# Patient Record
Sex: MALE | Race: WHITE | Employment: OTHER | URBAN - METROPOLITAN AREA
[De-identification: names, ages, dates, MRNs, and addresses within clinical notes are randomized per-mention and may not be internally consistent; named-entity substitution may affect disease eponyms.]

---

## 2018-12-19 ENCOUNTER — HOSPITAL ENCOUNTER (OUTPATIENT)
Dept: LAB | Age: 83
Discharge: HOME | End: 2018-12-19
Payer: MEDICARE

## 2018-12-19 LAB
APPEARANCE UR: ABNORMAL
BACTERIA URNS QL MICRO: ABNORMAL /HPF
BILIRUB UR QL: ABNORMAL
COLOR UR: ABNORMAL
EPITH CASTS URNS QL MICRO: ABNORMAL /LPF (ref 0–5)
GLUCOSE UR STRIP.AUTO-MCNC: 100 MG/DL
HGB UR QL STRIP: ABNORMAL
KETONES UR QL STRIP.AUTO: 40 MG/DL
LEUKOCYTE ESTERASE UR QL STRIP.AUTO: ABNORMAL
NITRITE UR QL STRIP.AUTO: NEGATIVE
PH UR STRIP: 8.5 [PH] (ref 5–8)
PROT UR STRIP-MCNC: >300 MG/DL
RBC #/AREA URNS HPF: ABNORMAL /HPF (ref 0–5)
SP GR UR REFRACTOMETRY: 1.01 (ref 1–1.03)
UROBILINOGEN UR QL STRIP.AUTO: 4 EU/DL (ref 0.2–1)
WBC URNS QL MICRO: ABNORMAL /HPF (ref 0–4)

## 2018-12-19 PROCEDURE — 81001 URINALYSIS AUTO W/SCOPE: CPT

## 2018-12-19 PROCEDURE — 87086 URINE CULTURE/COLONY COUNT: CPT

## 2018-12-21 LAB
BACTERIA SPEC CULT: NORMAL
SERVICE CMNT-IMP: NORMAL

## 2019-01-02 ENCOUNTER — HOSPITAL ENCOUNTER (OUTPATIENT)
Dept: CT IMAGING | Age: 84
Discharge: HOME | End: 2019-01-02
Attending: UROLOGY
Payer: MEDICARE

## 2019-01-02 DIAGNOSIS — R31.0 GROSS HEMATURIA: ICD-10-CM

## 2019-01-02 PROCEDURE — 74176 CT ABD & PELVIS W/O CONTRAST: CPT

## 2019-05-21 PROBLEM — C67.9 BLADDER CANCER (HCC): Status: ACTIVE | Noted: 2019-05-21

## 2021-08-06 ENCOUNTER — OFFICE VISIT (OUTPATIENT)
Dept: ORTHOPEDIC SURGERY | Age: 86
End: 2021-08-06
Payer: MEDICARE

## 2021-08-06 VITALS — BODY MASS INDEX: 32.49 KG/M2 | TEMPERATURE: 97.4 F | HEIGHT: 67 IN | WEIGHT: 207 LBS

## 2021-08-06 DIAGNOSIS — M19.032 PRIMARY OSTEOARTHRITIS OF LEFT WRIST: Primary | ICD-10-CM

## 2021-08-06 DIAGNOSIS — M10.9 ACUTE GOUT OF LEFT WRIST, UNSPECIFIED CAUSE: ICD-10-CM

## 2021-08-06 PROCEDURE — G8432 DEP SCR NOT DOC, RNG: HCPCS | Performed by: ORTHOPAEDIC SURGERY

## 2021-08-06 PROCEDURE — 20605 DRAIN/INJ JOINT/BURSA W/O US: CPT | Performed by: ORTHOPAEDIC SURGERY

## 2021-08-06 PROCEDURE — 1101F PT FALLS ASSESS-DOCD LE1/YR: CPT | Performed by: ORTHOPAEDIC SURGERY

## 2021-08-06 PROCEDURE — G8427 DOCREV CUR MEDS BY ELIG CLIN: HCPCS | Performed by: ORTHOPAEDIC SURGERY

## 2021-08-06 PROCEDURE — G8536 NO DOC ELDER MAL SCRN: HCPCS | Performed by: ORTHOPAEDIC SURGERY

## 2021-08-06 PROCEDURE — G8419 CALC BMI OUT NRM PARAM NOF/U: HCPCS | Performed by: ORTHOPAEDIC SURGERY

## 2021-08-06 PROCEDURE — 73110 X-RAY EXAM OF WRIST: CPT | Performed by: ORTHOPAEDIC SURGERY

## 2021-08-06 PROCEDURE — 99203 OFFICE O/P NEW LOW 30 MIN: CPT | Performed by: ORTHOPAEDIC SURGERY

## 2021-08-06 RX ORDER — PREDNISONE 10 MG/1
TABLET ORAL
COMMUNITY

## 2021-08-06 RX ORDER — MECLIZINE HYDROCHLORIDE 25 MG/1
TABLET ORAL
COMMUNITY

## 2021-08-06 RX ORDER — ALLOPURINOL 100 MG/1
TABLET ORAL DAILY
COMMUNITY

## 2021-08-06 NOTE — PROGRESS NOTES
Guanako Guido is a 80 y.o. male right handed retiree. Worker's Compensation and legal considerations: none filed. Vitals:    08/06/21 0901   Temp: 97.4 °F (36.3 °C)   Weight: 207 lb (93.9 kg)   Height: 5' 7\" (1.702 m)   PainSc:  10 - Worst pain ever           Chief Complaint   Patient presents with    Hand Pain     left    Wrist Pain     left         HPI: Patient presents today with complaints of left wrist pain as well as a pre-existing history of gout for which she takes allopurinol. Date of onset: July 2021    Injury: No    Prior Treatment:  No    Numbness/ Tingling: No      ROS: Review of Systems - General ROS: negative  Psychological ROS: negative  ENT ROS: negative  Allergy and Immunology ROS: negative  Hematological and Lymphatic ROS: negative  Respiratory ROS: no cough, shortness of breath, or wheezing  Cardiovascular ROS: no chest pain or dyspnea on exertion  Gastrointestinal ROS: no abdominal pain, change in bowel habits, or black or bloody stools  Musculoskeletal ROS: positive for - pain in wrist - left and swelling in wrist - left  Neurological ROS: negative  Dermatological ROS: negative    Past Medical History:   Diagnosis Date    Anemia     Atrial fibrillation (HCC)     CKD (chronic kidney disease), stage III (HCC)     Hypothyroidism     Renal insufficiency     Vitamin D deficiency        Past Surgical History:   Procedure Laterality Date    HX CARPAL TUNNEL RELEASE Left     HX CORONARY STENT PLACEMENT  12/04/2018    HX HIP REPLACEMENT      HX KNEE REPLACEMENT Left     HX ORTHOPAEDIC      hip replacement bilateral    HX TONSILLECTOMY      HX UROLOGICAL  05/14/2019    TURBT w postoperative intravesical instillation of gemcitabine. [address]       Current Outpatient Medications   Medication Sig Dispense Refill    predniSONE (DELTASONE) 10 mg tablet Take  by mouth daily (with breakfast).       meclizine (ANTIVERT) 25 mg tablet Take  by mouth three (3) times daily as needed for Dizziness.  allopurinoL (ZYLOPRIM) 100 mg tablet Take  by mouth daily.  apixaban (Eliquis) 2.5 mg tablet Take 2.5 mg by mouth two (2) times a day.  clopidogrel (PLAVIX) 75 mg tab       sotalol (BETAPACE) 80 mg tablet       amLODIPine (NORVASC) 10 mg tablet Take  by mouth daily.  atorvastatin (LIPITOR) 40 mg tablet 40 mg.      aspirin delayed-release 81 mg tablet 81 mg.      sodium bicarbonate 650 mg tablet       furosemide (LASIX) 20 mg tablet Take  by mouth daily.  levothyroxine (SYNTHROID) 100 mcg tablet Take 1 Tab by mouth Daily (before breakfast). 90 Tab 3    multivitamin (ONE A DAY) tablet Take 1 Tab by mouth daily. Current Facility-Administered Medications   Medication Dose Route Frequency Provider Last Rate Last Admin    triamcinolone acetonide (KENALOG) 10 mg/mL injection 5 mg  5 mg Intra artICUlar ONCE Oseas Jesus, DO           Allergies   Allergen Reactions    Codeine Nausea and Vomiting    Dilaudid [Hydromorphone] Nausea and Vomiting           PE:     Physical Exam  Vitals and nursing note reviewed. Constitutional:       General: He is not in acute distress. Appearance: Normal appearance. He is not ill-appearing. Cardiovascular:      Pulses: Normal pulses. Pulmonary:      Effort: Pulmonary effort is normal. No respiratory distress. Musculoskeletal:         General: Swelling and tenderness present. No deformity or signs of injury. Cervical back: Normal range of motion and neck supple. Right lower leg: No edema. Left lower leg: No edema. Skin:     General: Skin is warm and dry. Capillary Refill: Capillary refill takes less than 2 seconds. Findings: No bruising or erythema. Neurological:      General: No focal deficit present. Mental Status: He is alert and oriented to person, place, and time.    Psychiatric:         Mood and Affect: Mood normal.         Behavior: Behavior normal.            Left wrist: There is edema and tenderness to palpation over the dorsal radiocarpal joint. This is exacerbated with flexion and extension of the wrist.  There is also synovitis palpated. Neurovascularly intact distally. Imagin/6/2021 3 views of left wrist significant for moderate degenerative changes throughout the radiocarpal and midcarpal joints. ICD-10-CM ICD-9-CM    1. Primary osteoarthritis of left wrist  M19.032 715.13 AMB POC XRAY, WRIST; COMPLETE, 3+ VIE      triamcinolone acetonide (KENALOG) 10 mg/mL injection 5 mg      DRAIN/INJECT SMALL JOINT/BURSA      REFERRAL TO OCCUPATIONAL THERAPY   2. Acute gout of left wrist, unspecified cause  M10.9 274.01          Plan:     Left wrist joint injection and encourage patient to wear brace that he already has. Also encouraged him to continue taking the allopurinol daily. Follow-up and Dispositions    · Return if symptoms worsen or fail to improve. Plan was reviewed with patient, who verbalized agreement and understanding of the plan    [address] [place] [address] NOTE        Chart reviewed for the following:   Oseas REINOSO DO, have reviewed the History, Physical and updated the Allergic reactions for [address] performed immediately prior to start of procedure:   Oseas REINOSO DO, have performed the following reviews on Lux Ramsay prior to the start of the procedure:            * Patient was identified by name and date of birth   * Agreement on procedure being performed was verified  * Risks and Benefits explained to the patient  * Procedure site verified and marked as necessary  * Patient was positioned for comfort  * Consent was signed and verified     Time: 09:35 AM      Date of procedure: 2021    Procedure performed by:   Kirill Glynn DO    Provider assisted by: Kody Servin LPN    Patient assisted by: self    How tolerated by patient: tolerated the procedure well with no complications    Post Procedural Pain Scale: 0 - No Hurt    Comments: none    Procedure:  After consent was obtained, using sterile technique the left wrist joint was prepped. Local anesthetic used: 1% lidocaine. Kenalog 5 mg and was then injected and the needle withdrawn. The procedure was well tolerated. The patient is asked to continue to rest the area for a few more days before resuming regular activities. It may be more painful for the first 1-2 days. Watch for fever, or increased swelling or persistent pain in the joint. Call or return to clinic prn if such symptoms occur or there is failure to improve as anticipated.

## 2022-03-19 PROBLEM — C67.9 BLADDER CANCER (HCC): Status: ACTIVE | Noted: 2019-05-21

## 2023-09-18 ENCOUNTER — HOSPITAL ENCOUNTER (EMERGENCY)
Facility: HOSPITAL | Age: 88
Discharge: HOME | End: 2023-09-18
Attending: EMERGENCY MEDICINE
Payer: MEDICARE

## 2023-09-18 VITALS
DIASTOLIC BLOOD PRESSURE: 39 MMHG | OXYGEN SATURATION: 95 % | WEIGHT: 220 LBS | TEMPERATURE: 98.3 F | SYSTOLIC BLOOD PRESSURE: 115 MMHG | HEART RATE: 85 BPM | RESPIRATION RATE: 11 BRPM | BODY MASS INDEX: 34.46 KG/M2

## 2023-09-18 DIAGNOSIS — R33.8 ACUTE URINARY RETENTION: Primary | ICD-10-CM

## 2023-09-18 DIAGNOSIS — R31.0 GROSS HEMATURIA: ICD-10-CM

## 2023-09-18 LAB
ANION GAP SERPL CALC-SCNC: 9 MMOL/L (ref 3–18)
APPEARANCE UR: ABNORMAL
BACTERIA URNS QL MICRO: NEGATIVE /HPF
BASOPHILS # BLD: 0 K/UL (ref 0–0.1)
BASOPHILS NFR BLD: 0 % (ref 0–2)
BILIRUB UR QL: ABNORMAL
BUN SERPL-MCNC: 24 MG/DL (ref 7–18)
BUN/CREAT SERPL: 7 (ref 12–20)
CALCIUM SERPL-MCNC: 8.4 MG/DL (ref 8.5–10.1)
CHLORIDE SERPL-SCNC: 107 MMOL/L (ref 100–111)
CO2 SERPL-SCNC: 24 MMOL/L (ref 21–32)
COLOR UR: ABNORMAL
CREAT SERPL-MCNC: 3.55 MG/DL (ref 0.6–1.3)
DIFFERENTIAL METHOD BLD: ABNORMAL
EOSINOPHIL # BLD: 0.1 K/UL (ref 0–0.4)
EOSINOPHIL NFR BLD: 1 % (ref 0–5)
EPITH CASTS URNS QL MICRO: NEGATIVE /LPF (ref 0–5)
ERYTHROCYTE [DISTWIDTH] IN BLOOD BY AUTOMATED COUNT: 22.9 % (ref 11.6–14.5)
GLUCOSE SERPL-MCNC: 119 MG/DL (ref 74–99)
GLUCOSE UR STRIP.AUTO-MCNC: NEGATIVE MG/DL
HCT VFR BLD AUTO: 24.6 % (ref 36–48)
HGB BLD-MCNC: 8.1 G/DL (ref 13–16)
HGB UR QL STRIP: ABNORMAL
IMM GRANULOCYTES # BLD AUTO: 0 K/UL (ref 0–0.04)
IMM GRANULOCYTES NFR BLD AUTO: 0 % (ref 0–0.5)
KETONES UR QL STRIP.AUTO: NEGATIVE MG/DL
LEUKOCYTE ESTERASE UR QL STRIP.AUTO: ABNORMAL
LYMPHOCYTES # BLD: 4.8 K/UL (ref 0.9–3.6)
LYMPHOCYTES NFR BLD: 39 % (ref 21–52)
MCH RBC QN AUTO: 34.2 PG (ref 24–34)
MCHC RBC AUTO-ENTMCNC: 32.9 G/DL (ref 31–37)
MCV RBC AUTO: 103.8 FL (ref 78–100)
MONOCYTES # BLD: 0.6 K/UL (ref 0.05–1.2)
MONOCYTES NFR BLD: 5 % (ref 3–10)
NEUTS BAND NFR BLD MANUAL: 2 %
NEUTS SEG # BLD: 6.8 K/UL (ref 1.8–8)
NEUTS SEG NFR BLD: 53 % (ref 40–73)
NITRITE UR QL STRIP.AUTO: NEGATIVE
NRBC # BLD: 0 K/UL (ref 0–0.01)
NRBC BLD-RTO: 0 PER 100 WBC
PH UR STRIP: 7 (ref 5–8)
PLATELET # BLD AUTO: 505 K/UL (ref 135–420)
PLATELET COMMENT: ABNORMAL
PMV BLD AUTO: 10.5 FL (ref 9.2–11.8)
POTASSIUM SERPL-SCNC: 4.3 MMOL/L (ref 3.5–5.5)
PROT UR STRIP-MCNC: >1000 MG/DL
RBC # BLD AUTO: 2.37 M/UL (ref 4.35–5.65)
RBC #/AREA URNS HPF: NORMAL /HPF (ref 0–5)
RBC MORPH BLD: ABNORMAL
RBC MORPH BLD: ABNORMAL
SODIUM SERPL-SCNC: 140 MMOL/L (ref 136–145)
SP GR UR REFRACTOMETRY: 1.02 (ref 1–1.03)
UROBILINOGEN UR QL STRIP.AUTO: 1 EU/DL (ref 0.2–1)
WBC # BLD AUTO: 12.3 K/UL (ref 4.6–13.2)
WBC URNS QL MICRO: NEGATIVE /HPF (ref 0–4)

## 2023-09-18 PROCEDURE — 99284 EMERGENCY DEPT VISIT MOD MDM: CPT

## 2023-09-18 PROCEDURE — 81001 URINALYSIS AUTO W/SCOPE: CPT

## 2023-09-18 PROCEDURE — 51700 IRRIGATION OF BLADDER: CPT

## 2023-09-18 PROCEDURE — 80048 BASIC METABOLIC PNL TOTAL CA: CPT

## 2023-09-18 PROCEDURE — 51798 US URINE CAPACITY MEASURE: CPT

## 2023-09-18 PROCEDURE — 96374 THER/PROPH/DIAG INJ IV PUSH: CPT

## 2023-09-18 PROCEDURE — 6360000002 HC RX W HCPCS: Performed by: EMERGENCY MEDICINE

## 2023-09-18 PROCEDURE — 6370000000 HC RX 637 (ALT 250 FOR IP): Performed by: EMERGENCY MEDICINE

## 2023-09-18 PROCEDURE — 87086 URINE CULTURE/COLONY COUNT: CPT

## 2023-09-18 PROCEDURE — 85025 COMPLETE CBC W/AUTO DIFF WBC: CPT

## 2023-09-18 RX ORDER — LIDOCAINE HYDROCHLORIDE 20 MG/ML
JELLY TOPICAL
Status: COMPLETED | OUTPATIENT
Start: 2023-09-18 | End: 2023-09-18

## 2023-09-18 RX ORDER — FENTANYL CITRATE 50 UG/ML
25 INJECTION, SOLUTION INTRAMUSCULAR; INTRAVENOUS ONCE
Status: COMPLETED | OUTPATIENT
Start: 2023-09-18 | End: 2023-09-18

## 2023-09-18 RX ORDER — MORPHINE SULFATE 4 MG/ML
4 INJECTION, SOLUTION INTRAMUSCULAR; INTRAVENOUS
Status: DISCONTINUED | OUTPATIENT
Start: 2023-09-18 | End: 2023-09-18

## 2023-09-18 RX ORDER — MIDODRINE HYDROCHLORIDE 5 MG/1
10 TABLET ORAL ONCE
Status: COMPLETED | OUTPATIENT
Start: 2023-09-18 | End: 2023-09-18

## 2023-09-18 RX ADMIN — LIDOCAINE HYDROCHLORIDE: 20 JELLY TOPICAL at 04:15

## 2023-09-18 RX ADMIN — MIDODRINE HYDROCHLORIDE 10 MG: 5 TABLET ORAL at 05:12

## 2023-09-18 RX ADMIN — FENTANYL CITRATE 25 MCG: 50 INJECTION INTRAMUSCULAR; INTRAVENOUS at 04:20

## 2023-09-18 ASSESSMENT — PAIN DESCRIPTION - ORIENTATION: ORIENTATION: LOWER

## 2023-09-18 ASSESSMENT — PAIN DESCRIPTION - LOCATION: LOCATION: ABDOMEN

## 2023-09-18 ASSESSMENT — PAIN SCALES - GENERAL: PAINLEVEL_OUTOF10: 10

## 2023-09-18 ASSESSMENT — PAIN DESCRIPTION - DESCRIPTORS: DESCRIPTORS: CRAMPING;POUNDING

## 2023-09-18 NOTE — ED NOTES
Existing hebert removed. New hebert placed. Pt with 800ml of bloody urine out.   New hebert is draining without difficulty     Fadi Wilkerson RN  09/18/23 9960

## 2023-09-18 NOTE — ED NOTES
Pt hebert connected to leg bag. Pt given discharge instructions. PIV removed.   RAMOS Rondon RN  09/18/23 4265

## 2023-09-18 NOTE — ED PROVIDER NOTES
breakfast)      sodium polystyrene (KAYEXALATE) 15 GM/60ML suspension TAKE 60 MLS BY MOUTH TWICE WEEKLY         Past History     Past Medical History:  Past Medical History:   Diagnosis Date    Anemia     Arthritis     Atrial fibrillation ([address])     Broken leg 09/2022    CHF exacerbation ([address]) 01/08/2021    CKD (chronic kidney disease), stage III ([address])     Essential hypertension 01/18/2016    Exercise tolerance finding 10/2020    WORKS 8HRS/DAY NO CP OR SOB ON EXERTION    Exercise tolerance finding 05/2019    sob when up 2 flights of stairs no cp    Gout     Hay fever     Hearing impaired     History of pneumonia     Hyperkalemia 10/2020    Hypothyroidism     Leukocytosis     Renal insufficiency     Varicella     Vertigo     Vitamin D deficiency        Past Surgical History:  Past Surgical History:   Procedure Laterality Date    BLADDER SURGERY  04/12/2023    CARPAL TUNNEL RELEASE Left     COLOSTOMY  04/11/2023    CORONARY ANGIOPLASTY WITH STENT PLACEMENT  12/04/2018    ORTHOPEDIC SURGERY      hip replacement bilateral    OTHER SURGICAL HISTORY  04/10/2023    Blood clot removal of  the bladder    TONSILLECTOMY      TOTAL HIP ARTHROPLASTY      TOTAL KNEE ARTHROPLASTY Left     UROLOGICAL SURGERY  05/14/2019    TURBT w postoperative intravesical instillation of gemcitabine. [address] [facility]  Dr Fer Graham       Family History:  Family History   Problem Relation Age of Onset    Osteoarthritis Other     Heart Disease Father     Diabetes Sister     Hypertension Sister     Diabetes Brother     Cancer Brother        Social History:  Social History     Tobacco Use    Smoking status: Never    Smokeless tobacco: Never   Substance Use Topics    Alcohol use: No       Allergies:   Allergies   Allergen Reactions    Codeine Nausea And Vomiting    Hydromorphone Nausea And Vomiting    Lorazepam      Other reaction(s): psychological reaction  COMBATIVE         Physical Exam     General: Patient is awake and alert, appears uncomfortable, moaning in

## 2023-09-18 NOTE — ED TRIAGE NOTES
Pt presents via medic c/o no urine output from hebert for the last 4 hours.   Pt has indwelling 3 way hebert on arrival.  VSS

## 2023-09-19 LAB
BACTERIA SPEC CULT: NORMAL
SERVICE CMNT-IMP: NORMAL

## 2023-09-21 PROBLEM — I48.0 HYPERCOAGULABLE STATE DUE TO PAROXYSMAL ATRIAL FIBRILLATION (HCC): Status: ACTIVE | Noted: 2023-09-21

## 2023-09-21 PROBLEM — E78.00 PURE HYPERCHOLESTEROLEMIA: Status: ACTIVE | Noted: 2019-01-10

## 2023-09-21 PROBLEM — M62.81 MUSCLE WEAKNESS (GENERALIZED): Status: ACTIVE | Noted: 2023-08-23

## 2023-09-21 PROBLEM — I12.9 HYPERTENSIVE CHRONIC KIDNEY DISEASE WITH STAGE 1 THROUGH STAGE 4 CHRONIC KIDNEY DISEASE, OR UNSPECIFIED CHRONIC KIDNEY DISEASE: Status: ACTIVE | Noted: 2019-11-19

## 2023-09-21 PROBLEM — N25.81 SECONDARY HYPERPARATHYROIDISM OF RENAL ORIGIN (HCC): Status: ACTIVE | Noted: 2023-05-24

## 2023-09-21 PROBLEM — E55.9 VITAMIN D DEFICIENCY: Status: ACTIVE | Noted: 2017-10-16

## 2023-09-21 PROBLEM — T38.0X5A STEROID-INDUCED HYPERGLYCEMIA: Status: ACTIVE | Noted: 2023-07-31

## 2023-09-21 PROBLEM — D49.4 NEOPLASM OF BLADDER: Status: ACTIVE | Noted: 2019-05-21

## 2023-09-21 PROBLEM — K21.9 GASTRO-ESOPHAGEAL REFLUX DISEASE WITHOUT ESOPHAGITIS: Status: ACTIVE | Noted: 2023-08-23

## 2023-09-21 PROBLEM — Q61.02 MULTIPLE RENAL CYSTS: Status: ACTIVE | Noted: 2019-11-19

## 2023-09-21 PROBLEM — Z95.5 S/P RIGHT CORONARY ARTERY (RCA) STENT PLACEMENT: Status: ACTIVE | Noted: 2018-12-05

## 2023-09-21 PROBLEM — J47.9 BRONCHIECTASIS WITHOUT COMPLICATION (HCC): Status: ACTIVE | Noted: 2022-05-23

## 2023-09-21 PROBLEM — I48.0 PAF (PAROXYSMAL ATRIAL FIBRILLATION) (HCC): Status: ACTIVE | Noted: 2019-01-10

## 2023-09-21 PROBLEM — D68.69 HYPERCOAGULABLE STATE DUE TO PAROXYSMAL ATRIAL FIBRILLATION (HCC): Status: ACTIVE | Noted: 2023-09-21

## 2023-09-21 PROBLEM — Z93.3 COLOSTOMY PRESENT (HCC): Status: ACTIVE | Noted: 2023-09-20

## 2023-09-21 PROBLEM — Z97.8 INDWELLING FOLEY CATHETER PRESENT: Status: ACTIVE | Noted: 2023-09-20

## 2023-09-21 PROBLEM — Z99.2 END-STAGE RENAL DISEASE ON HEMODIALYSIS (HCC): Status: ACTIVE | Noted: 2023-08-12

## 2023-09-21 PROBLEM — G93.41 METABOLIC ENCEPHALOPATHY: Status: ACTIVE | Noted: 2023-08-23

## 2023-09-21 PROBLEM — I63.9 CEREBRAL INFARCTION, UNSPECIFIED (HCC): Status: ACTIVE | Noted: 2023-08-23

## 2023-09-21 PROBLEM — M1A.39X0 CHRONIC GOUT DUE TO RENAL IMPAIRMENT OF MULTIPLE SITES WITHOUT TOPHUS: Status: ACTIVE | Noted: 2021-10-28

## 2023-09-21 PROBLEM — M97.11XA PERIPROSTHETIC FRACTURE AROUND INTERNAL PROSTHETIC RIGHT KNEE JOINT: Status: ACTIVE | Noted: 2022-09-07

## 2023-09-21 PROBLEM — I27.20 PULMONARY HYPERTENSION, UNSPECIFIED (HCC): Status: ACTIVE | Noted: 2023-02-01

## 2023-09-21 PROBLEM — C80.1 PRIMARY MALIGNANT NEOPLASM (HCC): Status: ACTIVE | Noted: 2019-12-17

## 2023-09-21 PROBLEM — R73.9 STEROID-INDUCED HYPERGLYCEMIA: Status: ACTIVE | Noted: 2023-07-31

## 2023-09-21 PROBLEM — I77.810 THORACIC AORTIC ECTASIA (HCC): Status: ACTIVE | Noted: 2023-05-24

## 2023-09-21 PROBLEM — I25.5 ISCHEMIC CARDIOMYOPATHY: Status: ACTIVE | Noted: 2023-08-12

## 2023-09-21 PROBLEM — I25.10 CORONARY ARTERIOSCLEROSIS: Status: ACTIVE | Noted: 2019-04-09

## 2023-09-21 PROBLEM — N13.9 OBSTRUCTIVE AND REFLUX UROPATHY, UNSPECIFIED: Status: ACTIVE | Noted: 2023-08-23

## 2023-09-21 PROBLEM — D89.89 OTHER SPECIFIED DISORDERS INVOLVING THE IMMUNE MECHANISM, NOT ELSEWHERE CLASSIFIED (HCC): Status: ACTIVE | Noted: 2023-08-23

## 2023-09-21 PROBLEM — E78.5 HYPERLIPIDEMIA, UNSPECIFIED: Status: ACTIVE | Noted: 2023-08-23

## 2023-09-21 PROBLEM — N18.6 END-STAGE RENAL DISEASE ON HEMODIALYSIS (HCC): Status: ACTIVE | Noted: 2023-08-12

## 2023-09-21 PROBLEM — H91.90 HEARING IMPAIRED: Status: ACTIVE | Noted: 2023-09-21

## 2023-09-21 PROBLEM — E46 PROTEIN-CALORIE MALNUTRITION (HCC): Status: ACTIVE | Noted: 2023-08-23

## 2023-09-21 PROBLEM — R26.9 UNSPECIFIED ABNORMALITIES OF GAIT AND MOBILITY: Status: ACTIVE | Noted: 2023-08-23

## 2023-09-28 ENCOUNTER — CLINICAL DOCUMENTATION (OUTPATIENT)
Age: 88
End: 2023-09-28

## 2023-09-28 NOTE — PROGRESS NOTES
Received referral from [facility], spoke to [employer] ([phone]) sister in law stated that patient refuse access on his arm. He is happy where it is at. Fax info to the facility. Document scanned.

## 2023-10-09 ENCOUNTER — TRANSCRIBE ORDERS (OUTPATIENT)
Facility: HOSPITAL | Age: 88
End: 2023-10-09

## 2023-10-09 DIAGNOSIS — C67.9 MALIGNANT NEOPLASM OF URINARY BLADDER, UNSPECIFIED SITE (HCC): Primary | ICD-10-CM

## 2023-10-21 ENCOUNTER — APPOINTMENT (OUTPATIENT)
Facility: HOSPITAL | Age: 88
End: 2023-10-21
Payer: MEDICARE

## 2023-10-21 ENCOUNTER — HOSPITAL ENCOUNTER (INPATIENT)
Facility: HOSPITAL | Age: 88
LOS: 35 days | Discharge: NURSING, REHAB OR LONG-TERM CARE | End: 2023-11-25
Attending: STUDENT IN AN ORGANIZED HEALTH CARE EDUCATION/TRAINING PROGRAM | Admitting: STUDENT IN AN ORGANIZED HEALTH CARE EDUCATION/TRAINING PROGRAM
Payer: MEDICARE

## 2023-10-21 DIAGNOSIS — R09.02 HYPOXIA: ICD-10-CM

## 2023-10-21 DIAGNOSIS — R53.81 DEBILITY: ICD-10-CM

## 2023-10-21 DIAGNOSIS — Z71.89 GOALS OF CARE, COUNSELING/DISCUSSION: Primary | ICD-10-CM

## 2023-10-21 DIAGNOSIS — R19.5 OCCULT BLOOD POSITIVE STOOL: ICD-10-CM

## 2023-10-21 DIAGNOSIS — T83.511A URINARY TRACT INFECTION ASSOCIATED WITH INDWELLING URETHRAL CATHETER, INITIAL ENCOUNTER (HCC): ICD-10-CM

## 2023-10-21 DIAGNOSIS — D72.829 LEUKOCYTOSIS, UNSPECIFIED TYPE: ICD-10-CM

## 2023-10-21 DIAGNOSIS — R10.9 ABDOMINAL PAIN: ICD-10-CM

## 2023-10-21 DIAGNOSIS — R60.0 EDEMA OF RIGHT UPPER EXTREMITY: ICD-10-CM

## 2023-10-21 DIAGNOSIS — N39.0 URINARY TRACT INFECTION ASSOCIATED WITH INDWELLING URETHRAL CATHETER, INITIAL ENCOUNTER (HCC): ICD-10-CM

## 2023-10-21 DIAGNOSIS — A04.72 C. DIFFICILE COLITIS: ICD-10-CM

## 2023-10-21 PROBLEM — A41.9 SEVERE SEPSIS (HCC): Status: ACTIVE | Noted: 2023-08-23

## 2023-10-21 PROBLEM — R65.20 SEVERE SEPSIS (HCC): Status: ACTIVE | Noted: 2023-08-23

## 2023-10-21 PROBLEM — N30.90 CYSTITIS: Status: ACTIVE | Noted: 2023-10-21

## 2023-10-21 PROBLEM — J96.01 ACUTE RESPIRATORY FAILURE WITH HYPOXIA (HCC): Status: ACTIVE | Noted: 2023-10-21

## 2023-10-21 PROBLEM — E87.6 HYPOKALEMIA: Status: ACTIVE | Noted: 2023-10-21

## 2023-10-21 LAB
ALBUMIN SERPL-MCNC: 2.1 G/DL (ref 3.4–5)
ALBUMIN/GLOB SERPL: 0.8 (ref 0.8–1.7)
ALP SERPL-CCNC: 119 U/L (ref 45–117)
ALT SERPL-CCNC: 17 U/L (ref 16–61)
AMORPH CRY URNS QL MICRO: ABNORMAL
ANION GAP SERPL CALC-SCNC: 5 MMOL/L (ref 3–18)
APPEARANCE UR: ABNORMAL
AST SERPL-CCNC: 21 U/L (ref 10–38)
B PERT DNA SPEC QL NAA+PROBE: NOT DETECTED
BACTERIA URNS QL MICRO: ABNORMAL /HPF
BASOPHILS # BLD: 0.2 K/UL (ref 0–0.1)
BASOPHILS NFR BLD: 1 % (ref 0–2)
BILIRUB SERPL-MCNC: 1.2 MG/DL (ref 0.2–1)
BILIRUB UR QL: ABNORMAL
BORDETELLA PARAPERTUSSIS BY PCR: NOT DETECTED
BUN SERPL-MCNC: 14 MG/DL (ref 7–18)
BUN/CREAT SERPL: 4 (ref 12–20)
C PNEUM DNA SPEC QL NAA+PROBE: NOT DETECTED
CALCIUM SERPL-MCNC: 7.5 MG/DL (ref 8.5–10.1)
CHLORIDE SERPL-SCNC: 103 MMOL/L (ref 100–111)
CO2 SERPL-SCNC: 30 MMOL/L (ref 21–32)
COLOR UR: ABNORMAL
CREAT SERPL-MCNC: 3.67 MG/DL (ref 0.6–1.3)
DIFFERENTIAL METHOD BLD: ABNORMAL
EOSINOPHIL # BLD: 0 K/UL (ref 0–0.4)
EOSINOPHIL NFR BLD: 0 % (ref 0–5)
EPITH CASTS URNS QL MICRO: ABNORMAL /LPF (ref 0–5)
ERYTHROCYTE [DISTWIDTH] IN BLOOD BY AUTOMATED COUNT: 21.3 % (ref 11.6–14.5)
FLUAV SUBTYP SPEC NAA+PROBE: NOT DETECTED
FLUBV RNA SPEC QL NAA+PROBE: NOT DETECTED
GLOBULIN SER CALC-MCNC: 2.7 G/DL (ref 2–4)
GLUCOSE SERPL-MCNC: 128 MG/DL (ref 74–99)
GLUCOSE UR STRIP.AUTO-MCNC: NEGATIVE MG/DL
HADV DNA SPEC QL NAA+PROBE: NOT DETECTED
HCOV 229E RNA SPEC QL NAA+PROBE: NOT DETECTED
HCOV HKU1 RNA SPEC QL NAA+PROBE: NOT DETECTED
HCOV NL63 RNA SPEC QL NAA+PROBE: NOT DETECTED
HCOV OC43 RNA SPEC QL NAA+PROBE: NOT DETECTED
HCT VFR BLD AUTO: 27.2 % (ref 36–48)
HGB BLD-MCNC: 8.9 G/DL (ref 13–16)
HGB UR QL STRIP: ABNORMAL
HMPV RNA SPEC QL NAA+PROBE: NOT DETECTED
HPIV1 RNA SPEC QL NAA+PROBE: NOT DETECTED
HPIV2 RNA SPEC QL NAA+PROBE: NOT DETECTED
HPIV3 RNA SPEC QL NAA+PROBE: NOT DETECTED
HPIV4 RNA SPEC QL NAA+PROBE: NOT DETECTED
IMM GRANULOCYTES # BLD AUTO: 0 K/UL (ref 0–0.04)
IMM GRANULOCYTES NFR BLD AUTO: 0 % (ref 0–0.5)
KETONES UR QL STRIP.AUTO: NEGATIVE MG/DL
LACTATE SERPL-SCNC: 2 MMOL/L (ref 0.4–2)
LEUKOCYTE ESTERASE UR QL STRIP.AUTO: ABNORMAL
LIPASE SERPL-CCNC: 32 U/L (ref 73–393)
LYMPHOCYTES # BLD: 2.2 K/UL (ref 0.9–3.6)
LYMPHOCYTES NFR BLD: 9 % (ref 21–52)
M PNEUMO DNA SPEC QL NAA+PROBE: NOT DETECTED
MCH RBC QN AUTO: 35.3 PG (ref 24–34)
MCHC RBC AUTO-ENTMCNC: 32.7 G/DL (ref 31–37)
MCV RBC AUTO: 107.9 FL (ref 78–100)
MONOCYTES # BLD: 0 K/UL (ref 0.05–1.2)
MONOCYTES NFR BLD: 0 % (ref 3–10)
NEUTS SEG # BLD: 22.3 K/UL (ref 1.8–8)
NEUTS SEG NFR BLD: 90 % (ref 40–73)
NITRITE UR QL STRIP.AUTO: NEGATIVE
NRBC # BLD: 0.02 K/UL (ref 0–0.01)
NRBC BLD-RTO: 0.1 PER 100 WBC
NT PRO BNP: 9032 PG/ML (ref 0–1800)
PH UR STRIP: 6 (ref 5–8)
PLATELET # BLD AUTO: 438 K/UL (ref 135–420)
PLATELET COMMENT: ABNORMAL
PMV BLD AUTO: 10.4 FL (ref 9.2–11.8)
POTASSIUM SERPL-SCNC: 3.3 MMOL/L (ref 3.5–5.5)
PROT SERPL-MCNC: 4.8 G/DL (ref 6.4–8.2)
PROT UR STRIP-MCNC: 300 MG/DL
RBC # BLD AUTO: 2.52 M/UL (ref 4.35–5.65)
RBC #/AREA URNS HPF: ABNORMAL /HPF (ref 0–5)
RBC MORPH BLD: ABNORMAL
RSV RNA SPEC QL NAA+PROBE: NOT DETECTED
RV+EV RNA SPEC QL NAA+PROBE: NOT DETECTED
SARS-COV-2 RNA RESP QL NAA+PROBE: NOT DETECTED
SODIUM SERPL-SCNC: 138 MMOL/L (ref 136–145)
SP GR UR REFRACTOMETRY: >1.03 (ref 1–1.03)
TROPONIN I SERPL HS-MCNC: 54 NG/L (ref 0–78)
UROBILINOGEN UR QL STRIP.AUTO: 1 EU/DL (ref 0.2–1)
WBC # BLD AUTO: 24.7 K/UL (ref 4.6–13.2)
WBC URNS QL MICRO: ABNORMAL /HPF (ref 0–4)

## 2023-10-21 PROCEDURE — 96365 THER/PROPH/DIAG IV INF INIT: CPT

## 2023-10-21 PROCEDURE — 87040 BLOOD CULTURE FOR BACTERIA: CPT

## 2023-10-21 PROCEDURE — 99285 EMERGENCY DEPT VISIT HI MDM: CPT

## 2023-10-21 PROCEDURE — 71275 CT ANGIOGRAPHY CHEST: CPT

## 2023-10-21 PROCEDURE — 6370000000 HC RX 637 (ALT 250 FOR IP): Performed by: PHYSICIAN ASSISTANT

## 2023-10-21 PROCEDURE — 84484 ASSAY OF TROPONIN QUANT: CPT

## 2023-10-21 PROCEDURE — 80053 COMPREHEN METABOLIC PANEL: CPT

## 2023-10-21 PROCEDURE — 2580000003 HC RX 258: Performed by: STUDENT IN AN ORGANIZED HEALTH CARE EDUCATION/TRAINING PROGRAM

## 2023-10-21 PROCEDURE — 87086 URINE CULTURE/COLONY COUNT: CPT

## 2023-10-21 PROCEDURE — 83605 ASSAY OF LACTIC ACID: CPT

## 2023-10-21 PROCEDURE — 87077 CULTURE AEROBIC IDENTIFY: CPT

## 2023-10-21 PROCEDURE — 87186 SC STD MICRODIL/AGAR DIL: CPT

## 2023-10-21 PROCEDURE — 83880 ASSAY OF NATRIURETIC PEPTIDE: CPT

## 2023-10-21 PROCEDURE — 6360000004 HC RX CONTRAST MEDICATION: Performed by: PHYSICIAN ASSISTANT

## 2023-10-21 PROCEDURE — 71045 X-RAY EXAM CHEST 1 VIEW: CPT

## 2023-10-21 PROCEDURE — 1100000000 HC RM PRIVATE

## 2023-10-21 PROCEDURE — 99223 1ST HOSP IP/OBS HIGH 75: CPT | Performed by: STUDENT IN AN ORGANIZED HEALTH CARE EDUCATION/TRAINING PROGRAM

## 2023-10-21 PROCEDURE — 2580000003 HC RX 258: Performed by: PHYSICIAN ASSISTANT

## 2023-10-21 PROCEDURE — 85025 COMPLETE CBC W/AUTO DIFF WBC: CPT

## 2023-10-21 PROCEDURE — 96375 TX/PRO/DX INJ NEW DRUG ADDON: CPT

## 2023-10-21 PROCEDURE — 0202U NFCT DS 22 TRGT SARS-COV-2: CPT

## 2023-10-21 PROCEDURE — 74177 CT ABD & PELVIS W/CONTRAST: CPT

## 2023-10-21 PROCEDURE — 93005 ELECTROCARDIOGRAM TRACING: CPT | Performed by: PHYSICIAN ASSISTANT

## 2023-10-21 PROCEDURE — 96367 TX/PROPH/DG ADDL SEQ IV INF: CPT

## 2023-10-21 PROCEDURE — 6370000000 HC RX 637 (ALT 250 FOR IP): Performed by: STUDENT IN AN ORGANIZED HEALTH CARE EDUCATION/TRAINING PROGRAM

## 2023-10-21 PROCEDURE — 83690 ASSAY OF LIPASE: CPT

## 2023-10-21 PROCEDURE — 81001 URINALYSIS AUTO W/SCOPE: CPT

## 2023-10-21 PROCEDURE — 6360000002 HC RX W HCPCS: Performed by: PHYSICIAN ASSISTANT

## 2023-10-21 RX ORDER — SODIUM CHLORIDE 0.9 % (FLUSH) 0.9 %
5-40 SYRINGE (ML) INJECTION PRN
Status: DISCONTINUED | OUTPATIENT
Start: 2023-10-21 | End: 2023-11-25 | Stop reason: HOSPADM

## 2023-10-21 RX ORDER — MIDODRINE HYDROCHLORIDE 10 MG/1
10 TABLET ORAL
Status: DISCONTINUED | OUTPATIENT
Start: 2023-10-22 | End: 2023-11-05

## 2023-10-21 RX ORDER — HEPARIN SODIUM 5000 [USP'U]/ML
5000 INJECTION, SOLUTION INTRAVENOUS; SUBCUTANEOUS EVERY 8 HOURS SCHEDULED
Status: DISCONTINUED | OUTPATIENT
Start: 2023-10-21 | End: 2023-11-25 | Stop reason: HOSPADM

## 2023-10-21 RX ORDER — POTASSIUM CHLORIDE 7.45 MG/ML
10 INJECTION INTRAVENOUS
Status: COMPLETED | OUTPATIENT
Start: 2023-10-22 | End: 2023-10-22

## 2023-10-21 RX ORDER — SODIUM CHLORIDE 0.9 % (FLUSH) 0.9 %
5-40 SYRINGE (ML) INJECTION EVERY 12 HOURS SCHEDULED
Status: DISCONTINUED | OUTPATIENT
Start: 2023-10-21 | End: 2023-11-25 | Stop reason: HOSPADM

## 2023-10-21 RX ORDER — IODIXANOL 320 MG/ML
100 INJECTION, SOLUTION INTRAVASCULAR
Status: COMPLETED | OUTPATIENT
Start: 2023-10-21 | End: 2023-10-21

## 2023-10-21 RX ORDER — ONDANSETRON 2 MG/ML
4 INJECTION INTRAMUSCULAR; INTRAVENOUS
Status: COMPLETED | OUTPATIENT
Start: 2023-10-21 | End: 2023-10-21

## 2023-10-21 RX ORDER — ONDANSETRON 2 MG/ML
4 INJECTION INTRAMUSCULAR; INTRAVENOUS EVERY 6 HOURS PRN
Status: DISCONTINUED | OUTPATIENT
Start: 2023-10-21 | End: 2023-11-25 | Stop reason: HOSPADM

## 2023-10-21 RX ORDER — ACETAMINOPHEN 325 MG/1
650 TABLET ORAL
Status: COMPLETED | OUTPATIENT
Start: 2023-10-21 | End: 2023-10-21

## 2023-10-21 RX ORDER — ONDANSETRON 4 MG/1
4 TABLET, ORALLY DISINTEGRATING ORAL EVERY 8 HOURS PRN
Status: DISCONTINUED | OUTPATIENT
Start: 2023-10-21 | End: 2023-11-25 | Stop reason: HOSPADM

## 2023-10-21 RX ORDER — AMIODARONE HYDROCHLORIDE 200 MG/1
200 TABLET ORAL DAILY
Status: DISCONTINUED | OUTPATIENT
Start: 2023-10-22 | End: 2023-11-25 | Stop reason: HOSPADM

## 2023-10-21 RX ORDER — POLYETHYLENE GLYCOL 3350 17 G/17G
17 POWDER, FOR SOLUTION ORAL DAILY PRN
Status: DISCONTINUED | OUTPATIENT
Start: 2023-10-21 | End: 2023-11-05

## 2023-10-21 RX ORDER — ALBUMIN (HUMAN) 12.5 G/50ML
25 SOLUTION INTRAVENOUS ONCE
Status: COMPLETED | OUTPATIENT
Start: 2023-10-21 | End: 2023-10-22

## 2023-10-21 RX ORDER — ACETAMINOPHEN 325 MG/1
650 TABLET ORAL EVERY 6 HOURS PRN
Status: DISCONTINUED | OUTPATIENT
Start: 2023-10-21 | End: 2023-10-23

## 2023-10-21 RX ORDER — MIDODRINE HYDROCHLORIDE 5 MG/1
20 TABLET ORAL
Status: COMPLETED | OUTPATIENT
Start: 2023-10-21 | End: 2023-10-21

## 2023-10-21 RX ORDER — LEVOTHYROXINE SODIUM 0.12 MG/1
125 TABLET ORAL DAILY
Status: DISCONTINUED | OUTPATIENT
Start: 2023-10-22 | End: 2023-11-25 | Stop reason: HOSPADM

## 2023-10-21 RX ORDER — CARVEDILOL 3.12 MG/1
3.12 TABLET ORAL 2 TIMES DAILY
Status: DISCONTINUED | OUTPATIENT
Start: 2023-10-22 | End: 2023-11-05

## 2023-10-21 RX ORDER — ACETAMINOPHEN 650 MG/1
650 SUPPOSITORY RECTAL EVERY 6 HOURS PRN
Status: DISCONTINUED | OUTPATIENT
Start: 2023-10-21 | End: 2023-10-23

## 2023-10-21 RX ORDER — SODIUM CHLORIDE 9 MG/ML
INJECTION, SOLUTION INTRAVENOUS PRN
Status: DISCONTINUED | OUTPATIENT
Start: 2023-10-21 | End: 2023-11-25 | Stop reason: HOSPADM

## 2023-10-21 RX ADMIN — VANCOMYCIN HYDROCHLORIDE 1000 MG: 1 INJECTION, POWDER, LYOPHILIZED, FOR SOLUTION INTRAVENOUS at 18:19

## 2023-10-21 RX ADMIN — POTASSIUM BICARBONATE 40 MEQ: 782 TABLET, EFFERVESCENT ORAL at 21:31

## 2023-10-21 RX ADMIN — ACETAMINOPHEN 325MG 650 MG: 325 TABLET ORAL at 16:29

## 2023-10-21 RX ADMIN — ONDANSETRON 4 MG: 2 INJECTION INTRAMUSCULAR; INTRAVENOUS at 19:10

## 2023-10-21 RX ADMIN — MIDODRINE HYDROCHLORIDE 20 MG: 5 TABLET ORAL at 21:40

## 2023-10-21 RX ADMIN — VANCOMYCIN HYDROCHLORIDE 1000 MG: 1 INJECTION, POWDER, LYOPHILIZED, FOR SOLUTION INTRAVENOUS at 16:55

## 2023-10-21 RX ADMIN — IODIXANOL 100 ML: 320 INJECTION, SOLUTION INTRAVASCULAR at 20:21

## 2023-10-21 RX ADMIN — PIPERACILLIN AND TAZOBACTAM 4500 MG: 4; .5 INJECTION, POWDER, LYOPHILIZED, FOR SOLUTION INTRAVENOUS at 16:36

## 2023-10-21 RX ADMIN — SODIUM CHLORIDE, PRESERVATIVE FREE 10 ML: 5 INJECTION INTRAVENOUS at 23:17

## 2023-10-21 ASSESSMENT — ENCOUNTER SYMPTOMS
RHINORRHEA: 0
EYE REDNESS: 0
STRIDOR: 0
WHEEZING: 0
SHORTNESS OF BREATH: 0
ABDOMINAL PAIN: 0
NAUSEA: 0
VOMITING: 0
BACK PAIN: 0
EYE DISCHARGE: 0
SORE THROAT: 0
DIARRHEA: 1
COUGH: 1
CONSTIPATION: 0

## 2023-10-21 ASSESSMENT — LIFESTYLE VARIABLES
HOW OFTEN DO YOU HAVE A DRINK CONTAINING ALCOHOL: NEVER
HOW MANY STANDARD DRINKS CONTAINING ALCOHOL DO YOU HAVE ON A TYPICAL DAY: PATIENT DOES NOT DRINK

## 2023-10-21 NOTE — ED NOTES
[phone] wife Joanna Charles).  Verbal permission obtained from the patient to speak with his wife regarding protected healthcare information     Negar Fisher RN  10/21/23 0688

## 2023-10-21 NOTE — ED TRIAGE NOTES
Patient bibems. Per family pt has not eaten or drank in 24-48 hours. Pt c/o nausea with dry heaves. Patient also has possible blood in hebert. Family also endorsing new confusion. Pt is a dialysis pt and has an ostomy.

## 2023-10-21 NOTE — ED PROVIDER NOTES
right lower lobe lung nodule recommend 3-month follow-up cholelithiasis and splenomegaly no evidence of obstruction. Patient seen and reevaluated at bedside. His supplemental oxygen was turned off and the patient once again desatted into the low 80s. He was placed back on 4 L by nasal cannula. Hospitalist vicki. 9:33 PM spoke with Dr. Tj Palencia, hospitalist on call and discussed this pt. she agrees to accept the patient for admission to [address] [place] [address]. Patient's blood pressure has been soft for the last hour. Systolic pressures in the low 100s and upper 90s with a map of 59. Patient does take midodrine daily. Hospitalist recommends giving the patient a dose of midodrine 20 mg at this time and adding a proBNP. Admission orders have been placed.  Lexus Garcia PA-C     MED RECONCILIATION:  Current Facility-Administered Medications   Medication Dose Route Frequency    vancomycin (VANCOCIN) intermittent dosing (placeholder)  1 each Other RX Placeholder    potassium bicarb-citric acid (EFFER-K) effervescent tablet 40 mEq  40 mEq Oral NOW     Current Outpatient Medications   Medication Sig    finasteride (PROSCAR) 5 MG tablet     levothyroxine (SYNTHROID) 125 MCG tablet     nystatin (MYCOSTATIN) 123823 UNIT/ML suspension TAKE ONE TEASPOONFUL (5 ML) BY MOUTH FOUR TIMES A DAY - SHAKE WELL (Patient not taking: Reported on 10/21/2023)    ondansetron (ZOFRAN) 4 MG tablet     QUEtiapine (SEROQUEL) 25 MG tablet     predniSONE (DELTASONE) 20 MG tablet     ezetimibe (ZETIA) 10 MG tablet     dutasteride (AVODART) 0.5 MG capsule Take 1 capsule by mouth daily    Epoetin Remberto (PROCRIT IJ) Inject 5,000 Units as directed three times a week    trospium (SANCTURA) 20 MG tablet Take 1 tablet by mouth 2 times daily    atorvastatin (LIPITOR) 80 MG tablet     tamsulosin (FLOMAX) 0.4 MG capsule Take 1 capsule by mouth daily    Multiple Vitamin (MULTIVITAMIN) capsule Take by mouth    midodrine (PROAMATINE) 10 MG tablet     omeprazole

## 2023-10-21 NOTE — ED NOTES
Patient no longer dry heaving at this time.  CT called to retrieve patient for CT scan     Clarissa Gauthier RN  10/21/23 1944

## 2023-10-22 LAB
ALBUMIN SERPL-MCNC: 2.2 G/DL (ref 3.4–5)
ALBUMIN/GLOB SERPL: 1 (ref 0.8–1.7)
ALP SERPL-CCNC: 95 U/L (ref 45–117)
ALT SERPL-CCNC: 13 U/L (ref 16–61)
ANION GAP SERPL CALC-SCNC: 5 MMOL/L (ref 3–18)
AST SERPL-CCNC: 15 U/L (ref 10–38)
BASOPHILS # BLD: 0 K/UL (ref 0–0.1)
BASOPHILS NFR BLD: 0 % (ref 0–2)
BILIRUB SERPL-MCNC: 1.3 MG/DL (ref 0.2–1)
BUN SERPL-MCNC: 18 MG/DL (ref 7–18)
BUN/CREAT SERPL: 4 (ref 12–20)
CALCIUM SERPL-MCNC: 7.4 MG/DL (ref 8.5–10.1)
CHLORIDE SERPL-SCNC: 104 MMOL/L (ref 100–111)
CO2 SERPL-SCNC: 29 MMOL/L (ref 21–32)
CREAT SERPL-MCNC: 4.2 MG/DL (ref 0.6–1.3)
DIFFERENTIAL METHOD BLD: ABNORMAL
EKG ATRIAL RATE: 65 BPM
EKG DIAGNOSIS: NORMAL
EKG Q-T INTERVAL: 502 MS
EKG QRS DURATION: 122 MS
EKG QTC CALCULATION (BAZETT): 557 MS
EKG R AXIS: -4 DEGREES
EKG T AXIS: 20 DEGREES
EKG VENTRICULAR RATE: 74 BPM
EOSINOPHIL # BLD: 0 K/UL (ref 0–0.4)
EOSINOPHIL NFR BLD: 0 % (ref 0–5)
ERYTHROCYTE [DISTWIDTH] IN BLOOD BY AUTOMATED COUNT: 21.2 % (ref 11.6–14.5)
GLOBULIN SER CALC-MCNC: 2.3 G/DL (ref 2–4)
GLUCOSE SERPL-MCNC: 123 MG/DL (ref 74–99)
HCT VFR BLD AUTO: 23.5 % (ref 36–48)
HGB BLD-MCNC: 7.7 G/DL (ref 13–16)
IMM GRANULOCYTES # BLD AUTO: 0 K/UL (ref 0–0.04)
IMM GRANULOCYTES NFR BLD AUTO: 0 % (ref 0–0.5)
LYMPHOCYTES # BLD: 1.1 K/UL (ref 0.9–3.6)
LYMPHOCYTES NFR BLD: 4 % (ref 21–52)
MAGNESIUM SERPL-MCNC: 1.8 MG/DL (ref 1.6–2.6)
MCH RBC QN AUTO: 35.6 PG (ref 24–34)
MCHC RBC AUTO-ENTMCNC: 32.8 G/DL (ref 31–37)
MCV RBC AUTO: 108.8 FL (ref 78–100)
MONOCYTES # BLD: 0.8 K/UL (ref 0.05–1.2)
MONOCYTES NFR BLD: 3 % (ref 3–10)
NEUTS BAND NFR BLD MANUAL: 1 %
NEUTS SEG # BLD: 26.2 K/UL (ref 1.8–8)
NEUTS SEG NFR BLD: 92 % (ref 40–73)
NRBC # BLD: 0.02 K/UL (ref 0–0.01)
NRBC BLD-RTO: 0.1 PER 100 WBC
PLATELET # BLD AUTO: 365 K/UL (ref 135–420)
PLATELET COMMENT: ABNORMAL
PMV BLD AUTO: 10.6 FL (ref 9.2–11.8)
POTASSIUM SERPL-SCNC: 3.2 MMOL/L (ref 3.5–5.5)
PROT SERPL-MCNC: 4.5 G/DL (ref 6.4–8.2)
RBC # BLD AUTO: 2.16 M/UL (ref 4.35–5.65)
RBC MORPH BLD: ABNORMAL
RBC MORPH BLD: ABNORMAL
SODIUM SERPL-SCNC: 138 MMOL/L (ref 136–145)
TROPONIN I SERPL HS-MCNC: 54 NG/L (ref 0–78)
VANCOMYCIN SERPL-MCNC: 21 UG/ML (ref 5–40)
WBC # BLD AUTO: 28.1 K/UL (ref 4.6–13.2)
WBC MORPH BLD: ABNORMAL

## 2023-10-22 PROCEDURE — 6370000000 HC RX 637 (ALT 250 FOR IP): Performed by: HOSPITALIST

## 2023-10-22 PROCEDURE — 83735 ASSAY OF MAGNESIUM: CPT

## 2023-10-22 PROCEDURE — 2580000003 HC RX 258: Performed by: STUDENT IN AN ORGANIZED HEALTH CARE EDUCATION/TRAINING PROGRAM

## 2023-10-22 PROCEDURE — 6370000000 HC RX 637 (ALT 250 FOR IP): Performed by: STUDENT IN AN ORGANIZED HEALTH CARE EDUCATION/TRAINING PROGRAM

## 2023-10-22 PROCEDURE — 6360000002 HC RX W HCPCS: Performed by: STUDENT IN AN ORGANIZED HEALTH CARE EDUCATION/TRAINING PROGRAM

## 2023-10-22 PROCEDURE — 85025 COMPLETE CBC W/AUTO DIFF WBC: CPT

## 2023-10-22 PROCEDURE — P9041 ALBUMIN (HUMAN),5%, 50ML: HCPCS | Performed by: STUDENT IN AN ORGANIZED HEALTH CARE EDUCATION/TRAINING PROGRAM

## 2023-10-22 PROCEDURE — 5A1D70Z PERFORMANCE OF URINARY FILTRATION, INTERMITTENT, LESS THAN 6 HOURS PER DAY: ICD-10-PCS | Performed by: INTERNAL MEDICINE

## 2023-10-22 PROCEDURE — 1100000000 HC RM PRIVATE

## 2023-10-22 PROCEDURE — 99233 SBSQ HOSP IP/OBS HIGH 50: CPT | Performed by: HOSPITALIST

## 2023-10-22 PROCEDURE — 93010 ELECTROCARDIOGRAM REPORT: CPT | Performed by: INTERNAL MEDICINE

## 2023-10-22 PROCEDURE — P9047 ALBUMIN (HUMAN), 25%, 50ML: HCPCS | Performed by: STUDENT IN AN ORGANIZED HEALTH CARE EDUCATION/TRAINING PROGRAM

## 2023-10-22 PROCEDURE — 80202 ASSAY OF VANCOMYCIN: CPT

## 2023-10-22 PROCEDURE — 6360000002 HC RX W HCPCS: Performed by: INTERNAL MEDICINE

## 2023-10-22 PROCEDURE — 80053 COMPREHEN METABOLIC PANEL: CPT

## 2023-10-22 RX ORDER — ERYTHROMYCIN ETHYLSUCCINATE 400 MG/1
400 TABLET ORAL
Status: DISCONTINUED | OUTPATIENT
Start: 2023-10-22 | End: 2023-10-22

## 2023-10-22 RX ORDER — LANOLIN ALCOHOL/MO/W.PET/CERES
400 CREAM (GRAM) TOPICAL
Status: COMPLETED | OUTPATIENT
Start: 2023-10-22 | End: 2023-10-22

## 2023-10-22 RX ORDER — ALBUMIN, HUMAN INJ 5% 5 %
25 SOLUTION INTRAVENOUS ONCE
Status: COMPLETED | OUTPATIENT
Start: 2023-10-23 | End: 2023-10-23

## 2023-10-22 RX ORDER — METOCLOPRAMIDE HYDROCHLORIDE 5 MG/ML
5 INJECTION INTRAMUSCULAR; INTRAVENOUS EVERY 6 HOURS
Status: DISPENSED | OUTPATIENT
Start: 2023-10-22 | End: 2023-10-24

## 2023-10-22 RX ORDER — ALBUMIN, HUMAN INJ 5% 5 %
25 SOLUTION INTRAVENOUS ONCE
Status: COMPLETED | OUTPATIENT
Start: 2023-10-22 | End: 2023-10-22

## 2023-10-22 RX ORDER — MIDODRINE HYDROCHLORIDE 10 MG/1
20 TABLET ORAL ONCE
Status: COMPLETED | OUTPATIENT
Start: 2023-10-23 | End: 2023-10-23

## 2023-10-22 RX ORDER — METOCLOPRAMIDE HYDROCHLORIDE 5 MG/ML
10 INJECTION INTRAMUSCULAR; INTRAVENOUS EVERY 6 HOURS
Status: DISCONTINUED | OUTPATIENT
Start: 2023-10-22 | End: 2023-10-22

## 2023-10-22 RX ADMIN — CARVEDILOL 3.12 MG: 3.12 TABLET, FILM COATED ORAL at 08:22

## 2023-10-22 RX ADMIN — METOCLOPRAMIDE HYDROCHLORIDE 5 MG: 5 INJECTION INTRAMUSCULAR; INTRAVENOUS at 15:24

## 2023-10-22 RX ADMIN — MIDODRINE HYDROCHLORIDE 10 MG: 5 TABLET ORAL at 16:55

## 2023-10-22 RX ADMIN — SODIUM CHLORIDE, PRESERVATIVE FREE 10 ML: 5 INJECTION INTRAVENOUS at 22:10

## 2023-10-22 RX ADMIN — METOCLOPRAMIDE HYDROCHLORIDE 5 MG: 5 INJECTION INTRAMUSCULAR; INTRAVENOUS at 22:10

## 2023-10-22 RX ADMIN — SODIUM CHLORIDE: 9 INJECTION, SOLUTION INTRAVENOUS at 15:36

## 2023-10-22 RX ADMIN — PIPERACILLIN SODIUM AND TAZOBACTAM SODIUM 3375 MG: 3; .375 INJECTION, POWDER, LYOPHILIZED, FOR SOLUTION INTRAVENOUS at 05:40

## 2023-10-22 RX ADMIN — HEPARIN SODIUM 5000 UNITS: 5000 INJECTION INTRAVENOUS; SUBCUTANEOUS at 00:06

## 2023-10-22 RX ADMIN — HEPARIN SODIUM 5000 UNITS: 5000 INJECTION INTRAVENOUS; SUBCUTANEOUS at 05:40

## 2023-10-22 RX ADMIN — ACETAMINOPHEN 325MG 650 MG: 325 TABLET ORAL at 00:40

## 2023-10-22 RX ADMIN — POTASSIUM CHLORIDE 10 MEQ: 7.45 INJECTION INTRAVENOUS at 00:07

## 2023-10-22 RX ADMIN — PIPERACILLIN SODIUM AND TAZOBACTAM SODIUM 3375 MG: 3; .375 INJECTION, POWDER, LYOPHILIZED, FOR SOLUTION INTRAVENOUS at 15:28

## 2023-10-22 RX ADMIN — HEPARIN SODIUM 5000 UNITS: 5000 INJECTION INTRAVENOUS; SUBCUTANEOUS at 22:10

## 2023-10-22 RX ADMIN — ALBUMIN (HUMAN) 25 G: 12.5 INJECTION, SOLUTION INTRAVENOUS at 22:10

## 2023-10-22 RX ADMIN — LEVOTHYROXINE SODIUM 125 MCG: 125 TABLET ORAL at 08:22

## 2023-10-22 RX ADMIN — MIDODRINE HYDROCHLORIDE 10 MG: 5 TABLET ORAL at 08:23

## 2023-10-22 RX ADMIN — ALBUMIN (HUMAN) 25 G: 25 SOLUTION INTRAVENOUS at 00:04

## 2023-10-22 RX ADMIN — Medication 400 MG: at 16:55

## 2023-10-22 RX ADMIN — MIDODRINE HYDROCHLORIDE 10 MG: 5 TABLET ORAL at 12:51

## 2023-10-22 RX ADMIN — SODIUM CHLORIDE, PRESERVATIVE FREE 10 ML: 5 INJECTION INTRAVENOUS at 09:23

## 2023-10-22 RX ADMIN — AMIODARONE HYDROCHLORIDE 200 MG: 200 TABLET ORAL at 08:23

## 2023-10-22 RX ADMIN — POTASSIUM CHLORIDE 10 MEQ: 7.45 INJECTION INTRAVENOUS at 00:57

## 2023-10-22 NOTE — ED NOTES
Assumed care of patient, patient sleeping on stretcher, will continue to monitor.      Fritz Baker, RN  10/22/23 8321

## 2023-10-22 NOTE — PROGRESS NOTES
[facility] Hospitalist Group  Progress Note    Patient: John Christensen Age: 80 y.o. : 1935 MR#: [id number] SSN: [id number]  Date/Time: 10/22/2023     Subjective: Patient lying in the bed, complains of dry heaving. Mild abdominal pain, no vomiting. Patient states he has been having diarrhea for last 2 days. Patient had diarrhea on Monday and then wife called PCP and obtain Imodium. After Imodium, diarrhea stopped and he did not have any bowel movement since Wednesday. Patient cannot recall any passing flatus in the colostomy bag. Assessment/Plan:   Severe sepsis - febrile to 100.9, leukocytosis, tachycardia, lactic acid 2.0; likely secondary to UTI  Urinary tract infection secondary to indwelling hebert. Hebert changed in ED on 10/21. Patient with h/o pseudomonal UTI. Nausea and vomiting  Distended colon, no signs of bowel obstruction  3. Acute hypoxic respiratory failure likely 2/2 fluid overload   4. ESRD on HD           5. Mild hypokalemia  6. Right lower lung nodule - 8 mm. Needs 3 month FU.  7.   Mild hypotension  8. Atrial fibrillation, rate controlled. Plan  We will keep n.p.o., symptomatic treatment  GI consulted, recommend keeping potassium more than 4 and magnesium more than 2  Will continue empiric antibiotics, follow-up urine culture  Continue oxygen supplementation, wean as tolerated  Nephrology consulted, hemodialysis per nephrology  We will replace potassium and monitor  Continue midodrine  Continue amiodarone and Coreg  PT/OT eval and treatment  Further plan based on hospital course    Discussed with patient and also with his wife at the bedside and explained in detail about my above plan care. Both of them understood and agreed with plan. I spent 50 minutes with the patient in face-to-face consultation, of which greater than 50% was spent in counseling and coordination of care as described above.     Case discussed with:  [x]Patient

## 2023-10-22 NOTE — CONSULTS
Consult Note  Consult requested by:dr Sagar Connelly is a 80 y.o. male White (non-) who is being seen on consult for esrd  Chief Complaint   Patient presents with    Hematuria     Admission diagnosis: UTI (urinary tract infection) [N39.0]  Cystitis [N30.90]      HPI: 80 y o white male with hx of esrd,on dialysis mon wed and Friday,admitted with urosepsis,has chronic indwelling cath,hx of cad,afib,chf,anemia  Past Medical History:   Diagnosis Date    Anemia     Arthritis     Atrial fibrillation ([address])     Broken leg 09/2022    CHF exacerbation ([address]) 01/08/2021    CKD (chronic kidney disease), stage III ([address])     Essential hypertension 01/18/2016    Exercise tolerance finding 10/2020    WORKS 8HRS/DAY NO CP OR SOB ON EXERTION    Exercise tolerance finding 05/2019    sob when up 2 flights of stairs no cp    Gout     Hay fever     Hearing impaired     History of pneumonia     Hyperkalemia 10/2020    Hypothyroidism     Leukocytosis     Renal insufficiency     Varicella     Vertigo     Vitamin D deficiency       Past Surgical History:   Procedure Laterality Date    BLADDER SURGERY  04/12/2023    CARPAL TUNNEL RELEASE Left     COLOSTOMY  04/11/2023    CORONARY ANGIOPLASTY WITH STENT PLACEMENT  12/04/2018    ORTHOPEDIC SURGERY      hip replacement bilateral    OTHER SURGICAL HISTORY  04/10/2023    Blood clot removal of  the bladder    TONSILLECTOMY      TOTAL HIP ARTHROPLASTY      TOTAL KNEE ARTHROPLASTY Left     UROLOGICAL SURGERY  05/14/2019    TURBT w postoperative intravesical instillation of gemcitabine.   [facility]  Dr Alber Castillo       Social History     Socioeconomic History    Marital status:      Spouse name: Not on file    Number of children: Not on file    Years of education: Not on file    Highest education level: Not on file   Occupational History    Not on file   Tobacco Use    Smoking status: Never    Smokeless tobacco: Never   Substance and Sexual Activity    Alcohol use: No    Drug use: Not

## 2023-10-22 NOTE — PROGRESS NOTES
Metoclopramide 10mg IV Q6h x 8 doses was adjusted per renal dosing protocol to 5mg IV Q6h x 8 doses. Estimated Creatinine Clearance: 14 mL/min (A) (based on SCr of 4.2 mg/dL (H)).     Devon Shields, PharmD, BCPS

## 2023-10-22 NOTE — H&P
History & Physical    Patient: Sesar Stuart MRN: [id number]  [facility]: [id number]    YOB: 1935  Age: 80 y.o. Sex: male      DOA: 10/21/2023    Chief Complaint:   Chief Complaint   Patient presents with    Hematuria          HPI:     Sesar Stuart is a 80 y.o.  male with hx of urothelial carcinoma of bladder (diagnosed 2019) with chronic indwelling hebert, lap sigmoid colectomy and colostomy (April 23), Afib (rate controlled, not on DOAC), ESRD on HD. Patient presented to [facility] ED via EMS from home where he resides with his wife. Per patient - he has had been unable to tolerate food  and has had dry heaving and nausea for a few days. He had increased watery bowel movements from his osteomy over the past few days. He took imodium twice and has noticed less output. In ED - patient found to be febrile to 100.9. ED provider noted patient was hypoxic to 80s on room air. He does not have oxygen at home.      Past Medical History:   Diagnosis Date    Anemia     Arthritis     Atrial fibrillation ([address])     Broken leg 09/2022    CHF exacerbation ([address]) 01/08/2021    CKD (chronic kidney disease), stage III ([address])     Essential hypertension 01/18/2016    Exercise tolerance finding 10/2020    WORKS 8HRS/DAY NO CP OR SOB ON EXERTION    Exercise tolerance finding 05/2019    sob when up 2 flights of stairs no cp    Gout     Hay fever     Hearing impaired     History of pneumonia     Hyperkalemia 10/2020    Hypothyroidism     Leukocytosis     Renal insufficiency     Varicella     Vertigo     Vitamin D deficiency        Past Surgical History:   Procedure Laterality Date    BLADDER SURGERY  04/12/2023    CARPAL TUNNEL RELEASE Left     COLOSTOMY  04/11/2023    CORONARY ANGIOPLASTY WITH STENT PLACEMENT  12/04/2018    ORTHOPEDIC SURGERY      hip replacement bilateral    OTHER SURGICAL HISTORY  04/10/2023    Blood clot removal of  the bladder    TONSILLECTOMY      TOTAL HIP ARTHROPLASTY TOTAL KNEE ARTHROPLASTY Left     UROLOGICAL SURGERY  05/14/2019    TURBT w postoperative intravesical instillation of gemcitabine. [facility]  Dr Evaristo Bee       Family History   Problem Relation Age of Onset    Osteoarthritis Other     Heart Disease Father     Diabetes Sister     Hypertension Sister     Diabetes Brother     Cancer Brother        Social History     Socioeconomic History    Marital status:    Tobacco Use    Smoking status: Never    Smokeless tobacco: Never   Substance and Sexual Activity    Alcohol use: No       Prior to Admission medications    Medication Sig Start Date End Date Taking?  Authorizing Provider   finasteride (PROSCAR) 5 MG tablet  9/9/23   Scott Montana MD   levothyroxine (SYNTHROID) 125 MCG tablet  9/9/23   Scott Montana MD   nystatin (MYCOSTATIN) 593136 UNIT/ML suspension TAKE ONE TEASPOONFUL (5 ML) BY MOUTH FOUR TIMES A DAY - SHAKE WELL  Patient not taking: Reported on 10/21/2023 9/19/23   Scott Montana MD   ondansetron (ZOFRAN) 4 MG tablet  9/13/23   Scott Montana MD   QUEtiapine (SEROQUEL) 25 MG tablet  9/9/23   Scott Montana MD   predniSONE (DELTASONE) 20 MG tablet  9/15/23   Scott Montana MD   ezetimibe (ZETIA) 10 MG tablet  9/13/23   Scott Montana MD   dutasteride (AVODART) 0.5 MG capsule Take 1 capsule by mouth daily    Scott Montana MD   Epoetin Remberto (PROCRIT IJ) Inject 5,000 Units as directed three times a week    Scott Montana MD   trospium (SANCTURA) 20 MG tablet Take 1 tablet by mouth 2 times daily 9/21/23   Murali Stratton MD   atorvastatin (LIPITOR) 80 MG tablet  6/17/23   Scott Montana MD   tamsulosin (FLOMAX) 0.4 MG capsule Take 1 capsule by mouth daily 6/28/23   Murali Stratton MD   Multiple Vitamin (MULTIVITAMIN) capsule Take by mouth 8/20/19   Scott Montana MD   midodrine (PROAMATINE) 10 MG tablet  5/6/23   Scott Montana MD   omeprazole (PRILOSEC) 20 MG delayed

## 2023-10-22 NOTE — ED NOTES
Patient repositioned in bed with 2 techs. Patient has no complaints.      Rivka Woods., RN  10/22/23 0311

## 2023-10-23 LAB
ALBUMIN SERPL-MCNC: 2.5 G/DL (ref 3.4–5)
ANION GAP SERPL CALC-SCNC: 8 MMOL/L (ref 3–18)
BASOPHILS # BLD: 0 K/UL (ref 0–0.1)
BASOPHILS NFR BLD: 0 % (ref 0–2)
BUN SERPL-MCNC: 26 MG/DL (ref 7–18)
BUN/CREAT SERPL: 5 (ref 12–20)
CALCIUM SERPL-MCNC: 7.3 MG/DL (ref 8.5–10.1)
CHLORIDE SERPL-SCNC: 102 MMOL/L (ref 100–111)
CO2 SERPL-SCNC: 26 MMOL/L (ref 21–32)
CREAT SERPL-MCNC: 5.38 MG/DL (ref 0.6–1.3)
DIFFERENTIAL METHOD BLD: ABNORMAL
EOSINOPHIL # BLD: 0.5 K/UL (ref 0–0.4)
EOSINOPHIL NFR BLD: 1 % (ref 0–5)
ERYTHROCYTE [DISTWIDTH] IN BLOOD BY AUTOMATED COUNT: 21.4 % (ref 11.6–14.5)
GLUCOSE SERPL-MCNC: 97 MG/DL (ref 74–99)
HCT VFR BLD AUTO: 25.8 % (ref 36–48)
HGB BLD-MCNC: 8.4 G/DL (ref 13–16)
IMM GRANULOCYTES # BLD AUTO: 0 K/UL (ref 0–0.04)
IMM GRANULOCYTES NFR BLD AUTO: 0 % (ref 0–0.5)
LYMPHOCYTES # BLD: 2.3 K/UL (ref 0.9–3.6)
LYMPHOCYTES NFR BLD: 5 % (ref 21–52)
MAGNESIUM SERPL-MCNC: 1.9 MG/DL (ref 1.6–2.6)
MCH RBC QN AUTO: 35.7 PG (ref 24–34)
MCHC RBC AUTO-ENTMCNC: 32.6 G/DL (ref 31–37)
MCV RBC AUTO: 109.8 FL (ref 78–100)
METAMYELOCYTES NFR BLD MANUAL: 2 %
MONOCYTES # BLD: 0.9 K/UL (ref 0.05–1.2)
MONOCYTES NFR BLD: 2 % (ref 3–10)
NEUTS BAND NFR BLD MANUAL: 11 %
NEUTS SEG # BLD: 41 K/UL (ref 1.8–8)
NEUTS SEG NFR BLD: 79 % (ref 40–73)
NRBC # BLD: 0.04 K/UL (ref 0–0.01)
NRBC BLD-RTO: 0.1 PER 100 WBC
PHOSPHATE SERPL-MCNC: 3.6 MG/DL (ref 2.5–4.9)
PLATELET # BLD AUTO: 435 K/UL (ref 135–420)
PLATELET COMMENT: ABNORMAL
PMV BLD AUTO: 10.8 FL (ref 9.2–11.8)
POTASSIUM SERPL-SCNC: 3.2 MMOL/L (ref 3.5–5.5)
RBC # BLD AUTO: 2.35 M/UL (ref 4.35–5.65)
RBC MORPH BLD: ABNORMAL
SODIUM SERPL-SCNC: 136 MMOL/L (ref 136–145)
VANCOMYCIN SERPL-MCNC: 16 UG/ML (ref 5–40)
WBC # BLD AUTO: 45.5 K/UL (ref 4.6–13.2)
WBC MORPH BLD: ABNORMAL

## 2023-10-23 PROCEDURE — 85025 COMPLETE CBC W/AUTO DIFF WBC: CPT

## 2023-10-23 PROCEDURE — 80069 RENAL FUNCTION PANEL: CPT

## 2023-10-23 PROCEDURE — 99232 SBSQ HOSP IP/OBS MODERATE 35: CPT | Performed by: HOSPITALIST

## 2023-10-23 PROCEDURE — 87449 NOS EACH ORGANISM AG IA: CPT

## 2023-10-23 PROCEDURE — 90935 HEMODIALYSIS ONE EVALUATION: CPT

## 2023-10-23 PROCEDURE — 83735 ASSAY OF MAGNESIUM: CPT

## 2023-10-23 PROCEDURE — 6360000002 HC RX W HCPCS: Performed by: INTERNAL MEDICINE

## 2023-10-23 PROCEDURE — 97530 THERAPEUTIC ACTIVITIES: CPT

## 2023-10-23 PROCEDURE — 6370000000 HC RX 637 (ALT 250 FOR IP): Performed by: STUDENT IN AN ORGANIZED HEALTH CARE EDUCATION/TRAINING PROGRAM

## 2023-10-23 PROCEDURE — 80202 ASSAY OF VANCOMYCIN: CPT

## 2023-10-23 PROCEDURE — P9041 ALBUMIN (HUMAN),5%, 50ML: HCPCS | Performed by: STUDENT IN AN ORGANIZED HEALTH CARE EDUCATION/TRAINING PROGRAM

## 2023-10-23 PROCEDURE — 6360000002 HC RX W HCPCS: Performed by: STUDENT IN AN ORGANIZED HEALTH CARE EDUCATION/TRAINING PROGRAM

## 2023-10-23 PROCEDURE — 1100000003 HC PRIVATE W/ TELEMETRY

## 2023-10-23 PROCEDURE — 97166 OT EVAL MOD COMPLEX 45 MIN: CPT

## 2023-10-23 PROCEDURE — 2580000003 HC RX 258: Performed by: STUDENT IN AN ORGANIZED HEALTH CARE EDUCATION/TRAINING PROGRAM

## 2023-10-23 PROCEDURE — 6370000000 HC RX 637 (ALT 250 FOR IP): Performed by: INTERNAL MEDICINE

## 2023-10-23 PROCEDURE — 86706 HEP B SURFACE ANTIBODY: CPT

## 2023-10-23 PROCEDURE — 6370000000 HC RX 637 (ALT 250 FOR IP): Performed by: HOSPITALIST

## 2023-10-23 PROCEDURE — 87324 CLOSTRIDIUM AG IA: CPT

## 2023-10-23 PROCEDURE — 87340 HEPATITIS B SURFACE AG IA: CPT

## 2023-10-23 PROCEDURE — P9047 ALBUMIN (HUMAN), 25%, 50ML: HCPCS

## 2023-10-23 PROCEDURE — 6360000002 HC RX W HCPCS

## 2023-10-23 PROCEDURE — 2580000003 HC RX 258: Performed by: INTERNAL MEDICINE

## 2023-10-23 PROCEDURE — 2700000000 HC OXYGEN THERAPY PER DAY

## 2023-10-23 RX ORDER — ACETAMINOPHEN 325 MG/1
650 TABLET ORAL EVERY 6 HOURS PRN
Status: DISCONTINUED | OUTPATIENT
Start: 2023-10-23 | End: 2023-10-30

## 2023-10-23 RX ORDER — ALBUMIN (HUMAN) 12.5 G/50ML
SOLUTION INTRAVENOUS
Status: COMPLETED
Start: 2023-10-23 | End: 2023-10-23

## 2023-10-23 RX ORDER — POTASSIUM CHLORIDE 20 MEQ/1
20 TABLET, EXTENDED RELEASE ORAL EVERY 4 HOURS
Status: DISPENSED | OUTPATIENT
Start: 2023-10-23 | End: 2023-10-23

## 2023-10-23 RX ORDER — ONDANSETRON 2 MG/ML
4 INJECTION INTRAMUSCULAR; INTRAVENOUS ONCE
Status: COMPLETED | OUTPATIENT
Start: 2023-10-23 | End: 2023-10-23

## 2023-10-23 RX ORDER — HEPARIN SODIUM 1000 [USP'U]/ML
INJECTION, SOLUTION INTRAVENOUS; SUBCUTANEOUS
Status: DISPENSED
Start: 2023-10-23 | End: 2023-10-23

## 2023-10-23 RX ORDER — ACETAMINOPHEN 650 MG/1
650 SUPPOSITORY RECTAL EVERY 6 HOURS PRN
Status: DISCONTINUED | OUTPATIENT
Start: 2023-10-23 | End: 2023-10-30

## 2023-10-23 RX ORDER — VANCOMYCIN 1.75 G/350ML
1250 INJECTION, SOLUTION INTRAVENOUS
Status: COMPLETED | OUTPATIENT
Start: 2023-10-23 | End: 2023-10-23

## 2023-10-23 RX ADMIN — MIDODRINE HYDROCHLORIDE 20 MG: 10 TABLET ORAL at 00:31

## 2023-10-23 RX ADMIN — METOCLOPRAMIDE HYDROCHLORIDE 5 MG: 5 INJECTION INTRAMUSCULAR; INTRAVENOUS at 23:00

## 2023-10-23 RX ADMIN — MIDODRINE HYDROCHLORIDE 10 MG: 5 TABLET ORAL at 13:32

## 2023-10-23 RX ADMIN — POTASSIUM CHLORIDE 20 MEQ: 1500 TABLET, EXTENDED RELEASE ORAL at 13:32

## 2023-10-23 RX ADMIN — LEVOTHYROXINE SODIUM 125 MCG: 125 TABLET ORAL at 07:06

## 2023-10-23 RX ADMIN — HEPARIN SODIUM 5000 UNITS: 5000 INJECTION INTRAVENOUS; SUBCUTANEOUS at 22:59

## 2023-10-23 RX ADMIN — ALBUMIN (HUMAN) 25 G: 12.5 INJECTION, SOLUTION INTRAVENOUS at 00:11

## 2023-10-23 RX ADMIN — ALBUMIN (HUMAN) 25 G: 0.25 INJECTION, SOLUTION INTRAVENOUS at 10:45

## 2023-10-23 RX ADMIN — MIDODRINE HYDROCHLORIDE 10 MG: 5 TABLET ORAL at 07:29

## 2023-10-23 RX ADMIN — METOCLOPRAMIDE HYDROCHLORIDE 5 MG: 5 INJECTION INTRAMUSCULAR; INTRAVENOUS at 07:07

## 2023-10-23 RX ADMIN — VANCOMYCIN 1250 MG: 1.75 INJECTION, SOLUTION INTRAVENOUS at 13:51

## 2023-10-23 RX ADMIN — CARVEDILOL 3.12 MG: 3.12 TABLET, FILM COATED ORAL at 13:39

## 2023-10-23 RX ADMIN — ACETAMINOPHEN 325MG 650 MG: 325 TABLET ORAL at 13:32

## 2023-10-23 RX ADMIN — METOCLOPRAMIDE HYDROCHLORIDE 5 MG: 5 INJECTION INTRAMUSCULAR; INTRAVENOUS at 16:26

## 2023-10-23 RX ADMIN — GENTAMICIN SULFATE 160 MG: 40 INJECTION, SOLUTION INTRAMUSCULAR; INTRAVENOUS at 16:25

## 2023-10-23 RX ADMIN — PIPERACILLIN SODIUM AND TAZOBACTAM SODIUM 3375 MG: 3; .375 INJECTION, POWDER, LYOPHILIZED, FOR SOLUTION INTRAVENOUS at 07:07

## 2023-10-23 RX ADMIN — SODIUM CHLORIDE, PRESERVATIVE FREE 10 ML: 5 INJECTION INTRAVENOUS at 07:00

## 2023-10-23 RX ADMIN — ONDANSETRON 4 MG: 2 INJECTION INTRAMUSCULAR; INTRAVENOUS at 00:36

## 2023-10-23 RX ADMIN — CARVEDILOL 3.12 MG: 3.12 TABLET, FILM COATED ORAL at 22:59

## 2023-10-23 RX ADMIN — HEPARIN SODIUM 5000 UNITS: 5000 INJECTION INTRAVENOUS; SUBCUTANEOUS at 13:51

## 2023-10-23 RX ADMIN — MIDODRINE HYDROCHLORIDE 10 MG: 5 TABLET ORAL at 16:25

## 2023-10-23 RX ADMIN — HEPARIN SODIUM 5000 UNITS: 5000 INJECTION INTRAVENOUS; SUBCUTANEOUS at 07:28

## 2023-10-23 RX ADMIN — POTASSIUM CHLORIDE 20 MEQ: 1500 TABLET, EXTENDED RELEASE ORAL at 16:25

## 2023-10-23 RX ADMIN — AMIODARONE HYDROCHLORIDE 200 MG: 200 TABLET ORAL at 13:38

## 2023-10-23 RX ADMIN — SODIUM CHLORIDE, PRESERVATIVE FREE 10 ML: 5 INJECTION INTRAVENOUS at 23:12

## 2023-10-23 ASSESSMENT — PAIN DESCRIPTION - ORIENTATION: ORIENTATION: RIGHT;LEFT

## 2023-10-23 ASSESSMENT — PAIN SCALES - GENERAL
PAINLEVEL_OUTOF10: 7
PAINLEVEL_OUTOF10: 3
PAINLEVEL_OUTOF10: 7

## 2023-10-23 ASSESSMENT — PAIN - FUNCTIONAL ASSESSMENT: PAIN_FUNCTIONAL_ASSESSMENT: ACTIVITIES ARE NOT PREVENTED

## 2023-10-23 ASSESSMENT — PAIN DESCRIPTION - DESCRIPTORS: DESCRIPTORS: ACHING

## 2023-10-23 ASSESSMENT — PAIN DESCRIPTION - LOCATION: LOCATION: ABDOMEN

## 2023-10-23 NOTE — PROGRESS NOTES
[phone]    Gastroenterology follow up-Progress note    Impression:  1. Distended abdomen, dry heaves; distended colon on CT scan-suggestive of ileus versus pseudoobstruction - occurred after taking Imodium for diarrhea, Bms now currently resumed, C diff pending. 2.  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 Shobha Washburn. 3.  Urothelial carcinoma of the bladder-status post treatment by urology [facility]. 4.  End-stage renal disease-on hemodialysis. 5.  Obesity. Plan:  1. Await C diff   2. Keep K > 4, Mg > 2.0  3. Monitor Abdomen  4. Avoid narcotics/Imodium if possible  5. Diet per primary team  6. Medical management per primary team    Chief Complaint: Abdominal distention, lack of output from stoma      Subjective:  No abdominal pain, diarrhea has resumed, passing gas into bag. ROS: Denies any fevers, chills, rash.        Patient Active Problem List   Diagnosis    Proteinuria    Acquired hypothyroidism    Osteoarthrosis    Leukocytosis    Renal failure    Neoplasm of bladder    Anemia of chronic renal failure    Chronic hyperkalemia    PAF (paroxysmal atrial fibrillation) ([facility])    Bronchiectasis without complication (HCC)    Carpal tunnel syndrome    Cerebral infarction, unspecified ([address])    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 without tophus    Hypertensive chronic kidney disease with stage 1 through stage 4 chronic kidney disease, or unspecified chronic kidney disease    Colostomy present (HCC)    Coronary arteriosclerosis    End-stage renal disease on hemodialysis (HCC)    Gastro-esophageal reflux disease without esophagitis    Hearing impaired    Hypercoagulable state due to paroxysmal atrial fibrillation (HCC)    Hyperlipidemia, unspecified    Indwelling Carlos catheter present    Ischemic cardiomyopathy    Metabolic encephalopathy    Multiple renal cysts    Muscle weakness (generalized)    Obstructive and reflux uropathy, unspecified    Severe sepsis (HCC)    Periprosthetic

## 2023-10-23 NOTE — DIALYSIS
HD Care plan  Time: 3  hrs  Dialysate:  4 K  2.5   Ca  [place]  Net UF: 2 L  Access: Aseptically care for RT [facility]  Hemodynamic stability: Maintain BP WNL    Pre Dialysis:  Pt received from Unit Nurse Stacey Correa , pt on a bed ,A+O X 4, No s/s of distress noted  on oxygen 2L via NC, SPO2 98% . RT [facility]  assessed no abnormalities noted, dressing changed, line patent with good flow. TDC accessed  per protocol without any difficulty    Intra Dialysis:  Time out / safety process performed per policy, Tx initiated at 0910. TDC flowing with ease. For hemodynamic stability UF goal  set at 2000 ml as tolerated   Pt offered assistance with repositioning every 2 hours/prn    Vascular access visible  and line connections remained intact throughout entire duration of treatment. Vital signs checked every 15 mins, Had an episode of low BP,Nephrologist Dr Donis Farah updated, 25% Albumin 100 mls given with good effect. UF continued. Rest of vital signs WNL. Dr Pravin Tello in attendance, colostomy bag full and leaking, order for stool for c-diff obtained, Primary Nurse called, came and changed the colostomy bag and obtained a stool sample, pt made comfortable. Post Dialysis: Tx completed at 1223,   Tolerated well ,2 L removed. De-accessed per protocol. Dialysis catheter  locked accordingly with Heparin 1.8 ml in arterial port, and 1.8 ml in venous port ,catheter dressing clean, dry and intact.   Post Dialysis report given to unit  Nurse Tamela Jones

## 2023-10-23 NOTE — PLAN OF CARE
Problem: Occupational Therapy - Adult  Goal: By Discharge: Performs self-care activities at highest level of function for planned discharge setting. See evaluation for individualized goals. Description: Occupational Therapy Goals:  Initiated 10/23/2023 to be met within 7-10 days. 1.  Patient will perform upper body dressing with minimal assistance/contact guard assist.   2.  Patient will perform lower body dressing with minimal assistance/contact guard assist.  3.  Patient will perform functional task in standing for 3 minutes with min assist for balance. 4.  Patient will perform toilet transfers with minimal assistance/contact guard assist.  5.  Patient will participate in upper extremity therapeutic exercise/activities with supervision/set-up for 8-10 minutes to increase strength/endurance for ADLs. PLOF: Patient required min to mod assist with basic self care tasks and used a RW for functional mobility PTA. Outcome: Progressing   OCCUPATIONAL THERAPY EVALUATION    Patient: Brando Fritz (67 y.o. male)  Date: 10/23/2023  Primary Diagnosis: UTI (urinary tract infection) [N39.0]  Cystitis [N30.90]  Hypoxia [R09.02]  Leukocytosis, unspecified type [D72.829]  Urinary tract infection associated with indwelling urethral catheter, initial encounter ([address]) [Z07.696Q, N39.0]       Precautions: Contact Precautions, Fall Risk,     ASSESSMENT :  Patient presents with decreased ADLs, decreased functional mobility and muscle weakness/poor endurance. He was receiving care at home as well as home health therapy. Patient unable to reach his feet for LB self care and needs partial assist for his UB. He sat on the EOB with mod assist in prep for functional tasks but declined to attempt standing. Verbal and tactile cuing given to maintain sitting balance. He was returned to supine in bed with all needs met at the end of the session. Wife and nurse in room.      DEFICITS/IMPAIRMENTS:  Performance deficits / understanding;Continued education needed    Thank you for this referral.  Danny Perez, MS OTR/L   Minutes: 34    Eval Complexity: Decision Making: Medium Complexity

## 2023-10-23 NOTE — CONSULTS
Infectious Disease Consultation Note        Reason: Severe sepsis    Current abx Prior abx   Piperacillin/tazobactam, vancomycin since 10/21      Lines:       Assessment :   80 y.o.  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 Clinical presentation consistent with severe sepsis-present on admission due to Hebert catheter associated cystitis    Possible ileus versus pseudoobstruction:  GI follow-up appreciated. Now with liquid bowel movements    Significant worsening leukocytosis-despite current broad-spectrum antibiotics concerning for partially treated sepsis- ? UTI with multidrug-resistant gram-negative's versus evolving C. difficile colitis. Discussed with microbiology lab. Urine culture 10/21 positive for 70,000 colonies of 3 different gram-negative rods    Recommendations:    Discontinue piperacillin/tazobactam, IV vancomycin. Start IV gentamicin  Agree with obtaining stool C. Difficile  Follow-up GI recommended regarding ileus/diarrhea  Recommend performing ID/susceptibility gram-negative mitchel in urine culture. Discussed with micro lab  We will change Hebert catheter after patient has received at least 48 hours of effective antibiotic therapy  Probiotics while on antibiotics    Thank you for consultation request. Above plan was discussed in details with patient, rn  and dr Josef Lindsey. Please call me if any further questions or concerns. Will continue to participate in the care of this patient. HPI:       80 y.o.   male with hx of urothelial carcinoma of bladder (diagnosed 2019) with chronic indwelling hebert, lap sigmoid colectomy and colostomy (April 23), Afib (rate have resulted in some phonetic based errors in grammar and contents. Even though attempts were made to correct all the mistakes, some may have been missed, and remained in the body of the document. If questions arise, please contact our department.     Dr. Caitlyn Ponce, Infectious Disease Specialist  [phone]  October 23, 2023  2:00 PM

## 2023-10-23 NOTE — PROGRESS NOTES
RENAL DAILY PROGRESS NOTE    Patient: Monique Matthews               Sex: male          DOA: 10/21/2023  3:52 PM        YOB: 1935      Age:  80 y.o.        LOS:  LOS: 2 days     Subjective:     Monique Matthews is a 80 y.o.  who presents with UTI (urinary tract infection) [N39.0]  Cystitis [N30.90]  Hypoxia [R09.02]  Leukocytosis, unspecified type [D72.829]  Urinary tract infection associated with indwelling urethral catheter, initial encounter ([address] ) [T83.511A, N39.0].    Asked to evaluate for esrd,hx of uretheral cancer,indwelling hebert cath,admitted with urosepsis  Chief complains: Patient denies nausea, vomiting, chest pain, dizziness, shortness of breath or headache.  - Reviewed last 24 hrs events     Current Facility-Administered Medications   Medication Dose Route Frequency    potassium chloride (KLOR-CON M) extended release tablet 20 mEq  20 mEq Oral Q4H    heparin (porcine) 1000 UNIT/ML injection        vancomycin (VANCOCIN) 1250 mg in 250 mL IVPB  1,250 mg IntraVENous Once in dialysis    acetaminophen (TYLENOL) tablet 650 mg  650 mg Oral Q6H PRN    Or    acetaminophen (TYLENOL) suppository 650 mg  650 mg Rectal Q6H PRN    metoclopramide (REGLAN) injection 5 mg  5 mg IntraVENous Q6H    vancomycin (VANCOCIN) intermittent dosing (placeholder)  1 each Other RX Placeholder    sodium chloride flush 0.9 % injection 5-40 mL  5-40 mL IntraVENous 2 times per day    sodium chloride flush 0.9 % injection 5-40 mL  5-40 mL IntraVENous PRN    0.9 % sodium chloride infusion   IntraVENous PRN    ondansetron (ZOFRAN-ODT) disintegrating tablet 4 mg  4 mg Oral Q8H PRN    Or    ondansetron (ZOFRAN) injection 4 mg  4 mg IntraVENous Q6H PRN    polyethylene glycol (GLYCOLAX) packet 17 g  17 g Oral Daily PRN    heparin (porcine) injection 5,000 Units  5,000 Units SubCUTAneous 3 times per day    piperacillin-tazobactam (ZOSYN) 3,375 mg in sodium chloride 0.9 % 50 mL IVPB (mini-bag)  3,375 mg IntraVENous Q12H

## 2023-10-24 LAB
ALBUMIN SERPL-MCNC: 2.2 G/DL (ref 3.4–5)
ANION GAP SERPL CALC-SCNC: 10 MMOL/L (ref 3–18)
BASOPHILS # BLD: 0 K/UL (ref 0–0.1)
BASOPHILS NFR BLD: 0 % (ref 0–2)
BUN SERPL-MCNC: 19 MG/DL (ref 7–18)
BUN/CREAT SERPL: 4 (ref 12–20)
C DIFF GDH STL QL: POSITIVE
C DIFF TOX A+B STL QL IA: POSITIVE
C DIFF TOXIN INTERPRETATION: ABNORMAL
CALCIUM SERPL-MCNC: 7.6 MG/DL (ref 8.5–10.1)
CHLORIDE SERPL-SCNC: 102 MMOL/L (ref 100–111)
CO2 SERPL-SCNC: 24 MMOL/L (ref 21–32)
CREAT SERPL-MCNC: 4.37 MG/DL (ref 0.6–1.3)
DIFFERENTIAL METHOD BLD: ABNORMAL
EOSINOPHIL # BLD: 0 K/UL (ref 0–0.4)
EOSINOPHIL NFR BLD: 0 % (ref 0–5)
ERYTHROCYTE [DISTWIDTH] IN BLOOD BY AUTOMATED COUNT: 20.9 % (ref 11.6–14.5)
GLUCOSE SERPL-MCNC: 90 MG/DL (ref 74–99)
HBV SURFACE AB SER QL IA: NEGATIVE
HBV SURFACE AB SERPL IA-ACNC: <3.1 MIU/ML
HBV SURFACE AG SER QL: <0.1 INDEX
HBV SURFACE AG SER QL: NEGATIVE
HCT VFR BLD AUTO: 27.7 % (ref 36–48)
HEP BS AB COMMENT: ABNORMAL
HGB BLD-MCNC: 8.8 G/DL (ref 13–16)
IMM GRANULOCYTES # BLD AUTO: 0 K/UL (ref 0–0.04)
IMM GRANULOCYTES NFR BLD AUTO: 0 % (ref 0–0.5)
LYMPHOCYTES # BLD: 0 K/UL (ref 0.9–3.6)
LYMPHOCYTES NFR BLD: 0 % (ref 21–52)
MAGNESIUM SERPL-MCNC: 1.9 MG/DL (ref 1.6–2.6)
MCH RBC QN AUTO: 34.8 PG (ref 24–34)
MCHC RBC AUTO-ENTMCNC: 31.8 G/DL (ref 31–37)
MCV RBC AUTO: 109.5 FL (ref 78–100)
MONOCYTES # BLD: 0.9 K/UL (ref 0.05–1.2)
MONOCYTES NFR BLD: 2 % (ref 3–10)
NEUTS BAND NFR BLD MANUAL: 4 %
NEUTS SEG # BLD: 45.6 K/UL (ref 1.8–8)
NEUTS SEG NFR BLD: 94 % (ref 40–73)
NRBC # BLD: 0.05 K/UL (ref 0–0.01)
NRBC BLD-RTO: 0.1 PER 100 WBC
PHOSPHATE SERPL-MCNC: 2.6 MG/DL (ref 2.5–4.9)
PLATELET # BLD AUTO: 435 K/UL (ref 135–420)
PLATELET COMMENT: ABNORMAL
PMV BLD AUTO: 11.3 FL (ref 9.2–11.8)
POTASSIUM SERPL-SCNC: 3.7 MMOL/L (ref 3.5–5.5)
RBC # BLD AUTO: 2.53 M/UL (ref 4.35–5.65)
RBC MORPH BLD: ABNORMAL
SODIUM SERPL-SCNC: 136 MMOL/L (ref 136–145)
WBC # BLD AUTO: 46.5 K/UL (ref 4.6–13.2)

## 2023-10-24 PROCEDURE — 97530 THERAPEUTIC ACTIVITIES: CPT

## 2023-10-24 PROCEDURE — 6370000000 HC RX 637 (ALT 250 FOR IP): Performed by: HOSPITALIST

## 2023-10-24 PROCEDURE — 6360000002 HC RX W HCPCS: Performed by: STUDENT IN AN ORGANIZED HEALTH CARE EDUCATION/TRAINING PROGRAM

## 2023-10-24 PROCEDURE — 6370000000 HC RX 637 (ALT 250 FOR IP): Performed by: INTERNAL MEDICINE

## 2023-10-24 PROCEDURE — 97535 SELF CARE MNGMENT TRAINING: CPT

## 2023-10-24 PROCEDURE — 6370000000 HC RX 637 (ALT 250 FOR IP): Performed by: STUDENT IN AN ORGANIZED HEALTH CARE EDUCATION/TRAINING PROGRAM

## 2023-10-24 PROCEDURE — 83735 ASSAY OF MAGNESIUM: CPT

## 2023-10-24 PROCEDURE — 2580000003 HC RX 258: Performed by: INTERNAL MEDICINE

## 2023-10-24 PROCEDURE — 36415 COLL VENOUS BLD VENIPUNCTURE: CPT

## 2023-10-24 PROCEDURE — 94761 N-INVAS EAR/PLS OXIMETRY MLT: CPT

## 2023-10-24 PROCEDURE — 6360000002 HC RX W HCPCS: Performed by: INTERNAL MEDICINE

## 2023-10-24 PROCEDURE — 85025 COMPLETE CBC W/AUTO DIFF WBC: CPT

## 2023-10-24 PROCEDURE — 2580000003 HC RX 258: Performed by: STUDENT IN AN ORGANIZED HEALTH CARE EDUCATION/TRAINING PROGRAM

## 2023-10-24 PROCEDURE — 97162 PT EVAL MOD COMPLEX 30 MIN: CPT

## 2023-10-24 PROCEDURE — 1100000003 HC PRIVATE W/ TELEMETRY

## 2023-10-24 PROCEDURE — 99232 SBSQ HOSP IP/OBS MODERATE 35: CPT | Performed by: HOSPITALIST

## 2023-10-24 PROCEDURE — 80069 RENAL FUNCTION PANEL: CPT

## 2023-10-24 RX ORDER — NEPHROCAP 1 MG
1 CAP ORAL DAILY
Status: DISCONTINUED | OUTPATIENT
Start: 2023-10-24 | End: 2023-11-25 | Stop reason: HOSPADM

## 2023-10-24 RX ORDER — LANOLIN ALCOHOL/MO/W.PET/CERES
6 CREAM (GRAM) TOPICAL NIGHTLY PRN
Status: DISCONTINUED | OUTPATIENT
Start: 2023-10-24 | End: 2023-11-25 | Stop reason: HOSPADM

## 2023-10-24 RX ORDER — POTASSIUM CHLORIDE 20 MEQ/1
20 TABLET, EXTENDED RELEASE ORAL ONCE
Status: COMPLETED | OUTPATIENT
Start: 2023-10-24 | End: 2023-10-24

## 2023-10-24 RX ORDER — LACTOBACILLUS RHAMNOSUS GG 10B CELL
1 CAPSULE ORAL
Status: DISCONTINUED | OUTPATIENT
Start: 2023-10-24 | End: 2023-10-25

## 2023-10-24 RX ADMIN — CARVEDILOL 3.12 MG: 3.12 TABLET, FILM COATED ORAL at 21:13

## 2023-10-24 RX ADMIN — POTASSIUM CHLORIDE 20 MEQ: 1500 TABLET, EXTENDED RELEASE ORAL at 09:54

## 2023-10-24 RX ADMIN — LEVOTHYROXINE SODIUM 125 MCG: 125 TABLET ORAL at 07:12

## 2023-10-24 RX ADMIN — AMIODARONE HYDROCHLORIDE 200 MG: 200 TABLET ORAL at 09:54

## 2023-10-24 RX ADMIN — MIDODRINE HYDROCHLORIDE 10 MG: 5 TABLET ORAL at 16:07

## 2023-10-24 RX ADMIN — VANCOMYCIN HYDROCHLORIDE 500 MG: 10 INJECTION, POWDER, LYOPHILIZED, FOR SOLUTION INTRAVENOUS at 22:17

## 2023-10-24 RX ADMIN — SODIUM CHLORIDE, PRESERVATIVE FREE 10 ML: 5 INJECTION INTRAVENOUS at 09:56

## 2023-10-24 RX ADMIN — HEPARIN SODIUM 5000 UNITS: 5000 INJECTION INTRAVENOUS; SUBCUTANEOUS at 21:12

## 2023-10-24 RX ADMIN — MIDODRINE HYDROCHLORIDE 10 MG: 5 TABLET ORAL at 12:04

## 2023-10-24 RX ADMIN — NEPHROCAP 1 MG: 1 CAP ORAL at 16:07

## 2023-10-24 RX ADMIN — HEPARIN SODIUM 5000 UNITS: 5000 INJECTION INTRAVENOUS; SUBCUTANEOUS at 13:48

## 2023-10-24 RX ADMIN — METOCLOPRAMIDE HYDROCHLORIDE 5 MG: 5 INJECTION INTRAMUSCULAR; INTRAVENOUS at 13:46

## 2023-10-24 RX ADMIN — MIDODRINE HYDROCHLORIDE 10 MG: 5 TABLET ORAL at 09:55

## 2023-10-24 RX ADMIN — CARVEDILOL 3.12 MG: 3.12 TABLET, FILM COATED ORAL at 09:55

## 2023-10-24 RX ADMIN — HEPARIN SODIUM 5000 UNITS: 5000 INJECTION INTRAVENOUS; SUBCUTANEOUS at 07:12

## 2023-10-24 RX ADMIN — VANCOMYCIN HYDROCHLORIDE 500 MG: 10 INJECTION, POWDER, LYOPHILIZED, FOR SOLUTION INTRAVENOUS at 16:13

## 2023-10-24 RX ADMIN — SODIUM CHLORIDE, PRESERVATIVE FREE 10 ML: 5 INJECTION INTRAVENOUS at 21:13

## 2023-10-24 RX ADMIN — METOCLOPRAMIDE HYDROCHLORIDE 5 MG: 5 INJECTION INTRAMUSCULAR; INTRAVENOUS at 07:12

## 2023-10-24 RX ADMIN — Medication 1 CAPSULE: at 16:07

## 2023-10-24 ASSESSMENT — PAIN SCALES - GENERAL
PAINLEVEL_OUTOF10: 0

## 2023-10-24 NOTE — PLAN OF CARE
pre-treatment: 0/10   Pain level post-treatment: 010  Pain Intervention(s): Rest, Repositioning   Response to intervention: Nurse notified    Activity Tolerance:    Activity Tolerance: Patient limited by fatigue;Patient limited by endurance  Please refer to the flowsheet for vital signs taken during this treatment. After treatment:   []  Patient left in no apparent distress sitting up in chair  [x]  Patient left in no apparent distress in bed  [x]  Call bell left within reach  [x]  Nursing notified  []  Caregiver present  [x]  Bed alarm activated    COMMUNICATION/EDUCATION:   Patient Education  Education Given To: Patient  Education Provided: Role of Therapy;Plan of Care;ADL Adaptive Strategies;Transfer Training;Energy Conservation; Fall Prevention Strategies  Education Method: Demonstration;Verbal  Barriers to Learning: None  Education Outcome: Continued education needed      Thank you for this referral.  NORM Naidu  Minutes: 24

## 2023-10-24 NOTE — PLAN OF CARE
Problem: Physical Therapy - Adult  Goal: By Discharge: Performs mobility at highest level of function for planned discharge setting. See evaluation for individualized goals. Description: Initiated  10/23/23  to be met within 7-10 days. 1.  Patient will move from supine to sit and sit to supine , scoot up and down, and roll side to side in bed with minimal assistance/contact guard assist.    2.  Patient will transfer from bed to chair and chair to bed with minimal assistance/contact guard assist using the least restrictive device. 3.  Patient will perform sit to stand with minimal assistance/contact guard assist.  4.  Patient will ambulate with minimal assistance/contact guard assist for 50 feet with the least restrictive device. 5.  Patient will ascend/descend stairs as needed for discharge. PLOF: pt lives with wife in a 1 SH, gets assist with ADLs, ambulatory with a walker    Outcome: Progressing     PHYSICAL THERAPY EVALUATION    Patient: Noman Connelly (88 y.o. male)  Date: 10/24/2023  Primary Diagnosis: UTI (urinary tract infection) [N39.0]  Cystitis [N30.90]  Hypoxia [R09.02]  Leukocytosis, unspecified type [D72.829]  Urinary tract infection associated with indwelling urethral catheter, initial encounter ([address]) [E92.104Z, N39.0]       Precautions: Contact Precautions, Fall Risk, Bed Alarm     ASSESSMENT :  Pt received in bed and agreeable, on 2L via NC. Pt with grossly decreased strength to BLE. Pt with fair sitting and standing balance this date, utilized RW in standing for increased support. X 2 standing trials with max A x 2, able to tolerate up to 1 min of standing with lateral shuffling steps x 3 ft along EOB. Pt with increased fatigue following activity this date, educated in PLB. Pt returned to bed at end of session ,bed alarm on and all needs met. Pt limited by decreased activity tolerance and functional mobility from baseline and will benefit from acute PT during admission. DEFICITS/IMPAIRMENTS:    Body Structures, Functions, Activity Limitations Requiring Skilled Therapeutic Intervention: Decreased functional mobility ; Decreased strength;Decreased tolerance to work activity; Decreased balance;Decreased endurance    Patient will benefit from skilled intervention to address the above impairments. Patient's rehabilitation potential/Therapy Prognosis: Good. Factors which may influence rehabilitation potential include:   []         None noted  []         Mental ability/status  [x]         Medical condition  [x]         Home/family situation and support systems  []         Safety awareness  []         Pain tolerance/management  []         Other:      PLAN :  Recommendations and Planned Interventions:   [x]           Bed Mobility Training             [x]    Neuromuscular Re-Education  [x]           Transfer Training                   []    Orthotic/Prosthetic Training  [x]           Gait Training                          []    Modalities  [x]           Therapeutic Exercises           []    Edema Management/Control  [x]           Therapeutic Activities            [x]    Family Training/Education  [x]           Patient Education  []           Other (comment):    Frequency/Duration: Patient will be followed by physical therapy to address goals, 1-2 times per day/3-5 days per week to address goals. Further Equipment Recommendations for Discharge:  has RW     AMPAC:    12    Current research shows that an AM-PAC score of 17 (13 without stairs) or less is not associated with a discharge to the patient's home setting. Based on an AM-PAC score and their current functional mobility deficits, it is recommended that the patient have 3-5 sessions per week of Physical Therapy at d/c to increase the patient's independence. This AMPAC score should be considered in conjunction with interdisciplinary team recommendations to determine the most appropriate discharge setting.  Patient's social

## 2023-10-24 NOTE — PLAN OF CARE
Problem: Discharge Planning  Goal: Discharge to home or other facility with appropriate resources  Outcome: Progressing  Flowsheets (Taken 10/23/2023 0845)  Discharge to home or other facility with appropriate resources: Identify barriers to discharge with patient and caregiver     Problem: Safety - Adult  Goal: Free from fall injury  Outcome: Progressing     Problem: Skin/Tissue Integrity  Goal: Absence of new skin breakdown  Description: 1. Monitor for areas of redness and/or skin breakdown  2. Assess vascular access sites hourly  3. Every 4-6 hours minimum:  Change oxygen saturation probe site  4. Every 4-6 hours:  If on nasal continuous positive airway pressure, respiratory therapy assess nares and determine need for appliance change or resting period. Outcome: Progressing     Problem: Chronic Conditions and Co-morbidities  Goal: Patient's chronic conditions and co-morbidity symptoms are monitored and maintained or improved  Outcome: Progressing  Flowsheets (Taken 10/23/2023 0845)  Care Plan - Patient's Chronic Conditions and Co-Morbidity Symptoms are Monitored and Maintained or Improved: Monitor and assess patient's chronic conditions and comorbid symptoms for stability, deterioration, or improvement     Problem: ABCDS Injury Assessment  Goal: Absence of physical injury  Outcome: Progressing     Problem: Pain  Goal: Verbalizes/displays adequate comfort level or baseline comfort level  Outcome: Progressing     Problem: Occupational Therapy - Adult  Goal: By Discharge: Performs self-care activities at highest level of function for planned discharge setting. See evaluation for individualized goals. Description: Occupational Therapy Goals:  Initiated 10/23/2023 to be met within 7-10 days.     1.  Patient will perform upper body dressing with minimal assistance/contact guard assist.   2.  Patient will perform lower body dressing with minimal assistance/contact guard assist.  3.  Patient will perform

## 2023-10-24 NOTE — PROGRESS NOTES
conducted an initial consultation and Spiritual Assessment for Bhavani Miller, who is a 80 y. o.,male. Patient's Primary Language is: [place].    According to the patient's EMR Yarsani Affiliation is: [employer].     The reason the Patient came to the hospital is:   Patient Active Problem List    Diagnosis Date Noted    Leukocytosis 09/23/2014    Anemia of chronic renal failure 09/12/2014    UTI (urinary tract infection) 10/21/2023    Cystitis 10/21/2023    Acute respiratory failure with hypoxia ([address]) 10/21/2023    Hypokalemia 10/21/2023    Hearing impaired 09/21/2023    Hypercoagulable state due to paroxysmal atrial fibrillation ([address]) 09/21/2023    Colostomy present ([address]) 09/20/2023    Indwelling Carlos catheter present 09/20/2023    Cerebral infarction, unspecified ([address]) 08/23/2023    Gastro-esophageal reflux disease without esophagitis 08/23/2023    Hyperlipidemia, unspecified 14/24/2711    Metabolic encephalopathy 89/93/8749    Muscle weakness (generalized) 08/23/2023    Obstructive and reflux uropathy, unspecified 08/23/2023    Severe sepsis ([address]) 08/23/2023    Protein-calorie malnutrition ([address]) 08/23/2023    Unspecified abnormalities of gait and mobility 08/23/2023    End-stage renal disease on hemodialysis ([address]) 08/12/2023    Ischemic cardiomyopathy 08/12/2023    Steroid-induced hyperglycemia 07/31/2023    Secondary hyperparathyroidism of renal origin ([address]) 05/24/2023    Thoracic aortic ectasia ([address]) 05/24/2023    Pulmonary hypertension, unspecified ([address]) 02/01/2023    Periprosthetic fracture around internal prosthetic right knee joint 09/07/2022    Bronchiectasis without complication ([address]) 20/53/4628    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

## 2023-10-24 NOTE — PROGRESS NOTES
RENAL DAILY PROGRESS NOTE    Patient: Jennifer Briceno               Sex: male          DOA: 10/21/2023  3:52 PM        YOB: 1935      Age:  80 y.o.        LOS:  LOS: 3 days     Subjective:     Jennifer Briceno is a 80 y.o.  who presents with UTI (urinary tract infection) [N39.0]  Cystitis [N30.90]  Hypoxia [R09.02]  Leukocytosis, unspecified type [D72.829]  Urinary tract infection associated with indwelling urethral catheter, initial encounter ([address] ) [T83.511A, N39.0].    Asked to evaluate for esrd,hx of uretheral cancer,indwelling hebert cath,admitted with urosepsis  Chief complains: Patient denies nausea, vomiting, chest pain, dizziness, shortness of breath or headache.  - Reviewed last 24 hrs events     Current Facility-Administered Medications   Medication Dose Route Frequency    vancomycin (VANCOCIN) 50 mg/mL oral solution 500 mg  500 mg Oral 4 times per day    acetaminophen (TYLENOL) tablet 650 mg  650 mg Oral Q6H PRN    Or    acetaminophen (TYLENOL) suppository 650 mg  650 mg Rectal Q6H PRN    gentamicin intermittent dosing (placeholder)   Other RX Placeholder    metoclopramide (REGLAN) injection 5 mg  5 mg IntraVENous Q6H    sodium chloride flush 0.9 % injection 5-40 mL  5-40 mL IntraVENous 2 times per day    sodium chloride flush 0.9 % injection 5-40 mL  5-40 mL IntraVENous PRN    0.9 % sodium chloride infusion   IntraVENous PRN    ondansetron (ZOFRAN-ODT) disintegrating tablet 4 mg  4 mg Oral Q8H PRN    Or    ondansetron (ZOFRAN) injection 4 mg  4 mg IntraVENous Q6H PRN    polyethylene glycol (GLYCOLAX) packet 17 g  17 g Oral Daily PRN    heparin (porcine) injection 5,000 Units  5,000 Units SubCUTAneous 3 times per day    amiodarone (CORDARONE) tablet 200 mg  200 mg Oral Daily    carvedilol (COREG) tablet 3.125 mg  3.125 mg Oral BID    levothyroxine (SYNTHROID) tablet 125 mcg  125 mcg Oral Daily    midodrine (PROAMATINE) tablet 10 mg  10 mg Oral TID WC       Objective:     Vitals:

## 2023-10-24 NOTE — PROGRESS NOTES
Comprehensive Nutrition Assessment    Type and Reason for Visit:  Initial, Positive Nutrition Screen, Wound    Nutrition Recommendations/Plan:   Continue current diet. Plan to add renal MVI. Plan to add oral nutrition supplement to optimize nutrition intake opportunity: Abdiel (each provides 80 kcals, 2.5 g collagen protein, 300 mg vitamin C, 9.5 mg zinc) BID  Monitor PO intake, weight, labs, and POC while admitted. Malnutrition Assessment:  Malnutrition Status: At risk for malnutrition (Comment) (r/t increased nutrient needs 2/2 ESRD on HD and wounds) (10/24/23 1458)    Context:  Acute Illness       Nutrition History and Allergies: PMHx: hx of urothelial carcinoma of bladder (diagnosed 2019) with chronic indwelling hebert, lap sigmoid colectomy and colostomy (April 23), Afib, ESRD on HD. Wt hx: c wt- 220 lb (stated), 220 lb (9/18/23), 212 lb (7/10/23, +3.8%), 214 lb (8/31/22, +2.8%). no significant wt change documented, per EMR review with current stated wt. NKFA. Nutrition Assessment:    Pt presented c/o unable to tolerate food with dry heaving and nausea for a few days PTA with increased watery bowel movements from ostomy. Admitted for management of severe sepsis with UTI. Received referral r/t pt with stg 2 PI. Per chart, dry heaving improved since admission, and pt does not report nausea today. Tolerating diet currently. Pt last dialyzed 10/23- 2 L removed. No PO data in chart, plan to add supplements to aid wound healing. Will not add potassium of phosphorus restriction r/t normal labs. Nutrition Related Findings:    Last BM: 10/23. Edema: BLE +1. Labs: Na 136 WNL, K 3.7 WNL, Cl 102 WNL, BUN/Cr 19/4.37 H, GFR 12 L, Ca 7.6 L (trend up), Phos 2.6 WNL. Pertinent meds: Synthroid. S/p KCl 20 mEq x1 dose 10/24.  Wound Type: Pressure Injury, Stage II       Current Nutrition Intake & Therapies:    Average Meal Intake:  (reporting nausea and dry heaving has improved over past 1-2 days, noted to be

## 2023-10-25 LAB
ALBUMIN SERPL-MCNC: 1.9 G/DL (ref 3.4–5)
ANION GAP SERPL CALC-SCNC: 11 MMOL/L (ref 3–18)
BACTERIA SPEC CULT: ABNORMAL
BACTERIA SPEC CULT: ABNORMAL
BASOPHILS # BLD: 0 K/UL (ref 0–0.1)
BASOPHILS NFR BLD: 0 % (ref 0–2)
BUN SERPL-MCNC: 33 MG/DL (ref 7–18)
BUN/CREAT SERPL: 6 (ref 12–20)
CALCIUM SERPL-MCNC: 7.9 MG/DL (ref 8.5–10.1)
CC UR VC: ABNORMAL
CHLORIDE SERPL-SCNC: 99 MMOL/L (ref 100–111)
CO2 SERPL-SCNC: 21 MMOL/L (ref 21–32)
CREAT SERPL-MCNC: 5.92 MG/DL (ref 0.6–1.3)
DIFFERENTIAL METHOD BLD: ABNORMAL
EOSINOPHIL # BLD: 0 K/UL (ref 0–0.4)
EOSINOPHIL NFR BLD: 0 % (ref 0–5)
ERYTHROCYTE [DISTWIDTH] IN BLOOD BY AUTOMATED COUNT: 20.9 % (ref 11.6–14.5)
GENTAMICIN SERPL-MCNC: 2 UG/ML (ref 0.5–10)
GLUCOSE SERPL-MCNC: 158 MG/DL (ref 74–99)
HCT VFR BLD AUTO: 32.8 % (ref 36–48)
HEMOCCULT STL QL: POSITIVE
HGB BLD-MCNC: 10.6 G/DL (ref 13–16)
IMM GRANULOCYTES # BLD AUTO: 0 K/UL (ref 0–0.04)
IMM GRANULOCYTES NFR BLD AUTO: 0 % (ref 0–0.5)
LYMPHOCYTES # BLD: 2.2 K/UL (ref 0.9–3.6)
LYMPHOCYTES NFR BLD: 3 % (ref 21–52)
MAGNESIUM SERPL-MCNC: 2 MG/DL (ref 1.6–2.6)
MCH RBC QN AUTO: 35.2 PG (ref 24–34)
MCHC RBC AUTO-ENTMCNC: 32.3 G/DL (ref 31–37)
MCV RBC AUTO: 109 FL (ref 78–100)
METAMYELOCYTES NFR BLD MANUAL: 1 %
MONOCYTES # BLD: 0.7 K/UL (ref 0.05–1.2)
MONOCYTES NFR BLD: 1 % (ref 3–10)
MYELOCYTES NFR BLD MANUAL: 1 %
NEUTS BAND NFR BLD MANUAL: 2 %
NEUTS SEG # BLD: 69 K/UL (ref 1.8–8)
NEUTS SEG NFR BLD: 92 % (ref 40–73)
NRBC # BLD: 0.06 K/UL (ref 0–0.01)
NRBC BLD-RTO: 0.1 PER 100 WBC
PERIPHERAL SMEAR, MD REVIEW: NORMAL
PHOSPHATE SERPL-MCNC: 3.2 MG/DL (ref 2.5–4.9)
PLATELET # BLD AUTO: 535 K/UL (ref 135–420)
PLATELET COMMENT: ABNORMAL
PMV BLD AUTO: 11.2 FL (ref 9.2–11.8)
POTASSIUM SERPL-SCNC: 4.6 MMOL/L (ref 3.5–5.5)
RBC # BLD AUTO: 3.01 M/UL (ref 4.35–5.65)
RBC MORPH BLD: ABNORMAL
SERVICE CMNT-IMP: ABNORMAL
SODIUM SERPL-SCNC: 131 MMOL/L (ref 136–145)
WBC # BLD AUTO: 73.4 K/UL (ref 4.6–13.2)

## 2023-10-25 PROCEDURE — 1100000003 HC PRIVATE W/ TELEMETRY

## 2023-10-25 PROCEDURE — 87449 NOS EACH ORGANISM AG IA: CPT

## 2023-10-25 PROCEDURE — 6360000002 HC RX W HCPCS

## 2023-10-25 PROCEDURE — 80170 ASSAY OF GENTAMICIN: CPT

## 2023-10-25 PROCEDURE — 6370000000 HC RX 637 (ALT 250 FOR IP): Performed by: INTERNAL MEDICINE

## 2023-10-25 PROCEDURE — 6360000002 HC RX W HCPCS: Performed by: INTERNAL MEDICINE

## 2023-10-25 PROCEDURE — 2580000003 HC RX 258: Performed by: INTERNAL MEDICINE

## 2023-10-25 PROCEDURE — 85025 COMPLETE CBC W/AUTO DIFF WBC: CPT

## 2023-10-25 PROCEDURE — 80069 RENAL FUNCTION PANEL: CPT

## 2023-10-25 PROCEDURE — 6370000000 HC RX 637 (ALT 250 FOR IP): Performed by: STUDENT IN AN ORGANIZED HEALTH CARE EDUCATION/TRAINING PROGRAM

## 2023-10-25 PROCEDURE — 6360000002 HC RX W HCPCS: Performed by: STUDENT IN AN ORGANIZED HEALTH CARE EDUCATION/TRAINING PROGRAM

## 2023-10-25 PROCEDURE — 2580000003 HC RX 258: Performed by: STUDENT IN AN ORGANIZED HEALTH CARE EDUCATION/TRAINING PROGRAM

## 2023-10-25 PROCEDURE — 94761 N-INVAS EAR/PLS OXIMETRY MLT: CPT

## 2023-10-25 PROCEDURE — 6370000000 HC RX 637 (ALT 250 FOR IP): Performed by: HOSPITALIST

## 2023-10-25 PROCEDURE — 83735 ASSAY OF MAGNESIUM: CPT

## 2023-10-25 PROCEDURE — 82272 OCCULT BLD FECES 1-3 TESTS: CPT

## 2023-10-25 PROCEDURE — 99232 SBSQ HOSP IP/OBS MODERATE 35: CPT | Performed by: HOSPITALIST

## 2023-10-25 PROCEDURE — 90935 HEMODIALYSIS ONE EVALUATION: CPT

## 2023-10-25 PROCEDURE — 36415 COLL VENOUS BLD VENIPUNCTURE: CPT

## 2023-10-25 PROCEDURE — P9047 ALBUMIN (HUMAN), 25%, 50ML: HCPCS

## 2023-10-25 RX ORDER — ALLOPURINOL 100 MG/1
100 TABLET ORAL DAILY
Status: DISCONTINUED | OUTPATIENT
Start: 2023-10-25 | End: 2023-10-26

## 2023-10-25 RX ORDER — METRONIDAZOLE 500 MG/100ML
500 INJECTION, SOLUTION INTRAVENOUS EVERY 8 HOURS
Status: DISCONTINUED | OUTPATIENT
Start: 2023-10-25 | End: 2023-11-06 | Stop reason: ALTCHOICE

## 2023-10-25 RX ORDER — ALBUMIN (HUMAN) 12.5 G/50ML
SOLUTION INTRAVENOUS
Status: COMPLETED
Start: 2023-10-25 | End: 2023-10-25

## 2023-10-25 RX ADMIN — METRONIDAZOLE 500 MG: 500 INJECTION, SOLUTION INTRAVENOUS at 22:45

## 2023-10-25 RX ADMIN — SODIUM CHLORIDE, PRESERVATIVE FREE 10 ML: 5 INJECTION INTRAVENOUS at 09:04

## 2023-10-25 RX ADMIN — METRONIDAZOLE 500 MG: 500 INJECTION, SOLUTION INTRAVENOUS at 07:57

## 2023-10-25 RX ADMIN — VANCOMYCIN HYDROCHLORIDE 500 MG: 10 INJECTION, POWDER, LYOPHILIZED, FOR SOLUTION INTRAVENOUS at 04:15

## 2023-10-25 RX ADMIN — ALLOPURINOL 100 MG: 100 TABLET ORAL at 12:31

## 2023-10-25 RX ADMIN — MIDODRINE HYDROCHLORIDE 10 MG: 5 TABLET ORAL at 17:44

## 2023-10-25 RX ADMIN — Medication 1 CAPSULE: at 09:01

## 2023-10-25 RX ADMIN — MIDODRINE HYDROCHLORIDE 10 MG: 5 TABLET ORAL at 12:31

## 2023-10-25 RX ADMIN — Medication 6 MG: at 21:52

## 2023-10-25 RX ADMIN — HEPARIN SODIUM 5000 UNITS: 5000 INJECTION INTRAVENOUS; SUBCUTANEOUS at 22:00

## 2023-10-25 RX ADMIN — SODIUM CHLORIDE, PRESERVATIVE FREE 10 ML: 5 INJECTION INTRAVENOUS at 21:58

## 2023-10-25 RX ADMIN — VANCOMYCIN HYDROCHLORIDE 500 MG: 10 INJECTION, POWDER, LYOPHILIZED, FOR SOLUTION INTRAVENOUS at 18:22

## 2023-10-25 RX ADMIN — HEPARIN SODIUM 5000 UNITS: 5000 INJECTION INTRAVENOUS; SUBCUTANEOUS at 05:33

## 2023-10-25 RX ADMIN — CARVEDILOL 3.12 MG: 3.12 TABLET, FILM COATED ORAL at 21:52

## 2023-10-25 RX ADMIN — VANCOMYCIN HYDROCHLORIDE 500 MG: 10 INJECTION, POWDER, LYOPHILIZED, FOR SOLUTION INTRAVENOUS at 12:32

## 2023-10-25 RX ADMIN — LEVOTHYROXINE SODIUM 125 MCG: 125 TABLET ORAL at 07:55

## 2023-10-25 RX ADMIN — VANCOMYCIN HYDROCHLORIDE 500 MG: 10 INJECTION, POWDER, LYOPHILIZED, FOR SOLUTION INTRAVENOUS at 22:00

## 2023-10-25 RX ADMIN — MIDODRINE HYDROCHLORIDE 10 MG: 5 TABLET ORAL at 09:01

## 2023-10-25 RX ADMIN — ONDANSETRON 4 MG: 2 INJECTION INTRAMUSCULAR; INTRAVENOUS at 09:09

## 2023-10-25 RX ADMIN — ACETAMINOPHEN 325MG 650 MG: 325 TABLET ORAL at 22:21

## 2023-10-25 RX ADMIN — ALBUMIN (HUMAN) 25 G: 0.25 INJECTION, SOLUTION INTRAVENOUS at 13:30

## 2023-10-25 RX ADMIN — AMIODARONE HYDROCHLORIDE 200 MG: 200 TABLET ORAL at 09:01

## 2023-10-25 RX ADMIN — NEPHROCAP 1 MG: 1 CAP ORAL at 09:01

## 2023-10-25 RX ADMIN — GENTAMICIN SULFATE 140 MG: 40 INJECTION, SOLUTION INTRAMUSCULAR; INTRAVENOUS at 17:45

## 2023-10-25 ASSESSMENT — PAIN DESCRIPTION - DESCRIPTORS: DESCRIPTORS: ACHING

## 2023-10-25 ASSESSMENT — PAIN SCALES - GENERAL
PAINLEVEL_OUTOF10: 0
PAINLEVEL_OUTOF10: 3
PAINLEVEL_OUTOF10: 0
PAINLEVEL_OUTOF10: 0

## 2023-10-25 ASSESSMENT — PAIN DESCRIPTION - LOCATION: LOCATION: FOOT

## 2023-10-25 ASSESSMENT — PAIN DESCRIPTION - ORIENTATION: ORIENTATION: RIGHT;LEFT

## 2023-10-25 NOTE — CARE COORDINATION
Patient was provided SNF list yesterday. Patient not interested in SNF but will discuss with his wife. Patient prefers to discharge home. Patient stated he's been at several SNFs recently and does not want to go to another. CM to follow up today with patient and his wife. 1340: CM met with the patient's wife and sister in law Jaimee Mendoza today. The patient is considering SNF. Rudy Dhaliwal to visit several facilities today and discuss with the patient. CM explained the patient should be ready for discharge on Friday or Saturday and a plan is needed. They will finalize the plan tomorrow and follow up with CM.      Mendel Capri, BSN, RN  Care Management  Phone: [phone]

## 2023-10-25 NOTE — DIALYSIS
HD Care plan  Time: 3  hrs  Dialysate:  4 K  2.5   Ca  [place]  Net UF: 2 L  Access: Aseptically care for RT [facility]  Hemodynamic stability: Maintain BP WNL     Pre Dialysis:  Pt received from Unit Nurse [facility]  , pt on a bed ,A+O X 4, No s/s of distress noted  on oxygen 2L via NC, SPO2 98% . RT [facility]  assessed no abnormalities noted, dressing changed, line patent with good flow. TDC accessed  per protocol without any difficulty     Intra Dialysis:  Time out / safety process performed per policy, Tx initiated at 1320. TDC flowing with ease. For hemodynamic stability UF goal  set at 2000 ml as tolerated   Pt offered assistance with repositioning every 2 hours/prn    Vascular access visible  and line connections remained intact throughout entire duration of treatment. Vital signs checked every 15 mins,  BP low, despite giving,  25% Albumin 100 mls . UF Paused, not able to achieve prescribed UF,  tx disc 15 mins early as blood was starting to clot, Nephrologist Dr Vivian Landis updated. Rest of vital signs WNL. Post Dialysis: Tx completed at 1602,   Tolerated fairly ,No net UF removed. De-accessed per protocol. Dialysis catheter  locked accordingly with Heparin 1.8 ml in arterial port, and 1.8 ml in venous port ,catheter dressing clean, dry and intact.   Post Dialysis report given to unit  Nurse Neda Ruelas

## 2023-10-25 NOTE — PROGRESS NOTES
[address] [facility] Hospitalist Group  Progress Note    Patient: Xu Ndiaye Age: 80 y.o. : 1935 MR#: [id number] SSN: [id number]  Date/Time: 10/25/2023     Subjective: Patient seen in hemodialysis, patient feeling good, denies any abdominal pain, denies any nausea or vomiting. Tolerating p.o. diet but does not like the food here. Per family including sister and wife, patient did not sleep well last night. Patient takes Benadryl to sleep at home and she prefers him having low-dose of Benadryl. Patient gets very woozy and confused with narcotics and also sleeping medications. Assessment/Plan:   Severe sepsis, worsening leukocytosis  C. difficile toxin colitis  Diarrhea DVT #2  Urinary tract infection secondary to indwelling hebert. Hebert changed in ED on 10/21. Patient with h/o pseudomonal UTI. Nausea and vomiting  Distended colon, no signs of bowel obstruction  3. Acute hypoxic respiratory failure likely 2/2 fluid overload   4. ESRD on HD           5. Mild hypokalemia  6. Right lower lung nodule - 8 mm. Needs 3 month FU.  7.   Mild hypotension  8. Atrial fibrillation, rate controlled. Plan  Discussed with ID, continue p.o. vancomycin, IV Flagyl has been ordered. ID recommending hematology input. We will consult hematology, peripheral smear mostly neutrophils I suspect this is most likely due to infection. Continue gentamicin per ID  Continue oxygen supplementation, wean as tolerated  Hemodialysis per nephrology  We will replace potassium and monitor  Continue midodrine  Continue amiodarone and Coreg  PT/OT eval and treatment, recommending SNF    Discussed with patient and also with his wife and sister at the bedside and explained in detail about my above plan care including diagnosis of C. difficile toxin colitis, worsening leukocytosis and need for hematology evaluation. Both of them understood and agreed with the plan.   Patient currently declining SNF but family %    Platelets 134 (H) 759 - 420 K/uL    MPV 11.2 9.2 - 11.8 FL    Nucleated RBCs 0.1 (H) 0  WBC    nRBC 0.06 (H) 0.00 - 0.01 K/uL    Neutrophils % 92 (H) 40 - 73 %    Band Neutrophils 2 %    Lymphocytes % 3 (L) 21 - 52 %    Monocytes % 1 (L) 3 - 10 %    Eosinophils % 0 0 - 5 %    Basophils % 0 0 - 2 %    Metamyelocytes 1 %    Myelocytes 1 %    Immature Granulocytes 0 0.0 - 0.5 %    Neutrophils Absolute 69.0 (H) 1.8 - 8.0 K/UL    Lymphocytes Absolute 2.2 0.9 - 3.6 K/UL    Monocytes Absolute 0.7 0.05 - 1.2 K/UL    Eosinophils Absolute 0.0 0.0 - 0.4 K/UL    Basophils Absolute 0.0 0.0 - 0.1 K/UL    Absolute Immature Granulocyte 0.0 0.00 - 0.04 K/UL    Differential Type MANUAL      Platelet Comment Increased Platelets      RBC Comment ANISOCYTOSIS  2+        RBC Comment TONY CELLS  2+        RBC Comment TEARDROP CELLS  1+        RBC Comment OVALOCYTES  1+       Occult Blood, Fecal    Collection Time: 10/25/23  9:06 AM   Result Value Ref Range    POC Occult Blood, Fecal Positive (A) NEG         Signed By: Flip Mendez MD     October 25, 2023      Disclaimer: Sections of this note are dictated using utilizing voice recognition software. Minor typographical errors may be present. If questions arise, please do not hesitate to contact me or call our department.

## 2023-10-25 NOTE — CARE COORDINATION
Case Management Assessment  Initial Evaluation    Date/Time of Evaluation: 10/25/2023 6:36 PM  Assessment Completed by: Lilian Skinner RN    If patient is discharged prior to next notation, then this note serves as note for discharge by case management. Patient Name: Darlin Miller                   YOB: 1935  Diagnosis: UTI (urinary tract infection) [N39.0]  Cystitis [N30.90]  Hypoxia [R09.02]  Leukocytosis, unspecified type [D72.829]  Urinary tract infection associated with indwelling urethral catheter, initial encounter ([address]) [N31.366J, N39.0]                   Date / Time: 10/21/2023  3:52 PM    Patient Admission Status: Inpatient   Readmission Risk (Low < 19, Mod (19-27), High > 27): Readmission Risk Score: 21.9    Current PCP: Yuni Hassan MD  PCP verified by CM? Yes    Chart Reviewed: Yes      History Provided by: Patient, Friend  Patient Orientation: Alert and Oriented    Patient Cognition: Alert    Hospitalization in the last 30 days (Readmission):  No    If yes, Readmission Assessment in  Navigator will be completed. Advance Directives:      Code Status: Full Code   Patient's Primary Decision Maker is: Legal Next of Kin      Discharge Planning:    Patient lives with: (P) Spouse/Significant Other Type of Home: (P) Skilled Nursing Facility  Primary Care Giver: Self  Patient Support Systems include: Spouse/Significant Other, Friends/Neighbors   Current Financial resources: (P) Medicare  Current community resources: (P) None  Current services prior to admission: (P) Home Care            Current DME:              Type of Home Care services:  (P) None    ADLS  Prior functional level: Assistance with the following:, Mobility, Housework, Shopping  Current functional level: Assistance with the following:, Mobility, Shopping, Housework    PT AM-PAC:   /24  OT AM-PAC: 14 /24    Family can provide assistance at DC:  Yes  Would you like Case Management to discuss the discharge plan with any

## 2023-10-25 NOTE — PROGRESS NOTES
RENAL DAILY PROGRESS NOTE    Patient: John Christensen               Sex: male          DOA: 10/21/2023  3:52 PM        YOB: 1935      Age:  80 y.o.        LOS:  LOS: 4 days     Subjective:     John Christensen is a 80 y.o.  who presents with UTI (urinary tract infection) [N39.0]  Cystitis [N30.90]  Hypoxia [R09.02]  Leukocytosis, unspecified type [D72.829]  Urinary tract infection associated with indwelling urethral catheter, initial encounter ([address] ) [T83.511A, N39.0].    Asked to evaluate for esrd,hx of uretheral cancer,indwelling hebert cath,admitted with urosepsis  Chief complains: Patient denies nausea, vomiting, chest pain, dizziness, shortness of breath or headache.  - Reviewed last 24 hrs events     Current Facility-Administered Medications   Medication Dose Route Frequency    metronidazole (FLAGYL) 500 mg in 0.9% NaCl 100 mL IVPB premix  500 mg IntraVENous Q8H    gentamicin (GARAMYCIN) 140 mg in sodium chloride 0.9 % 250 mL IVPB  140 mg IntraVENous Once    allopurinol (ZYLOPRIM) tablet 100 mg  100 mg Oral Daily    vancomycin (VANCOCIN) 50 mg/mL oral solution 500 mg  500 mg Oral 4 times per day    lactobacillus (CULTURELLE) capsule 1 capsule  1 capsule Oral Daily with breakfast    Virt-Caps 1 mg  1 capsule Oral Daily    melatonin tablet 6 mg  6 mg Oral Nightly PRN    acetaminophen (TYLENOL) tablet 650 mg  650 mg Oral Q6H PRN    Or    acetaminophen (TYLENOL) suppository 650 mg  650 mg Rectal Q6H PRN    gentamicin intermittent dosing (placeholder)   Other RX Placeholder    sodium chloride flush 0.9 % injection 5-40 mL  5-40 mL IntraVENous 2 times per day    sodium chloride flush 0.9 % injection 5-40 mL  5-40 mL IntraVENous PRN    0.9 % sodium chloride infusion   IntraVENous PRN    ondansetron (ZOFRAN-ODT) disintegrating tablet 4 mg  4 mg Oral Q8H PRN    Or    ondansetron (ZOFRAN) injection 4 mg  4 mg IntraVENous Q6H PRN    polyethylene glycol (GLYCOLAX) packet 17 g  17 g Oral Daily PRN Additional Contrast? None    Result Date: 10/21/2023  No definite evidence of pulmonary embolism. Suboptimal contrast bolus timing limiting subsegmental levels as discussed above. Small pleural effusions with dependent mild atelectasis. New 8 mm right lower lobe lung nodule. Fleischner Society Pulmonary Nodule Guidelines: Three-month follow-up. Gas distended transverse colon but no evidence of obstruction. Oral contrast reaches the and nondilated descending colon. Cholelithiasis. Splenomegaly. XR CHEST PORTABLE    Result Date: 10/21/2023  1. Low lung volumes with bibasilar opacities likely representing atelectasis. Continued follow-up is recommended. EKG Results for orders placed or performed during the hospital encounter of 10/21/23   EKG 12 Lead   Result Value Ref Range    Ventricular Rate 74 BPM    Atrial Rate 65 BPM    QRS Duration 122 ms    Q-T Interval 502 ms    QTc Calculation (Bazett) 557 ms    R Axis -4 degrees    T Axis 20 degrees    Diagnosis       Likely sinus rhythm   Nonspecific intraventricular conduction delay  ST & T wave abnormality, consider anterior ischemia  Abnormal ECG  No previous ECGs available  Confirmed by Coleen Marie MD, Victor Hugo Kim ([id number]) on 10/22/2023 9:50:20 AM          I have personally reviewed the old medical records and labs    Plan / Recommendation:      1.  Esrd,continue dialysis mon wed friday  2.urosepsis,worsening leukocytosis,pos c diff  3.anemia,pt and  wife refused epo  4.replace potassium    D/w Viji Jackson MD  Nephrology  10/25/2023

## 2023-10-25 NOTE — PROGRESS NOTES
[phone]    Gastroenterology follow up-Progress note    Impression:  1. Distended abdomen, improving, stool C diff positive, oral vanco started, still with mild nausea but no vomiting  2. Leukocytosis - severe, wbc up to 73.4, severe sepsis on admission due to Carlos catheter associated cystitis -  ? C diff. 3. Chronic hiccups - X 3- 4 weeks  4. 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 Sharon Parson. 5.  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 6.  End-stage renal disease-on hemodialysis. 7.  Obesity. Plan:  1. Continue oral vancomycin for C diff  2. Appreciate input from ID  3. Monitor abdomen and ostomy output  4. Diet per primary team  5. Medical management per primary team    Chief Complaint: Abdominal distention, diarrhea, C diff      Subjective:  Nausea better, tolerating PO, denies abdominal pain.         Patient Active Problem List   Diagnosis    Proteinuria    Acquired hypothyroidism    Osteoarthrosis    Leukocytosis    Renal failure    Neoplasm of bladder    Anemia of chronic renal failure    Chronic hyperkalemia    PAF (paroxysmal atrial fibrillation) ([facility])    Bronchiectasis without complication ([facility])    Carpal tunnel syndrome    Cerebral infarction, unspecified ([address])    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 without tophus    Hypertensive chronic kidney disease with stage 1 through stage 4 chronic kidney disease, or unspecified chronic kidney disease    Colostomy present (H[facility])    Coronary arteriosclerosis    End-stage renal disease on hemodialysis (HCC)    Gastro-esophageal reflux disease without esophagitis    Hearing impaired    Hypercoagulable state due to paroxysmal atrial fibrillation ([facility])    Hyperlipidemia, unspecified    Indwelling Carlos catheter present    Ischemic cardiomyopathy    Metabolic encephalopathy    Multiple renal cysts    Muscle weakness (generalized)    Obstructive and reflux uropathy, unspecified    Severe sepsis ([address])

## 2023-10-25 NOTE — PROGRESS NOTES
Infectious Disease progress Note        Reason: Severe sepsis    Current abx Prior abx   Gentamicin since 10/23 Piperacillin/tazobactam, vancomycin since 10/21-10/23     Lines:       Assessment :   80 y.o.  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 Clinical presentation consistent with severe sepsis-present on admission due to Hebert catheter associated cystitis    Possible ileus versus pseudoobstruction:  GI follow-up appreciated. Now with liquid bowel movements    Significant worsening leukocytosis-despite current broad-spectrum antibiotics, treatment positive for infection is perplexing- ? Undiagnosed underlying hematological disorder rule out evolving GI bleed    Stool c.diff 10/23- positive likely suggest evolving c.diff as the cause of persistent leukocytosis. Patient's recent diarrhea as outpt could have been due to evolving c.diff with subsequent ileus due to immodium in setting of c.diff infection. Discussed with microbiology lab. Urine culture 10/21 positive for 70,000 colonies of pseudomonas (gentamicin NIKA 2, levofloxacin NIKA:1, cefepime NIKA:4, pip/tazo NIKA 8), Klebsiella (R to levofloxacin, gentamicin NIKA:<1)    Clinically same. No worsening diarrhea or abdominal pain noted on today's exam  Leukocytosis out of proportion to severity of illness- rule out undiagnosed hematological disorder  No blasts noted on peripheral smear. Toxic granulation noted consistent with infection  Rule out fungemia    + melanotic stool- rule out evolving UGi bleed- stool occult blood not sent on 10/24     + nausea, some vomiting- ?  Due to GI infection     Recommendations:    Continue IV gentamicin till 10/27 (will use shorter course since abx can worsen c.diff)  Cont po. Vancomycin-add IV metronidazole  Obtain hematology evaluation for persistent leukocytosis  Follow-up GI recommendations   change Carlos catheter tomorrow  Probiotics while on antibiotics  Send stool occult blood, fungitell    Above plan was discussed in details with patient, rn, GI NP and dr Lisa Lovett. All questions answered to their full satisfaction. Spent additional 20 minutes in management and evaluation of this patient. >50% time spent in counselling and coordination of care. Please call me if any further questions or concerns. Will continue to participate in the care of this patient. HPI:      Patient denies any subjective fever, chills at this time. Denies increasing chest pain, shortness of breath, abdominal pain. Past Medical History:   Diagnosis Date    Anemia     Arthritis     Atrial fibrillation ([address])     Broken leg 09/2022    CHF exacerbation ([address]) 01/08/2021    CKD (chronic kidney disease), stage III ([address])     Essential hypertension 01/18/2016    Exercise tolerance finding 10/2020    WORKS 8HRS/DAY NO CP OR SOB ON EXERTION    Exercise tolerance finding 05/2019    sob when up 2 flights of stairs no cp    Gout     Hay fever     Hearing impaired     History of pneumonia     Hyperkalemia 10/2020    Hypothyroidism     Leukocytosis     Renal insufficiency     Varicella     Vertigo     Vitamin D deficiency        Past Surgical History:   Procedure Laterality Date    BLADDER SURGERY  04/12/2023    CARPAL TUNNEL RELEASE Left     COLOSTOMY  04/11/2023    CORONARY ANGIOPLASTY WITH STENT PLACEMENT  12/04/2018    ORTHOPEDIC SURGERY      hip replacement bilateral    OTHER SURGICAL HISTORY  04/10/2023    Blood clot removal of  the bladder    TONSILLECTOMY      TOTAL HIP ARTHROPLASTY      TOTAL KNEE ARTHROPLASTY Left     UROLOGICAL SURGERY  05/14/2019    TURBT w postoperative intravesical instillation of gemcitabine.   [facility]

## 2023-10-26 LAB
ANION GAP SERPL CALC-SCNC: 7 MMOL/L (ref 3–18)
BASOPHILS # BLD: 0.4 K/UL (ref 0–0.1)
BASOPHILS NFR BLD: 1 % (ref 0–2)
BUN SERPL-MCNC: 21 MG/DL (ref 7–18)
BUN/CREAT SERPL: 5 (ref 12–20)
CALCIUM SERPL-MCNC: 7.6 MG/DL (ref 8.5–10.1)
CHLORIDE SERPL-SCNC: 102 MMOL/L (ref 100–111)
CO2 SERPL-SCNC: 27 MMOL/L (ref 21–32)
CREAT SERPL-MCNC: 4.51 MG/DL (ref 0.6–1.3)
DIFFERENTIAL METHOD BLD: ABNORMAL
EOSINOPHIL # BLD: 0.4 K/UL (ref 0–0.4)
EOSINOPHIL NFR BLD: 1 % (ref 0–5)
ERYTHROCYTE [DISTWIDTH] IN BLOOD BY AUTOMATED COUNT: 20.7 % (ref 11.6–14.5)
FOLATE SERPL-MCNC: 18.1 NG/ML (ref 3.1–17.5)
GLUCOSE SERPL-MCNC: 116 MG/DL (ref 74–99)
HCT VFR BLD AUTO: 27.5 % (ref 36–48)
HGB BLD-MCNC: 9.1 G/DL (ref 13–16)
IMM GRANULOCYTES # BLD AUTO: 0 K/UL (ref 0–0.04)
IMM GRANULOCYTES NFR BLD AUTO: 0 % (ref 0–0.5)
IRON SATN MFR SERPL: 39 % (ref 20–50)
IRON SERPL-MCNC: 37 UG/DL (ref 50–175)
LYMPHOCYTES # BLD: 1.3 K/UL (ref 0.9–3.6)
LYMPHOCYTES NFR BLD: 3 % (ref 21–52)
MAGNESIUM SERPL-MCNC: 2 MG/DL (ref 1.6–2.6)
MCH RBC QN AUTO: 35.7 PG (ref 24–34)
MCHC RBC AUTO-ENTMCNC: 33.1 G/DL (ref 31–37)
MCV RBC AUTO: 107.8 FL (ref 78–100)
METAMYELOCYTES NFR BLD MANUAL: 2 %
MONOCYTES # BLD: 0 K/UL (ref 0.05–1.2)
MONOCYTES NFR BLD: 0 % (ref 3–10)
MYELOCYTES NFR BLD MANUAL: 3 %
NEUTS BAND NFR BLD MANUAL: 4 %
NEUTS SEG # BLD: 38.1 K/UL (ref 1.8–8)
NEUTS SEG NFR BLD: 86 % (ref 40–73)
NRBC # BLD: 0.04 K/UL (ref 0–0.01)
NRBC BLD-RTO: 0.1 PER 100 WBC
PLATELET # BLD AUTO: 365 K/UL (ref 135–420)
PLATELET COMMENT: ABNORMAL
PMV BLD AUTO: 11 FL (ref 9.2–11.8)
POTASSIUM SERPL-SCNC: 3.4 MMOL/L (ref 3.5–5.5)
RBC # BLD AUTO: 2.55 M/UL (ref 4.35–5.65)
RBC MORPH BLD: ABNORMAL
SODIUM SERPL-SCNC: 136 MMOL/L (ref 136–145)
TIBC SERPL-MCNC: 94 UG/DL (ref 250–450)
VIT B12 SERPL-MCNC: 1477 PG/ML (ref 211–911)
WBC # BLD AUTO: 42.3 K/UL (ref 4.6–13.2)

## 2023-10-26 PROCEDURE — 36415 COLL VENOUS BLD VENIPUNCTURE: CPT

## 2023-10-26 PROCEDURE — 83735 ASSAY OF MAGNESIUM: CPT

## 2023-10-26 PROCEDURE — 97535 SELF CARE MNGMENT TRAINING: CPT

## 2023-10-26 PROCEDURE — 6360000002 HC RX W HCPCS: Performed by: STUDENT IN AN ORGANIZED HEALTH CARE EDUCATION/TRAINING PROGRAM

## 2023-10-26 PROCEDURE — 6360000002 HC RX W HCPCS: Performed by: INTERNAL MEDICINE

## 2023-10-26 PROCEDURE — 82746 ASSAY OF FOLIC ACID SERUM: CPT

## 2023-10-26 PROCEDURE — 83540 ASSAY OF IRON: CPT

## 2023-10-26 PROCEDURE — 82607 VITAMIN B-12: CPT

## 2023-10-26 PROCEDURE — 2580000003 HC RX 258: Performed by: STUDENT IN AN ORGANIZED HEALTH CARE EDUCATION/TRAINING PROGRAM

## 2023-10-26 PROCEDURE — 6370000000 HC RX 637 (ALT 250 FOR IP): Performed by: STUDENT IN AN ORGANIZED HEALTH CARE EDUCATION/TRAINING PROGRAM

## 2023-10-26 PROCEDURE — 85025 COMPLETE CBC W/AUTO DIFF WBC: CPT

## 2023-10-26 PROCEDURE — 99223 1ST HOSP IP/OBS HIGH 75: CPT | Performed by: INTERNAL MEDICINE

## 2023-10-26 PROCEDURE — 99232 SBSQ HOSP IP/OBS MODERATE 35: CPT | Performed by: INTERNAL MEDICINE

## 2023-10-26 PROCEDURE — 83550 IRON BINDING TEST: CPT

## 2023-10-26 PROCEDURE — 80048 BASIC METABOLIC PNL TOTAL CA: CPT

## 2023-10-26 PROCEDURE — 6370000000 HC RX 637 (ALT 250 FOR IP): Performed by: INTERNAL MEDICINE

## 2023-10-26 PROCEDURE — 6370000000 HC RX 637 (ALT 250 FOR IP): Performed by: HOSPITALIST

## 2023-10-26 PROCEDURE — 94761 N-INVAS EAR/PLS OXIMETRY MLT: CPT

## 2023-10-26 PROCEDURE — 51702 INSERT TEMP BLADDER CATH: CPT

## 2023-10-26 PROCEDURE — 2580000003 HC RX 258: Performed by: INTERNAL MEDICINE

## 2023-10-26 PROCEDURE — 1100000003 HC PRIVATE W/ TELEMETRY

## 2023-10-26 RX ORDER — ALLOPURINOL 100 MG/1
100 TABLET ORAL
Status: DISCONTINUED | OUTPATIENT
Start: 2023-10-27 | End: 2023-11-25 | Stop reason: HOSPADM

## 2023-10-26 RX ORDER — PREDNISONE 20 MG/1
20 TABLET ORAL DAILY
Status: DISCONTINUED | OUTPATIENT
Start: 2023-10-26 | End: 2023-10-27

## 2023-10-26 RX ADMIN — NEPHROCAP 1 MG: 1 CAP ORAL at 10:03

## 2023-10-26 RX ADMIN — METRONIDAZOLE 500 MG: 500 INJECTION, SOLUTION INTRAVENOUS at 14:50

## 2023-10-26 RX ADMIN — HEPARIN SODIUM 5000 UNITS: 5000 INJECTION INTRAVENOUS; SUBCUTANEOUS at 05:52

## 2023-10-26 RX ADMIN — METRONIDAZOLE 500 MG: 500 INJECTION, SOLUTION INTRAVENOUS at 23:00

## 2023-10-26 RX ADMIN — MIDODRINE HYDROCHLORIDE 10 MG: 5 TABLET ORAL at 10:03

## 2023-10-26 RX ADMIN — AMIODARONE HYDROCHLORIDE 200 MG: 200 TABLET ORAL at 10:03

## 2023-10-26 RX ADMIN — ACETAMINOPHEN 325MG 650 MG: 325 TABLET ORAL at 16:01

## 2023-10-26 RX ADMIN — VANCOMYCIN HYDROCHLORIDE 500 MG: 10 INJECTION, POWDER, LYOPHILIZED, FOR SOLUTION INTRAVENOUS at 17:33

## 2023-10-26 RX ADMIN — VANCOMYCIN HYDROCHLORIDE 500 MG: 10 INJECTION, POWDER, LYOPHILIZED, FOR SOLUTION INTRAVENOUS at 04:00

## 2023-10-26 RX ADMIN — Medication 6 MG: at 23:48

## 2023-10-26 RX ADMIN — VANCOMYCIN HYDROCHLORIDE 500 MG: 10 INJECTION, POWDER, LYOPHILIZED, FOR SOLUTION INTRAVENOUS at 22:00

## 2023-10-26 RX ADMIN — LEVOTHYROXINE SODIUM 125 MCG: 125 TABLET ORAL at 10:03

## 2023-10-26 RX ADMIN — MIDODRINE HYDROCHLORIDE 10 MG: 5 TABLET ORAL at 16:01

## 2023-10-26 RX ADMIN — SODIUM CHLORIDE, PRESERVATIVE FREE 10 ML: 5 INJECTION INTRAVENOUS at 22:00

## 2023-10-26 RX ADMIN — HEPARIN SODIUM 5000 UNITS: 5000 INJECTION INTRAVENOUS; SUBCUTANEOUS at 22:00

## 2023-10-26 RX ADMIN — HEPARIN SODIUM 5000 UNITS: 5000 INJECTION INTRAVENOUS; SUBCUTANEOUS at 14:42

## 2023-10-26 RX ADMIN — PREDNISONE 20 MG: 20 TABLET ORAL at 14:37

## 2023-10-26 RX ADMIN — VANCOMYCIN HYDROCHLORIDE 500 MG: 10 INJECTION, POWDER, LYOPHILIZED, FOR SOLUTION INTRAVENOUS at 10:05

## 2023-10-26 RX ADMIN — METRONIDAZOLE 500 MG: 500 INJECTION, SOLUTION INTRAVENOUS at 06:30

## 2023-10-26 RX ADMIN — SODIUM CHLORIDE, PRESERVATIVE FREE 10 ML: 5 INJECTION INTRAVENOUS at 10:04

## 2023-10-26 RX ADMIN — ACETAMINOPHEN 325MG 650 MG: 325 TABLET ORAL at 23:48

## 2023-10-26 ASSESSMENT — PAIN DESCRIPTION - LOCATION: LOCATION: GENERALIZED

## 2023-10-26 ASSESSMENT — PAIN SCALES - GENERAL
PAINLEVEL_OUTOF10: 0
PAINLEVEL_OUTOF10: 3

## 2023-10-26 ASSESSMENT — PAIN DESCRIPTION - DESCRIPTORS: DESCRIPTORS: ACHING

## 2023-10-26 NOTE — PROGRESS NOTES
Infectious Disease progress Note        Reason: Severe sepsis    Current abx Prior abx   Gentamicin since 10/23 Piperacillin/tazobactam, vancomycin since 10/21-10/23     Lines:       Assessment :   80 y.o.  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 Clinical presentation consistent with severe sepsis-present on admission due to Hebert catheter associated cystitis    Possible ileus versus pseudoobstruction:  GI follow-up appreciated. Now with liquid bowel movements    Significant worsening leukocytosis-despite current broad-spectrum antibiotics, treatment positive for infection is perplexing- ? Undiagnosed underlying hematological disorder rule out evolving GI bleed    Stool c.diff 10/23- positive likely suggest evolving c.diff as the cause of persistent leukocytosis. Patient's recent diarrhea as outpt could have been due to evolving c.diff with subsequent ileus due to immodium in setting of c.diff infection. Discussed with microbiology lab. Urine culture 10/21 positive for 70,000 colonies of pseudomonas (gentamicin NIKA 2, levofloxacin NIKA:1, cefepime NIKA:4, pip/tazo NIKA 8), Klebsiella (R to levofloxacin, gentamicin NIKA:<1)    Clinically same. No worsening diarrhea or abdominal pain noted on today's exam  Leukocytosis out of proportion to severity of illness- rule out undiagnosed hematological disorder  No blasts noted on peripheral smear. Toxic granulation noted consistent with infection  Rule out fungemia    + melanotic stool- rule out evolving UGi bleed- stool occult blood not sent on 10/24     + nausea, some vomiting- ? Due to GI infection     Clinically stable.  Improving

## 2023-10-26 NOTE — CONSULTS
[facility]    [facility] - Consult Note    Consultation request by Kari Mena DO for advice/opinion related to evaluating UTI (urinary tract infection) [N39.0]  Cystitis [N30.90]  Hypoxia [R09.02]  Leukocytosis, unspecified type [D72.829]  Urinary tract infection associated with indwelling urethral catheter, initial encounter [facility]) [N10.873T, N39.0]    Date of  Admission: 10/21/2023  3:52 PM   Primary Care Physician:  Guzman Collier MD     Assessment patient has indwelling     Patient Active Problem List   Diagnosis    Proteinuria    Acquired hypothyroidism    Osteoarthrosis    Leukocytosis    Renal failure    Neoplasm of bladder    Anemia of chronic renal failure    Chronic hyperkalemia    PAF (paroxysmal atrial fibrillation) (HCC)    Bronchiectasis without complication ([address])    Carpal tunnel syndrome    Cerebral infarction, unspecified ([address])    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 without tophus    Hypertensive chronic kidney disease with stage 1 through stage 4 chronic kidney disease, or unspecified chronic kidney disease    Colostomy present ([address])    Coronary arteriosclerosis    End-stage renal disease on hemodialysis ([address])    Gastro-esophageal reflux disease without esophagitis    Hearing impaired    Hypercoagulable state due to paroxysmal atrial fibrillation ([address])    Hyperlipidemia, unspecified    Indwelling Carlos catheter present    Ischemic cardiomyopathy    Metabolic encephalopathy    Multiple renal cysts    Muscle weakness (generalized)    Obstructive and reflux uropathy, unspecified    Severe sepsis ([address])    Periprosthetic fracture around internal prosthetic right knee joint    Primary malignant neoplasm ([address])    Protein-calorie malnutrition ([address])    Pulmonary hypertension, unspecified ([address])    Pure hypercholesterolemia    S/P right coronary artery (RCA) stent placement    Secondary hyperparathyroidism of renal origin ([address])    Steroid-induced

## 2023-10-26 NOTE — PLAN OF CARE
Problem: Occupational Therapy - Adult  Goal: By Discharge: Performs self-care activities at highest level of function for planned discharge setting. See evaluation for individualized goals. Description: Occupational Therapy Goals:  Initiated 10/23/2023 to be met within 7-10 days. 1.  Patient will perform upper body dressing with minimal assistance/contact guard assist.   2.  Patient will perform lower body dressing with minimal assistance/contact guard assist.  3.  Patient will perform functional task in standing for 3 minutes with min assist for balance. 4.  Patient will perform toilet transfers with minimal assistance/contact guard assist.  5.  Patient will participate in upper extremity therapeutic exercise/activities with supervision/set-up for 8-10 minutes to increase strength/endurance for ADLs. PLOF: Patient required min to mod assist with basic self care tasks and used a RW for functional mobility PTA. Outcome: Progressing   OCCUPATIONAL THERAPY TREATMENT    Patient: Kendra Gaviria (20 y.o. male)  Date: 10/26/2023  Diagnosis: UTI (urinary tract infection) [N39.0]  Cystitis [N30.90]  Hypoxia [R09.02]  Leukocytosis, unspecified type [D72.829]  Urinary tract infection associated with indwelling urethral catheter, initial encounter ([address]) [V44.639B, N39.0] Severe sepsis ([address])      Precautions: Contact Precautions, Fall Risk, Bed Alarm,  ,  ,  ,  ,  ,  ,      Chart, occupational therapy assessment, plan of care, and goals were reviewed. ASSESSMENT:  Pt presented slumped towards R SR attempting to eat his breakfast. Pt required MOD A to reposition to mid line. Pt performed simple grooming tasks SBA w/ extended time 2/2 fatigue to improve functional activity tolerance for ADL carryover. He was provided S/U for his meal and declined sitting EOB at this time. MAX A to scoot towards [facility] for optimal bed positioning. He was left with HOB elevated, bed alarm active, and all needs within reach.  RN made

## 2023-10-26 NOTE — CONSULTS
[address] [place] [address]    Name:  Brenda Rodriguez  MR#:   [id number]  :  1935  ACCOUNT #:  [de-identified]  DATE OF SERVICE:  10/26/2023      REFERRING PHYSICIAN:  Gill Peters MD    REASON FOR EVALUATION:  Leukocytosis. HISTORY OF PRESENT ILLNESS:  The patient is an 24-year-old male. He is currently being treated for C. diff colitis as well as multidrug-resistant urinary tract infection with Klebsiella and Pseudomonas. He came in with blood work noting white count of 12,000 in September. During his hospitalization, his white count is 73,000. We have been asked to evaluate and comment on the leukocytosis. He is also noted to have thrombocytosis, platelet count 693,273. He has macrocytic anemia that is chronic. The patient has end-stage renal disease and is currently on hemodialysis. He remains on multiple antimicrobials including treatment with gentamycin, IV Flagyl and vancomycin oral.    PAST MEDICAL HISTORY:  End-stage renal disease on hemodialysis, chronic anemia, congestive heart failure, gout, hypothyroidism, vitamin D deficiency, bladder cancer. PAST SURGICAL HISTORY:  Bilateral hip replacement, tonsillectomy, left knee arthroplasty, TURBT for a bladder tumor, colostomy, coronary artery stent placement. FAMILY HISTORY:  Noncontributory. SOCIAL HISTORY:  . No tobacco use. REVIEW OF SYSTEMS:  Denies chest pain. There is intermittent abdominal pain, loose bowel movement. No bleeding in stools. No headache, vision problems or focal weakness. Denies rash or arthralgia. No changes in the mood. Denies dysuria or frequency of urination. PHYSICAL EXAMINATION:  GENERAL:  Elderly gentleman, chronically ill appearing. VITAL SIGNS:  In the chart. HEENT:  Pupils equal.  Sclerae anicteric. There is pallor noted. EXTREMITIES:  Without rash. LUNGS:  Bilateral inspiratory breath sounds. CARDIAC:  First, second heart sounds audible.   ABDOMEN:

## 2023-10-26 NOTE — CONSULTS
Secondary leukocytosis and thrombocytosis, normal WBC 4 wks ago  Macrocytic anemia, multifactorial sec to inflammation/renal failure  Sepsis, C diff colitis, klebsiella and pseudomonas urosepsis     Peripheral smear does not reveal any immature myeloid cells, note shift to left with neutrophilia    Suggest no suspicion for leukemia   Continue management of infection as ongoing  Epogen support with HD    Note dictated

## 2023-10-26 NOTE — PROGRESS NOTES
[phone]    Gastroenterology follow up-Progress note    Impression:  1. C diff - output into ostomy bag improving  2. Leukocytosis - severe, wbc up to 73.4 10/25/2023 now back down to 42.3, severe sepsis on admission due to Carlos catheter associated cystitis -  ? C diff. 3. Chronic hiccups - improved  4. 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 Julieth Blakely. 5.  Urothelial carcinoma of the bladder-status post treatment by [facility] [place]. 6.  End-stage renal disease-on hemodialysis. 7.  Obesity. Plan:  1. Continue oral vancomycin and IV metronidazole  2. Remaining abx per ID  3. Monitor ostomy output  4. Start probiotics and fiber therapy if diarrhea is persistent  5. Medical management per primary team    Will sign off-Thank you for this consultation and the opportunity to participate in the care of this patient. Please do not hesitate to call with any questions or concerns, or should event occur that may necessitate additional GI evaluation.       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

## 2023-10-26 NOTE — CARE COORDINATION
CM spoke with the patient and his wife at the bedside. Patient agreeable to going to SNF. Sister in law Radha Mauricio was able to visit several SNF's today. FOC obtained for [facility]. She met with the admissions coordinator while at the facility. Referral uploaded in [place] [address] spoke with Dori Bautista, [address] [place] [address] Director. She will review for admission. Updated PT note need to start auth. CM sent a message to Radha Colbert and requested that PT see the patient tomorrow; and that OT and PT see the patient over the weekend as required for SNF authorization. 1526: CM received a call from Dori Bautista, stating they can not accept the patient due to facility not providing transport to HD. CM spoke with the patient's wife and she is agreeable to arranging and paying for medical transport for the patient. 1558: CM advised Dori Bautista the family will schedule and pay for transportation to HD. [facility] can accept the patient. Wife verbalized understanding that transportation must be scheduled prior to the patient going to SNF.      NELSON Alas, RN  Care Management  Phone: [phone]

## 2023-10-26 NOTE — PROGRESS NOTES
[facility] Hospitalist Group  Progress Note    Patient: Catherine Mcmullen Age: 80 y.o. : 1935 MR#: [id number] SSN: [id number]  Date/Time: 10/26/2023     Subjective: Patient is eating his lunch asymptomatic denies any fever chills rigors no abdominal pain no nausea vomiting     Assessment/Plan:   Severe sepsis, worsening leukocytosis  C. difficile toxin colitis  Diarrhea DVT #2  Urinary tract infection secondary to indwelling hebert. Hebert changed in ED on 10/21. Patient with h/o pseudomonal UTI. Nausea and vomiting  Distended colon, no signs of bowel obstruction  3. Acute hypoxic respiratory failure likely 2/2 fluid overload   4. ESRD on HD           5. Mild hypokalemia  6. Right lower lung nodule - 8 mm. Needs 3 month FU.  7.   Mild hypotension  8. Atrial fibrillation, rate controlled. Plan  -Continue current treatment with vancomycin p.o. and follow ID recommendation for C. difficile treatment  hematology input.-Consult was placed for leukocytosis what appears to be secondary leukocytosis from infection. Continue gentamicin per ID  Continue oxygen supplementation, wean as tolerated  Hemodialysis per nephrology  We will replace potassium and monitor  Continue midodrine  Continue amiodarone and Coreg  PT/OT eval and treatment, recommending SNF    Discussed with patient and also with his wife and sister at the bedside and explained in detail about my above plan care including diagnosis of C. difficile toxin colitis, worsening leukocytosis and need for hematology evaluation. Both of them understood and agreed with the plan. Patient currently declining SNF but family is trying to convince him to go to SNF. Wife and sister will be looking at [facility] tomorrow and will finalize facility tomorrow.     Disposition: SNF once clinically better possibly in 2 days          Case discussed with:  [x]Patient  [x]Family  [x]Nursing  []Case Management  DVT Prophylaxis:  []Lovenox  [x]Hep polyethylene glycol (GLYCOLAX) packet 17 g  17 g Oral Daily PRN    heparin (porcine) injection 5,000 Units  5,000 Units SubCUTAneous 3 times per day    amiodarone (CORDARONE) tablet 200 mg  200 mg Oral Daily    carvedilol (COREG) tablet 3.125 mg  3.125 mg Oral BID    levothyroxine (SYNTHROID) tablet 125 mcg  125 mcg Oral Daily    midodrine (PROAMATINE) tablet 10 mg  10 mg Oral TID WC       Labs:    Recent Results (from the past 24 hour(s))   Basic Metabolic Panel    Collection Time: 10/26/23  3:41 AM   Result Value Ref Range    Sodium 136 136 - 145 mmol/L    Potassium 3.4 (L) 3.5 - 5.5 mmol/L    Chloride 102 100 - 111 mmol/L    CO2 27 21 - 32 mmol/L    Anion Gap 7 3.0 - 18 mmol/L    Glucose 116 (H) 74 - 99 mg/dL    BUN 21 (H) 7.0 - 18 MG/DL    Creatinine 4.51 (H) 0.6 - 1.3 MG/DL    Bun/Cre Ratio 5 (L) 12 - 20      Est, Glom Filt Rate 12 (L) >60 ml/min/1.73m2    Calcium 7.6 (L) 8.5 - 10.1 MG/DL   Magnesium    Collection Time: 10/26/23  3:41 AM   Result Value Ref Range    Magnesium 2.0 1.6 - 2.6 mg/dL   CBC with Auto Differential    Collection Time: 10/26/23  7:19 AM   Result Value Ref Range    WBC 42.3 (H) 4.6 - 13.2 K/uL    RBC 2.55 (L) 4.35 - 5.65 M/uL    Hemoglobin 9.1 (L) 13.0 - 16.0 g/dL    Hematocrit 27.5 (L) 36.0 - 48.0 %    .8 (H) 78.0 - 100.0 FL    MCH 35.7 (H) 24.0 - 34.0 PG    MCHC 33.1 31.0 - 37.0 g/dL    RDW 20.7 (H) 11.6 - 14.5 %    Platelets 217 388 - 553 K/uL    MPV 11.0 9.2 - 11.8 FL    Nucleated RBCs 0.1 (H) 0  WBC    nRBC 0.04 (H) 0.00 - 0.01 K/uL    Neutrophils % 86 (H) 40 - 73 %    Band Neutrophils 4 %    Lymphocytes % 3 (L) 21 - 52 %    Monocytes % 0 (L) 3 - 10 %    Eosinophils % 1 0 - 5 %    Basophils % 1 0 - 2 %    Metamyelocytes 2 %    Myelocytes 3 %    Immature Granulocytes 0 0.0 - 0.5 %    Neutrophils Absolute 38.1 (H) 1.8 - 8.0 K/UL    Lymphocytes Absolute 1.3 0.9 - 3.6 K/UL    Monocytes Absolute 0.0 (L) 0.05 - 1.2 K/UL    Eosinophils Absolute 0.4 0.0 - 0.4 K/UL    Basophils

## 2023-10-27 PROBLEM — A04.72 C. DIFFICILE COLITIS: Status: ACTIVE | Noted: 2023-10-27

## 2023-10-27 PROBLEM — A04.72 C. DIFFICILE DIARRHEA: Status: ACTIVE | Noted: 2023-10-27

## 2023-10-27 PROBLEM — D53.9 MACROCYTIC ANEMIA: Status: ACTIVE | Noted: 2023-10-27

## 2023-10-27 PROBLEM — R19.5 OCCULT BLOOD POSITIVE STOOL: Status: ACTIVE | Noted: 2023-10-27

## 2023-10-27 PROBLEM — R09.02 HYPOXIA: Status: ACTIVE | Noted: 2023-10-27

## 2023-10-27 LAB
BACTERIA SPEC CULT: NORMAL
BACTERIA SPEC CULT: NORMAL
BASOPHILS # BLD: 0 K/UL (ref 0–0.1)
BASOPHILS NFR BLD: 0 % (ref 0–2)
DIFFERENTIAL METHOD BLD: ABNORMAL
EOSINOPHIL # BLD: 0 K/UL (ref 0–0.4)
EOSINOPHIL NFR BLD: 0 % (ref 0–5)
ERYTHROCYTE [DISTWIDTH] IN BLOOD BY AUTOMATED COUNT: 20.6 % (ref 11.6–14.5)
GENTAMICIN SERPL-MCNC: 3.1 UG/ML (ref 0.5–10)
HCT VFR BLD AUTO: 27.3 % (ref 36–48)
HGB BLD-MCNC: 9 G/DL (ref 13–16)
IMM GRANULOCYTES # BLD AUTO: 0 K/UL (ref 0–0.04)
IMM GRANULOCYTES NFR BLD AUTO: 0 % (ref 0–0.5)
LYMPHOCYTES # BLD: 1.4 K/UL (ref 0.9–3.6)
LYMPHOCYTES NFR BLD: 3 % (ref 21–52)
MCH RBC QN AUTO: 35.6 PG (ref 24–34)
MCHC RBC AUTO-ENTMCNC: 33 G/DL (ref 31–37)
MCV RBC AUTO: 107.9 FL (ref 78–100)
METAMYELOCYTES NFR BLD MANUAL: 3 %
MONOCYTES # BLD: 1 K/UL (ref 0.05–1.2)
MONOCYTES NFR BLD: 2 % (ref 3–10)
MYELOCYTES NFR BLD MANUAL: 5 %
NEUTS BAND NFR BLD MANUAL: 9 %
NEUTS SEG # BLD: 41.4 K/UL (ref 1.8–8)
NEUTS SEG NFR BLD: 78 % (ref 40–73)
NRBC # BLD: 0.04 K/UL (ref 0–0.01)
NRBC BLD-RTO: 0.1 PER 100 WBC
PLATELET # BLD AUTO: 371 K/UL (ref 135–420)
PLATELET COMMENT: ABNORMAL
PMV BLD AUTO: 11.3 FL (ref 9.2–11.8)
RBC # BLD AUTO: 2.53 M/UL (ref 4.35–5.65)
RBC MORPH BLD: ABNORMAL
SERVICE CMNT-IMP: NORMAL
SERVICE CMNT-IMP: NORMAL
WBC # BLD AUTO: 47.6 K/UL (ref 4.6–13.2)

## 2023-10-27 PROCEDURE — 6370000000 HC RX 637 (ALT 250 FOR IP): Performed by: STUDENT IN AN ORGANIZED HEALTH CARE EDUCATION/TRAINING PROGRAM

## 2023-10-27 PROCEDURE — 6360000002 HC RX W HCPCS: Performed by: INTERNAL MEDICINE

## 2023-10-27 PROCEDURE — 80170 ASSAY OF GENTAMICIN: CPT

## 2023-10-27 PROCEDURE — 97530 THERAPEUTIC ACTIVITIES: CPT

## 2023-10-27 PROCEDURE — 97535 SELF CARE MNGMENT TRAINING: CPT

## 2023-10-27 PROCEDURE — 36415 COLL VENOUS BLD VENIPUNCTURE: CPT

## 2023-10-27 PROCEDURE — 2700000000 HC OXYGEN THERAPY PER DAY

## 2023-10-27 PROCEDURE — 90935 HEMODIALYSIS ONE EVALUATION: CPT

## 2023-10-27 PROCEDURE — 2580000003 HC RX 258: Performed by: STUDENT IN AN ORGANIZED HEALTH CARE EDUCATION/TRAINING PROGRAM

## 2023-10-27 PROCEDURE — 99232 SBSQ HOSP IP/OBS MODERATE 35: CPT | Performed by: INTERNAL MEDICINE

## 2023-10-27 PROCEDURE — 6370000000 HC RX 637 (ALT 250 FOR IP): Performed by: INTERNAL MEDICINE

## 2023-10-27 PROCEDURE — 94761 N-INVAS EAR/PLS OXIMETRY MLT: CPT

## 2023-10-27 PROCEDURE — 97112 NEUROMUSCULAR REEDUCATION: CPT

## 2023-10-27 PROCEDURE — 6370000000 HC RX 637 (ALT 250 FOR IP): Performed by: HOSPITALIST

## 2023-10-27 PROCEDURE — 6360000002 HC RX W HCPCS

## 2023-10-27 PROCEDURE — 1100000003 HC PRIVATE W/ TELEMETRY

## 2023-10-27 PROCEDURE — 6360000002 HC RX W HCPCS: Performed by: STUDENT IN AN ORGANIZED HEALTH CARE EDUCATION/TRAINING PROGRAM

## 2023-10-27 PROCEDURE — 85025 COMPLETE CBC W/AUTO DIFF WBC: CPT

## 2023-10-27 PROCEDURE — 2580000003 HC RX 258: Performed by: INTERNAL MEDICINE

## 2023-10-27 PROCEDURE — P9047 ALBUMIN (HUMAN), 25%, 50ML: HCPCS

## 2023-10-27 RX ORDER — ALBUMIN (HUMAN) 12.5 G/50ML
SOLUTION INTRAVENOUS
Status: COMPLETED
Start: 2023-10-27 | End: 2023-10-27

## 2023-10-27 RX ORDER — ATORVASTATIN CALCIUM 40 MG/1
80 TABLET, FILM COATED ORAL NIGHTLY
Status: DISCONTINUED | OUTPATIENT
Start: 2023-10-27 | End: 2023-11-25 | Stop reason: HOSPADM

## 2023-10-27 RX ORDER — HEPARIN SODIUM 1000 [USP'U]/ML
INJECTION, SOLUTION INTRAVENOUS; SUBCUTANEOUS
Status: DISPENSED
Start: 2023-10-27 | End: 2023-10-27

## 2023-10-27 RX ORDER — HEPARIN SODIUM 1000 [USP'U]/ML
1000 INJECTION, SOLUTION INTRAVENOUS; SUBCUTANEOUS PRN
OUTPATIENT
Start: 2023-10-27

## 2023-10-27 RX ORDER — ALBUMIN (HUMAN) 12.5 G/50ML
25 SOLUTION INTRAVENOUS PRN
Status: DISCONTINUED | OUTPATIENT
Start: 2023-10-27 | End: 2023-11-12

## 2023-10-27 RX ORDER — PROCHLORPERAZINE EDISYLATE 5 MG/ML
10 INJECTION INTRAMUSCULAR; INTRAVENOUS EVERY 6 HOURS PRN
Status: DISCONTINUED | OUTPATIENT
Start: 2023-10-27 | End: 2023-10-28

## 2023-10-27 RX ORDER — ASPIRIN 81 MG/1
81 TABLET, CHEWABLE ORAL DAILY
Status: DISCONTINUED | OUTPATIENT
Start: 2023-10-27 | End: 2023-10-29

## 2023-10-27 RX ADMIN — SODIUM CHLORIDE, PRESERVATIVE FREE 5 ML: 5 INJECTION INTRAVENOUS at 08:27

## 2023-10-27 RX ADMIN — PROCHLORPERAZINE EDISYLATE 10 MG: 5 INJECTION INTRAMUSCULAR; INTRAVENOUS at 22:28

## 2023-10-27 RX ADMIN — NEPHROCAP 1 MG: 1 CAP ORAL at 08:26

## 2023-10-27 RX ADMIN — MIDODRINE HYDROCHLORIDE 10 MG: 5 TABLET ORAL at 16:13

## 2023-10-27 RX ADMIN — CARVEDILOL 3.12 MG: 3.12 TABLET, FILM COATED ORAL at 20:06

## 2023-10-27 RX ADMIN — LEVOTHYROXINE SODIUM 125 MCG: 125 TABLET ORAL at 08:26

## 2023-10-27 RX ADMIN — ALLOPURINOL 100 MG: 100 TABLET ORAL at 08:30

## 2023-10-27 RX ADMIN — VANCOMYCIN HYDROCHLORIDE 500 MG: 10 INJECTION, POWDER, LYOPHILIZED, FOR SOLUTION INTRAVENOUS at 04:00

## 2023-10-27 RX ADMIN — PREDNISONE 20 MG: 20 TABLET ORAL at 08:26

## 2023-10-27 RX ADMIN — METRONIDAZOLE 500 MG: 500 INJECTION, SOLUTION INTRAVENOUS at 13:54

## 2023-10-27 RX ADMIN — HEPARIN SODIUM 5000 UNITS: 5000 INJECTION INTRAVENOUS; SUBCUTANEOUS at 06:00

## 2023-10-27 RX ADMIN — METRONIDAZOLE 500 MG: 500 INJECTION, SOLUTION INTRAVENOUS at 22:39

## 2023-10-27 RX ADMIN — SODIUM CHLORIDE, PRESERVATIVE FREE 10 ML: 5 INJECTION INTRAVENOUS at 20:07

## 2023-10-27 RX ADMIN — MIDODRINE HYDROCHLORIDE 10 MG: 5 TABLET ORAL at 13:51

## 2023-10-27 RX ADMIN — GENTAMICIN SULFATE 140 MG: 40 INJECTION, SOLUTION INTRAMUSCULAR; INTRAVENOUS at 18:15

## 2023-10-27 RX ADMIN — ALBUMIN (HUMAN) 25 G: 0.25 INJECTION, SOLUTION INTRAVENOUS at 10:15

## 2023-10-27 RX ADMIN — HEPARIN SODIUM 5000 UNITS: 5000 INJECTION INTRAVENOUS; SUBCUTANEOUS at 13:53

## 2023-10-27 RX ADMIN — VANCOMYCIN HYDROCHLORIDE 500 MG: 10 INJECTION, POWDER, LYOPHILIZED, FOR SOLUTION INTRAVENOUS at 20:16

## 2023-10-27 RX ADMIN — VANCOMYCIN HYDROCHLORIDE 500 MG: 10 INJECTION, POWDER, LYOPHILIZED, FOR SOLUTION INTRAVENOUS at 14:00

## 2023-10-27 RX ADMIN — METRONIDAZOLE 500 MG: 500 INJECTION, SOLUTION INTRAVENOUS at 06:45

## 2023-10-27 RX ADMIN — ATORVASTATIN CALCIUM 80 MG: 40 TABLET, FILM COATED ORAL at 20:06

## 2023-10-27 RX ADMIN — ONDANSETRON 4 MG: 2 INJECTION INTRAMUSCULAR; INTRAVENOUS at 20:03

## 2023-10-27 RX ADMIN — MIDODRINE HYDROCHLORIDE 10 MG: 5 TABLET ORAL at 08:26

## 2023-10-27 RX ADMIN — POTASSIUM BICARBONATE 50 MEQ: 978 TABLET, EFFERVESCENT ORAL at 13:52

## 2023-10-27 RX ADMIN — HEPARIN SODIUM 5000 UNITS: 5000 INJECTION INTRAVENOUS; SUBCUTANEOUS at 22:30

## 2023-10-27 ASSESSMENT — PAIN SCALES - GENERAL
PAINLEVEL_OUTOF10: 0

## 2023-10-27 NOTE — PROGRESS NOTES
SUSCEPTIBILITY PANEL NIKA      amikacin <=2 ug/mL Sensitive      ampicillin >=32 ug/mL Resistant      ampicillin-sulbactam 16 ug/mL Intermediate      ceFAZolin <=4 ug/mL Sensitive      cefepime <=1 ug/mL Sensitive      cefOXitin 32 ug/mL Resistant      cefTAZidime <=1 ug/mL Sensitive      cefTRIAXone <=1 ug/mL Sensitive      ciprofloxacin >=4 ug/mL Resistant      gentamicin <=1 ug/mL Sensitive      levofloxacin >=8 ug/mL Resistant      meropenem <=0.25 ug/mL Sensitive      nitrofurantoin 128 ug/mL Resistant      piperacillin-tazobactam 16 ug/mL Sensitive      tobramycin <=1 ug/mL Sensitive      trimethoprim-sulfamethoxazole <=20 ug/mL Sensitive                       Susceptibility        Pseudomonas aeruginosa      BACTERIAL SUSCEPTIBILITY PANEL NIKA      amikacin 8 ug/mL Sensitive      cefepime 4 ug/mL Sensitive      cefTAZidime 4 ug/mL Sensitive      ciprofloxacin <=0.25 ug/mL Sensitive      gentamicin 2 ug/mL Sensitive      levofloxacin 1 ug/mL Sensitive      meropenem <=0.25 ug/mL Sensitive      piperacillin-tazobactam 8 ug/mL Sensitive      tobramycin <=1 ug/mL Sensitive                           Respiratory Panel, Molecular, with COVID-19 (Restricted: peds pts or suitable admitted adults) [[id number]] Collected: 10/21/23 1657    Order Status: Completed Specimen: Nasopharyngeal Updated: 10/21/23 1816     Adenovirus by PCR Not detected        Coronavirus 229E by PCR Not detected        Coronavirus HKU1 by PCR Not detected        Coronavirus NL63 by PCR Not detected        Coronavirus OC43 by PCR Not detected        SARS-CoV-2, PCR Not detected        Human Metapneumovirus by PCR Not detected        Rhinovirus Enterovirus PCR Not detected        Influenza A by PCR Not detected        Influenza B PCR Not detected        Parainfluenza 1 PCR Not detected        Parainfluenza 2 PCR Not detected        Parainfluenza 3 PCR Not detected        Parainfluenza 4 PCR Not detected        Respiratory Syncytial Virus by PCR Not detected        Bordetella parapertussis by PCR Not detected        Bordetella pertussis by PCR Not detected        Chlamydophila Pneumonia PCR Not detected        Mycoplasma pneumo by PCR Not detected       Culture, Blood 2 [[id number]] Collected: 10/21/23 1637    Order Status: Completed Specimen: Blood Updated: 10/27/23 0650     Special Requests NO SPECIAL REQUESTS        Culture NO GROWTH 6 DAYS       Culture, Blood 1 [[id number]] Collected: 10/21/23 1620    Order Status: Completed Specimen: Blood Updated: 10/27/23 0650     Special Requests NO SPECIAL REQUESTS        Culture NO GROWTH 6 DAYS                   RADIOLOGY:    All available imaging studies/reports in [facility] for this admission were reviewed     High complexity decision making was performed during the evaluation of this patient at high risk for decompensation with multiple organ involvement     Above mentioned total time spent on reviewing the case/medical record/data/notes/EMR/patient examination/documentation/coordinating care with nurse/consultants, exclusive of procedures with complex decision making performed and > 50% time spent in face to face evaluation. Disclaimer: Sections of this note are dictated utilizing voice recognition software, which may have resulted in some phonetic based errors in grammar and contents. Even though attempts were made to correct all the mistakes, some may have been missed, and remained in the body of the document. If questions arise, please contact our department.     Dr. Jax Enriquez, Infectious Disease Specialist  [phone]  October 27, 2023  9:24 AM

## 2023-10-27 NOTE — PROGRESS NOTES
Received report from [facility]. Pt AAOx3, NAD, breathing non labored, on O2 NC at 2L HOB up. IV site clean, dry and intact. Colostomy noted w/ no stool. FC in place. Bed at the lowest level on lock position, call bell w/i reach. Bed alarm on. Pt w/ episodes of nausea and vomiting. Medicated accordingly and referred to MD for stomach feeling tight as per pt. Noted positive bowel sound. Compazine was ordered by MD and given.

## 2023-10-27 NOTE — PROGRESS NOTES
[address] Hospitalist Group  Progress Note    Patient: Monique Matthews Age: 80 y.o. : 1935 MR#: [id number] SSN: [id number]  Date/Time: 10/27/2023     Subjective:   Patient doing well today. Eating well and in good spirits. No chest pain, no nausea, no vomiting. There is present. Review of systems  General: No fevers or chills. Cardiovascular: No chest pain or pressure. No palpitations. Pulmonary: No shortness of breath, cough or wheeze. Gastrointestinal: No abdominal pain, nausea, vomiting or diarrhea. Genitourinary: No urinary frequency, urgency, hesitancy or dysuria. Musculoskeletal: No joint or muscle pain, no back pain, no recent trauma. Neurologic: No headache, numbness, tingling or weakness. Assessment/Plan:   Severe sepsis, worsening leukocytosis  C. difficile toxin colitis  Diarrhea  UTI secondary to indwelling Carlos, Pseudomonas positive  Nausea vomiting  Distended colon, no signs of bowel obstruction  Acute hypoxic respiratory failure likely secondary to fluid overload  ESRD on HD  Mild hypokalemia  Right lower lung nodule 8 mm, 3-month follow-up  Mild hypertension  Atrial fibrillation rate controlled  Macrocytic anemia  Positive Hemoccult stool    Admitted to 2 S. Cardiac monitoring  Currently on vancomycin p.o. ID following  Hematology following for leukocytosis, thrombocytosis and microcytic anemia. Peripheral smear done showing left shifted myeloid series with neutrophilia with no abnormal cells. On gentamicin, Flagyl and vancomycin  Continue oxygen supplementation, wean as tolerated  Catheter change today  Nephrology following for hemodialysis  Continue midodrine  Continue amiodarone and Coreg  PT/OT evaluation  Monitor for bleeding, no visualized blood in the ostomy bag. GI following for potential GI bleed. If patient has a melanotic bowel movement could consider an EGD.   We will continue heparin subcutaneous DVT prophylaxis, hold if patient Metamyelocytes 3 %    Myelocytes 5 %    Immature Granulocytes 0 0.0 - 0.5 %    Neutrophils Absolute 41.4 (H) 1.8 - 8.0 K/UL    Lymphocytes Absolute 1.4 0.9 - 3.6 K/UL    Monocytes Absolute 1.0 0.05 - 1.2 K/UL    Eosinophils Absolute 0.0 0.0 - 0.4 K/UL    Basophils Absolute 0.0 0.0 - 0.1 K/UL    Absolute Immature Granulocyte 0.0 0.00 - 0.04 K/UL    Differential Type MANUAL      Platelet Comment ADEQUATE PLATELETS      RBC Comment ANISOCYTOSIS  2+        RBC Comment POLYCHROMASIA  1+        RBC Comment SCHISTOCYTES  1+        RBC Comment TONY CELLS  1+           Signed By: Alek Salmon DO     October 27, 2023      Disclaimer: Sections of this note are dictated using utilizing voice recognition software. Minor typographical errors may be present. If questions arise, please do not hesitate to contact me or call our department.

## 2023-10-27 NOTE — PLAN OF CARE
Problem: Occupational Therapy - Adult  Goal: By Discharge: Performs self-care activities at highest level of function for planned discharge setting. See evaluation for individualized goals. Description: Occupational Therapy Goals:  Initiated 10/23/2023 to be met within 7-10 days. 1.  Patient will perform upper body dressing with minimal assistance/contact guard assist.   2.  Patient will perform lower body dressing with minimal assistance/contact guard assist.  3.  Patient will perform functional task in standing for 3 minutes with min assist for balance. 4.  Patient will perform toilet transfers with minimal assistance/contact guard assist.  5.  Patient will participate in upper extremity therapeutic exercise/activities with supervision/set-up for 8-10 minutes to increase strength/endurance for ADLs. PLOF: Patient required min to mod assist with basic self care tasks and used a RW for functional mobility PTA. Outcome: Progressing   OCCUPATIONAL THERAPY TREATMENT    Patient: Noman Connelly (76 y.o. male)  Date: 10/27/2023  Diagnosis: UTI (urinary tract infection) [N39.0]  Cystitis [N30.90]  Hypoxia [R09.02]  Leukocytosis, unspecified type [D72.829]  Urinary tract infection associated with indwelling urethral catheter, initial encounter ([address]) [T83.511A, N39.0] Severe sepsis ([address])  Procedure(s) (LRB):  EGD ESOPHAGOGASTRODUODENOSCOPY (N/A)    Precautions: Fall Risk, Contact Precautions,  ,  ,  ,  ,  ,  ,      Chart, occupational therapy assessment, plan of care, and goals were reviewed. ASSESSMENT:  Pt presented supine in bed upon entry and agreeable for participation. Pt assisted to EOB MAX A w/ max cueing for proper placement in prep for functional tasks. Once sitting, pt demo F balance requiring MIN A to maintain balance and MOD A over time 2/2 increased fatigue, tolerating ~ 5 mins to improve functional activity tolerance needed for self cares.  Pt was returned back to supine w/ 2 person assist and positioned for comfort. Pt required S/U for breakfast and assist with opening all containers 2/2 decreased FM strength. Pt left with bed alarm active and all needs left within reach. RN made aware. Progression toward goals:  []          Improving appropriately and progressing toward goals  [x]          Improving slowly and progressing toward goals  []          Not making progress toward goals and plan of care will be adjusted     PLAN:  Patient continues to benefit from skilled intervention to address the above impairments. Continue treatment per established plan of care. Further Equipment Recommendations for Discharge: TBA    AMPAC: AM-PAC Inpatient Daily Activity Raw Score: 14    Current research shows that an AM-PAC score of 17 or less is not associated with a discharge to the patient's home setting. Based on an AM-PAC score and their current ADL deficits; it is recommended that the patient have 3-5 sessions per week of Occupational Therapy at d/c to increase the patient's independence. This AMPA score should be considered in conjunction with interdisciplinary team recommendations to determine the most appropriate discharge setting. Patient's social support, diagnosis, medical stability, and prior level of function should also be taken into consideration. SUBJECTIVE:   Patient stated, \"My boss is calling me. \"    OBJECTIVE DATA SUMMARY:   Cognitive/Behavioral Status:  Orientation  Orientation Level: Disoriented to person;Oriented to place       Functional Mobility and Transfers for ADLs:   Bed Mobility:  Bed Mobility Training  Supine to Sit: Maximum assistance; Moderate assistance  Sit to Supine: Maximum assistance;Assist X2  Scooting: Maximum assistance;Assist X2     Balance:  Balance  Sitting: Impaired  Sitting - Static: Fair (occasional)  Sitting - Dynamic: Poor (constant support)    ADL Intervention:  Feeding: Setup     Pain:  Pain level pre-treatment: 0/10   Pain level post-treatment:

## 2023-10-27 NOTE — PLAN OF CARE
Problem: Physical Therapy - Adult  Goal: By Discharge: Performs mobility at highest level of function for planned discharge setting. See evaluation for individualized goals. Description: Initiated  10/23/23  to be met within 7-10 days. 1.  Patient will move from supine to sit and sit to supine , scoot up and down, and roll side to side in bed with minimal assistance/contact guard assist.    2.  Patient will transfer from bed to chair and chair to bed with minimal assistance/contact guard assist using the least restrictive device. 3.  Patient will perform sit to stand with minimal assistance/contact guard assist.  4.  Patient will ambulate with minimal assistance/contact guard assist for 50 feet with the least restrictive device. 5.  Patient will ascend/descend stairs as needed for discharge. PLOF: pt lives with wife in a 1 SH, gets assist with ADLs, ambulatory with a walker    Outcome: Progressing   PHYSICAL THERAPY TREATMENT    Patient: Randolph Alpers (80 y.o. male)  Date: 10/27/2023  Diagnosis: UTI (urinary tract infection) [N39.0]  Cystitis [N30.90]  Hypoxia [R09.02]  Leukocytosis, unspecified type [D72.829]  Urinary tract infection associated with indwelling urethral catheter, initial encounter ([address]) [L84.757G, N39.0] Severe sepsis ([address])  Precautions: Fall Risk, Contact Precautions  ASSESSMENT:  Alert. Oriented to self. Mod A for BLE; max A for trunk for supine to sit with HOB elevated. Seated EOB with min A for balance initially; progresses to mod A with fatigue. Increased fatigue and weakness with all mobility. Returned to seated in bed with max A x2 for sit to supine. Max A x2 for scooting up in bed. HOB elevated. Educated on need for RN assistance with mobility; verbalized understanding. Call hayes in reach. RN Haily Ponce aware.      Progression toward goals:   [x]      Improving appropriately and progressing toward goals  []      Improving slowly and progressing toward goals  []      Not making progress

## 2023-10-27 NOTE — PROGRESS NOTES
[phone]    Gastroenterology follow up-Progress note    Impression:  1. C diff - output into ostomy bag improving  2. Leukocytosis - severe, 47.6, severe sepsis on admission due to Carlos catheter associated cystitis -  ? C diff. 3. Anemia - hgb stable, no overt bleeding, heme + stool, however stool is brown in ostomy bag.   4. Chronic hiccups - improved  5. 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 Chester Jones. 6.  Urothelial carcinoma of the bladder-status post treatment by urology [facility]. 7.  End-stage renal disease-on hemodialysis. 8.  Obesity. Plan:  1. Conservative management concerning occult pos stool. Hgb remains stable, stool is brown in ostomy bag.   2. Monitor for bleeding, if precipitous drop in h/h or large volume event would order NM bleed scan or can consider EGD. 3. Monitor h/h transfuse per protocol  4. Continue current abx  5. Probiotics and fiber therapy if diarrhea is persistent  6. Medical management per primary team      Chief Complaint: Diarrhea, C diff, occult pos stool      Subjective:  Feeling better, family at bedside, patient eating outside food brought in by family. Nausea has resolved. ROS: Denies any fevers, chills, rash.        Patient Active Problem List   Diagnosis    Proteinuria    Acquired hypothyroidism    Osteoarthrosis    Leukocytosis    Renal failure    Neoplasm of bladder    Anemia of chronic renal failure    Chronic hyperkalemia    PAF (paroxysmal atrial fibrillation) (HCC)    Bronchiectasis without complication (HCC)    Carpal tunnel syndrome    Cerebral infarction, unspecified ([address] )    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 without tophus    Hypertensive chronic kidney disease with stage 1 through stage 4 chronic kidney disease, or unspecified chronic kidney disease    Colostomy present ([address] )    Coronary arteriosclerosis    End-stage renal disease on hemodialysis (HCC)    Gastro-esophageal reflux disease without esophagitis    Hearing

## 2023-10-27 NOTE — DIALYSIS
HD Care plan  Time: 3  hrs  Dialysate:  3 K  2.5   Ca  [place]  Net UF: 2 L  Access: Aseptically care for RT [facility]  Hemodynamic stability: Maintain BP WNL     Pre Dialysis:  Pt received from Unit Nurse Puja Mcallister pt on a bed ,A+O X 4, No s/s of distress noted  on oxygen 2L via NC, SPO2 97% . RT [facility]  assessed no abnormalities noted, line patent with good flow. TDC accessed  per protocol without any difficulty     Intra Dialysis:  Time out / safety process performed per policy, Tx initiated at 0905. TDC flowing with ease. For hemodynamic stability UF goal  set at 2000 ml as tolerated   Pt offered assistance with repositioning every 2 hours/prn    Vascular access visible  and line connections remained intact throughout entire duration of treatment. Vital signs checked every 15 mins,  BP low, despite giving,  25% Albumin 100 mls . UF Paused, UF continued,  Rest of vital signs WNL. Post Dialysis: Tx completed at 1205,   Tolerated fairly ,No net UF removed. De-accessed per protocol. Dialysis catheter  locked accordingly with Heparin 1.8 ml in arterial port, and 1.8 ml in venous port ,catheter dressing clean, dry and intact.   Post Dialysis report given to unit  Nurse Bharath Seen

## 2023-10-28 ENCOUNTER — APPOINTMENT (OUTPATIENT)
Facility: HOSPITAL | Age: 88
End: 2023-10-28
Payer: MEDICARE

## 2023-10-28 LAB
ALBUMIN SERPL-MCNC: 2.5 G/DL (ref 3.4–5)
ALBUMIN/GLOB SERPL: 1 (ref 0.8–1.7)
ALP SERPL-CCNC: 130 U/L (ref 45–117)
ALT SERPL-CCNC: 21 U/L (ref 16–61)
ANION GAP SERPL CALC-SCNC: 7 MMOL/L (ref 3–18)
APPEARANCE UR: ABNORMAL
AST SERPL-CCNC: 13 U/L (ref 10–38)
BACTERIA URNS QL MICRO: ABNORMAL /HPF
BASOPHILS # BLD: 0 K/UL (ref 0–0.1)
BASOPHILS NFR BLD: 0 % (ref 0–2)
BILIRUB SERPL-MCNC: 0.7 MG/DL (ref 0.2–1)
BILIRUB UR QL: ABNORMAL
BUN SERPL-MCNC: 24 MG/DL (ref 7–18)
BUN/CREAT SERPL: 5 (ref 12–20)
CALCIUM SERPL-MCNC: 8.4 MG/DL (ref 8.5–10.1)
CHLORIDE SERPL-SCNC: 102 MMOL/L (ref 100–111)
CO2 SERPL-SCNC: 26 MMOL/L (ref 21–32)
COLOR UR: ABNORMAL
CREAT SERPL-MCNC: 4.51 MG/DL (ref 0.6–1.3)
DIFFERENTIAL METHOD BLD: ABNORMAL
EOSINOPHIL # BLD: 0 K/UL (ref 0–0.4)
EOSINOPHIL NFR BLD: 0 % (ref 0–5)
EPITH CASTS URNS QL MICRO: NEGATIVE /LPF (ref 0–5)
ERYTHROCYTE [DISTWIDTH] IN BLOOD BY AUTOMATED COUNT: 20.6 % (ref 11.6–14.5)
GLOBULIN SER CALC-MCNC: 2.6 G/DL (ref 2–4)
GLUCOSE SERPL-MCNC: 153 MG/DL (ref 74–99)
GLUCOSE UR STRIP.AUTO-MCNC: NEGATIVE MG/DL
HCT VFR BLD AUTO: 30 % (ref 36–48)
HGB BLD-MCNC: 9.9 G/DL (ref 13–16)
HGB UR QL STRIP: ABNORMAL
IMM GRANULOCYTES # BLD AUTO: 0 K/UL (ref 0–0.04)
IMM GRANULOCYTES NFR BLD AUTO: 0 % (ref 0–0.5)
KETONES UR QL STRIP.AUTO: ABNORMAL MG/DL
LEUKOCYTE ESTERASE UR QL STRIP.AUTO: ABNORMAL
LYMPHOCYTES # BLD: 2.1 K/UL (ref 0.9–3.6)
LYMPHOCYTES NFR BLD: 4 % (ref 21–52)
MCH RBC QN AUTO: 34.9 PG (ref 24–34)
MCHC RBC AUTO-ENTMCNC: 33 G/DL (ref 31–37)
MCV RBC AUTO: 105.6 FL (ref 78–100)
METAMYELOCYTES NFR BLD MANUAL: 2 %
MONOCYTES # BLD: 1 K/UL (ref 0.05–1.2)
MONOCYTES NFR BLD: 2 % (ref 3–10)
MYELOCYTES NFR BLD MANUAL: 1 %
NEUTS BAND NFR BLD MANUAL: 3 %
NEUTS SEG # BLD: 47 K/UL (ref 1.8–8)
NEUTS SEG NFR BLD: 88 % (ref 40–73)
NITRITE UR QL STRIP.AUTO: NEGATIVE
NRBC # BLD: 0.08 K/UL (ref 0–0.01)
NRBC BLD-RTO: 0.2 PER 100 WBC
PH UR STRIP: 5 (ref 5–8)
PLATELET # BLD AUTO: 447 K/UL (ref 135–420)
PLATELET COMMENT: ABNORMAL
PMV BLD AUTO: 11.3 FL (ref 9.2–11.8)
POTASSIUM SERPL-SCNC: 3.6 MMOL/L (ref 3.5–5.5)
PROT SERPL-MCNC: 5.1 G/DL (ref 6.4–8.2)
PROT UR STRIP-MCNC: 100 MG/DL
RBC # BLD AUTO: 2.84 M/UL (ref 4.35–5.65)
RBC #/AREA URNS HPF: ABNORMAL /HPF (ref 0–5)
RBC MORPH BLD: ABNORMAL
SODIUM SERPL-SCNC: 135 MMOL/L (ref 136–145)
SP GR UR REFRACTOMETRY: 1.02 (ref 1–1.03)
UROBILINOGEN UR QL STRIP.AUTO: 1 EU/DL (ref 0.2–1)
WBC # BLD AUTO: 51.6 K/UL (ref 4.6–13.2)
WBC URNS QL MICRO: ABNORMAL /HPF (ref 0–4)
YEAST URNS QL MICRO: ABNORMAL

## 2023-10-28 PROCEDURE — 6360000002 HC RX W HCPCS: Performed by: INTERNAL MEDICINE

## 2023-10-28 PROCEDURE — 6370000000 HC RX 637 (ALT 250 FOR IP): Performed by: STUDENT IN AN ORGANIZED HEALTH CARE EDUCATION/TRAINING PROGRAM

## 2023-10-28 PROCEDURE — 2580000003 HC RX 258: Performed by: INTERNAL MEDICINE

## 2023-10-28 PROCEDURE — 2580000003 HC RX 258: Performed by: STUDENT IN AN ORGANIZED HEALTH CARE EDUCATION/TRAINING PROGRAM

## 2023-10-28 PROCEDURE — 97535 SELF CARE MNGMENT TRAINING: CPT

## 2023-10-28 PROCEDURE — 97530 THERAPEUTIC ACTIVITIES: CPT

## 2023-10-28 PROCEDURE — 80053 COMPREHEN METABOLIC PANEL: CPT

## 2023-10-28 PROCEDURE — 94761 N-INVAS EAR/PLS OXIMETRY MLT: CPT

## 2023-10-28 PROCEDURE — 6370000000 HC RX 637 (ALT 250 FOR IP): Performed by: INTERNAL MEDICINE

## 2023-10-28 PROCEDURE — 36415 COLL VENOUS BLD VENIPUNCTURE: CPT

## 2023-10-28 PROCEDURE — 74018 RADEX ABDOMEN 1 VIEW: CPT

## 2023-10-28 PROCEDURE — 99232 SBSQ HOSP IP/OBS MODERATE 35: CPT | Performed by: INTERNAL MEDICINE

## 2023-10-28 PROCEDURE — 1100000003 HC PRIVATE W/ TELEMETRY

## 2023-10-28 PROCEDURE — 85025 COMPLETE CBC W/AUTO DIFF WBC: CPT

## 2023-10-28 PROCEDURE — 6370000000 HC RX 637 (ALT 250 FOR IP): Performed by: HOSPITALIST

## 2023-10-28 PROCEDURE — 81001 URINALYSIS AUTO W/SCOPE: CPT

## 2023-10-28 PROCEDURE — 6360000002 HC RX W HCPCS: Performed by: STUDENT IN AN ORGANIZED HEALTH CARE EDUCATION/TRAINING PROGRAM

## 2023-10-28 RX ORDER — SIMETHICONE 80 MG
80 TABLET,CHEWABLE ORAL EVERY 6 HOURS PRN
Status: DISCONTINUED | OUTPATIENT
Start: 2023-10-28 | End: 2023-11-25 | Stop reason: HOSPADM

## 2023-10-28 RX ORDER — METOCLOPRAMIDE HYDROCHLORIDE 5 MG/ML
5 INJECTION INTRAMUSCULAR; INTRAVENOUS EVERY 6 HOURS
Status: DISCONTINUED | OUTPATIENT
Start: 2023-10-28 | End: 2023-11-05

## 2023-10-28 RX ADMIN — ONDANSETRON 4 MG: 2 INJECTION INTRAMUSCULAR; INTRAVENOUS at 08:56

## 2023-10-28 RX ADMIN — SODIUM CHLORIDE, PRESERVATIVE FREE 5 ML: 5 INJECTION INTRAVENOUS at 09:02

## 2023-10-28 RX ADMIN — METOCLOPRAMIDE HYDROCHLORIDE 5 MG: 5 INJECTION INTRAMUSCULAR; INTRAVENOUS at 14:05

## 2023-10-28 RX ADMIN — Medication 6 MG: at 21:23

## 2023-10-28 RX ADMIN — METOCLOPRAMIDE HYDROCHLORIDE 5 MG: 5 INJECTION INTRAMUSCULAR; INTRAVENOUS at 21:02

## 2023-10-28 RX ADMIN — METRONIDAZOLE 500 MG: 500 INJECTION, SOLUTION INTRAVENOUS at 14:12

## 2023-10-28 RX ADMIN — ONDANSETRON 4 MG: 2 INJECTION INTRAMUSCULAR; INTRAVENOUS at 02:30

## 2023-10-28 RX ADMIN — AMIODARONE HYDROCHLORIDE 200 MG: 200 TABLET ORAL at 09:04

## 2023-10-28 RX ADMIN — SODIUM CHLORIDE, PRESERVATIVE FREE 10 ML: 5 INJECTION INTRAVENOUS at 21:27

## 2023-10-28 RX ADMIN — HEPARIN SODIUM 5000 UNITS: 5000 INJECTION INTRAVENOUS; SUBCUTANEOUS at 05:41

## 2023-10-28 RX ADMIN — CARVEDILOL 3.12 MG: 3.12 TABLET, FILM COATED ORAL at 21:27

## 2023-10-28 RX ADMIN — MIDODRINE HYDROCHLORIDE 10 MG: 5 TABLET ORAL at 12:21

## 2023-10-28 RX ADMIN — FIDAXOMICIN 200 MG: 200 TABLET, FILM COATED ORAL at 14:20

## 2023-10-28 RX ADMIN — METRONIDAZOLE 500 MG: 500 INJECTION, SOLUTION INTRAVENOUS at 05:47

## 2023-10-28 RX ADMIN — SIMETHICONE 80 MG: 80 TABLET, CHEWABLE ORAL at 14:05

## 2023-10-28 RX ADMIN — LEVOTHYROXINE SODIUM 125 MCG: 125 TABLET ORAL at 09:01

## 2023-10-28 RX ADMIN — VANCOMYCIN HYDROCHLORIDE 500 MG: 10 INJECTION, POWDER, LYOPHILIZED, FOR SOLUTION INTRAVENOUS at 09:15

## 2023-10-28 RX ADMIN — ATORVASTATIN CALCIUM 80 MG: 40 TABLET, FILM COATED ORAL at 21:27

## 2023-10-28 RX ADMIN — SIMETHICONE 80 MG: 80 TABLET, CHEWABLE ORAL at 20:03

## 2023-10-28 RX ADMIN — MIDODRINE HYDROCHLORIDE 10 MG: 5 TABLET ORAL at 09:00

## 2023-10-28 RX ADMIN — NEPHROCAP 1 MG: 1 CAP ORAL at 09:00

## 2023-10-28 RX ADMIN — ASPIRIN 81 MG: 81 TABLET, CHEWABLE ORAL at 09:01

## 2023-10-28 RX ADMIN — HEPARIN SODIUM 5000 UNITS: 5000 INJECTION INTRAVENOUS; SUBCUTANEOUS at 14:06

## 2023-10-28 RX ADMIN — METRONIDAZOLE 500 MG: 500 INJECTION, SOLUTION INTRAVENOUS at 23:25

## 2023-10-28 RX ADMIN — MIDODRINE HYDROCHLORIDE 10 MG: 5 TABLET ORAL at 16:20

## 2023-10-28 RX ADMIN — HEPARIN SODIUM 5000 UNITS: 5000 INJECTION INTRAVENOUS; SUBCUTANEOUS at 21:25

## 2023-10-28 RX ADMIN — VANCOMYCIN HYDROCHLORIDE 500 MG: 10 INJECTION, POWDER, LYOPHILIZED, FOR SOLUTION INTRAVENOUS at 02:34

## 2023-10-28 ASSESSMENT — PAIN SCALES - GENERAL
PAINLEVEL_OUTOF10: 0
PAINLEVEL_OUTOF10: 5
PAINLEVEL_OUTOF10: 0

## 2023-10-28 ASSESSMENT — PAIN DESCRIPTION - DESCRIPTORS: DESCRIPTORS: SORE

## 2023-10-28 ASSESSMENT — PAIN DESCRIPTION - LOCATION: LOCATION: ABDOMEN

## 2023-10-28 NOTE — PLAN OF CARE
Problem: Discharge Planning  Goal: Discharge to home or other facility with appropriate resources  10/28/2023 0326 by Shell Winter RN  Outcome: Progressing  10/27/2023 2055 by Tish Lazar RN  Outcome: Progressing  Flowsheets  Taken 10/27/2023 2000 by Shell Winter RN  Discharge to home or other facility with appropriate resources:   Identify barriers to discharge with patient and caregiver   Arrange for needed discharge resources and transportation as appropriate   Refer to discharge planning if patient needs post-hospital services based on physician order or complex needs related to functional status, cognitive ability or social support system  Taken 10/27/2023 0825 by Tish Lazar RN  Discharge to home or other facility with appropriate resources: Identify barriers to discharge with patient and caregiver     Problem: Safety - Adult  Goal: Free from fall injury  10/28/2023 0326 by Shell Winter RN  Outcome: Progressing  10/27/2023 2055 by Tish Lazar RN  Outcome: Progressing     Problem: Skin/Tissue Integrity  Goal: Absence of new skin breakdown  Description: 1. Monitor for areas of redness and/or skin breakdown  2. Assess vascular access sites hourly  3. Every 4-6 hours minimum:  Change oxygen saturation probe site  4. Every 4-6 hours:  If on nasal continuous positive airway pressure, respiratory therapy assess nares and determine need for appliance change or resting period.   10/28/2023 0326 by Shell Winter RN  Outcome: Progressing  10/27/2023 2055 by Tish Lazar RN  Outcome: Progressing     Problem: Chronic Conditions and Co-morbidities  Goal: Patient's chronic conditions and co-morbidity symptoms are monitored and maintained or improved  10/28/2023 0326 by Shell Winter RN  Outcome: Progressing  10/27/2023 2055 by Tish Lazar RN  Outcome: Progressing  Flowsheets  Taken 10/27/2023 2000 by Shell Winter RN  Care Plan - Patient's Chronic Conditions and Co-Morbidity Symptoms are

## 2023-10-28 NOTE — PLAN OF CARE
Problem: Occupational Therapy - Adult  Goal: By Discharge: Performs self-care activities at highest level of function for planned discharge setting. See evaluation for individualized goals. Description: Occupational Therapy Goals:  Initiated 10/23/2023 to be met within 7-10 days. 1.  Patient will perform upper body dressing with minimal assistance/contact guard assist.   2.  Patient will perform lower body dressing with minimal assistance/contact guard assist.  3.  Patient will perform functional task in standing for 3 minutes with min assist for balance. 4.  Patient will perform toilet transfers with minimal assistance/contact guard assist.  5.  Patient will participate in upper extremity therapeutic exercise/activities with supervision/set-up for 8-10 minutes to increase strength/endurance for ADLs. PLOF: Patient required min to mod assist with basic self care tasks and used a RW for functional mobility PTA. Outcome: Progressing   OCCUPATIONAL THERAPY TREATMENT    Patient: Jennifer Briceno (87 y.o. male)  Date: 10/28/2023  Diagnosis: UTI (urinary tract infection) [N39.0]  Cystitis [N30.90]  Hypoxia [R09.02]  Leukocytosis, unspecified type [D72.829]  Urinary tract infection associated with indwelling urethral catheter, initial encounter ([address]) [T83.511A, N39.0] Severe sepsis ([address])  Procedure(s) (LRB):  EGD ESOPHAGOGASTRODUODENOSCOPY (N/A)    Precautions: Fall Risk, Contact Precautions, General Precautions, Bed Alarm    Chart, occupational therapy assessment, plan of care, and goals were reviewed. ASSESSMENT:    Pt reports not feeling good and having mult episodes of emesis this am. Pt remains limited by nausea. Pt is poorly positioned in bed, required Max A x2 to reposition for comfort with increased time due to abdominal discomfort. Pt with increased edema in RUE, educated on edema management techniques including AROM and elevation, RUE positioned on pillow.  Pt educated on importance of BUE TherEx

## 2023-10-28 NOTE — PLAN OF CARE
Problem: Physical Therapy - Adult  Goal: By Discharge: Performs mobility at highest level of function for planned discharge setting. See evaluation for individualized goals. Description: Initiated  10/23/23  to be met within 7-10 days. 1.  Patient will move from supine to sit and sit to supine , scoot up and down, and roll side to side in bed with minimal assistance/contact guard assist.    2.  Patient will transfer from bed to chair and chair to bed with minimal assistance/contact guard assist using the least restrictive device. 3.  Patient will perform sit to stand with minimal assistance/contact guard assist.  4.  Patient will ambulate with minimal assistance/contact guard assist for 50 feet with the least restrictive device. 5.  Patient will ascend/descend stairs as needed for discharge. PLOF: pt lives with wife in a 1 SH, gets assist with ADLs, ambulatory with a walker    Outcome: Progressing     PHYSICAL THERAPY TREATMENT    Patient: Marcos Perez (73 y.o. male)  Date: 10/28/2023  Diagnosis: UTI (urinary tract infection) [N39.0]  Cystitis [N30.90]  Hypoxia [R09.02]  Leukocytosis, unspecified type [D72.829]  Urinary tract infection associated with indwelling urethral catheter, initial encounter ([address]) [T83.511A, N39.0] Severe sepsis ([address])  Procedure(s) (LRB):  EGD ESOPHAGOGASTRODUODENOSCOPY (N/A)    Precautions: Fall Risk, Contact Precautions, General Precautions, Bed Alarm      ASSESSMENT:    Pt received in bed, on 2.5L via NC. Appears to not be feeling well, reports \"I feel horrible today\", limited during session by nausea. Pt repositioned with scooting up in bed with max A x 2 , UE/heels floated for pressure relief. After scooting, pt began to vomit, further mobility deferred. HOB elevated to prevent aspiration. Left with all needs met. Will continue to benefit from acute PT during admission.      Progression toward goals:   [x]      Improving appropriately and progressing toward goals  []

## 2023-10-28 NOTE — PROGRESS NOTES
Infectious Disease progress Note        Reason: Severe sepsis    Current abx Prior abx   Gentamicin since 10/23 Piperacillin/tazobactam, vancomycin since 10/21-10/23     Lines:       Assessment :   80 y.o.  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 Clinical presentation consistent with severe sepsis-present on admission due to Hebert catheter associated cystitis    Possible ileus versus pseudoobstruction:  GI follow-up appreciated. Now with liquid bowel movements    Significant worsening leukocytosis-despite current broad-spectrum antibiotics, treatment positive for infection is perplexing- ? Undiagnosed underlying hematological disorder rule out evolving GI bleed    Stool c.diff 10/23- positive likely suggest evolving c.diff as the cause of persistent leukocytosis. Patient's recent diarrhea as outpt could have been due to evolving c.diff with subsequent ileus due to immodium in setting of c.diff infection. Discussed with microbiology lab. Urine culture 10/21 positive for 70,000 colonies of pseudomonas (gentamicin NIKA 2, levofloxacin NIKA:1, cefepime NIKA:4, pip/tazo NIKA 8), Klebsiella (R to levofloxacin, gentamicin NIKA:<1)    Now with worsening leukocytosis. Leukocytosis out of proportion to severity of c.diff illness- very atypical presentation. Worsening leukocytosis could be due to prednisone. However, nausea, increased abdominal distension noted today concerning for partially treated c.diff- hence, will escalate care. No blasts noted on peripheral smear.   Toxic granulation noted consistent with infection  Rule out fungemia    + melanotic stool- rule out evolving UGi

## 2023-10-28 NOTE — PLAN OF CARE
Problem: Discharge Planning  Goal: Discharge to home or other facility with appropriate resources  10/28/2023 1102 by Josi Telles RN  Outcome: Progressing  Flowsheets (Taken 10/28/2023 0900)  Discharge to home or other facility with appropriate resources: Identify barriers to discharge with patient and caregiver  10/28/2023 0326 by Ruchi Aguayo RN  Outcome: Progressing     Problem: Safety - Adult  Goal: Free from fall injury  10/28/2023 1102 by Josi Telles RN  Outcome: Progressing  10/28/2023 0326 by Ruchi Aguayo RN  Outcome: Progressing     Problem: Skin/Tissue Integrity  Goal: Absence of new skin breakdown  Description: 1. Monitor for areas of redness and/or skin breakdown  2. Assess vascular access sites hourly  3. Every 4-6 hours minimum:  Change oxygen saturation probe site  4. Every 4-6 hours:  If on nasal continuous positive airway pressure, respiratory therapy assess nares and determine need for appliance change or resting period.   10/28/2023 1102 by Josi Telles RN  Outcome: Progressing  10/28/2023 0326 by Ruchi Aguayo RN  Outcome: Progressing     Problem: Chronic Conditions and Co-morbidities  Goal: Patient's chronic conditions and co-morbidity symptoms are monitored and maintained or improved  10/28/2023 1102 by Josi Telles RN  Outcome: Progressing  Flowsheets (Taken 10/28/2023 0900)  Care Plan - Patient's Chronic Conditions and Co-Morbidity Symptoms are Monitored and Maintained or Improved: Monitor and assess patient's chronic conditions and comorbid symptoms for stability, deterioration, or improvement  10/28/2023 0326 by Ruchi Aguayo RN  Outcome: Progressing     Problem: ABCDS Injury Assessment  Goal: Absence of physical injury  10/28/2023 1102 by Josi Telles RN  Outcome: Progressing  10/28/2023 0326 by Ruchi Aguayo RN  Outcome: Progressing     Problem: Pain  Goal: Verbalizes/displays adequate comfort level or baseline comfort level  10/28/2023 1102 by Josi Telles

## 2023-10-28 NOTE — PLAN OF CARE
Problem: Discharge Planning  Goal: Discharge to home or other facility with appropriate resources  Outcome: Progressing  Flowsheets (Taken 10/27/2023 0825)  Discharge to home or other facility with appropriate resources: Identify barriers to discharge with patient and caregiver     Problem: Safety - Adult  Goal: Free from fall injury  Outcome: Progressing     Problem: Skin/Tissue Integrity  Goal: Absence of new skin breakdown  Description: 1. Monitor for areas of redness and/or skin breakdown  2. Assess vascular access sites hourly  3. Every 4-6 hours minimum:  Change oxygen saturation probe site  4. Every 4-6 hours:  If on nasal continuous positive airway pressure, respiratory therapy assess nares and determine need for appliance change or resting period. Outcome: Progressing     Problem: Chronic Conditions and Co-morbidities  Goal: Patient's chronic conditions and co-morbidity symptoms are monitored and maintained or improved  Outcome: Progressing  Flowsheets (Taken 10/27/2023 0825)  Care Plan - Patient's Chronic Conditions and Co-Morbidity Symptoms are Monitored and Maintained or Improved: Monitor and assess patient's chronic conditions and comorbid symptoms for stability, deterioration, or improvement     Problem: ABCDS Injury Assessment  Goal: Absence of physical injury  Outcome: Progressing     Problem: Pain  Goal: Verbalizes/displays adequate comfort level or baseline comfort level  Outcome: Progressing  Flowsheets  Taken 10/27/2023 1545  Verbalizes/displays adequate comfort level or baseline comfort level: Encourage patient to monitor pain and request assistance  Taken 10/27/2023 1230  Verbalizes/displays adequate comfort level or baseline comfort level: Encourage patient to monitor pain and request assistance     Problem: Occupational Therapy - Adult  Goal: By Discharge: Performs self-care activities at highest level of function for planned discharge setting.   See evaluation for individualized

## 2023-10-28 NOTE — PROGRESS NOTES
[facility]  Progress Note    Patient: Sung Hodges Age: 80 y.o. : 1935 MR#: [id number] SSN: [id number]  Date/Time: 10/28/2023     Subjective:   Patient had nausea and vomiting this AM. Patient ate lots of food yesterday and family bringing milkshakes etc, may have gone overboard, will change die to clear liquid for bowel rest. If patient worsens will repeat CT abdomen with IV contrast, KUB did not show any signs of perforation. Patient more comfortable on evaluation later in the afternoon. Review of systems  General: No fevers or chills. Cardiovascular: No chest pain or pressure. No palpitations. Pulmonary: No shortness of breath, cough or wheeze. Gastrointestinal: some abdominal soreness,nausea and vomiting   Genitourinary: No urinary frequency, urgency, hesitancy or dysuria. Musculoskeletal: No joint or muscle pain, no back pain, no recent trauma. Neurologic: No headache, numbness, tingling or weakness. Assessment/Plan:   Severe sepsis, worsening leukocytosis  C. difficile toxin colitis  Diarrhea  UTI secondary to indwelling Carlos, Pseudomonas positive  Nausea vomiting  Distended colon, no signs of bowel obstruction  Acute hypoxic respiratory failure likely secondary to fluid overload  ESRD on HD  Mild hypokalemia  Right lower lung nodule 8 mm, 3-month follow-up  Mild hypertension  Atrial fibrillation rate controlled  Macrocytic anemia  Positive Hemoccult stool     Admitted to 2 S. Cardiac monitoring  ID following  Hematology following for leukocytosis, thrombocytosis and microcytic anemia. Peripheral smear done showing left shifted myeloid series with neutrophilia with no abnormal cells.   On gentamicin, Flagyl   Switch oral vanco to fidaxomycin  Continue oxygen supplementation, wean as tolerated  Catheter change today  Nephrology following for hemodialysis  Continue midodrine  Continue amiodarone and Coreg  PT/OT evaluation  Monitor for

## 2023-10-28 NOTE — CONSULTS
Comprehensive Nutrition Assessment    Type and Reason for Visit:  Consult, Reassess    Nutrition Recommendations/Plan:   Continue Current Diet, advance PO diet as tolerated when medically feasible. Modify Oral Nutrition Supplement- plan to add Gelatein (each provides 80 kcal, 20g protein) TID, continue Abdiel (each provides 95 kcal, 2.5g protein) BID. Continue Renal Multivitamin. Continue to monitor tolerance of PO, compliance of oral supplements, weight, labs, and plan of care during admission. Malnutrition Assessment:  Malnutrition Status: At risk for malnutrition (Comment) (Pt with wounds, ESRD on HD, poor intake x 2 days PTA and poor intake in house, on CL diet) (10/28/23 1411)      Nutrition Assessment:    Pt presented c/o unable to tolerate food with dry heaving and nausea for a few days PTA with increased watery bowel movements from ostomy. Admitted for management of severe sepsis with UTI. Consult noted for poor intake/appetite 5 or more days. Pt seen at bedside with wife and family member present, both assisted in providing nutrition hx. Pt with very poor intake, consuming <25% of meals x ~2 days d/t not feeling well. UBW ~ 205 lbs x 1 week, weight stable PTA, NKFA and issues with chewing d/t poorly fitting dentures. Pt reports poor intake in house d/t nausea, states antiemetic is not helping. Observed pt's lunch with 1-25% consumed. No PO documented in Flowsheets. Pt ok with texture and consistency of Soft & Bite Sized diet. Will add oral supplements to increase calorie/protein intake opportunity. Pt currently on Clear Liquid diet, will add gelatein, pt's wife agreeable. Note, per pt's family pt will only drink Nepro if writer sends ice cream and they will mix it as a milk shake. Plan to add Nepro once diet advanced. Will continue to monitor intake. Nutrition Related Findings:    Last BM (including prior to admit): 10/28/23. Edema: Right lower extremity, Left lower extremity.  Pertinent Progress toward Goal(s)  Goals: Meet at least 75% of estimated needs, by next RD assessment       Nutrition Monitoring and Evaluation:   Behavioral-Environmental Outcomes: None Identified  Food/Nutrient Intake Outcomes: Diet Advancement/Tolerance, Vitamin/Mineral Intake, Supplement Intake  Physical Signs/Symptoms Outcomes: Biochemical Data, Chewing or Swallowing, Constipation, GI Status, Diarrhea, Nausea or Vomiting, Fluid Status or Edema, Hemodynamic Status, Meal Time Behavior, Skin, Weight    Discharge Planning:    Continue Oral Nutrition Supplement, Continue current diet     [address], [address] [facility]  Contact: [phone]

## 2023-10-29 ENCOUNTER — APPOINTMENT (OUTPATIENT)
Facility: HOSPITAL | Age: 88
End: 2023-10-29
Payer: MEDICARE

## 2023-10-29 LAB
1,3 BETA GLUCAN SER-MCNC: 32 PG/ML
ALBUMIN SERPL-MCNC: 2.3 G/DL (ref 3.4–5)
ALBUMIN/GLOB SERPL: 1.1 (ref 0.8–1.7)
ALP SERPL-CCNC: 99 U/L (ref 45–117)
ALT SERPL-CCNC: 19 U/L (ref 16–61)
ANION GAP SERPL CALC-SCNC: 8 MMOL/L (ref 3–18)
ARTERIAL PATENCY WRIST A: POSITIVE
ARTERIAL PATENCY WRIST A: POSITIVE
AST SERPL-CCNC: 12 U/L (ref 10–38)
BASE EXCESS BLD CALC-SCNC: 0.3 MMOL/L
BASE EXCESS BLD CALC-SCNC: 0.9 MMOL/L
BASOPHILS # BLD: 0 K/UL (ref 0–0.1)
BASOPHILS NFR BLD: 0 % (ref 0–2)
BDY SITE: ABNORMAL
BDY SITE: ABNORMAL
BILIRUB SERPL-MCNC: 0.5 MG/DL (ref 0.2–1)
BUN SERPL-MCNC: 31 MG/DL (ref 7–18)
BUN/CREAT SERPL: 5 (ref 12–20)
CALCIUM SERPL-MCNC: 8 MG/DL (ref 8.5–10.1)
CHLORIDE SERPL-SCNC: 99 MMOL/L (ref 100–111)
CO2 SERPL-SCNC: 26 MMOL/L (ref 21–32)
CREAT SERPL-MCNC: 5.64 MG/DL (ref 0.6–1.3)
DIFFERENTIAL METHOD BLD: ABNORMAL
EOSINOPHIL # BLD: 0 K/UL (ref 0–0.4)
EOSINOPHIL NFR BLD: 0 % (ref 0–5)
ERYTHROCYTE [DISTWIDTH] IN BLOOD BY AUTOMATED COUNT: 20.6 % (ref 11.6–14.5)
GAS FLOW.O2 O2 DELIVERY SYS: ABNORMAL
GAS FLOW.O2 O2 DELIVERY SYS: ABNORMAL
GLOBULIN SER CALC-MCNC: 2.1 G/DL (ref 2–4)
GLUCOSE BLD STRIP.AUTO-MCNC: 123 MG/DL (ref 70–110)
GLUCOSE BLD STRIP.AUTO-MCNC: 155 MG/DL (ref 70–110)
GLUCOSE BLD STRIP.AUTO-MCNC: 183 MG/DL (ref 70–110)
GLUCOSE SERPL-MCNC: 227 MG/DL (ref 74–99)
HCO3 BLD-SCNC: 24.7 MMOL/L (ref 22–26)
HCO3 BLD-SCNC: 25.4 MMOL/L (ref 22–26)
HCT VFR BLD AUTO: 28 % (ref 36–48)
HGB BLD-MCNC: 9.2 G/DL (ref 13–16)
IMM GRANULOCYTES # BLD AUTO: 0 K/UL (ref 0–0.04)
IMM GRANULOCYTES NFR BLD AUTO: 0 % (ref 0–0.5)
LACTATE SERPL-SCNC: 0.9 MMOL/L (ref 0.4–2)
LYMPHOCYTES # BLD: 1.3 K/UL (ref 0.9–3.6)
LYMPHOCYTES NFR BLD: 3 % (ref 21–52)
MCH RBC QN AUTO: 35.8 PG (ref 24–34)
MCHC RBC AUTO-ENTMCNC: 32.9 G/DL (ref 31–37)
MCV RBC AUTO: 108.9 FL (ref 78–100)
METAMYELOCYTES NFR BLD MANUAL: 3 %
MONOCYTES # BLD: 0.9 K/UL (ref 0.05–1.2)
MONOCYTES NFR BLD: 2 % (ref 3–10)
MYELOCYTES NFR BLD MANUAL: 4 %
NEUTS BAND NFR BLD MANUAL: 1 %
NEUTS SEG # BLD: 39.4 K/UL (ref 1.8–8)
NEUTS SEG NFR BLD: 87 % (ref 40–73)
NRBC # BLD: 0.08 K/UL (ref 0–0.01)
NRBC BLD-RTO: 0.2 PER 100 WBC
O2/TOTAL GAS SETTING VFR VENT: 30 %
O2/TOTAL GAS SETTING VFR VENT: 50 %
PCO2 BLD: 35.2 MMHG (ref 35–45)
PCO2 BLD: 41.5 MMHG (ref 35–45)
PH BLD: 7.39 (ref 7.35–7.45)
PH BLD: 7.45 (ref 7.35–7.45)
PLATELET # BLD AUTO: 343 K/UL (ref 135–420)
PLATELET COMMENT: ABNORMAL
PMV BLD AUTO: 11.1 FL (ref 9.2–11.8)
PO2 BLD: 48 MMHG (ref 80–100)
PO2 BLD: 79 MMHG (ref 80–100)
POTASSIUM SERPL-SCNC: 3.6 MMOL/L (ref 3.5–5.5)
PROT SERPL-MCNC: 4.4 G/DL (ref 6.4–8.2)
RBC # BLD AUTO: 2.57 M/UL (ref 4.35–5.65)
RBC MORPH BLD: ABNORMAL
SAO2 % BLD: 82.8 % (ref 92–97)
SAO2 % BLD: 96.2 % (ref 92–97)
SERVICE CMNT-IMP: ABNORMAL
SODIUM SERPL-SCNC: 133 MMOL/L (ref 136–145)
SPECIMEN TYPE: ABNORMAL
SPECIMEN TYPE: ABNORMAL
WBC # BLD AUTO: 44.8 K/UL (ref 4.6–13.2)

## 2023-10-29 PROCEDURE — 82962 GLUCOSE BLOOD TEST: CPT

## 2023-10-29 PROCEDURE — P9047 ALBUMIN (HUMAN), 25%, 50ML: HCPCS | Performed by: INTERNAL MEDICINE

## 2023-10-29 PROCEDURE — 94660 CPAP INITIATION&MGMT: CPT

## 2023-10-29 PROCEDURE — 87040 BLOOD CULTURE FOR BACTERIA: CPT

## 2023-10-29 PROCEDURE — 6360000002 HC RX W HCPCS: Performed by: INTERNAL MEDICINE

## 2023-10-29 PROCEDURE — 90935 HEMODIALYSIS ONE EVALUATION: CPT

## 2023-10-29 PROCEDURE — 87077 CULTURE AEROBIC IDENTIFY: CPT

## 2023-10-29 PROCEDURE — 94760 N-INVAS EAR/PLS OXIMETRY 1: CPT

## 2023-10-29 PROCEDURE — 87186 SC STD MICRODIL/AGAR DIL: CPT

## 2023-10-29 PROCEDURE — 6360000002 HC RX W HCPCS: Performed by: STUDENT IN AN ORGANIZED HEALTH CARE EDUCATION/TRAINING PROGRAM

## 2023-10-29 PROCEDURE — 36415 COLL VENOUS BLD VENIPUNCTURE: CPT

## 2023-10-29 PROCEDURE — 6370000000 HC RX 637 (ALT 250 FOR IP): Performed by: HOSPITALIST

## 2023-10-29 PROCEDURE — 36600 WITHDRAWAL OF ARTERIAL BLOOD: CPT

## 2023-10-29 PROCEDURE — 2580000003 HC RX 258: Performed by: STUDENT IN AN ORGANIZED HEALTH CARE EDUCATION/TRAINING PROGRAM

## 2023-10-29 PROCEDURE — 87086 URINE CULTURE/COLONY COUNT: CPT

## 2023-10-29 PROCEDURE — 6370000000 HC RX 637 (ALT 250 FOR IP): Performed by: INTERNAL MEDICINE

## 2023-10-29 PROCEDURE — 83605 ASSAY OF LACTIC ACID: CPT

## 2023-10-29 PROCEDURE — 85025 COMPLETE CBC W/AUTO DIFF WBC: CPT

## 2023-10-29 PROCEDURE — 80053 COMPREHEN METABOLIC PANEL: CPT

## 2023-10-29 PROCEDURE — 71045 X-RAY EXAM CHEST 1 VIEW: CPT

## 2023-10-29 PROCEDURE — 2140000001 HC CVICU INTERMEDIATE R&B

## 2023-10-29 PROCEDURE — 2700000000 HC OXYGEN THERAPY PER DAY

## 2023-10-29 PROCEDURE — 6370000000 HC RX 637 (ALT 250 FOR IP): Performed by: STUDENT IN AN ORGANIZED HEALTH CARE EDUCATION/TRAINING PROGRAM

## 2023-10-29 PROCEDURE — 99232 SBSQ HOSP IP/OBS MODERATE 35: CPT | Performed by: INTERNAL MEDICINE

## 2023-10-29 PROCEDURE — 82803 BLOOD GASES ANY COMBINATION: CPT

## 2023-10-29 RX ORDER — DEXTROSE MONOHYDRATE 100 MG/ML
INJECTION, SOLUTION INTRAVENOUS CONTINUOUS PRN
Status: DISCONTINUED | OUTPATIENT
Start: 2023-10-29 | End: 2023-11-25 | Stop reason: HOSPADM

## 2023-10-29 RX ORDER — FUROSEMIDE 10 MG/ML
80 INJECTION INTRAMUSCULAR; INTRAVENOUS ONCE
Status: COMPLETED | OUTPATIENT
Start: 2023-10-29 | End: 2023-10-29

## 2023-10-29 RX ORDER — INSULIN GLARGINE 100 [IU]/ML
5 INJECTION, SOLUTION SUBCUTANEOUS NIGHTLY
Status: DISCONTINUED | OUTPATIENT
Start: 2023-10-29 | End: 2023-11-25 | Stop reason: HOSPADM

## 2023-10-29 RX ORDER — INSULIN LISPRO 100 [IU]/ML
0-4 INJECTION, SOLUTION INTRAVENOUS; SUBCUTANEOUS NIGHTLY
Status: DISCONTINUED | OUTPATIENT
Start: 2023-10-29 | End: 2023-11-25 | Stop reason: HOSPADM

## 2023-10-29 RX ORDER — INSULIN LISPRO 100 [IU]/ML
0-4 INJECTION, SOLUTION INTRAVENOUS; SUBCUTANEOUS
Status: DISCONTINUED | OUTPATIENT
Start: 2023-10-29 | End: 2023-11-25 | Stop reason: HOSPADM

## 2023-10-29 RX ORDER — MIDODRINE HYDROCHLORIDE 10 MG/1
10 TABLET ORAL ONCE
Status: DISCONTINUED | OUTPATIENT
Start: 2023-10-29 | End: 2023-11-04 | Stop reason: SDUPTHER

## 2023-10-29 RX ADMIN — ASPIRIN 81 MG: 81 TABLET, CHEWABLE ORAL at 10:10

## 2023-10-29 RX ADMIN — METRONIDAZOLE 500 MG: 500 INJECTION, SOLUTION INTRAVENOUS at 22:21

## 2023-10-29 RX ADMIN — ATORVASTATIN CALCIUM 80 MG: 40 TABLET, FILM COATED ORAL at 21:07

## 2023-10-29 RX ADMIN — NEPHROCAP 1 MG: 1 CAP ORAL at 10:10

## 2023-10-29 RX ADMIN — ALBUMIN (HUMAN) 25 G: 0.25 INJECTION, SOLUTION INTRAVENOUS at 15:45

## 2023-10-29 RX ADMIN — METRONIDAZOLE 500 MG: 500 INJECTION, SOLUTION INTRAVENOUS at 07:22

## 2023-10-29 RX ADMIN — ACETAMINOPHEN 325MG 650 MG: 325 TABLET ORAL at 11:13

## 2023-10-29 RX ADMIN — FIDAXOMICIN 200 MG: 200 TABLET, FILM COATED ORAL at 10:10

## 2023-10-29 RX ADMIN — MIDODRINE HYDROCHLORIDE 10 MG: 5 TABLET ORAL at 22:05

## 2023-10-29 RX ADMIN — METOCLOPRAMIDE HYDROCHLORIDE 5 MG: 5 INJECTION INTRAMUSCULAR; INTRAVENOUS at 21:00

## 2023-10-29 RX ADMIN — FUROSEMIDE 80 MG: 10 INJECTION, SOLUTION INTRAMUSCULAR; INTRAVENOUS at 00:36

## 2023-10-29 RX ADMIN — SODIUM CHLORIDE, PRESERVATIVE FREE 10 ML: 5 INJECTION INTRAVENOUS at 08:39

## 2023-10-29 RX ADMIN — HEPARIN SODIUM 5000 UNITS: 5000 INJECTION INTRAVENOUS; SUBCUTANEOUS at 21:30

## 2023-10-29 RX ADMIN — MIDODRINE HYDROCHLORIDE 10 MG: 5 TABLET ORAL at 10:10

## 2023-10-29 RX ADMIN — HEPARIN SODIUM 5000 UNITS: 5000 INJECTION INTRAVENOUS; SUBCUTANEOUS at 06:19

## 2023-10-29 RX ADMIN — INSULIN GLARGINE 5 UNITS: 100 INJECTION, SOLUTION SUBCUTANEOUS at 21:07

## 2023-10-29 RX ADMIN — SODIUM CHLORIDE, PRESERVATIVE FREE 10 ML: 5 INJECTION INTRAVENOUS at 21:08

## 2023-10-29 RX ADMIN — ONDANSETRON 4 MG: 2 INJECTION INTRAMUSCULAR; INTRAVENOUS at 11:23

## 2023-10-29 RX ADMIN — METRONIDAZOLE 500 MG: 500 INJECTION, SOLUTION INTRAVENOUS at 17:07

## 2023-10-29 RX ADMIN — ONDANSETRON 4 MG: 2 INJECTION INTRAMUSCULAR; INTRAVENOUS at 23:33

## 2023-10-29 RX ADMIN — HEPARIN SODIUM 5000 UNITS: 5000 INJECTION INTRAVENOUS; SUBCUTANEOUS at 17:08

## 2023-10-29 RX ADMIN — METOCLOPRAMIDE HYDROCHLORIDE 5 MG: 5 INJECTION INTRAMUSCULAR; INTRAVENOUS at 08:34

## 2023-10-29 RX ADMIN — AMIODARONE HYDROCHLORIDE 200 MG: 200 TABLET ORAL at 10:10

## 2023-10-29 RX ADMIN — CARVEDILOL 3.12 MG: 3.12 TABLET, FILM COATED ORAL at 10:09

## 2023-10-29 RX ADMIN — METOCLOPRAMIDE HYDROCHLORIDE 5 MG: 5 INJECTION INTRAMUSCULAR; INTRAVENOUS at 02:24

## 2023-10-29 RX ADMIN — ACETAMINOPHEN 325MG 650 MG: 325 TABLET ORAL at 21:21

## 2023-10-29 RX ADMIN — MIDODRINE HYDROCHLORIDE 10 MG: 5 TABLET ORAL at 11:13

## 2023-10-29 RX ADMIN — ALBUMIN (HUMAN) 25 G: 0.25 INJECTION, SOLUTION INTRAVENOUS at 14:10

## 2023-10-29 RX ADMIN — METOCLOPRAMIDE HYDROCHLORIDE 5 MG: 5 INJECTION INTRAMUSCULAR; INTRAVENOUS at 17:09

## 2023-10-29 RX ADMIN — MIDODRINE HYDROCHLORIDE 10 MG: 5 TABLET ORAL at 17:13

## 2023-10-29 RX ADMIN — FIDAXOMICIN 200 MG: 200 TABLET, FILM COATED ORAL at 21:07

## 2023-10-29 RX ADMIN — LEVOTHYROXINE SODIUM 125 MCG: 125 TABLET ORAL at 10:10

## 2023-10-29 ASSESSMENT — PAIN SCALES - GENERAL
PAINLEVEL_OUTOF10: 7
PAINLEVEL_OUTOF10: 0
PAINLEVEL_OUTOF10: 7
PAINLEVEL_OUTOF10: 0
PAINLEVEL_OUTOF10: 0

## 2023-10-29 ASSESSMENT — PAIN DESCRIPTION - PAIN TYPE: TYPE: CHRONIC PAIN

## 2023-10-29 ASSESSMENT — PAIN DESCRIPTION - FREQUENCY: FREQUENCY: CONTINUOUS

## 2023-10-29 ASSESSMENT — PAIN DESCRIPTION - DESCRIPTORS: DESCRIPTORS: ACHING

## 2023-10-29 ASSESSMENT — PAIN DESCRIPTION - ONSET: ONSET: ON-GOING

## 2023-10-29 ASSESSMENT — PAIN DESCRIPTION - LOCATION
LOCATION: BACK
LOCATION: BACK

## 2023-10-29 NOTE — PROGRESS NOTES
Paige (spouse) is requesting to be contacted for any procedures done or changes in pt condition while pt is hospitalized.  Katlyn Villa [phone]

## 2023-10-29 NOTE — PROGRESS NOTES
RENAL DAILY PROGRESS NOTE    Subjective:       Complaint:     Overnight events noted  no nausea, vomiting, chest pain  Sob today  Transferred to stepdown with hypoxia and required bipap in night    IMPRESSION:   ESRD on MWF dialysis  Access: TDC  Sob,fluid overload  UTI, C diff, Leucocytosis  Anemia, refused epogen  Chronic indwelling hebert due to hx of bladder cancer   PLAN:   HD today  D/w            Current Facility-Administered Medications   Medication Dose Route Frequency    insulin lispro (HUMALOG) injection vial 0-4 Units  0-4 Units SubCUTAneous TID WC    insulin lispro (HUMALOG) injection vial 0-4 Units  0-4 Units SubCUTAneous Nightly    glucose chewable tablet 16 g  4 tablet Oral PRN    dextrose bolus 10% 125 mL  125 mL IntraVENous PRN    Or    dextrose bolus 10% 250 mL  250 mL IntraVENous PRN    glucagon (rDNA) injection 1 mg  1 mg SubCUTAneous PRN    dextrose 10 % infusion   IntraVENous Continuous PRN    Fidaxomicin (DIFICID) tablet 200 mg  200 mg Oral BID    simethicone (MYLICON) chewable tablet 80 mg  80 mg Oral Q6H PRN    metoclopramide (REGLAN) injection 5 mg  5 mg IntraVENous Q6H    aspirin chewable tablet 81 mg  81 mg Oral Daily    atorvastatin (LIPITOR) tablet 80 mg  80 mg Oral Nightly    albumin human 25% IV solution 25 g  25 g IntraVENous PRN    allopurinol (ZYLOPRIM) tablet 100 mg  100 mg Oral Q MWF    metronidazole (FLAGYL) 500 mg in 0.9% NaCl 100 mL IVPB premix  500 mg IntraVENous Q8H    diphenhydrAMINE (BENYLIN) 12.5 MG/5ML liquid 12.5 mg  12.5 mg Oral Nightly PRN    Virt-Caps 1 mg  1 capsule Oral Daily    melatonin tablet 6 mg  6 mg Oral Nightly PRN    acetaminophen (TYLENOL) tablet 650 mg  650 mg Oral Q6H PRN    Or    acetaminophen (TYLENOL) suppository 650 mg  650 mg Rectal Q6H PRN    sodium chloride flush 0.9 % injection 5-40 mL  5-40 mL IntraVENous 2 times per day    sodium chloride flush 0.9 % injection 5-40 mL  5-40 mL IntraVENous PRN    0.9 % sodium chloride infusion

## 2023-10-29 NOTE — PLAN OF CARE
Problem: Chronic Conditions and Co-morbidities  Goal: Patient's chronic conditions and co-morbidity symptoms are monitored and maintained or improved  10/29/2023 1804 by Amelia Dill RN  Outcome: Progressing  Flowsheets (Taken 10/29/2023 1330)  Care Plan - Patient's Chronic Conditions and Co-Morbidity Symptoms are Monitored and Maintained or Improved:   Monitor and assess patient's chronic conditions and comorbid symptoms for stability, deterioration, or improvement   Collaborate with multidisciplinary team to address chronic and comorbid conditions and prevent exacerbation or deterioration   Update acute care plan with appropriate goals if chronic or comorbid symptoms are exacerbated and prevent overall improvement and discharge  Note: Plan for pt to tolerated 3hr, 3L UF.  10/29/2023 1210 by Kelley Aguilera RN  Outcome: Progressing  Flowsheets (Taken 10/29/2023 0800)  Care Plan - Patient's Chronic Conditions and Co-Morbidity Symptoms are Monitored and Maintained or Improved:   Monitor and assess patient's chronic conditions and comorbid symptoms for stability, deterioration, or improvement   Collaborate with multidisciplinary team to address chronic and comorbid conditions and prevent exacerbation or deterioration   Update acute care plan with appropriate goals if chronic or comorbid symptoms are exacerbated and prevent overall improvement and discharge

## 2023-10-29 NOTE — PROGRESS NOTES
Overnight, increased shortness of breath with fluid overload. On Dialysis MWF and producing minimal urine. Respiratory evaluated with ABG--recommending BIPAP in step down. Ordered transfer to step down and follow up with nephrology for evaluation of additional dialysis if SOB does not improve due to fluid overload. Yudy Thurman MD  [employer]  [facility]  [phone]

## 2023-10-29 NOTE — PROGRESS NOTES
0700: Bedside and Verbal shift change report given to Alysha Pineda RN (oncoming nurse) by Jaren Ha RN (offgoing nurse). Report included the following information Nurse Handoff Report, Intake/Output, MAR, Cardiac Rhythm NSR, and Quality Measures. 1030: Family at bedside & is requesting a meeting w/ attending. MD aware. Paige (spouse) is requesting to be contacted for any procedures done or changes in pt condition while pt is hospitalized. [phone]    1230: Pt will be going to dialysis on 8L NC per RT from 6L salter HF. Dialysis aware. Nursing supervisor also aware. Awaiting transport. Pt A&O X4. O2 sats 95%. Tele aware. 1655: Report received from DEAN Sultana in dialysis. 3L removed during treatment. 2 rounds of albumin were given d/t pt being hypotensive, NS bolus also given. 1700: Pt back on unit. VSS. Pt A&O X4. Tele notified. Bed alarm on & call light within reach. 1820: Upon pt rounding, pt son in room giving pt cookies. Explained importance to follow clear liquid diet as posted on board. MD paged. 1900: Bedside and Verbal shift change report given to Tarun Lama RN (oncoming nurse) by Alysha Pineda RN (offgoing nurse). Report included the following information Nurse Handoff Report, Intake/Output, MAR, Cardiac Rhythm Afib-NS, and Quality Measures.

## 2023-10-29 NOTE — PROGRESS NOTES
1:36 AM    Assess the patient, SOB with crackle. Patient have a ventri mask at 50%. 02 stat at 95%. IV lasix and a Chest x-ray with ABG's was done. Dr. Dory Dudley was called. Orders to transfer to step down unit. 2:17 AM    Report given to roel MORAN. All question answer.

## 2023-10-29 NOTE — PROGRESS NOTES
[facility] [facility] Hospitalist Group  Progress Note    Patient: Marcos Perez Age: 80 y.o. : 1935 MR#: [id number] SSN: [id number]  Date/Time: 10/29/2023     Subjective:   Transferred to stepdown overnight due to shortness of breath. X-ray reveals pulmonary edema. Patient to receive dialysis today. Otherwise feels good and patient states that he did not feel short of breath but rather it was seen on oxygen monitoring systems. Patient has no nausea and vomiting today and his abdomen is less distended. Please Contact family overnight if any issues occur. Disposition is later this upcoming week to SNF, will need ID clearance and patient will need to advance diet and have regular BM's before dc. Review of systems  General: No fevers or chills. Cardiovascular: No chest pain or pressure. No palpitations. Pulmonary: No shortness of breath, cough or wheeze. Gastrointestinal: No abdominal pain, nausea, vomiting or diarrhea. Genitourinary: No urinary frequency, urgency, hesitancy or dysuria. Musculoskeletal: No joint or muscle pain, no back pain, no recent trauma. Neurologic: No headache, numbness, tingling or weakness. Assessment/Plan:   Severe sepsis, worsening leukocytosis  C. difficile toxin colitis  Diarrhea  Volume overload  UTI secondary to indwelling Carlos, Pseudomonas positive  Nausea vomiting  Distended colon, no signs of bowel obstruction  Acute hypoxic respiratory failure likely secondary to fluid overload  ESRD on HD  Mild hypokalemia  Right lower lung nodule 8 mm, 3-month follow-up  Mild hypertension  Atrial fibrillation rate controlled  Macrocytic anemia  Positive Hemoccult stool     Admitted to 2 S. Cardiac monitoring  ID following  Hematology following for leukocytosis, thrombocytosis and microcytic anemia. Peripheral smear done showing left shifted myeloid series with neutrophilia with no abnormal cells.   On Flagyl and fidaxomycin  Continue oxygen

## 2023-10-29 NOTE — PROGRESS NOTES
1215-Transportation scheduled for HD. Spoke with primary RN regarding confirmed appropriateness for transport with oxygen delivery method and isolation precautions. 1230-Spoke with primary team regarding decision to place pt on NC due to staffing issues. 1250-Spoke with primary team regarding pt intolerance of  NC and staffing dynamic with the RDU. The pt will come on alternative hi-dania option. 1322-Pt arrived to RDU in no acute distress on supplemental oxygen. 1330-Prescription is as noted. Treatment started via R chest CVC per protocol and without incident. HD RN will monitor for intradialtyic complications. 1350-NS bolus to support BP pt asymptomatic  1400-NS bolus to support BP, pt asymptomatic  1630-Treatment completed with albumin and NS boluses given to support BP. Treatment tolerated well otherwise. Blood safely returned. [facility] secured with heparin to dwell and per protocol. End caps noted. 1635-Post HD report given to primary RN.

## 2023-10-29 NOTE — PLAN OF CARE
Problem: Discharge Planning  Goal: Discharge to home or other facility with appropriate resources  Outcome: Progressing  Flowsheets (Taken 10/29/2023 0800)  Discharge to home or other facility with appropriate resources:   Identify barriers to discharge with patient and caregiver   Arrange for needed discharge resources and transportation as appropriate   Identify discharge learning needs (meds, wound care, etc)     Problem: Safety - Adult  Goal: Free from fall injury  Outcome: Progressing     Problem: Skin/Tissue Integrity  Goal: Absence of new skin breakdown  Description: 1. Monitor for areas of redness and/or skin breakdown  2. Assess vascular access sites hourly  3. Every 4-6 hours minimum:  Change oxygen saturation probe site  4. Every 4-6 hours:  If on nasal continuous positive airway pressure, respiratory therapy assess nares and determine need for appliance change or resting period.   Outcome: Progressing     Problem: Chronic Conditions and Co-morbidities  Goal: Patient's chronic conditions and co-morbidity symptoms are monitored and maintained or improved  Outcome: Progressing  Flowsheets (Taken 10/29/2023 0800)  Care Plan - Patient's Chronic Conditions and Co-Morbidity Symptoms are Monitored and Maintained or Improved:   Monitor and assess patient's chronic conditions and comorbid symptoms for stability, deterioration, or improvement   Collaborate with multidisciplinary team to address chronic and comorbid conditions and prevent exacerbation or deterioration   Update acute care plan with appropriate goals if chronic or comorbid symptoms are exacerbated and prevent overall improvement and discharge     Problem: ABCDS Injury Assessment  Goal: Absence of physical injury  Outcome: Progressing     Problem: Pain  Goal: Verbalizes/displays adequate comfort level or baseline comfort level  Outcome: Progressing     Problem: Nutrition Deficit:  Goal: Optimize nutritional status  Outcome: Progressing

## 2023-10-29 NOTE — PROGRESS NOTES
Noted increasing sob and transfer to stepdown  Cxr shows increasing pulmonary vascular congestion  Will plan for dialysis today

## 2023-10-30 ENCOUNTER — APPOINTMENT (OUTPATIENT)
Facility: HOSPITAL | Age: 88
End: 2023-10-30
Payer: MEDICARE

## 2023-10-30 LAB
ALBUMIN SERPL-MCNC: 2.7 G/DL (ref 3.4–5)
ALBUMIN/GLOB SERPL: 1.2 (ref 0.8–1.7)
ALP SERPL-CCNC: 92 U/L (ref 45–117)
ALT SERPL-CCNC: 16 U/L (ref 16–61)
ANION GAP SERPL CALC-SCNC: 5 MMOL/L (ref 3–18)
AST SERPL-CCNC: 10 U/L (ref 10–38)
BASOPHILS # BLD: 0 K/UL (ref 0–0.1)
BASOPHILS # BLD: 0.4 K/UL (ref 0–0.1)
BASOPHILS NFR BLD: 0 % (ref 0–2)
BASOPHILS NFR BLD: 1 % (ref 0–2)
BILIRUB SERPL-MCNC: 0.8 MG/DL (ref 0.2–1)
BUN SERPL-MCNC: 22 MG/DL (ref 7–18)
BUN/CREAT SERPL: 5 (ref 12–20)
CALCIUM SERPL-MCNC: 8.1 MG/DL (ref 8.5–10.1)
CHLORIDE SERPL-SCNC: 102 MMOL/L (ref 100–111)
CO2 SERPL-SCNC: 28 MMOL/L (ref 21–32)
CREAT SERPL-MCNC: 4.64 MG/DL (ref 0.6–1.3)
DIFFERENTIAL METHOD BLD: ABNORMAL
DIFFERENTIAL METHOD BLD: ABNORMAL
EOSINOPHIL # BLD: 0.4 K/UL (ref 0–0.4)
EOSINOPHIL # BLD: 0.4 K/UL (ref 0–0.4)
EOSINOPHIL NFR BLD: 1 % (ref 0–5)
EOSINOPHIL NFR BLD: 1 % (ref 0–5)
ERYTHROCYTE [DISTWIDTH] IN BLOOD BY AUTOMATED COUNT: 20.8 % (ref 11.6–14.5)
ERYTHROCYTE [DISTWIDTH] IN BLOOD BY AUTOMATED COUNT: 20.9 % (ref 11.6–14.5)
GLOBULIN SER CALC-MCNC: 2.2 G/DL (ref 2–4)
GLUCOSE BLD STRIP.AUTO-MCNC: 127 MG/DL (ref 70–110)
GLUCOSE BLD STRIP.AUTO-MCNC: 140 MG/DL (ref 70–110)
GLUCOSE BLD STRIP.AUTO-MCNC: 149 MG/DL (ref 70–110)
GLUCOSE BLD STRIP.AUTO-MCNC: 163 MG/DL (ref 70–110)
GLUCOSE SERPL-MCNC: 146 MG/DL (ref 74–99)
HCT VFR BLD AUTO: 27.2 % (ref 36–48)
HCT VFR BLD AUTO: 27.5 % (ref 36–48)
HGB BLD-MCNC: 8.8 G/DL (ref 13–16)
HGB BLD-MCNC: 8.8 G/DL (ref 13–16)
IMM GRANULOCYTES # BLD AUTO: 0 K/UL (ref 0–0.04)
IMM GRANULOCYTES # BLD AUTO: 0 K/UL (ref 0–0.04)
IMM GRANULOCYTES NFR BLD AUTO: 0 % (ref 0–0.5)
IMM GRANULOCYTES NFR BLD AUTO: 0 % (ref 0–0.5)
LYMPHOCYTES # BLD: 1.2 K/UL (ref 0.9–3.6)
LYMPHOCYTES # BLD: 2.2 K/UL (ref 0.9–3.6)
LYMPHOCYTES NFR BLD: 3 % (ref 21–52)
LYMPHOCYTES NFR BLD: 5 % (ref 21–52)
MCH RBC QN AUTO: 34.6 PG (ref 24–34)
MCH RBC QN AUTO: 35.2 PG (ref 24–34)
MCHC RBC AUTO-ENTMCNC: 32 G/DL (ref 31–37)
MCHC RBC AUTO-ENTMCNC: 32.4 G/DL (ref 31–37)
MCV RBC AUTO: 108.3 FL (ref 78–100)
MCV RBC AUTO: 108.8 FL (ref 78–100)
METAMYELOCYTES NFR BLD MANUAL: 2 %
METAMYELOCYTES NFR BLD MANUAL: 2 %
MONOCYTES # BLD: 0.4 K/UL (ref 0.05–1.2)
MONOCYTES # BLD: 0.4 K/UL (ref 0.05–1.2)
MONOCYTES NFR BLD: 1 % (ref 3–10)
MONOCYTES NFR BLD: 1 % (ref 3–10)
MYELOCYTES NFR BLD MANUAL: 1 %
MYELOCYTES NFR BLD MANUAL: 3 %
NEUTS BAND NFR BLD MANUAL: 4 %
NEUTS BAND NFR BLD MANUAL: 5 %
NEUTS SEG # BLD: 37.5 K/UL (ref 1.8–8)
NEUTS SEG # BLD: 38.5 K/UL (ref 1.8–8)
NEUTS SEG NFR BLD: 83 % (ref 40–73)
NEUTS SEG NFR BLD: 87 % (ref 40–73)
NRBC # BLD: 0.1 K/UL (ref 0–0.01)
NRBC # BLD: 0.12 K/UL (ref 0–0.01)
NRBC BLD-RTO: 0.2 PER 100 WBC
NRBC BLD-RTO: 0.3 PER 100 WBC
PLATELET # BLD AUTO: 293 K/UL (ref 135–420)
PLATELET # BLD AUTO: 329 K/UL (ref 135–420)
PLATELET COMMENT: ABNORMAL
PLATELET COMMENT: ABNORMAL
PMV BLD AUTO: 11.4 FL (ref 9.2–11.8)
PMV BLD AUTO: 11.4 FL (ref 9.2–11.8)
POTASSIUM SERPL-SCNC: 3.5 MMOL/L (ref 3.5–5.5)
PROT SERPL-MCNC: 4.9 G/DL (ref 6.4–8.2)
RBC # BLD AUTO: 2.5 M/UL (ref 4.35–5.65)
RBC # BLD AUTO: 2.54 M/UL (ref 4.35–5.65)
RBC MORPH BLD: ABNORMAL
SODIUM SERPL-SCNC: 135 MMOL/L (ref 136–145)
WBC # BLD AUTO: 41.2 K/UL (ref 4.6–13.2)
WBC # BLD AUTO: 43.8 K/UL (ref 4.6–13.2)

## 2023-10-30 PROCEDURE — 82962 GLUCOSE BLOOD TEST: CPT

## 2023-10-30 PROCEDURE — 2700000000 HC OXYGEN THERAPY PER DAY

## 2023-10-30 PROCEDURE — 85025 COMPLETE CBC W/AUTO DIFF WBC: CPT

## 2023-10-30 PROCEDURE — P9047 ALBUMIN (HUMAN), 25%, 50ML: HCPCS | Performed by: INTERNAL MEDICINE

## 2023-10-30 PROCEDURE — 36415 COLL VENOUS BLD VENIPUNCTURE: CPT

## 2023-10-30 PROCEDURE — 2580000003 HC RX 258: Performed by: STUDENT IN AN ORGANIZED HEALTH CARE EDUCATION/TRAINING PROGRAM

## 2023-10-30 PROCEDURE — 90935 HEMODIALYSIS ONE EVALUATION: CPT

## 2023-10-30 PROCEDURE — 2140000001 HC CVICU INTERMEDIATE R&B

## 2023-10-30 PROCEDURE — 6370000000 HC RX 637 (ALT 250 FOR IP): Performed by: INTERNAL MEDICINE

## 2023-10-30 PROCEDURE — 97168 OT RE-EVAL EST PLAN CARE: CPT

## 2023-10-30 PROCEDURE — 6360000002 HC RX W HCPCS: Performed by: INTERNAL MEDICINE

## 2023-10-30 PROCEDURE — 94761 N-INVAS EAR/PLS OXIMETRY MLT: CPT

## 2023-10-30 PROCEDURE — 99232 SBSQ HOSP IP/OBS MODERATE 35: CPT | Performed by: INTERNAL MEDICINE

## 2023-10-30 PROCEDURE — 6370000000 HC RX 637 (ALT 250 FOR IP): Performed by: HOSPITALIST

## 2023-10-30 PROCEDURE — 80053 COMPREHEN METABOLIC PANEL: CPT

## 2023-10-30 PROCEDURE — 6360000002 HC RX W HCPCS: Performed by: STUDENT IN AN ORGANIZED HEALTH CARE EDUCATION/TRAINING PROGRAM

## 2023-10-30 PROCEDURE — 6370000000 HC RX 637 (ALT 250 FOR IP): Performed by: STUDENT IN AN ORGANIZED HEALTH CARE EDUCATION/TRAINING PROGRAM

## 2023-10-30 PROCEDURE — 74018 RADEX ABDOMEN 1 VIEW: CPT

## 2023-10-30 PROCEDURE — 94660 CPAP INITIATION&MGMT: CPT

## 2023-10-30 RX ORDER — TRAMADOL HYDROCHLORIDE 50 MG/1
25 TABLET ORAL EVERY 12 HOURS PRN
Status: DISCONTINUED | OUTPATIENT
Start: 2023-10-30 | End: 2023-11-25 | Stop reason: HOSPADM

## 2023-10-30 RX ORDER — ACETAMINOPHEN 325 MG/1
650 TABLET ORAL EVERY 8 HOURS
Status: DISCONTINUED | OUTPATIENT
Start: 2023-10-30 | End: 2023-11-05

## 2023-10-30 RX ADMIN — ALLOPURINOL 100 MG: 100 TABLET ORAL at 13:17

## 2023-10-30 RX ADMIN — FIDAXOMICIN 200 MG: 200 TABLET, FILM COATED ORAL at 20:41

## 2023-10-30 RX ADMIN — METRONIDAZOLE 500 MG: 500 INJECTION, SOLUTION INTRAVENOUS at 22:07

## 2023-10-30 RX ADMIN — ACETAMINOPHEN 325MG 650 MG: 325 TABLET ORAL at 18:22

## 2023-10-30 RX ADMIN — METOCLOPRAMIDE HYDROCHLORIDE 5 MG: 5 INJECTION INTRAMUSCULAR; INTRAVENOUS at 02:02

## 2023-10-30 RX ADMIN — LEVOTHYROXINE SODIUM 125 MCG: 125 TABLET ORAL at 06:40

## 2023-10-30 RX ADMIN — ALBUMIN (HUMAN) 25 G: 0.25 INJECTION, SOLUTION INTRAVENOUS at 10:15

## 2023-10-30 RX ADMIN — INSULIN GLARGINE 5 UNITS: 100 INJECTION, SOLUTION SUBCUTANEOUS at 20:43

## 2023-10-30 RX ADMIN — METRONIDAZOLE 500 MG: 500 INJECTION, SOLUTION INTRAVENOUS at 13:48

## 2023-10-30 RX ADMIN — SODIUM CHLORIDE, PRESERVATIVE FREE 10 ML: 5 INJECTION INTRAVENOUS at 08:28

## 2023-10-30 RX ADMIN — METRONIDAZOLE 500 MG: 500 INJECTION, SOLUTION INTRAVENOUS at 06:41

## 2023-10-30 RX ADMIN — ATORVASTATIN CALCIUM 80 MG: 40 TABLET, FILM COATED ORAL at 20:41

## 2023-10-30 RX ADMIN — ACETAMINOPHEN 325MG 650 MG: 325 TABLET ORAL at 22:07

## 2023-10-30 RX ADMIN — MIDODRINE HYDROCHLORIDE 10 MG: 5 TABLET ORAL at 08:24

## 2023-10-30 RX ADMIN — CARVEDILOL 3.12 MG: 3.12 TABLET, FILM COATED ORAL at 20:41

## 2023-10-30 RX ADMIN — AMIODARONE HYDROCHLORIDE 200 MG: 200 TABLET ORAL at 08:24

## 2023-10-30 RX ADMIN — HEPARIN SODIUM 5000 UNITS: 5000 INJECTION INTRAVENOUS; SUBCUTANEOUS at 20:41

## 2023-10-30 RX ADMIN — ONDANSETRON 4 MG: 2 INJECTION INTRAMUSCULAR; INTRAVENOUS at 12:02

## 2023-10-30 RX ADMIN — MIDODRINE HYDROCHLORIDE 10 MG: 5 TABLET ORAL at 13:17

## 2023-10-30 RX ADMIN — NEPHROCAP 1 MG: 1 CAP ORAL at 08:23

## 2023-10-30 RX ADMIN — ONDANSETRON 4 MG: 2 INJECTION INTRAMUSCULAR; INTRAVENOUS at 06:50

## 2023-10-30 RX ADMIN — METOCLOPRAMIDE HYDROCHLORIDE 5 MG: 5 INJECTION INTRAMUSCULAR; INTRAVENOUS at 20:41

## 2023-10-30 RX ADMIN — FIDAXOMICIN 200 MG: 200 TABLET, FILM COATED ORAL at 08:23

## 2023-10-30 RX ADMIN — HEPARIN SODIUM 5000 UNITS: 5000 INJECTION INTRAVENOUS; SUBCUTANEOUS at 06:40

## 2023-10-30 RX ADMIN — MIDODRINE HYDROCHLORIDE 10 MG: 5 TABLET ORAL at 17:23

## 2023-10-30 RX ADMIN — METOCLOPRAMIDE HYDROCHLORIDE 5 MG: 5 INJECTION INTRAMUSCULAR; INTRAVENOUS at 13:16

## 2023-10-30 RX ADMIN — SODIUM CHLORIDE, PRESERVATIVE FREE 10 ML: 5 INJECTION INTRAVENOUS at 20:40

## 2023-10-30 RX ADMIN — HEPARIN SODIUM 5000 UNITS: 5000 INJECTION INTRAVENOUS; SUBCUTANEOUS at 13:17

## 2023-10-30 ASSESSMENT — PAIN SCALES - GENERAL
PAINLEVEL_OUTOF10: 2
PAINLEVEL_OUTOF10: 0
PAINLEVEL_OUTOF10: 6
PAINLEVEL_OUTOF10: 0

## 2023-10-30 ASSESSMENT — PAIN DESCRIPTION - FREQUENCY: FREQUENCY: INTERMITTENT

## 2023-10-30 ASSESSMENT — PAIN DESCRIPTION - ORIENTATION
ORIENTATION: POSTERIOR
ORIENTATION: POSTERIOR

## 2023-10-30 ASSESSMENT — PAIN DESCRIPTION - PAIN TYPE: TYPE: CHRONIC PAIN

## 2023-10-30 ASSESSMENT — PAIN DESCRIPTION - DESCRIPTORS
DESCRIPTORS: ACHING
DESCRIPTORS: DISCOMFORT;ACHING

## 2023-10-30 ASSESSMENT — PAIN DESCRIPTION - LOCATION
LOCATION: BACK
LOCATION: BACK

## 2023-10-30 ASSESSMENT — PAIN DESCRIPTION - ONSET: ONSET: PROGRESSIVE

## 2023-10-30 NOTE — PLAN OF CARE
to decreased cognition  Please refer to the flowsheet for vital signs taken during this treatment. After treatment:   [] Patient left in no apparent distress sitting up in chair  [x] Patient left in no apparent distress in bed  [x] Call bell left within reach  [x] Nursing notified  [] Caregiver present  [x] Bed alarm activated    COMMUNICATION/EDUCATION:   Patient Education  Education Given To: Patient; Family  Education Provided: Role of Therapy;Plan of Care;Precautions; ADL Adaptive Strategies; Energy Conservation  Education Method: Verbal;Demonstration; Teach Back  Barriers to Learning: Cognition  Education Outcome: Continued education needed    Thank you for this referral.  Mayelin Machado OT  Minutes: 12

## 2023-10-30 NOTE — PROGRESS NOTES
0715: Bedside and Verbal shift change report given to DEAN Zimmer (oncoming nurse) by DEAN Avila (offgoing nurse). Report included the following information Nurse Handoff Report, Adult Overview, Intake/Output, MAR, and Cardiac Rhythm NSR .      0830: Chiara Skinner from dialysis called for report of pt and notified  of pt.     0900: pt taken off floor to dialysis. 1230: RN from dialysis called on status of pt. 3L removed from pt. /78, HR 79    1245: Pt returned back to room from dialysis. Family at bedside. Vitals retrieved and pt assessed. 1330: DEAN Altman from wound care in room with pt for consult. Gown changed and repositioning done on pt.     1615: Xray tech in pt room for imaging. 1745: Family in room with pt. Pt eating new soft and bite size diet. Pt tolerating well. 1830: Pt required pad change and position turning for relief of sacrum. 1900: Bedside and Verbal shift change report given to Kitty Camarena RN (oncoming nurse) by Bianca Devlin RN (offgoing nurse). Report included the following information Nurse Handoff Report, Intake/Output, Recent Results, Cardiac Rhythm NSR, Alarm Parameters, and Neuro Assessment.

## 2023-10-30 NOTE — PROGRESS NOTES
[email or web address]  [phone]    GASTROENTEROLOGY BRIEF NOTE     Impression:  1. C diff - improving 10/27; brown formed stool in ostomy 10/29  2. Leukocytosis - severe sepsis on admission due to Carlos catheter associated cystitis vs. C diff. 3. Anemia - hgb stable, FOB positive. no overt bleeding  4. S/P colostomy April 2023-Dr. Cherry Sinclair. 5.  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 6.  End-stage renal disease - on hemodialysis  7.  P. AF  8. Obesity    GI signed off 10/27, family requesting GI input today 10/30. Per chart review, over the weekend patient had worsening SOB and nausea which improved after HD. 10/28 KUB w/o significant findings. Of note, patient quickly advanced his diet 10/28 w/ milkshakes, etc which ma have precipitated some of these symptoms. No documented emesis 10/28-10/30. Plan:  1. Discussed w/ primary and Dr. Rajan Self - advance diet as tolerated today. Conservative management concerning occult pos stool. Hgb remains stable and no overt bleeding has been noted. Can follow-up in person tomorrow vs OP as to not delay discharge. 2. Monitor h/h transfuse per protocol  3. Medical management per primary team      Serenity Davidson PA-C.   08/26/23, 1:35 PM   [facility]  [email or web address]  Phone: [phone]  Pager: [phone]

## 2023-10-30 NOTE — PROGRESS NOTES
Patient wife Elias Licea was called at 0700 am  to update her on Mr. Jose Calderon condition. Room number and phone number of the unit was given.

## 2023-10-30 NOTE — CONSULTS
Room 367:   Pt not seen at this time. Pt is currently off the unit to hemodialysis.   Rad NAVARRON, RN, [facility], [address] [place] [address]

## 2023-10-30 NOTE — CONSULTS
Room 367: Focused wound assess after hemodialysis for ESRD    Left Sacrum, stage 3 pressure injury,  Measurements 2.2x2x0.2cm  Base tissue 50% red, 40% dusky purple, & 10% white. Drainage brown & serosanguinous cloudy. No wound odor. Edges attached & are clearly defined. Periwound intact with blanchable redness & evidence of venous congestion with   Purple hue. Silicone dressing in use. POA. Left heel, redness noted, intact skin, blanchable, silicone heel dressing in place,  (+) pain in heel. Heels are floated with pillows. Right heel is intact with silicone heel dressing in place, (+) pain in heel. Will ask staff to apply heel prevention boots. Pt on James bed. Unit nursing staff to apply heel prevention boots, use while in bed. Velcro should be on the loosest section. Unit nursing staff to turn & reposition q2hrs, left & right sides, on back only for meals or treatments. Pt has been seen by RD for recommendations due to malnutrition. Pt has last albumin of 2.7. Pt has poor skin turgor, advanced age, edema of legs, legs are pale in color. Pt is obese. Pt has hx of bladder cancer, sepsis. POC glucose is noted to be elevated, latest WBC is elevated, Low H&H, & pt is on supplemental oxygen. Wound Care Orders Both Heels: Unit staff nurses to continue Optifoam Gentle Silicone dressing, change every 2 days & prn soilage. Wound Care Orders Sacrum: Unit nursing staff to cleanse wound with wound spray from par room, apply Opticell AG gelling fiber dressing, rope/ribbon or 4x5 rectangle, cut dressing to fit, cover with Optifoam Gentle Silicone dressing, change every 2 days & prn soilage. Will turn over care to unit nursing staff at this time & sign off.    Jono NAVARRON, RN, [facility], [address] [place] [address]

## 2023-10-30 NOTE — PROGRESS NOTES
1345: Patient's wife at bedside. Pain medicine for patient's back is being requested. Will call MD.     2433: MD paged and called back immediately. MD will assess patient's chart and order appropriate pain medicine.

## 2023-10-30 NOTE — PROGRESS NOTES
Comprehensive Nutrition Assessment    Type and Reason for Visit:  Reassess, NPO/Clear Liquid    Nutrition Recommendations/Plan:   Continue current diet as tolerated per MD, fluid restriction per MD.  Order Gelatein (each provides 80 kcal, 20g protein) BID. Continue to monitor tolerance of PO, compliance of oral supplements, weight, labs, and plan of care during admission. Malnutrition Assessment:  Malnutrition Status:  Mild malnutrition (10/30/23 1133)    Context:  Acute Illness     Findings of the 6 clinical characteristics of malnutrition:  Energy Intake:  50% or less of estimated energy requirements for 5 or more days  Weight Loss:  No significant weight loss     Body Fat Loss:  No significant body fat loss Orbital   Muscle Mass Loss:  No significant muscle mass loss Temples (temporalis)  Fluid Accumulation:  Unable to assess     Strength:  Not Performed    Nutrition Assessment:    Pt admitted w/ hematuria, unable tolerate food w/ nausea for a few days PTA; increased watery bowel movements from ostomy. Work up showed severe sepsis. PO diet was advanced then downgraded to CLD 10/28. Transferred to SDU for SOB. Per MD note, pt had n/v, abdominal distention. Dialysis per Nephrology. Pt asleep at time of visit, observed meal tray untouched. Per RN, reported pt tolerating liquids, denies n/v. Pt has been CLD/NPO x 2 days, poor intake in-house. Continue clear liquids per MD for now. Will re-add Gelatein while on clear liquids. Nutrition Related Findings:    Last BM (including prior to admit): 10/29/23  Edema: Generalized  Edema Generalized: +2 Pertinent Meds: amiodarone, lipitor, lantus, synthroid, reglan, virt-caps Pertine nt Labs: POC Glucose 123-183 mg/dl x 24 hrs, Na 135 L, K 3.5 wnl, BUN/Cr 22/4.64, GFR 11 Wound Type: Pressure Injury, Stage II       Current Nutrition Intake & Therapies:    Average Meal Intake: NPO  Average Supplements Intake: None Ordered  ADULT DIET;  Clear Liquid; [address], [address] [facility], [address] [facility], [address] [place] [address]  Contact: [phone]

## 2023-10-30 NOTE — DIALYSIS
HD Care plan  Time: 3  hrs  Dialysate:  3 K  2.5   Ca  [place]ath  Net UF: 2 L  Access: Aseptically care for RT [facility]  Hemodynamic stability: Maintain BP WNL     Pre Dialysis:  Pt received from Unit Nurse Nimisha Ferrell  , pt on a bed ,A+O X 4, has moderate distress on exertion on high flow  oxygen 6 L via NC, SPO2 98% . RT [facility]  assessed no abnormalities noted,dressing changed,  line patent with good flow. TDC accessed  per protocol without any difficulty     Intra Dialysis:  Time out / safety process performed per policy, Tx initiated at 3259. TDC flowing with ease. For hemodynamic stability UF goal  set at 3000 ml as tolerated   Pt offered assistance with repositioning every 2 hours/prn    Vascular access visible  and line connections remained intact throughout entire duration of treatment. Vital signs checked every 15 mins,  BP low, despite giving,  25% Albumin 100 mls . UF continued,  Rest of vital signs WNL. Pt C/O nausea, vomited X 1, prn Ondansetron 4 mg IVP Given      Post Dialysis: Tx completed at 1205,   Tolerated fairly well, 3 L removed. De-accessed per protocol. Dialysis catheter  locked accordingly with Heparin 1.8 ml in arterial port, and 1.8 ml in venous port ,catheter dressing clean, dry and intact.   Post Dialysis report given to unit  Nurse Randall Rivas

## 2023-10-30 NOTE — PROGRESS NOTES
Infectious Disease progress Note        Reason: Severe sepsis    Current abx Prior abx   Gentamicin since 10/23-10/27  Metronidazole iv since 10/25  Fidaxomicin since 10/28   Piperacillin/tazobactam, vancomycin since 10/21-10/23  Po vancomycin 10/24-10/28     Lines:       Assessment :   80 y.o.  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 Clinical presentation consistent with severe sepsis-present on admission due to Hebert catheter associated cystitis    Possible ileus versus pseudoobstruction:  GI follow-up appreciated. Now with liquid bowel movements    Significant worsening leukocytosis-despite current broad-spectrum antibiotics, treatment positive for infection is perplexing- ? Undiagnosed underlying hematological disorder rule out evolving GI bleed    Stool c.diff 10/23- positive likely suggest evolving c.diff as the cause of persistent leukocytosis. Patient's recent diarrhea as outpt could have been due to evolving c.diff with subsequent ileus due to immodium in setting of c.diff infection. Discussed with microbiology lab. Urine culture 10/21 positive for 70,000 colonies of pseudomonas (gentamicin NIKA 2, levofloxacin NIKA:1, cefepime NIKA:4, pip/tazo NIKA 8), Klebsiella (R to levofloxacin, gentamicin NIKA:<1)    S/p hebert exchange 10/27. >100,000 colonies of pseudomonas in Urine cx 10/29- likely colonizer. No dysuria. No fever. Risk of giving antibiotics to cover for this outweigh the benefit since patient has recent severe c.diff & no definitive clinical evidence to suggest cystitis. Now with worsening leukocytosis.    Leukocytosis out of proportion to severity of ug/mL Sensitive      tobramycin <=1 ug/mL Sensitive      trimethoprim-sulfamethoxazole <=20 ug/mL Sensitive                       Susceptibility        Pseudomonas aeruginosa      BACTERIAL SUSCEPTIBILITY PANEL NIKA      amikacin 8 ug/mL Sensitive      cefepime 4 ug/mL Sensitive      cefTAZidime 4 ug/mL Sensitive      ciprofloxacin <=0.25 ug/mL Sensitive      gentamicin 2 ug/mL Sensitive      levofloxacin 1 ug/mL Sensitive      meropenem <=0.25 ug/mL Sensitive      piperacillin-tazobactam 8 ug/mL Sensitive      tobramycin <=1 ug/mL Sensitive                           Respiratory Panel, Molecular, with COVID-19 (Restricted: peds pts or suitable admitted adults) [[id number]] Collected: 10/21/23 1657    Order Status: Completed Specimen: Nasopharyngeal Updated: 10/21/23 1816     Adenovirus by PCR Not detected        Coronavirus 229E by PCR Not detected        Coronavirus HKU1 by PCR Not detected        Coronavirus NL63 by PCR Not detected        Coronavirus OC43 by PCR Not detected        SARS-CoV-2, PCR Not detected        Human Metapneumovirus by PCR Not detected        Rhinovirus Enterovirus PCR Not detected        Influenza A by PCR Not detected        Influenza B PCR Not detected        Parainfluenza 1 PCR Not detected        Parainfluenza 2 PCR Not detected        Parainfluenza 3 PCR Not detected        Parainfluenza 4 PCR Not detected        Respiratory Syncytial Virus by PCR Not detected        Bordetella parapertussis by PCR Not detected        Bordetella pertussis by PCR Not detected        Chlamydophila Pneumonia PCR Not detected        Mycoplasma pneumo by PCR Not detected       Culture, Blood 2 [[id number]] Collected: 10/21/23 1637    Order Status: Completed Specimen: Blood Updated: 10/27/23 0650     Special Requests NO SPECIAL REQUESTS        Culture NO GROWTH 6 DAYS       Culture, Blood 1 [[id number]] Collected: 10/21/23 1620    Order Status: Completed Specimen: Blood Updated: 10/27/23 0650     Special

## 2023-10-31 ENCOUNTER — APPOINTMENT (OUTPATIENT)
Facility: HOSPITAL | Age: 88
End: 2023-10-31
Payer: MEDICARE

## 2023-10-31 LAB
ALBUMIN SERPL-MCNC: 2.3 G/DL (ref 3.4–5)
ALBUMIN/GLOB SERPL: 1.1 (ref 0.8–1.7)
ALP SERPL-CCNC: 85 U/L (ref 45–117)
ALT SERPL-CCNC: 13 U/L (ref 16–61)
ANION GAP SERPL CALC-SCNC: 6 MMOL/L (ref 3–18)
AST SERPL-CCNC: 14 U/L (ref 10–38)
BACTERIA SPEC CULT: ABNORMAL
BASOPHILS # BLD: 0.5 K/UL (ref 0–0.1)
BASOPHILS NFR BLD: 1 % (ref 0–2)
BILIRUB SERPL-MCNC: 0.7 MG/DL (ref 0.2–1)
BUN SERPL-MCNC: 20 MG/DL (ref 7–18)
BUN/CREAT SERPL: 5 (ref 12–20)
CALCIUM SERPL-MCNC: 7.9 MG/DL (ref 8.5–10.1)
CC UR VC: ABNORMAL
CHLORIDE SERPL-SCNC: 103 MMOL/L (ref 100–111)
CO2 SERPL-SCNC: 28 MMOL/L (ref 21–32)
CREAT SERPL-MCNC: 4.13 MG/DL (ref 0.6–1.3)
DIFFERENTIAL METHOD BLD: ABNORMAL
EOSINOPHIL # BLD: 0.5 K/UL (ref 0–0.4)
EOSINOPHIL NFR BLD: 1 % (ref 0–5)
ERYTHROCYTE [DISTWIDTH] IN BLOOD BY AUTOMATED COUNT: 21.7 % (ref 11.6–14.5)
GLOBULIN SER CALC-MCNC: 2.1 G/DL (ref 2–4)
GLUCOSE BLD STRIP.AUTO-MCNC: 101 MG/DL (ref 70–110)
GLUCOSE BLD STRIP.AUTO-MCNC: 108 MG/DL (ref 70–110)
GLUCOSE BLD STRIP.AUTO-MCNC: 123 MG/DL (ref 70–110)
GLUCOSE BLD STRIP.AUTO-MCNC: 125 MG/DL (ref 70–110)
GLUCOSE SERPL-MCNC: 127 MG/DL (ref 74–99)
HCT VFR BLD AUTO: 25.2 % (ref 36–48)
HGB BLD-MCNC: 8.2 G/DL (ref 13–16)
IMM GRANULOCYTES # BLD AUTO: 0 K/UL (ref 0–0.04)
IMM GRANULOCYTES NFR BLD AUTO: 0 % (ref 0–0.5)
LYMPHOCYTES # BLD: 1.8 K/UL (ref 0.9–3.6)
LYMPHOCYTES NFR BLD: 4 % (ref 21–52)
MCH RBC QN AUTO: 35.8 PG (ref 24–34)
MCHC RBC AUTO-ENTMCNC: 32.5 G/DL (ref 31–37)
MCV RBC AUTO: 110 FL (ref 78–100)
MONOCYTES # BLD: 0 K/UL (ref 0.05–1.2)
MONOCYTES NFR BLD: 0 % (ref 3–10)
MYELOCYTES NFR BLD MANUAL: 4 %
NEUTS BAND NFR BLD MANUAL: 2 %
NEUTS SEG # BLD: 41.2 K/UL (ref 1.8–8)
NEUTS SEG NFR BLD: 88 % (ref 40–73)
NRBC # BLD: 0.1 K/UL (ref 0–0.01)
NRBC BLD-RTO: 0.2 PER 100 WBC
PLATELET # BLD AUTO: 287 K/UL (ref 135–420)
PLATELET COMMENT: ABNORMAL
PMV BLD AUTO: 11.9 FL (ref 9.2–11.8)
POTASSIUM SERPL-SCNC: 3.4 MMOL/L (ref 3.5–5.5)
PROT SERPL-MCNC: 4.4 G/DL (ref 6.4–8.2)
RBC # BLD AUTO: 2.29 M/UL (ref 4.35–5.65)
RBC MORPH BLD: ABNORMAL
SERVICE CMNT-IMP: ABNORMAL
SODIUM SERPL-SCNC: 137 MMOL/L (ref 136–145)
WBC # BLD AUTO: 45.8 K/UL (ref 4.6–13.2)

## 2023-10-31 PROCEDURE — 99232 SBSQ HOSP IP/OBS MODERATE 35: CPT | Performed by: INTERNAL MEDICINE

## 2023-10-31 PROCEDURE — 2580000003 HC RX 258: Performed by: STUDENT IN AN ORGANIZED HEALTH CARE EDUCATION/TRAINING PROGRAM

## 2023-10-31 PROCEDURE — 82962 GLUCOSE BLOOD TEST: CPT

## 2023-10-31 PROCEDURE — 6370000000 HC RX 637 (ALT 250 FOR IP): Performed by: HOSPITALIST

## 2023-10-31 PROCEDURE — 6370000000 HC RX 637 (ALT 250 FOR IP): Performed by: INTERNAL MEDICINE

## 2023-10-31 PROCEDURE — 85025 COMPLETE CBC W/AUTO DIFF WBC: CPT

## 2023-10-31 PROCEDURE — 6360000002 HC RX W HCPCS: Performed by: INTERNAL MEDICINE

## 2023-10-31 PROCEDURE — 71260 CT THORAX DX C+: CPT

## 2023-10-31 PROCEDURE — 6360000004 HC RX CONTRAST MEDICATION: Performed by: INTERNAL MEDICINE

## 2023-10-31 PROCEDURE — 97530 THERAPEUTIC ACTIVITIES: CPT

## 2023-10-31 PROCEDURE — 6370000000 HC RX 637 (ALT 250 FOR IP): Performed by: STUDENT IN AN ORGANIZED HEALTH CARE EDUCATION/TRAINING PROGRAM

## 2023-10-31 PROCEDURE — 97164 PT RE-EVAL EST PLAN CARE: CPT

## 2023-10-31 PROCEDURE — 97112 NEUROMUSCULAR REEDUCATION: CPT

## 2023-10-31 PROCEDURE — 6360000002 HC RX W HCPCS: Performed by: STUDENT IN AN ORGANIZED HEALTH CARE EDUCATION/TRAINING PROGRAM

## 2023-10-31 PROCEDURE — 87506 IADNA-DNA/RNA PROBE TQ 6-11: CPT

## 2023-10-31 PROCEDURE — 2140000001 HC CVICU INTERMEDIATE R&B

## 2023-10-31 PROCEDURE — 36415 COLL VENOUS BLD VENIPUNCTURE: CPT

## 2023-10-31 PROCEDURE — 80053 COMPREHEN METABOLIC PANEL: CPT

## 2023-10-31 RX ORDER — POTASSIUM CHLORIDE 7.45 MG/ML
10 INJECTION INTRAVENOUS
Status: COMPLETED | OUTPATIENT
Start: 2023-10-31 | End: 2023-10-31

## 2023-10-31 RX ADMIN — HEPARIN SODIUM 5000 UNITS: 5000 INJECTION INTRAVENOUS; SUBCUTANEOUS at 06:11

## 2023-10-31 RX ADMIN — HEPARIN SODIUM 5000 UNITS: 5000 INJECTION INTRAVENOUS; SUBCUTANEOUS at 22:02

## 2023-10-31 RX ADMIN — SODIUM CHLORIDE, PRESERVATIVE FREE 10 ML: 5 INJECTION INTRAVENOUS at 22:06

## 2023-10-31 RX ADMIN — METOCLOPRAMIDE HYDROCHLORIDE 5 MG: 5 INJECTION INTRAMUSCULAR; INTRAVENOUS at 22:03

## 2023-10-31 RX ADMIN — AMIODARONE HYDROCHLORIDE 200 MG: 200 TABLET ORAL at 09:56

## 2023-10-31 RX ADMIN — SODIUM CHLORIDE, PRESERVATIVE FREE 10 ML: 5 INJECTION INTRAVENOUS at 10:16

## 2023-10-31 RX ADMIN — METOCLOPRAMIDE HYDROCHLORIDE 5 MG: 5 INJECTION INTRAMUSCULAR; INTRAVENOUS at 09:59

## 2023-10-31 RX ADMIN — METRONIDAZOLE 500 MG: 500 INJECTION, SOLUTION INTRAVENOUS at 13:20

## 2023-10-31 RX ADMIN — METOCLOPRAMIDE HYDROCHLORIDE 5 MG: 5 INJECTION INTRAMUSCULAR; INTRAVENOUS at 13:15

## 2023-10-31 RX ADMIN — FIDAXOMICIN 200 MG: 200 TABLET, FILM COATED ORAL at 21:59

## 2023-10-31 RX ADMIN — Medication 6 MG: at 22:44

## 2023-10-31 RX ADMIN — ACETAMINOPHEN 325MG 650 MG: 325 TABLET ORAL at 21:59

## 2023-10-31 RX ADMIN — HEPARIN SODIUM 5000 UNITS: 5000 INJECTION INTRAVENOUS; SUBCUTANEOUS at 13:09

## 2023-10-31 RX ADMIN — MIDODRINE HYDROCHLORIDE 10 MG: 5 TABLET ORAL at 09:56

## 2023-10-31 RX ADMIN — ACETAMINOPHEN 325MG 650 MG: 325 TABLET ORAL at 06:10

## 2023-10-31 RX ADMIN — CARVEDILOL 3.12 MG: 3.12 TABLET, FILM COATED ORAL at 21:59

## 2023-10-31 RX ADMIN — INSULIN GLARGINE 5 UNITS: 100 INJECTION, SOLUTION SUBCUTANEOUS at 22:01

## 2023-10-31 RX ADMIN — NEPHROCAP 1 MG: 1 CAP ORAL at 09:56

## 2023-10-31 RX ADMIN — DIATRIZOATE MEGLUMINE AND DIATRIZOATE SODIUM 30 ML: 660; 100 LIQUID ORAL; RECTAL at 11:45

## 2023-10-31 RX ADMIN — MIDODRINE HYDROCHLORIDE 10 MG: 5 TABLET ORAL at 18:11

## 2023-10-31 RX ADMIN — MIDODRINE HYDROCHLORIDE 10 MG: 5 TABLET ORAL at 13:19

## 2023-10-31 RX ADMIN — METOCLOPRAMIDE HYDROCHLORIDE 5 MG: 5 INJECTION INTRAMUSCULAR; INTRAVENOUS at 01:47

## 2023-10-31 RX ADMIN — METRONIDAZOLE 500 MG: 500 INJECTION, SOLUTION INTRAVENOUS at 22:35

## 2023-10-31 RX ADMIN — POTASSIUM CHLORIDE 10 MEQ: 7.45 INJECTION INTRAVENOUS at 10:45

## 2023-10-31 RX ADMIN — LEVOTHYROXINE SODIUM 125 MCG: 125 TABLET ORAL at 06:10

## 2023-10-31 RX ADMIN — FIDAXOMICIN 200 MG: 200 TABLET, FILM COATED ORAL at 09:56

## 2023-10-31 RX ADMIN — TRAMADOL HYDROCHLORIDE 25 MG: 50 TABLET ORAL at 06:10

## 2023-10-31 RX ADMIN — POTASSIUM CHLORIDE 10 MEQ: 7.45 INJECTION INTRAVENOUS at 09:45

## 2023-10-31 RX ADMIN — METRONIDAZOLE 500 MG: 500 INJECTION, SOLUTION INTRAVENOUS at 06:11

## 2023-10-31 RX ADMIN — ACETAMINOPHEN 325MG 650 MG: 325 TABLET ORAL at 13:12

## 2023-10-31 RX ADMIN — ATORVASTATIN CALCIUM 80 MG: 40 TABLET, FILM COATED ORAL at 21:59

## 2023-10-31 RX ADMIN — IOPAMIDOL 100 ML: 612 INJECTION, SOLUTION INTRAVENOUS at 12:35

## 2023-10-31 ASSESSMENT — PAIN DESCRIPTION - ONSET: ONSET: PROGRESSIVE

## 2023-10-31 ASSESSMENT — PAIN DESCRIPTION - DESCRIPTORS
DESCRIPTORS: ACHING;DISCOMFORT
DESCRIPTORS: ACHING
DESCRIPTORS: ACHING

## 2023-10-31 ASSESSMENT — PAIN DESCRIPTION - ORIENTATION
ORIENTATION: POSTERIOR
ORIENTATION: POSTERIOR;MID
ORIENTATION: POSTERIOR

## 2023-10-31 ASSESSMENT — PAIN DESCRIPTION - PAIN TYPE: TYPE: CHRONIC PAIN

## 2023-10-31 ASSESSMENT — PAIN SCALES - GENERAL
PAINLEVEL_OUTOF10: 2
PAINLEVEL_OUTOF10: 0
PAINLEVEL_OUTOF10: 2
PAINLEVEL_OUTOF10: 0
PAINLEVEL_OUTOF10: 6
PAINLEVEL_OUTOF10: 8

## 2023-10-31 ASSESSMENT — PAIN - FUNCTIONAL ASSESSMENT
PAIN_FUNCTIONAL_ASSESSMENT: ACTIVITIES ARE NOT PREVENTED
PAIN_FUNCTIONAL_ASSESSMENT: ACTIVITIES ARE NOT PREVENTED

## 2023-10-31 ASSESSMENT — PAIN DESCRIPTION - FREQUENCY: FREQUENCY: INTERMITTENT

## 2023-10-31 ASSESSMENT — PAIN DESCRIPTION - LOCATION
LOCATION: SACRUM
LOCATION: ABDOMEN;BACK
LOCATION: ABDOMEN;BACK

## 2023-10-31 NOTE — PROGRESS NOTES
Infectious Disease progress Note        Reason: Severe sepsis    Current abx Prior abx   Gentamicin since 10/23-10/27  Metronidazole iv since 10/25  Fidaxomicin since 10/28   Piperacillin/tazobactam, vancomycin since 10/21-10/23  Po vancomycin 10/24-10/28     Lines:       Assessment :   80 y.o.  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 Clinical presentation consistent with severe sepsis-present on admission due to Hebert catheter associated cystitis    Possible ileus versus pseudoobstruction:  GI follow-up appreciated. Now with liquid bowel movements    Significant worsening leukocytosis-despite current broad-spectrum antibiotics, treatment positive for infection is perplexing- ? Undiagnosed underlying hematological disorder rule out evolving GI bleed    Stool c.diff 10/23- positive likely suggest evolving c.diff as the cause of persistent leukocytosis. Patient's recent diarrhea as outpt could have been due to evolving c.diff with subsequent ileus due to immodium in setting of c.diff infection. Discussed with microbiology lab. Urine culture 10/21 positive for 70,000 colonies of pseudomonas (gentamicin NIKA 2, levofloxacin NIKA:1, cefepime NIKA:4, pip/tazo NIKA 8), Klebsiella (R to levofloxacin, gentamicin NIKA:<1)    Now with worsening leukocytosis. Leukocytosis out of proportion to severity of c.diff illness- very atypical presentation. nausea, increased abdominal distension on 10/28 concerning for partially treated c.diff- hence, switched to fidaxomicin on 10/28. No blasts noted on peripheral smear.   Toxic granulation noted consistent with infection  Negative fungitell 10/25

## 2023-10-31 NOTE — PLAN OF CARE
Problem: Occupational Therapy - Adult  Goal: By Discharge: Performs self-care activities at highest level of function for planned discharge setting. See evaluation for individualized goals. Description: Occupational Therapy Goals:  Initiated 10/23/2023 to be met within 7-10 days, re-evaluation 10/30/2023 pt is making slow but steady progress towards goals and is motivated to participate in skilled OT services in order to reach maximal functional potential towards goals    1. Patient will perform upper body dressing with minimal assistance/contact guard assist.   2.  Patient will perform lower body dressing with minimal assistance/contact guard assist.  3.  Patient will perform functional task in standing for 3 minutes with min assist for balance. 4.  Patient will perform toilet transfers with minimal assistance/contact guard assist.  5.  Patient will participate in upper extremity therapeutic exercise/activities with supervision/set-up for 8-10 minutes to increase strength/endurance for ADLs. PLOF: Patient required min to mod assist with basic self care tasks and used a RW for functional mobility PTA. Outcome: Progressing   OCCUPATIONAL THERAPY TREATMENT    Patient: Valentino Diana (39 y.o. male)  Date: 10/31/2023  Diagnosis: UTI (urinary tract infection) [N39.0]  Cystitis [N30.90]  Hypoxia [R09.02]  Leukocytosis, unspecified type [D72.829]  Urinary tract infection associated with indwelling urethral catheter, initial encounter ([address]) [T83.511A, N39.0] Severe sepsis ([address])  Procedure(s) (LRB):  EGD ESOPHAGOGASTRODUODENOSCOPY (N/A)    Precautions: Fall Risk, Contact Precautions, General Precautions, Bed Alarm,  ,  ,  ,  ,  ,  ,      Chart, occupational therapy assessment, plan of care, and goals were reviewed. ASSESSMENT:  PT co tx to maximize pt safety and participation. Pt presented supine in bed upon entry with family at bedside.  Pt required MAX  A X 2 to maneuvered to EOB in prep for functional

## 2023-10-31 NOTE — PLAN OF CARE
Problem: Skin/Tissue Integrity  Goal: Absence of new skin breakdown  Description: 1. Monitor for areas of redness and/or skin breakdown  2. Assess vascular access sites hourly  3. Every 4-6 hours minimum:  Change oxygen saturation probe site  4. Every 4-6 hours:  If on nasal continuous positive airway pressure, respiratory therapy assess nares and determine need for appliance change or resting period.   Outcome: Not Progressing     Problem: Discharge Planning  Goal: Discharge to home or other facility with appropriate resources  Outcome: Progressing  Flowsheets (Taken 10/30/2023 0800)  Discharge to home or other facility with appropriate resources:   Identify barriers to discharge with patient and caregiver   Arrange for needed discharge resources and transportation as appropriate   Refer to discharge planning if patient needs post-hospital services based on physician order or complex needs related to functional status, cognitive ability or social support system   Identify discharge learning needs (meds, wound care, etc)     Problem: Safety - Adult  Goal: Free from fall injury  Outcome: Progressing     Problem: Chronic Conditions and Co-morbidities  Goal: Patient's chronic conditions and co-morbidity symptoms are monitored and maintained or improved  Outcome: Progressing  Flowsheets (Taken 10/30/2023 0800)  Care Plan - Patient's Chronic Conditions and Co-Morbidity Symptoms are Monitored and Maintained or Improved:   Monitor and assess patient's chronic conditions and comorbid symptoms for stability, deterioration, or improvement   Collaborate with multidisciplinary team to address chronic and comorbid conditions and prevent exacerbation or deterioration   Update acute care plan with appropriate goals if chronic or comorbid symptoms are exacerbated and prevent overall improvement and discharge     Problem: ABCDS Injury Assessment  Goal: Absence of physical injury  Outcome: Progressing     Problem: Pain  Goal: changes in neurological status   Maintain blood pressure and fluid volume within ordered parameters to optimize cerebral perfusion and minimize risk of hemorrhage   Monitor temperature, glucose, and sodium.  Initiate appropriate interventions as ordered     Problem: Respiratory - Adult  Goal: Achieves optimal ventilation and oxygenation  Outcome: Progressing     Problem: Skin/Tissue Integrity - Adult  Goal: Skin integrity remains intact  Outcome: Progressing  Flowsheets (Taken 10/30/2023 0800)  Skin Integrity Remains Intact:   Monitor for areas of redness and/or skin breakdown   Assess vascular access sites hourly   Every 4-6 hours minimum: Change oxygen saturation probe site   Every 4-6 hours: If on nasal continuous positive airway pressure, respiratory therapy assesses nares and determine need for appliance change or resting period  Goal: Incisions, wounds, or drain sites healing without S/S of infection  Outcome: Progressing     Problem: Musculoskeletal - Adult  Goal: Return mobility to safest level of function  Outcome: Progressing  Flowsheets (Taken 10/30/2023 0800)  Return Mobility to Safest Level of Function:   Assess patient stability and activity tolerance for standing, transferring and ambulating with or without assistive devices   Assist with transfers and ambulation using safe patient handling equipment as needed   Ensure adequate protection for wounds/incisions during mobilization   Obtain physical therapy/occupational therapy consults as needed   Instruct patient/family in ordered activity level  Goal: Return ADL status to a safe level of function  Outcome: Progressing     Problem: Gastrointestinal - Adult  Goal: Minimal or absence of nausea and vomiting  Outcome: Progressing  Flowsheets (Taken 10/30/2023 0800)  Minimal or absence of nausea and vomiting:   Administer IV fluids as ordered to ensure adequate hydration   Provide nonpharmacologic comfort measures as appropriate   Advance diet as tolerated,

## 2023-10-31 NOTE — PROGRESS NOTES
RENAL DAILY PROGRESS NOTE    Subjective:       Complaint:     Overnight events noted  no nausea, vomiting, chest pain      IMPRESSION:   ESRD on MWF dialysis  Access: TDC  Sob,fluid overload  UTI, C diff, Leucocytosis  Anemia, refused epogen  Chronic indwelling hebert due to hx of bladder cancer   PLAN:   HD per schedule for now  Got 3 l UF out yesterday  Still requiring supplemental oxygen with HFNC. Suspect aspiration and other causes for hypoxia at this point. Agree with getting CT scan or further imaging to delineate this. Will do next HD tomorrow. Ok to proceed with contrast based study today.            Current Facility-Administered Medications   Medication Dose Route Frequency    potassium chloride 10 mEq/100 mL IVPB (Peripheral Line)  10 mEq IntraVENous Q1H    diatrizoate meglumine-sodium (GASTROGRAFIN) 66-10 % solution 30 mL  30 mL Oral ONCE PRN    traMADol (ULTRAM) tablet 25 mg  25 mg Oral Q12H PRN    acetaminophen (TYLENOL) tablet 650 mg  650 mg Oral q8h    insulin lispro (HUMALOG) injection vial 0-4 Units  0-4 Units SubCUTAneous TID WC    insulin lispro (HUMALOG) injection vial 0-4 Units  0-4 Units SubCUTAneous Nightly    glucose chewable tablet 16 g  4 tablet Oral PRN    dextrose bolus 10% 125 mL  125 mL IntraVENous PRN    Or    dextrose bolus 10% 250 mL  250 mL IntraVENous PRN    glucagon (rDNA) injection 1 mg  1 mg SubCUTAneous PRN    dextrose 10 % infusion   IntraVENous Continuous PRN    insulin glargine (LANTUS) injection vial 5 Units  5 Units SubCUTAneous Nightly    midodrine (PROAMATINE) tablet 10 mg  10 mg Oral Once    Fidaxomicin (DIFICID) tablet 200 mg  200 mg Oral BID    simethicone (MYLICON) chewable tablet 80 mg  80 mg Oral Q6H PRN    metoclopramide (REGLAN) injection 5 mg  5 mg IntraVENous Q6H    atorvastatin (LIPITOR) tablet 80 mg  80 mg Oral Nightly    albumin human 25% IV solution 25 g  25 g IntraVENous PRN    allopurinol (ZYLOPRIM) tablet 100 mg  100 mg Oral Q MWF    metronidazole 110/71 --   10/30/23 1115 -- 88 -- 103/69 --   10/30/23 1100 -- 82 -- (!) 107/53 --   10/30/23 1045 -- 82 -- (!) 122/53 --          Weight change:      10/29 1901 - 10/31 0700  In: 3816.7 [P.O.:200; I.V.:10]  Out: 4235 [Urine:285]    Intake/Output Summary (Last 24 hours) at 10/31/2023 1038  Last data filed at 10/31/2023 0651  Gross per 24 hour   Intake 3700 ml   Output 4235 ml   Net -535 ml       Physical Exam:   General: comfortable, no acute distress   HEENT sclera anicteric, supple neck, no thyromegaly  CVS: S1S2 heard,  no rub  RS: + air entry b/l,   Abd: Soft, Non tender, Not distended, Positive bowel sounds, no organomegaly, no CVA / supra pubic tenderness  Neuro: non focal, awake, alert , CN II-XII are grossly intact  Extrm: no cyanosis, clubbing .    Skin: no visible  Rash  Musculoskeletal: No gross joints or bone deformities         Data Review:     LABS:   Hematology:   Recent Labs     10/29/23  0448 10/29/23  2153 10/30/23  0425 10/31/23  0218   WBC 44.8* 43.8* 41.2* 45.8*   HGB 9.2* 8.8* 8.8* 8.2*   HCT 28.0* 27.5* 27.2* 25.2*       Chemistry:   Recent Labs     10/29/23  0448 10/30/23  0425 10/31/23  0218   BUN 31* 22* 20*   K 3.6 3.5 3.4*   * 135* 137   CL 99* 102 103   CO2 26 28 28              Procedures/imaging: see electronic medical records for all procedures, Xrays and details which were not copied into this note but were reviewed prior to creation of Plan          Assessment & Plan:     See above        Robles Burnette MD  10/31/2023  10:38 AM

## 2023-10-31 NOTE — PROGRESS NOTES
[facility] Hospitalist Group  Progress Note    Patient: Mitzi Lu Age: 80 y.o. : 1935 MR#: [id number]: [id number]  Date/Time: 10/30/2023     Subjective:   Patient stable. Diet advanced today. GI eval tomorrow. No nausea and vomiting. Review of systems  General: No fevers or chills. Cardiovascular: No chest pain or pressure. No palpitations. Pulmonary: No shortness of breath, cough or wheeze. Gastrointestinal: No abdominal pain, nausea, vomiting or diarrhea. Genitourinary: No urinary frequency, urgency, hesitancy or dysuria. Musculoskeletal: No joint or muscle pain, no back pain, no recent trauma. Neurologic: No headache, numbness, tingling or weakness. Assessment/Plan:   Severe sepsis, improving leukocytosis  C. difficile toxin colitis  Diarrhea  Volume overload  UTI secondary to indwelling Carlos, Pseudomonas positive  Nausea vomiting  Distended colon, no signs of bowel obstruction  Acute hypoxic respiratory failure likely secondary to fluid overload  ESRD on HD  Mild hypokalemia  Right lower lung nodule 8 mm, 3-month follow-up  Mild hypertension  Atrial fibrillation rate controlled  Macrocytic anemia  Positive Hemoccult stool     Admitted to 2 S. Cardiac monitoring  ID following  Hematology following for leukocytosis, thrombocytosis and microcytic anemia. Peripheral smear done showing left shifted myeloid series with neutrophilia with no abnormal cells. On Flagyl and fidaxomycin  Continue oxygen supplementation, wean as tolerated  Catheter changed 10/27/23  Nephrology following for hemodialysis  Continue midodrine  Continue amiodarone and Coreg  PT/OT evaluation  Monitor for bleeding, no visualized blood in the ostomy bag. GI following for potential GI bleed. If patient has a melanotic bowel movement could consider an EGD.   We will continue heparin subcutaneous DVT prophylaxis, hold if patient starts to bleed  Abdominal distention and tenderness

## 2023-10-31 NOTE — CARE COORDINATION
Plan of care discussed with pts physician. WBC count this am 45.8. Abdominal CT ordered and pending. Receiving IV Abx. Disposition is [facility]. Will need auth started when closer to discharge. CM will continue to follow.     [facility] RN CDCES  Case Management

## 2023-11-01 PROBLEM — N18.6 END STAGE RENAL DISEASE ON DIALYSIS (HCC): Status: ACTIVE | Noted: 2023-11-01

## 2023-11-01 PROBLEM — Z71.89 GOALS OF CARE, COUNSELING/DISCUSSION: Status: ACTIVE | Noted: 2023-11-01

## 2023-11-01 PROBLEM — Z99.2 END STAGE RENAL DISEASE ON DIALYSIS (HCC): Status: ACTIVE | Noted: 2023-11-01

## 2023-11-01 PROBLEM — R53.81 DEBILITY: Status: ACTIVE | Noted: 2023-11-01

## 2023-11-01 LAB
ALBUMIN SERPL-MCNC: 2.2 G/DL (ref 3.4–5)
ALBUMIN/GLOB SERPL: 1.2 (ref 0.8–1.7)
ALP SERPL-CCNC: 83 U/L (ref 45–117)
ALT SERPL-CCNC: 12 U/L (ref 16–61)
ANION GAP SERPL CALC-SCNC: 9 MMOL/L (ref 3–18)
AST SERPL-CCNC: 15 U/L (ref 10–38)
BASOPHILS # BLD: 0 K/UL (ref 0–0.1)
BASOPHILS NFR BLD: 0 % (ref 0–2)
BILIRUB SERPL-MCNC: 0.6 MG/DL (ref 0.2–1)
BUN SERPL-MCNC: 28 MG/DL (ref 7–18)
BUN/CREAT SERPL: 5 (ref 12–20)
CALCIUM SERPL-MCNC: 7.9 MG/DL (ref 8.5–10.1)
CHLORIDE SERPL-SCNC: 101 MMOL/L (ref 100–111)
CO2 SERPL-SCNC: 24 MMOL/L (ref 21–32)
CREAT SERPL-MCNC: 5.29 MG/DL (ref 0.6–1.3)
DIFFERENTIAL METHOD BLD: ABNORMAL
EOSINOPHIL # BLD: 0.4 K/UL (ref 0–0.4)
EOSINOPHIL NFR BLD: 1 % (ref 0–5)
ERYTHROCYTE [DISTWIDTH] IN BLOOD BY AUTOMATED COUNT: 21.4 % (ref 11.6–14.5)
GLOBULIN SER CALC-MCNC: 1.8 G/DL (ref 2–4)
GLUCOSE BLD STRIP.AUTO-MCNC: 100 MG/DL (ref 70–110)
GLUCOSE BLD STRIP.AUTO-MCNC: 127 MG/DL (ref 70–110)
GLUCOSE BLD STRIP.AUTO-MCNC: 132 MG/DL (ref 70–110)
GLUCOSE BLD STRIP.AUTO-MCNC: 132 MG/DL (ref 70–110)
GLUCOSE BLD STRIP.AUTO-MCNC: 98 MG/DL (ref 70–110)
GLUCOSE SERPL-MCNC: 100 MG/DL (ref 74–99)
HCT VFR BLD AUTO: 27.5 % (ref 36–48)
HGB BLD-MCNC: 8.8 G/DL (ref 13–16)
IMM GRANULOCYTES # BLD AUTO: 0 K/UL (ref 0–0.04)
IMM GRANULOCYTES NFR BLD AUTO: 0 % (ref 0–0.5)
LYMPHOCYTES # BLD: 0.8 K/UL (ref 0.9–3.6)
LYMPHOCYTES NFR BLD: 2 % (ref 21–52)
MCH RBC QN AUTO: 34.8 PG (ref 24–34)
MCHC RBC AUTO-ENTMCNC: 32 G/DL (ref 31–37)
MCV RBC AUTO: 108.7 FL (ref 78–100)
MONOCYTES # BLD: 0.4 K/UL (ref 0.05–1.2)
MONOCYTES NFR BLD: 1 % (ref 3–10)
NEUTS BAND NFR BLD MANUAL: 6 %
NEUTS SEG # BLD: 39.9 K/UL (ref 1.8–8)
NEUTS SEG NFR BLD: 90 % (ref 40–73)
NRBC # BLD: 0.05 K/UL (ref 0–0.01)
NRBC BLD-RTO: 0.1 PER 100 WBC
PLATELET # BLD AUTO: 314 K/UL (ref 135–420)
PLATELET COMMENT: ABNORMAL
PMV BLD AUTO: 11.8 FL (ref 9.2–11.8)
POTASSIUM SERPL-SCNC: 3.9 MMOL/L (ref 3.5–5.5)
PROT SERPL-MCNC: 4 G/DL (ref 6.4–8.2)
RBC # BLD AUTO: 2.53 M/UL (ref 4.35–5.65)
RBC MORPH BLD: ABNORMAL
SODIUM SERPL-SCNC: 134 MMOL/L (ref 136–145)
WBC # BLD AUTO: 41.5 K/UL (ref 4.6–13.2)

## 2023-11-01 PROCEDURE — 6370000000 HC RX 637 (ALT 250 FOR IP): Performed by: INTERNAL MEDICINE

## 2023-11-01 PROCEDURE — 6360000002 HC RX W HCPCS: Performed by: INTERNAL MEDICINE

## 2023-11-01 PROCEDURE — G0316 PR PROLONG INPT EVAL ADD15 M: HCPCS | Performed by: NURSE PRACTITIONER

## 2023-11-01 PROCEDURE — 6370000000 HC RX 637 (ALT 250 FOR IP): Performed by: STUDENT IN AN ORGANIZED HEALTH CARE EDUCATION/TRAINING PROGRAM

## 2023-11-01 PROCEDURE — 2700000000 HC OXYGEN THERAPY PER DAY

## 2023-11-01 PROCEDURE — 2140000001 HC CVICU INTERMEDIATE R&B

## 2023-11-01 PROCEDURE — 94761 N-INVAS EAR/PLS OXIMETRY MLT: CPT

## 2023-11-01 PROCEDURE — 6360000002 HC RX W HCPCS: Performed by: STUDENT IN AN ORGANIZED HEALTH CARE EDUCATION/TRAINING PROGRAM

## 2023-11-01 PROCEDURE — 99223 1ST HOSP IP/OBS HIGH 75: CPT | Performed by: NURSE PRACTITIONER

## 2023-11-01 PROCEDURE — 99233 SBSQ HOSP IP/OBS HIGH 50: CPT | Performed by: STUDENT IN AN ORGANIZED HEALTH CARE EDUCATION/TRAINING PROGRAM

## 2023-11-01 PROCEDURE — 82962 GLUCOSE BLOOD TEST: CPT

## 2023-11-01 PROCEDURE — 85025 COMPLETE CBC W/AUTO DIFF WBC: CPT

## 2023-11-01 PROCEDURE — 90935 HEMODIALYSIS ONE EVALUATION: CPT

## 2023-11-01 PROCEDURE — 6370000000 HC RX 637 (ALT 250 FOR IP): Performed by: HOSPITALIST

## 2023-11-01 PROCEDURE — 99222 1ST HOSP IP/OBS MODERATE 55: CPT | Performed by: COLON & RECTAL SURGERY

## 2023-11-01 PROCEDURE — 2580000003 HC RX 258: Performed by: STUDENT IN AN ORGANIZED HEALTH CARE EDUCATION/TRAINING PROGRAM

## 2023-11-01 PROCEDURE — 36415 COLL VENOUS BLD VENIPUNCTURE: CPT

## 2023-11-01 PROCEDURE — 80053 COMPREHEN METABOLIC PANEL: CPT

## 2023-11-01 PROCEDURE — 94660 CPAP INITIATION&MGMT: CPT

## 2023-11-01 RX ORDER — HEPARIN SODIUM 1000 [USP'U]/ML
3600 INJECTION, SOLUTION INTRAVENOUS; SUBCUTANEOUS ONCE
Status: DISCONTINUED | OUTPATIENT
Start: 2023-11-01 | End: 2023-11-05

## 2023-11-01 RX ADMIN — ATORVASTATIN CALCIUM 80 MG: 40 TABLET, FILM COATED ORAL at 20:44

## 2023-11-01 RX ADMIN — METRONIDAZOLE 500 MG: 500 INJECTION, SOLUTION INTRAVENOUS at 06:20

## 2023-11-01 RX ADMIN — IMMUNE GLOBULIN (HUMAN) 20 G: 10 INJECTION INTRAVENOUS; SUBCUTANEOUS at 11:24

## 2023-11-01 RX ADMIN — METOCLOPRAMIDE HYDROCHLORIDE 5 MG: 5 INJECTION INTRAMUSCULAR; INTRAVENOUS at 08:48

## 2023-11-01 RX ADMIN — METOCLOPRAMIDE HYDROCHLORIDE 5 MG: 5 INJECTION INTRAMUSCULAR; INTRAVENOUS at 01:59

## 2023-11-01 RX ADMIN — AMIODARONE HYDROCHLORIDE 200 MG: 200 TABLET ORAL at 08:47

## 2023-11-01 RX ADMIN — INSULIN GLARGINE 5 UNITS: 100 INJECTION, SOLUTION SUBCUTANEOUS at 20:43

## 2023-11-01 RX ADMIN — SODIUM CHLORIDE, PRESERVATIVE FREE 10 ML: 5 INJECTION INTRAVENOUS at 20:42

## 2023-11-01 RX ADMIN — METOCLOPRAMIDE HYDROCHLORIDE 5 MG: 5 INJECTION INTRAMUSCULAR; INTRAVENOUS at 20:41

## 2023-11-01 RX ADMIN — MIDODRINE HYDROCHLORIDE 10 MG: 5 TABLET ORAL at 08:47

## 2023-11-01 RX ADMIN — ALLOPURINOL 100 MG: 100 TABLET ORAL at 17:58

## 2023-11-01 RX ADMIN — FIDAXOMICIN 200 MG: 200 TABLET, FILM COATED ORAL at 20:44

## 2023-11-01 RX ADMIN — ACETAMINOPHEN 325MG 650 MG: 325 TABLET ORAL at 21:58

## 2023-11-01 RX ADMIN — HEPARIN SODIUM 5000 UNITS: 5000 INJECTION INTRAVENOUS; SUBCUTANEOUS at 05:51

## 2023-11-01 RX ADMIN — NEPHROCAP 1 MG: 1 CAP ORAL at 08:46

## 2023-11-01 RX ADMIN — METRONIDAZOLE 500 MG: 500 INJECTION, SOLUTION INTRAVENOUS at 22:39

## 2023-11-01 RX ADMIN — ACETAMINOPHEN 325MG 650 MG: 325 TABLET ORAL at 18:20

## 2023-11-01 RX ADMIN — FIDAXOMICIN 200 MG: 200 TABLET, FILM COATED ORAL at 08:46

## 2023-11-01 RX ADMIN — HEPARIN SODIUM 5000 UNITS: 5000 INJECTION INTRAVENOUS; SUBCUTANEOUS at 21:57

## 2023-11-01 RX ADMIN — LEVOTHYROXINE SODIUM 125 MCG: 125 TABLET ORAL at 05:51

## 2023-11-01 RX ADMIN — ACETAMINOPHEN 325MG 650 MG: 325 TABLET ORAL at 05:51

## 2023-11-01 RX ADMIN — SODIUM CHLORIDE, PRESERVATIVE FREE 10 ML: 5 INJECTION INTRAVENOUS at 08:49

## 2023-11-01 RX ADMIN — MIDODRINE HYDROCHLORIDE 10 MG: 5 TABLET ORAL at 17:57

## 2023-11-01 RX ADMIN — MIDODRINE HYDROCHLORIDE 10 MG: 5 TABLET ORAL at 11:23

## 2023-11-01 RX ADMIN — ALLOPURINOL 100 MG: 100 TABLET ORAL at 08:59

## 2023-11-01 ASSESSMENT — PAIN - FUNCTIONAL ASSESSMENT
PAIN_FUNCTIONAL_ASSESSMENT: PREVENTS OR INTERFERES SOME ACTIVE ACTIVITIES AND ADLS
PAIN_FUNCTIONAL_ASSESSMENT: ACTIVITIES ARE NOT PREVENTED

## 2023-11-01 ASSESSMENT — PAIN DESCRIPTION - LOCATION: LOCATION: ABDOMEN

## 2023-11-01 ASSESSMENT — PAIN DESCRIPTION - ORIENTATION: ORIENTATION: LOWER

## 2023-11-01 ASSESSMENT — PAIN DESCRIPTION - DESCRIPTORS: DESCRIPTORS: ACHING;DISCOMFORT

## 2023-11-01 ASSESSMENT — PAIN SCALES - GENERAL
PAINLEVEL_OUTOF10: 0
PAINLEVEL_OUTOF10: 7

## 2023-11-01 NOTE — PROGRESS NOTES
[facility] Hospitalist Group  Progress Note    Patient: Kate Zaldivar Age: 80 y.o. : 1935 MR#: [id number] SSN: [id number]  Date/Time: 2023     Subjective:   Patient stable. No pain complaints today. No nausea or vomiting. Had discussion with ID, family regarding patient's diagnosis/prognosis and current treatments. Very difficult to fully ascertain what best treatment is given recurrent and resistant nature of current infection. Review of systems  General: No fevers or chills. Cardiovascular: No chest pain or pressure. No palpitations. Pulmonary: No shortness of breath, cough or wheeze. Gastrointestinal: No abdominal pain, nausea, vomiting or diarrhea. Genitourinary: No urinary frequency, urgency, hesitancy or dysuria. Musculoskeletal: No joint or muscle pain, no back pain, no recent trauma. Neurologic: No headache, numbness, tingling or weakness. Assessment/Plan:   Severe sepsis  C. difficile toxin colitis  Diarrhea  Volume overload  UTI secondary to indwelling Carlos, Pseudomonas positive  Nausea vomiting  Distended colon, no signs of bowel obstruction  Acute hypoxic respiratory failure likely secondary to fluid overload  ESRD on HD  Mild hypokalemia  Right lower lung nodule 8 mm, 3-month follow-up  Mild hypertension  Atrial fibrillation rate controlled  Macrocytic anemia  Positive Hemoccult stool  Splenic infarct    ID following  Hematology following for leukocytosis, thrombocytosis and microcytic anemia. On Flagyl and fidaxomycin  Nephrology following for hemodialysis  Continue midodrine  Continue amiodarone and Coreg  PT/OT evaluation  CT shows worsening colitis. Vascular does not think ischemic colitis is likely.  Patient high risk for developing toxic megacolon, Colorectal surgery consulted   Splenic infarct could be from hypotension earlier this admission vs a embolic piece of plaque, patient is a poor candidate for anticoagulation given that he has injection vial 0-4 Units  0-4 Units SubCUTAneous Nightly    glucose chewable tablet 16 g  4 tablet Oral PRN    dextrose bolus 10% 125 mL  125 mL IntraVENous PRN    Or    dextrose bolus 10% 250 mL  250 mL IntraVENous PRN    glucagon (rDNA) injection 1 mg  1 mg SubCUTAneous PRN    dextrose 10 % infusion   IntraVENous Continuous PRN    insulin glargine (LANTUS) injection vial 5 Units  5 Units SubCUTAneous Nightly    midodrine (PROAMATINE) tablet 10 mg  10 mg Oral Once    Fidaxomicin (DIFICID) tablet 200 mg  200 mg Oral BID    simethicone (MYLICON) chewable tablet 80 mg  80 mg Oral Q6H PRN    metoclopramide (REGLAN) injection 5 mg  5 mg IntraVENous Q6H    atorvastatin (LIPITOR) tablet 80 mg  80 mg Oral Nightly    albumin human 25% IV solution 25 g  25 g IntraVENous PRN    allopurinol (ZYLOPRIM) tablet 100 mg  100 mg Oral Q MWF    metronidazole (FLAGYL) 500 mg in 0.9% NaCl 100 mL IVPB premix  500 mg IntraVENous Q8H    diphenhydrAMINE (BENYLIN) 12.5 MG/5ML liquid 12.5 mg  12.5 mg Oral Nightly PRN    Virt-Caps 1 mg  1 capsule Oral Daily    melatonin tablet 6 mg  6 mg Oral Nightly PRN    sodium chloride flush 0.9 % injection 5-40 mL  5-40 mL IntraVENous 2 times per day    sodium chloride flush 0.9 % injection 5-40 mL  5-40 mL IntraVENous PRN    0.9 % sodium chloride infusion   IntraVENous PRN    ondansetron (ZOFRAN-ODT) disintegrating tablet 4 mg  4 mg Oral Q8H PRN    Or    ondansetron (ZOFRAN) injection 4 mg  4 mg IntraVENous Q6H PRN    polyethylene glycol (GLYCOLAX) packet 17 g  17 g Oral Daily PRN    heparin (porcine) injection 5,000 Units  5,000 Units SubCUTAneous 3 times per day    amiodarone (CORDARONE) tablet 200 mg  200 mg Oral Daily    carvedilol (COREG) tablet 3.125 mg  3.125 mg Oral BID    levothyroxine (SYNTHROID) tablet 125 mcg  125 mcg Oral Daily    midodrine (PROAMATINE) tablet 10 mg  10 mg Oral TID WC       Labs:    Recent Results (from the past 24 hour(s))   POCT Glucose    Collection Time: 10/31/23  9:56

## 2023-11-01 NOTE — PROGRESS NOTES
11/01/23 1745   Collection Container Standard 11/01/23 1745   Securement Method Securing device (Describe) 11/01/23 1315   Catheter Care  Perineal wipes 11/01/23 1315   Catheter Best Practices  Drainage tube clipped to bed;Catheter secured to thigh; Tamper seal intact; Bag below bladder;Bag not on floor; Lack of dependent loop in tubing;Drainage bag less than half full 11/01/23 1315   Status Draining 11/01/23 1315   Output (mL) 50 mL 11/01/23 1745        20:00 -  Patient assessment completed, Hypotension noted, 5 P's of patient care addressed. No complaints voiced at this time. Will continue to monitor. Standard Safety measures in place. Right side lying    21:00 - Patient hypotensive, carvedilol held. 22:00 - Right side lying; refused turn/repositioning. 22:39 - Infusion started as per [facility]    23:39 - Infusion completed. No signs or symptoms of phlebitis/infiltration/adverse reaction noted. 00:00 - Patient reassessment completed, Patient remains hypotensive, 5 P's of patient care addressed. No complaints voiced at this time. Will continue to monitor. Standard Safety measures in place. Refused turn/repositioning. 02:12 - Medication administration completed. Patient sleeping. Standard Safety measures in place. 03:00 - Patient repositioned to supine, semi fowlers, blanket given for comfort. 04:57 - Patient reassessment completed, Patient hypotensive (asymptomatic), 5 P's of patient care addressed. No complaints voiced at this time. Will continue to monitor. Standard Safety measures in place. Refused turn/repositioning. 06:07 - Patient complains of  9 /10 Back Pain; treated with prescribed PRN Medication as per MAR. Refused turn/repositioning. Refused CHG Bath due to back pain. 06:37 - Pain reassessment completed. Patient satisfied with pain relief. No signs or symptom of side effects noted or voiced by patient.      End of Shift Nurse Handoff Report    07:20 AM - Bedside and Verbal shift change

## 2023-11-01 NOTE — PROGRESS NOTES
Comprehensive Nutrition Assessment    Type and Reason for Visit:  Reassess    Nutrition Recommendations/Plan:   Continue current diet as tolerated per MD, fluid restriction per MD.  Modify oral supplements - rec Nepro (each provides 420 kcal, 19g protein) BID. Encourage PO intake. Continue renal MVI supplementation. Continue to monitor tolerance of PO, compliance of oral supplements, weight, labs, and plan of care during admission. Malnutrition Assessment:  Malnutrition Status:  Mild malnutrition (10/30/23 1133)    Context:  Acute Illness     Findings of the 6 clinical characteristics of malnutrition:  Energy Intake:  50% or less of estimated energy requirements for 5 or more days  Weight Loss:  No significant weight loss     Body Fat Loss:  No significant body fat loss Orbital   Muscle Mass Loss:  No significant muscle mass loss Temples (temporalis)  Fluid Accumulation:  Unable to assess     Strength:  Not Performed    Nutrition Assessment:    Pt admitted w/ hematuria, unable tolerate food w/ nausea for a few days PTA; increased watery bowel movements from ostomy. Work up showed severe sepsis. PO diet was advanced then downgraded to CLD 10/28. Transferred to SDU for SOB. Per MD note, pt had n/v, abdominal distention. Dialysis per Nephrology. Pt had been NPO/CLD x 2 days, poor PO intake in-house, diet advanced to soft/bite sized 10/30. Per flowsheet, pt ate 1-50% of meals yesterday. CRS consulted, per MD 11/1 - CT personally visualied, thickened edematous colon, poor surgical candidate. Per RN, pt only took a few bites this morning for breakfast. Visited pt with family at bedside. States coming some soup, dislikes the Rabun. Requesting Nepro to mix with ice cream as this is the only way pt will consume. Will modify oral supplements.  Discussed dislike of soft/bite sized consistency which was ordered per MD, encouraged them to speak to MD. Addendum: okay to advance diet consistency to regular and

## 2023-11-01 NOTE — PROGRESS NOTES
0800: Patient a wake alert, Morning assessment and AM medication provided,   0930: Patent a sleep, Bed in low position, call hayes within rich. Patient off the floor for CT scan       0730: Bedside and Verbal shift change report given to Farndy Saucedo (oncoming nurse) by Ramirez Torres RN   (offgoing nurse). Report included the following information Index, Quality Measures, and Neuro Assessment.

## 2023-11-01 NOTE — PLAN OF CARE
Problem: Discharge Planning  Goal: Discharge to home or other facility with appropriate resources  Outcome: Progressing     Problem: Safety - Adult  Goal: Free from fall injury  Outcome: Progressing     Problem: Skin/Tissue Integrity  Goal: Absence of new skin breakdown  Description: 1. Monitor for areas of redness and/or skin breakdown  2. Assess vascular access sites hourly  3. Every 4-6 hours minimum:  Change oxygen saturation probe site  4. Every 4-6 hours:  If on nasal continuous positive airway pressure, respiratory therapy assess nares and determine need for appliance change or resting period.   Outcome: Progressing     Problem: Chronic Conditions and Co-morbidities  Goal: Patient's chronic conditions and co-morbidity symptoms are monitored and maintained or improved  Outcome: Progressing     Problem: ABCDS Injury Assessment  Goal: Absence of physical injury  Outcome: Progressing     Problem: Pain  Goal: Verbalizes/displays adequate comfort level or baseline comfort level  Outcome: Progressing     Problem: Nutrition Deficit:  Goal: Optimize nutritional status  Outcome: Progressing  Flowsheets (Taken 11/1/2023 1223 by Donnie Kaufman RD)  Nutrient intake appropriate for improving, restoring, or maintaining nutritional needs:   Assess nutritional status and recommend course of action   Monitor oral intake, labs, and treatment plans   Recommend appropriate diets, oral nutritional supplements, and vitamin/mineral supplements     Problem: Respiratory - Adult  Goal: Achieves optimal ventilation and oxygenation  Outcome: Progressing     Problem: Skin/Tissue Integrity - Adult  Goal: Skin integrity remains intact  Outcome: Progressing  Flowsheets (Taken 11/1/2023 0800)  Skin Integrity Remains Intact: Monitor for areas of redness and/or skin breakdown  Goal: Incisions, wounds, or drain sites healing without S/S of infection  Outcome: Progressing     Problem: Musculoskeletal - Adult  Goal: Return mobility to

## 2023-11-01 NOTE — PROGRESS NOTES
HPI: Monique Matthews is a 80 y.o. male presenting with chief complain of C DIFF colitis. Pt admitted 10 days ago. He denies pain. Initial workup significant for leukocytosis. Pt did not substantial increase in stoma output. 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 Past Medical History:   Diagnosis Date    Anemia     Arthritis     Atrial fibrillation ([address])     Broken leg 09/2022    CHF exacerbation ([address]) 01/08/2021    CKD (chronic kidney disease), stage III ([address])     Essential hypertension 01/18/2016    Exercise tolerance finding 10/2020    WORKS 8HRS/DAY NO CP OR SOB ON EXERTION    Exercise tolerance finding 05/2019    sob when up 2 flights of stairs no cp    Gout     Hay fever     Hearing impaired     History of pneumonia     Hyperkalemia 10/2020    Hypothyroidism     Leukocytosis     Renal insufficiency     Varicella     Vertigo     Vitamin D deficiency        Past Surgical History:   Procedure Laterality Date    BLADDER SURGERY  04/12/2023    CARPAL TUNNEL RELEASE Left     COLOSTOMY  04/11/2023    CORONARY ANGIOPLASTY WITH STENT PLACEMENT  12/04/2018    ORTHOPEDIC SURGERY      hip replacement bilateral    OTHER SURGICAL HISTORY  04/10/2023    Blood clot removal of  the bladder    TONSILLECTOMY      TOTAL HIP ARTHROPLASTY      TOTAL KNEE ARTHROPLASTY Left     UROLOGICAL SURGERY  05/14/2019    TURBT w postoperative intravesical instillation of gemcitabine.   [facility]  Dr Matt Baron       Family History   Problem Relation Age of Onset    Osteoarthritis Other     Heart Disease Father     Diabetes Sister     Hypertension Sister     Diabetes Brother     Cancer Brother        Social History     Socioeconomic History    Marital status:    Tobacco Use    Smoking status: Never    Smokeless tobacco: Never   Substance and Sexual Activity    Alcohol use: No       Current Outpatient Medications   Medication Instructions    allopurinol (ZYLOPRIM) 100 MG tablet Oral, DAILY

## 2023-11-01 NOTE — PLAN OF CARE
Problem: Discharge Planning  Goal: Discharge to home or other facility with appropriate resources  Outcome: Progressing  Flowsheets (Taken 10/31/2023 0800)  Discharge to home or other facility with appropriate resources:   Identify barriers to discharge with patient and caregiver   Identify discharge learning needs (meds, wound care, etc)     Problem: Safety - Adult  Goal: Free from fall injury  Outcome: Progressing     Problem: Pain  Goal: Verbalizes/displays adequate comfort level or baseline comfort level  Outcome: Progressing     Problem: Occupational Therapy - Adult  Goal: By Discharge: Performs self-care activities at highest level of function for planned discharge setting. See evaluation for individualized goals. Description: Occupational Therapy Goals:  Initiated 10/23/2023 to be met within 7-10 days, re-evaluation 10/30/2023 pt is making slow but steady progress towards goals and is motivated to participate in skilled OT services in order to reach maximal functional potential towards goals    1. Patient will perform upper body dressing with minimal assistance/contact guard assist.   2.  Patient will perform lower body dressing with minimal assistance/contact guard assist.  3.  Patient will perform functional task in standing for 3 minutes with min assist for balance. 4.  Patient will perform toilet transfers with minimal assistance/contact guard assist.  5.  Patient will participate in upper extremity therapeutic exercise/activities with supervision/set-up for 8-10 minutes to increase strength/endurance for ADLs. PLOF: Patient required min to mod assist with basic self care tasks and used a RW for functional mobility PTA. 10/31/2023 1449 by NORM Raymundo  Outcome: Progressing     Problem: Physical Therapy - Adult  Goal: By Discharge: Performs mobility at highest level of function for planned discharge setting. See evaluation for individualized goals.   Description: Initiated

## 2023-11-01 NOTE — PROGRESS NOTES
[facility] Hospitalist Group  Progress Note    Patient: Leda Park Age: 80 y.o. : 1935 MR#: [id number] SSN: [id number]  Date/Time: 10/31/2023     Subjective:   Patient stable. No pain complaints today. No nausea or vomiting. Liquid stool in ostomy bag. CT shows worsening colitis up to the transverse colon. Also shows splenic infarct, discussed with family. Review of systems  General: No fevers or chills. Cardiovascular: No chest pain or pressure. No palpitations. Pulmonary: No shortness of breath, cough or wheeze. Gastrointestinal: No abdominal pain, nausea, vomiting or diarrhea. Genitourinary: No urinary frequency, urgency, hesitancy or dysuria. Musculoskeletal: No joint or muscle pain, no back pain, no recent trauma. Neurologic: No headache, numbness, tingling or weakness. Assessment/Plan:   Severe sepsis  C. difficile toxin colitis  Diarrhea  Volume overload  UTI secondary to indwelling Carlos, Pseudomonas positive  Nausea vomiting  Distended colon, no signs of bowel obstruction  Acute hypoxic respiratory failure likely secondary to fluid overload  ESRD on HD  Mild hypokalemia  Right lower lung nodule 8 mm, 3-month follow-up  Mild hypertension  Atrial fibrillation rate controlled  Macrocytic anemia  Positive Hemoccult stool  Splenic infarct     Admitted to 2 S. Cardiac monitoring  ID following  Hematology following for leukocytosis, thrombocytosis and microcytic anemia. Peripheral smear done showing left shifted myeloid series with neutrophilia with no abnormal cells. On Flagyl and fidaxomycin  Continue oxygen supplementation, wean as tolerated  Catheter changed 10/27/23  Nephrology following for hemodialysis  Continue midodrine  Continue amiodarone and Coreg  PT/OT evaluation  Monitor for bleeding, no visualized blood in the ostomy bag. GI following for potential GI bleed. If patient has a melanotic bowel movement could consider an EGD.   We will continue

## 2023-11-01 NOTE — CONSULTS
[address]: [phone]  [facility]: [address]: [phone]    Patient Name: Georgina Hernandez  YOB: 1935    Date of Initial Consult: 11/1/2023   Reason for Consult: goals of care  Requesting Provider: Dr Wellington    Primary Care Physician: Criss Quick MD      SUMMARY:   Georgina Hernandez is a 80y.o. year old with a past history of urothelial carcinoma of bladder ( 2019), chronic indwelling hebert, lap sigmoid colectomy and colostomy ( 4/2023), Afib no on [facility], ESRD on HD, who was admitted on 10/21/2023 from home with a diagnosis of severe sepsis due to diverticulitis and cystitis . Current medical issues leading to Palliative Medicine involvement include: 80year old gentleman with a long and complicated hospital stay. Patient with nausea dry heaves on admission he was noted to have a distended colon however no signs of bowel obstruction. During hospitalization he started having increased stool output in his colostomy bag with liquid stool. Stool was tested for C. difficile which came back positive. He also developed significant worsening of leukocytosis despite IV antibiotics ID was consulted and is currently following patient. GI was also consulted and involved. Patient continues to have persistent leukocytosis he is no longer nauseous CT of the abdomen pelvis on 10/31 showed entire colon wall thickening with extensive pericolic fat stranding dilated transverse colon was also noted to have a possible acute splenic infarct. He currently has a poor p.o. intake vascular surgery is also awaiting they felt it was low probability for ischemic colitis. Currently ID is suggesting atypical C. difficile colitis he continues with IV antibiotics GI continues to follow. Palliative medicine is consulted for goals of care discussions and support.      PALLIATIVE DIAGNOSES:   Goals of care/advance care planning discussions  C. Macrocytic anemia    Occult blood positive stool  Resolved Problems:    * No resolved hospital problems. *    Past Medical History:   Diagnosis Date    Anemia     Arthritis     Atrial fibrillation ([address])     Broken leg 09/2022    CHF exacerbation ([address]) 01/08/2021    CKD (chronic kidney disease), stage III ([address])     Essential hypertension 01/18/2016    Exercise tolerance finding 10/2020    WORKS 8HRS/DAY NO CP OR SOB ON EXERTION    Exercise tolerance finding 05/2019    sob when up 2 flights of stairs no cp    Gout     Hay fever     Hearing impaired     History of pneumonia     Hyperkalemia 10/2020    Hypothyroidism     Leukocytosis     Renal insufficiency     Varicella     Vertigo     Vitamin D deficiency       Past Surgical History:   Procedure Laterality Date    BLADDER SURGERY  04/12/2023    CARPAL TUNNEL RELEASE Left     COLOSTOMY  04/11/2023    CORONARY ANGIOPLASTY WITH STENT PLACEMENT  12/04/2018    ORTHOPEDIC SURGERY      hip replacement bilateral    OTHER SURGICAL HISTORY  04/10/2023    Blood clot removal of  the bladder    TONSILLECTOMY      TOTAL HIP ARTHROPLASTY      TOTAL KNEE ARTHROPLASTY Left     UROLOGICAL SURGERY  05/14/2019    TURBT w postoperative intravesical instillation of gemcitabine. [facility]  Dr Adele Siddiqui      Family History   Problem Relation Age of Onset    Osteoarthritis Other     Heart Disease Father     Diabetes Sister     Hypertension Sister     Diabetes Brother     Cancer Brother      History reviewed, no pertinent family history.   Social History     Tobacco Use    Smoking status: Never    Smokeless tobacco: Never   Substance Use Topics    Alcohol use: No     Allergies   Allergen Reactions    Codeine Nausea And Vomiting    Hydromorphone Nausea And Vomiting    Antivert [Meclizine]     Lorazepam      Other reaction(s): psychological reaction  COMBATIVE      Current Facility-Administered Medications   Medication Dose Route Frequency    diatrizoate meglumine-sodium (GASTROGRAFIN) 66-10 % solution

## 2023-11-01 NOTE — PROGRESS NOTES
0700: Bedside and Verbal shift change report given to Isac Jovel, RN and Carol Hernadez RN (oncoming nurse) by Korin Singh RN (offgoing nurse). Report included the following information Nurse Handoff Report, Index, Intake/Output, MAR, Cardiac Rhythm NSR, and Alarm Parameters. 1350: Pt FLACO at dialysis, tele notified. 1730: Pt back on floor, received report from dialysis RN 3 L removed. Pt tolerated well. 1830: Colostomy emptied. Pt turned on right side. Call light within reach, pt back on tele. Pt given scheduled tylenol at this time due to pain in sacrum. 1900: Bedside and Verbal shift change report given to Judy Poole RN (oncoming nurse) by Maryam Bennett RN (offgoing nurse). Report included the following information SBAR, Kardex, MAR and Recent Results.       Maryam Bennett RN

## 2023-11-01 NOTE — ACP (ADVANCE CARE PLANNING)
Advance Care Planning       General Advance Care Planning (ACP) Conversation      Date of Conversation: 11/1/2023    Conducted with: Patient with Decision Making Capacity, patient's wife (Willi Tejada), patient's sister-in-law Case Cummings), Rebecca Alvares NP (Palliative) and this writer. Healthcare Decision Maker:    Patient does not have a formal healthcare decision maker document, on file. Patient's family reported that patient does have an advance medical directive (AMD); but it is at home. They were encouraged to bring in a copy of the AMD. Patient's wife Willi Tejada, WZ#[phone]) is his legal next-of-kin and default healthcare decision maker. Patient's sister-in-law Case Cummings, #[phone]) has helped patient and his wife, at home. Content/Action Overview: Has ACP document(s) NOT on file - requested patient to provide  Reviewed DNR/DNI and patient elects DNR order - completed portable DNR form & placed order      Advanced Steps Levi Ramos (Physician Orders for Life Sustaining Treatment)       Date of conversation: 11/1/2023          Location: [facility]                            Length (minutes): 90    Participants:     [x] Patient                   [x] Other Surrogate Decision Maker / Next of Kin      Name: Willi Tejada    Relationship to Patient: Wife    Phone Number: [phone]                 Other persons present:                  Name: Case Cummings ([address])                           Name: Rebecca Alvares NP (Palliative)                            Name: Ruiz Scott. HUBER (Palliative)         Krysten Fields ACP Facilitator: Ruiz Chavez. Sandro Scott. MSW and Rebecca Alvares NP      Conversation Topics     Goals of care. Healthcare decision makers. POLST completion. Cardiopulmonary Resuscitation      \"What do you understand about CPR? \" Response: Patient and his family have full understanding of the risks and burdens

## 2023-11-01 NOTE — PROGRESS NOTES
Infectious Disease progress Note        Reason: Severe sepsis    Current abx Prior abx   Gentamicin since 10/23-10/27  Metronidazole iv since 10/25  Fidaxomicin since 10/28   Piperacillin/tazobactam, vancomycin since 10/21-10/23  Po vancomycin 10/24-10/28     Lines:       Assessment :   80 y.o.  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 Clinical presentation consistent with severe sepsis-present on admission due to Hebert catheter associated cystitis    Possible ileus versus pseudoobstruction:  GI follow-up appreciated. Now with liquid bowel movements    Significant worsening leukocytosis-despite current broad-spectrum antibiotics, treatment positive for infection is perplexing- ? Undiagnosed underlying hematological disorder rule out evolving GI bleed    Stool c.diff 10/23- positive likely suggest evolving c.diff as the cause of persistent leukocytosis. Patient's recent diarrhea as outpt could have been due to evolving c.diff with subsequent ileus due to immodium in setting of c.diff infection. Urine culture 10/21 positive for 70,000 colonies of pseudomonas (gentamicin NIKA 2, levofloxacin NIKA:1, cefepime NIKA:4, pip/tazo NIKA 8), Klebsiella (R to levofloxacin, gentamicin NIKA:<1)    Now with persistent leukocytosis. Leukocytosis out of proportion to physical findings    nausea, increased abdominal distension on 10/28 concerning for partially treated c.diff- hence, switched to fidaxomicin on 10/28. No blasts noted on peripheral smear.   Toxic granulation noted consistent with infection  Negative fungitell 10/25 argues against fungemia    Persistent leukocytosis despite resolution

## 2023-11-01 NOTE — PROGRESS NOTES
[email or web address]  [phone]    Gastroenterology Progress Note    Impression:  1. Abn CT of GI tract - 10/31 CT w/ moderate-severe infectious/inflammatory colitis, new splenic hypodensity concerning for acute splenic infarct. Additional stool studies ordered by ID. Colorectal surgery consulted. 2. C diff  - persistent loose stool. On Dificid. 3. Leukocytosis - severe sepsis on admission   4. Anemia - hgb stable, FOB positive. no overt bleeding  5. S/P colostomy - April 2023-Dr. Dell Cleveland. 6.  Urothelial carcinoma of the bladder - s/p treatment by [facility]. 7.  End-stage renal disease - on hemodialysis  8.  P. AF  9. Obesity       Plan:  1. Follow imaging given colonic distention; await CRS recommendations. If patient becomes toxic, would recommend surgical intervention if possible. 2. Consider repeat C. Diff testing, if positive then patient may be a candidate for fecal transplant. Fecal transplant is recommended for refractory non-toxic moderate C. Diff colitis. In our opinion, colostomy infused vancomycin may not be indicated; however would not be harmful, defer to ID. 3. Monitor h/h transfuse per protocol. 4. Medical management per primary team.     Chief Complaint: c. Diff, leukocytosis, abn CT      Subjective:  continues to eat well. No abdominal pain. CT yesterday w/ findings above. ROS: Denies any fevers, chills, nausea, vomiting. General: well nourished, no acute distress  Eyes: conjunctiva normal, EOM normal  Cardiovascular: normal rate and regular rhythm  Pulmonary: effort normal  Abdominal: non-distended, active bowel sounds, soft, non-tender, non-acute, + loose brown stool in ostomy bag.   Patient Active Problem List   Diagnosis    Proteinuria    Acquired hypothyroidism    Osteoarthrosis    Leukocytosis    Renal failure    Neoplasm of bladder    Anemia of chronic renal failure    Chronic hyperkalemia    Paroxysmal atrial fibrillation (HCC)    Bronchiectasis without 11/01/2023 01:51 AM    HGB 8.8 (L) 11/01/2023 01:51 AM    HCT 27.5 (L) 11/01/2023 01:51 AM    .7 (H) 11/01/2023 01:51 AM    MCH 34.8 (H) 11/01/2023 01:51 AM    MCHC 32.0 11/01/2023 01:51 AM    RDW 21.4 (H) 11/01/2023 01:51 AM     11/01/2023 01:51 AM    MPV 11.8 11/01/2023 01:51 AM    Lab Results   Component Value Date/Time    BANDS 6 11/01/2023 01:51 AM      Basic Metabolic Profile   Recent Labs     11/01/23  0151   *   K 3.9      CO2 24   BUN 28*   GLUCOSE 100*        Hepatic Function    No results found for: \"ALB\", \"TP\" No components found for: \"SGOT\", \"GPT\", \"DBILI\", \"TBIL\"       Coags   No results for input(s): \"INR\", \"APTT\" in the last 72 hours. Invalid input(s): \"PTP\"            Bar Morgan PA-C.   11/01/23, 9:56 AM   [facility]  [email or web address]  Phone: [phone]  Pager: [phone]

## 2023-11-01 NOTE — PROGRESS NOTES
RENAL DAILY PROGRESS NOTE    Subjective:       Complaint:     Overnight events noted  Some nausea  No abdominal pain reported      IMPRESSION:   ESRD on MWF dialysis  Access: TDC  Sob,fluid overload  UTI, C diff, Leucocytosis  Anemia, refused epogen  Chronic indwelling hebert due to hx of bladder cancer   PLAN:   HD today  Spoke with ID. Due to worsening findings on CT scan with colitis, plan is to give IV IVGG.            Current Facility-Administered Medications   Medication Dose Route Frequency    immune globulin (GAMUNEX-C) 10% solution 20 g  20 g IntraVENous Once    diatrizoate meglumine-sodium (GASTROGRAFIN) 66-10 % solution 30 mL  30 mL Oral ONCE PRN    traMADol (ULTRAM) tablet 25 mg  25 mg Oral Q12H PRN    acetaminophen (TYLENOL) tablet 650 mg  650 mg Oral q8h    insulin lispro (HUMALOG) injection vial 0-4 Units  0-4 Units SubCUTAneous TID WC    insulin lispro (HUMALOG) injection vial 0-4 Units  0-4 Units SubCUTAneous Nightly    glucose chewable tablet 16 g  4 tablet Oral PRN    dextrose bolus 10% 125 mL  125 mL IntraVENous PRN    Or    dextrose bolus 10% 250 mL  250 mL IntraVENous PRN    glucagon (rDNA) injection 1 mg  1 mg SubCUTAneous PRN    dextrose 10 % infusion   IntraVENous Continuous PRN    insulin glargine (LANTUS) injection vial 5 Units  5 Units SubCUTAneous Nightly    midodrine (PROAMATINE) tablet 10 mg  10 mg Oral Once    Fidaxomicin (DIFICID) tablet 200 mg  200 mg Oral BID    simethicone (MYLICON) chewable tablet 80 mg  80 mg Oral Q6H PRN    metoclopramide (REGLAN) injection 5 mg  5 mg IntraVENous Q6H    atorvastatin (LIPITOR) tablet 80 mg  80 mg Oral Nightly    albumin human 25% IV solution 25 g  25 g IntraVENous PRN    allopurinol (ZYLOPRIM) tablet 100 mg  100 mg Oral Q MWF    metronidazole (FLAGYL) 500 mg in 0.9% NaCl 100 mL IVPB premix  500 mg IntraVENous Q8H    diphenhydrAMINE (BENYLIN) 12.5 MG/5ML liquid 12.5 mg  12.5 mg Oral Nightly PRN    Virt-Caps 1 mg  1 capsule Oral Daily

## 2023-11-02 ENCOUNTER — APPOINTMENT (OUTPATIENT)
Facility: HOSPITAL | Age: 88
End: 2023-11-02
Attending: INTERNAL MEDICINE
Payer: MEDICARE

## 2023-11-02 LAB
BASOPHILS # BLD: 0 K/UL (ref 0–0.1)
BASOPHILS NFR BLD: 0 % (ref 0–2)
C COLI+JEJUNI TUF STL QL NAA+PROBE: NEGATIVE
DIFFERENTIAL METHOD BLD: ABNORMAL
EC STX1+STX2 GENES STL QL NAA+PROBE: NEGATIVE
EOSINOPHIL # BLD: 0.4 K/UL (ref 0–0.4)
EOSINOPHIL NFR BLD: 1 % (ref 0–5)
ERYTHROCYTE [DISTWIDTH] IN BLOOD BY AUTOMATED COUNT: 21.9 % (ref 11.6–14.5)
ETEC ELTA+ESTB GENES STL QL NAA+PROBE: NEGATIVE
GLUCOSE BLD STRIP.AUTO-MCNC: 111 MG/DL (ref 70–110)
GLUCOSE BLD STRIP.AUTO-MCNC: 185 MG/DL (ref 70–110)
GLUCOSE BLD STRIP.AUTO-MCNC: 191 MG/DL (ref 70–110)
GLUCOSE BLD STRIP.AUTO-MCNC: 212 MG/DL (ref 70–110)
HCT VFR BLD AUTO: 28.6 % (ref 36–48)
HGB BLD-MCNC: 9.4 G/DL (ref 13–16)
IMM GRANULOCYTES # BLD AUTO: 0 K/UL (ref 0–0.04)
IMM GRANULOCYTES NFR BLD AUTO: 0 % (ref 0–0.5)
LYMPHOCYTES # BLD: 3.9 K/UL (ref 0.9–3.6)
LYMPHOCYTES NFR BLD: 10 % (ref 21–52)
MCH RBC QN AUTO: 35.9 PG (ref 24–34)
MCHC RBC AUTO-ENTMCNC: 32.9 G/DL (ref 31–37)
MCV RBC AUTO: 109.2 FL (ref 78–100)
METAMYELOCYTES NFR BLD MANUAL: 1 %
MONOCYTES # BLD: 1.2 K/UL (ref 0.05–1.2)
MONOCYTES NFR BLD: 3 % (ref 3–10)
NEUTS BAND NFR BLD MANUAL: 7 %
NEUTS SEG # BLD: 33.2 K/UL (ref 1.8–8)
NEUTS SEG NFR BLD: 78 % (ref 40–73)
NRBC # BLD: 0.03 K/UL (ref 0–0.01)
NRBC BLD-RTO: 0.1 PER 100 WBC
P SHIGELLOIDES DNA STL QL NAA+PROBE: NEGATIVE
PLATELET # BLD AUTO: 342 K/UL (ref 135–420)
PLATELET COMMENT: ABNORMAL
PMV BLD AUTO: 11.3 FL (ref 9.2–11.8)
RBC # BLD AUTO: 2.62 M/UL (ref 4.35–5.65)
RBC MORPH BLD: ABNORMAL
SALMONELLA SP SPAO STL QL NAA+PROBE: NEGATIVE
SHIGELLA SP+EIEC IPAH STL QL NAA+PROBE: NEGATIVE
V CHOL+PARA+VUL DNA STL QL NAA+NON-PROBE: NEGATIVE
WBC # BLD AUTO: 39.1 K/UL (ref 4.6–13.2)
Y ENTEROCOL DNA STL QL NAA+NON-PROBE: NEGATIVE

## 2023-11-02 PROCEDURE — 97164 PT RE-EVAL EST PLAN CARE: CPT

## 2023-11-02 PROCEDURE — 6360000002 HC RX W HCPCS: Performed by: STUDENT IN AN ORGANIZED HEALTH CARE EDUCATION/TRAINING PROGRAM

## 2023-11-02 PROCEDURE — 82962 GLUCOSE BLOOD TEST: CPT

## 2023-11-02 PROCEDURE — 6360000002 HC RX W HCPCS: Performed by: INTERNAL MEDICINE

## 2023-11-02 PROCEDURE — 2140000001 HC CVICU INTERMEDIATE R&B

## 2023-11-02 PROCEDURE — 94761 N-INVAS EAR/PLS OXIMETRY MLT: CPT

## 2023-11-02 PROCEDURE — 85025 COMPLETE CBC W/AUTO DIFF WBC: CPT

## 2023-11-02 PROCEDURE — 36415 COLL VENOUS BLD VENIPUNCTURE: CPT

## 2023-11-02 PROCEDURE — 97530 THERAPEUTIC ACTIVITIES: CPT

## 2023-11-02 PROCEDURE — 6370000000 HC RX 637 (ALT 250 FOR IP): Performed by: HOSPITALIST

## 2023-11-02 PROCEDURE — 2700000000 HC OXYGEN THERAPY PER DAY

## 2023-11-02 PROCEDURE — 93005 ELECTROCARDIOGRAM TRACING: CPT | Performed by: STUDENT IN AN ORGANIZED HEALTH CARE EDUCATION/TRAINING PROGRAM

## 2023-11-02 PROCEDURE — 6370000000 HC RX 637 (ALT 250 FOR IP): Performed by: INTERNAL MEDICINE

## 2023-11-02 PROCEDURE — 97535 SELF CARE MNGMENT TRAINING: CPT

## 2023-11-02 PROCEDURE — 94660 CPAP INITIATION&MGMT: CPT

## 2023-11-02 PROCEDURE — 6370000000 HC RX 637 (ALT 250 FOR IP): Performed by: STUDENT IN AN ORGANIZED HEALTH CARE EDUCATION/TRAINING PROGRAM

## 2023-11-02 PROCEDURE — 99233 SBSQ HOSP IP/OBS HIGH 50: CPT | Performed by: STUDENT IN AN ORGANIZED HEALTH CARE EDUCATION/TRAINING PROGRAM

## 2023-11-02 PROCEDURE — 2580000003 HC RX 258: Performed by: STUDENT IN AN ORGANIZED HEALTH CARE EDUCATION/TRAINING PROGRAM

## 2023-11-02 RX ADMIN — HEPARIN SODIUM 5000 UNITS: 5000 INJECTION INTRAVENOUS; SUBCUTANEOUS at 06:02

## 2023-11-02 RX ADMIN — MIDODRINE HYDROCHLORIDE 10 MG: 5 TABLET ORAL at 17:22

## 2023-11-02 RX ADMIN — INSULIN GLARGINE 5 UNITS: 100 INJECTION, SOLUTION SUBCUTANEOUS at 20:43

## 2023-11-02 RX ADMIN — METOCLOPRAMIDE HYDROCHLORIDE 5 MG: 5 INJECTION INTRAMUSCULAR; INTRAVENOUS at 19:36

## 2023-11-02 RX ADMIN — MIDODRINE HYDROCHLORIDE 10 MG: 5 TABLET ORAL at 09:04

## 2023-11-02 RX ADMIN — METRONIDAZOLE 500 MG: 500 INJECTION, SOLUTION INTRAVENOUS at 14:49

## 2023-11-02 RX ADMIN — ACETAMINOPHEN 325MG 650 MG: 325 TABLET ORAL at 14:48

## 2023-11-02 RX ADMIN — HEPARIN SODIUM 5000 UNITS: 5000 INJECTION INTRAVENOUS; SUBCUTANEOUS at 21:37

## 2023-11-02 RX ADMIN — METOCLOPRAMIDE HYDROCHLORIDE 5 MG: 5 INJECTION INTRAMUSCULAR; INTRAVENOUS at 09:05

## 2023-11-02 RX ADMIN — ACETAMINOPHEN 325MG 650 MG: 325 TABLET ORAL at 21:37

## 2023-11-02 RX ADMIN — TRAMADOL HYDROCHLORIDE 25 MG: 50 TABLET ORAL at 20:41

## 2023-11-02 RX ADMIN — MIDODRINE HYDROCHLORIDE 10 MG: 5 TABLET ORAL at 12:08

## 2023-11-02 RX ADMIN — NEPHROCAP 1 MG: 1 CAP ORAL at 09:04

## 2023-11-02 RX ADMIN — FIDAXOMICIN 200 MG: 200 TABLET, FILM COATED ORAL at 09:03

## 2023-11-02 RX ADMIN — ATORVASTATIN CALCIUM 80 MG: 40 TABLET, FILM COATED ORAL at 20:41

## 2023-11-02 RX ADMIN — METOCLOPRAMIDE HYDROCHLORIDE 5 MG: 5 INJECTION INTRAMUSCULAR; INTRAVENOUS at 02:12

## 2023-11-02 RX ADMIN — HEPARIN SODIUM 5000 UNITS: 5000 INJECTION INTRAVENOUS; SUBCUTANEOUS at 14:49

## 2023-11-02 RX ADMIN — FIDAXOMICIN 200 MG: 200 TABLET, FILM COATED ORAL at 20:41

## 2023-11-02 RX ADMIN — METOCLOPRAMIDE HYDROCHLORIDE 5 MG: 5 INJECTION INTRAMUSCULAR; INTRAVENOUS at 14:49

## 2023-11-02 RX ADMIN — TRAMADOL HYDROCHLORIDE 25 MG: 50 TABLET ORAL at 06:07

## 2023-11-02 RX ADMIN — SODIUM CHLORIDE, PRESERVATIVE FREE 10 ML: 5 INJECTION INTRAVENOUS at 21:47

## 2023-11-02 RX ADMIN — METRONIDAZOLE 500 MG: 500 INJECTION, SOLUTION INTRAVENOUS at 22:37

## 2023-11-02 RX ADMIN — AMIODARONE HYDROCHLORIDE 200 MG: 200 TABLET ORAL at 09:04

## 2023-11-02 RX ADMIN — ACETAMINOPHEN 325MG 650 MG: 325 TABLET ORAL at 06:02

## 2023-11-02 RX ADMIN — METRONIDAZOLE 500 MG: 500 INJECTION, SOLUTION INTRAVENOUS at 06:15

## 2023-11-02 RX ADMIN — LEVOTHYROXINE SODIUM 125 MCG: 125 TABLET ORAL at 06:02

## 2023-11-02 RX ADMIN — CARVEDILOL 3.12 MG: 3.12 TABLET, FILM COATED ORAL at 22:07

## 2023-11-02 ASSESSMENT — PAIN SCALES - GENERAL
PAINLEVEL_OUTOF10: 0
PAINLEVEL_OUTOF10: 1
PAINLEVEL_OUTOF10: 9
PAINLEVEL_OUTOF10: 0
PAINLEVEL_OUTOF10: 9

## 2023-11-02 ASSESSMENT — PAIN DESCRIPTION - DESCRIPTORS
DESCRIPTORS: ACHING;DISCOMFORT
DESCRIPTORS: ACHING;DISCOMFORT;SORE

## 2023-11-02 ASSESSMENT — PAIN DESCRIPTION - LOCATION
LOCATION: BACK
LOCATION: BACK

## 2023-11-02 NOTE — PROGRESS NOTES
RENAL DAILY PROGRESS NOTE    Subjective:       Complaint:     Overnight events noted  No chest pain or sob or nausea  No abdominal pain reported      IMPRESSION:   ESRD on MWF dialysis  Access: TDC  Sob,fluid overload  UTI, C diff, Leucocytosis  Anemia, refused epogen  Chronic indwelling hebert due to hx of bladder cancer   PLAN:   At risk for toxic megacolon per previous CT scan  Rpt HD tomorrow           Current Facility-Administered Medications   Medication Dose Route Frequency    heparin (porcine) injection 3,600 Units  3,600 Units IntraCATHeter Once    diatrizoate meglumine-sodium (GASTROGRAFIN) 66-10 % solution 30 mL  30 mL Oral ONCE PRN    traMADol (ULTRAM) tablet 25 mg  25 mg Oral Q12H PRN    acetaminophen (TYLENOL) tablet 650 mg  650 mg Oral q8h    insulin lispro (HUMALOG) injection vial 0-4 Units  0-4 Units SubCUTAneous TID WC    insulin lispro (HUMALOG) injection vial 0-4 Units  0-4 Units SubCUTAneous Nightly    glucose chewable tablet 16 g  4 tablet Oral PRN    dextrose bolus 10% 125 mL  125 mL IntraVENous PRN    Or    dextrose bolus 10% 250 mL  250 mL IntraVENous PRN    glucagon (rDNA) injection 1 mg  1 mg SubCUTAneous PRN    dextrose 10 % infusion   IntraVENous Continuous PRN    insulin glargine (LANTUS) injection vial 5 Units  5 Units SubCUTAneous Nightly    midodrine (PROAMATINE) tablet 10 mg  10 mg Oral Once    Fidaxomicin (DIFICID) tablet 200 mg  200 mg Oral BID    simethicone (MYLICON) chewable tablet 80 mg  80 mg Oral Q6H PRN    metoclopramide (REGLAN) injection 5 mg  5 mg IntraVENous Q6H    atorvastatin (LIPITOR) tablet 80 mg  80 mg Oral Nightly    albumin human 25% IV solution 25 g  25 g IntraVENous PRN    allopurinol (ZYLOPRIM) tablet 100 mg  100 mg Oral Q MWF    metronidazole (FLAGYL) 500 mg in 0.9% NaCl 100 mL IVPB premix  500 mg IntraVENous Q8H    diphenhydrAMINE (BENYLIN) 12.5 MG/5ML liquid 12.5 mg  12.5 mg Oral Nightly PRN    Virt-Caps 1 mg  1 capsule Oral Daily    melatonin tablet 6

## 2023-11-02 NOTE — PALLIATIVE CARE
Palliative Medicine    Follow up visit made to patient along with Palliative team member Rollen Sever NP. Patient is resting quietly in bed with eyes closed, respirations are easy and non labored, oxygen in use. Did not disturb patient for visit. No family at bedside. POLST form completed during our visit yesterday. Palliative team remains available to provide support to Mr. Bhargav Maldonado and his family during this hospital stay.     CODE STATUS: DNR/DNI, SELECTIVE TREATMENTS , NO FEEDING TUBE    Felicia Santacruz RN  Palliative Medicine Inpatient RN  Palliative [facility] Line: [phone]

## 2023-11-02 NOTE — PROGRESS NOTES
Differential    Collection Time: 11/02/23  9:56 AM   Result Value Ref Range    WBC 39.1 (H) 4.6 - 13.2 K/uL    RBC 2.62 (L) 4.35 - 5.65 M/uL    Hemoglobin 9.4 (L) 13.0 - 16.0 g/dL    Hematocrit 28.6 (L) 36.0 - 48.0 %    .2 (H) 78.0 - 100.0 FL    MCH 35.9 (H) 24.0 - 34.0 PG    MCHC 32.9 31.0 - 37.0 g/dL    RDW 21.9 (H) 11.6 - 14.5 %    Platelets 695 251 - 393 K/uL    MPV 11.3 9.2 - 11.8 FL    Nucleated RBCs 0.1 (H) 0  WBC    nRBC 0.03 (H) 0.00 - 0.01 K/uL    Neutrophils % 78 (H) 40 - 73 %    Band Neutrophils 7 %    Lymphocytes % 10 (L) 21 - 52 %    Monocytes % 3 3 - 10 %    Eosinophils % 1 0 - 5 %    Basophils % 0 0 - 2 %    Metamyelocytes 1 %    Immature Granulocytes 0 0.0 - 0.5 %    Neutrophils Absolute 33.2 (H) 1.8 - 8.0 K/UL    Lymphocytes Absolute 3.9 (H) 0.9 - 3.6 K/UL    Monocytes Absolute 1.2 0.05 - 1.2 K/UL    Eosinophils Absolute 0.4 0.0 - 0.4 K/UL    Basophils Absolute 0.0 0.0 - 0.1 K/UL    Absolute Immature Granulocyte 0.0 0.00 - 0.04 K/UL    Differential Type MANUAL      Platelet Comment ADEQUATE PLATELETS      RBC Comment NORMOCYTIC, NORMOCHROMIC     POCT Glucose    Collection Time: 11/02/23 12:04 PM   Result Value Ref Range    POC Glucose 185 (H) 70 - 110 mg/dL   EKG 12 Lead    Collection Time: 11/02/23  3:43 PM   Result Value Ref Range    Ventricular Rate 79 BPM    Atrial Rate 79 BPM    P-R Interval 218 ms    QRS Duration 142 ms    Q-T Interval 504 ms    QTc Calculation (Bazett) 577 ms    P Axis 96 degrees    R Axis -20 degrees    T Axis 101 degrees    Diagnosis       Sinus rhythm with 1st degree AV block  Left bundle branch block  Abnormal ECG  When compared with ECG of 21-OCT-2023 16:27,  Previous ECG has undetermined rhythm, needs review  Left bundle branch block has replaced Nonspecific intraventricular conduction   delay     POCT Glucose    Collection Time: 11/02/23  3:49 PM   Result Value Ref Range    POC Glucose 191 (H) 70 - 110 mg/dL   POCT Glucose    Collection Time: 11/02/23 7:35 PM   Result Value Ref Range    POC Glucose 212 (H) 70 - 110 mg/dL       Signed By: Juan Valenzuela MD     November 2, 2023      Disclaimer: Sections of this note are dictated using utilizing voice recognition software. Minor typographical errors may be present. If questions arise, please do not hesitate to contact me or call our department.

## 2023-11-02 NOTE — PLAN OF CARE
Problem: Physical Therapy - Adult  Goal: By Discharge: Performs mobility at highest level of function for planned discharge setting. See evaluation for individualized goals. Description: Initiated  10/23/23  to be met within 7-10 days. 1.  Patient will move from supine to sit and sit to supine , scoot up and down, and roll side to side in bed with minimal assistance/contact guard assist.    2.  Patient will transfer from bed to chair and chair to bed with minimal assistance/contact guard assist using the least restrictive device. 3.  Patient will perform sit to stand with minimal assistance/contact guard assist.  4.  Patient will ambulate with minimal assistance/contact guard assist for 50 feet with the least restrictive device. 5.  Patient will ascend/descend stairs as needed for discharge. PLOF: pt lives with wife in a 1 SH, gets assist with ADLs, ambulatory with a walker    Outcome: Progressing     PHYSICAL THERAPY TREATMENT    Patient: Gabriela Trevizo (2 y.o. male)  Date: 11/2/2023  Diagnosis: UTI (urinary tract infection) [N39.0]  Cystitis [N30.90]  Hypoxia [R09.02]  Leukocytosis, unspecified type [D72.829]  Urinary tract infection associated with indwelling urethral catheter, initial encounter ([address]) [T83.511A, N39.0] Severe sepsis ([address])  Procedure(s) (LRB):  EGD ESOPHAGOGASTRODUODENOSCOPY (N/A)    Precautions: Fall Risk, Contact Precautions, General Precautions, Bed Alarm,  ,  ,  ,  ,  ,  ,      ASSESSMENT:  Patient received in bed, alert, and agreeable to participate in mobility with encouragement. Seen with OT to maximize safety with mobility. Ostomy found to be leaking. Nursing changed ostomy. Pt rolls r/l with max A using bed rails for clean up and linen changed. C/o pain/stiffness in LE's. Encouraged for LE AROM. Patient declines to participate in further mobility. Call bell in reach and bed alarm activated for safety.      Progression toward goals:   []      Improving appropriately and

## 2023-11-02 NOTE — PLAN OF CARE
Problem: Discharge Planning  Goal: Discharge to home or other facility with appropriate resources  11/1/2023 2350 by Sandie Vásquez RN  Outcome: Progressing  Flowsheets (Taken 11/1/2023 2000)  Discharge to home or other facility with appropriate resources:   Identify barriers to discharge with patient and caregiver   Arrange for needed discharge resources and transportation as appropriate   Identify discharge learning needs (meds, wound care, etc)   Refer to discharge planning if patient needs post-hospital services based on physician order or complex needs related to functional status, cognitive ability or social support system  11/1/2023 1553 by Lalito Suárez RN  Outcome: Progressing     Problem: Safety - Adult  Goal: Free from fall injury  11/1/2023 2350 by Sandie Vásquez RN  Outcome: Progressing  Flowsheets (Taken 11/1/2023 2350)  Free From Fall Injury:   Instruct family/caregiver on patient safety   Based on caregiver fall risk screen, instruct family/caregiver to ask for assistance with transferring infant if caregiver noted to have fall risk factors  11/1/2023 1553 by Lalito Suárez RN  Outcome: Progressing     Problem: Skin/Tissue Integrity  Goal: Absence of new skin breakdown  Description: 1. Monitor for areas of redness and/or skin breakdown  2. Assess vascular access sites hourly  3. Every 4-6 hours minimum:  Change oxygen saturation probe site  4. Every 4-6 hours:  If on nasal continuous positive airway pressure, respiratory therapy assess nares and determine need for appliance change or resting period.   11/1/2023 2350 by Sandie Vásquez RN  Outcome: Progressing  11/1/2023 1553 by Lalito Suárez RN  Outcome: Progressing     Problem: Chronic Conditions and Co-morbidities  Goal: Patient's chronic conditions and co-morbidity symptoms are monitored and maintained or improved  11/1/2023 2350 by Snadie Vásquez RN  Outcome: Progressing  Flowsheets (Taken 11/1/2023 2000)  Care Plan - Patient's Chronic

## 2023-11-02 NOTE — PROGRESS NOTES
Ostomy bag leaking. Ostomy bag changed at this time. Stoma red . Complete linen change, 225ml output received.

## 2023-11-02 NOTE — PROGRESS NOTES
Nursing Shift Note        Start of Shift Handoff Report      Bedside and Verbal change report received from DEAN Mancia     Report included the following information Nurse Handoff Report, Intake/Output, MAR, Recent Results, Cardiac Rhythm NSR, and Neuro Assessment. Peripheral Intravenous Line:   Peripheral IV 10/21/23 Right Wrist (Active)   Site Assessment Clean, dry & intact 11/02/23 0800   Line Status Flushed;Capped;Normal saline locked 11/02/23 0800   Line Care Connections checked and tightened 11/02/23 0800   Phlebitis Assessment No symptoms 11/02/23 0800   Infiltration Assessment 0 11/02/23 0800   Alcohol Cap Used Yes 11/02/23 0800   Dressing Status Clean, dry & intact 11/02/23 0800   Dressing Type Transparent 11/01/23 2000       Peripheral IV 10/29/23 Left Antecubital (Active)   Site Assessment Clean, dry & intact 11/02/23 0800   Line Status Flushed;Capped;Normal saline locked 11/02/23 0800   Line Care Cap changed; Connections checked and tightened 11/02/23 0800   Phlebitis Assessment No symptoms 11/02/23 0800   Infiltration Assessment 0 11/02/23 0800   Alcohol Cap Used Yes 11/02/23 0800   Dressing Status Clean, dry & intact 11/02/23 0800   Dressing Type Transparent 11/02/23 0800   Dressing Intervention New 10/29/23 2220        20:00 -  Patient assessment completed, Vital Signs Stable, 5 P's of patient care addressed. No complaints voiced at this time. Will continue to monitor. Standard Safety measures in place. Gown changed. Sugar free ginger ale provided to patient. Blood pressure cuff soiled and replaced. Refused turn/reposition. 20:41 - Patient complains of  9/10 back pain; treated with prescribed PRN Medication as per MAR, Insulin held order parameters not met. 21:11 - Pain reassessment completed. Patient satisfied with pain relief. No signs or symptom of side effects noted or voiced by patient. 22:37 - Infusion started. White board updated. Refused turn/reposition.     23:37 - Infusion completed. No signs or symptoms of phlebitis/infiltration/adverse reaction noted. 00:00 - Patient reassessment completed, Vital Signs Stable, 5 P's of patient care addressed. No complaints voiced at this time. Will continue to monitor. Standard Safety measures in place. Refused turn/reposition. 02:00 - Refused turn/reposition. 04:00 - Patient reassessment completed, Vital Signs Stable, 5 P's of patient care addressed. CHG Bath given, electrodes replaced, pulse oximeter  device replaced, colostomy emptied. Carlos noted to have a large amount of thick sediment in urethral catheter. Manually irrigated with 50 cc of normal saline, 50 cc returned. Carlos draining more freely. Turned patient to LEFT SIDE with wedge pillows in place. Will continue to monitor. Standard Safety measures in place. 06:00 - Patient in bed resting comfortably, Refused turn/reposition. End of Shift Nurse Handoff Report    Bedside and Verbal shift change report given to Sanju Bobo RN (oncoming nurse) by Claus Velásquez (offgoing nurse). Report included the following information Nurse Handoff Report, Intake/Output, MAR, Recent Results, Med Rec Status, Cardiac Rhythm NSR 1*AVB, LBBB, and Neuro Assessment.

## 2023-11-02 NOTE — PLAN OF CARE
Problem: Discharge Planning  Goal: Discharge to home or other facility with appropriate resources  11/2/2023 0953 by Lena Crystal RN  Outcome: Progressing  11/1/2023 2350 by Krishan Silva RN  Outcome: Progressing  Flowsheets (Taken 11/1/2023 2000)  Discharge to home or other facility with appropriate resources:   Identify barriers to discharge with patient and caregiver   Arrange for needed discharge resources and transportation as appropriate   Identify discharge learning needs (meds, wound care, etc)   Refer to discharge planning if patient needs post-hospital services based on physician order or complex needs related to functional status, cognitive ability or social support system     Problem: Safety - Adult  Goal: Free from fall injury  11/2/2023 0953 by Lena Crystal RN  Outcome: Progressing  11/1/2023 2350 by Krishan Silva RN  Outcome: Progressing  Flowsheets (Taken 11/1/2023 2350)  Free From Fall Injury:   Instruct family/caregiver on patient safety   Based on caregiver fall risk screen, instruct family/caregiver to ask for assistance with transferring infant if caregiver noted to have fall risk factors     Problem: Skin/Tissue Integrity  Goal: Absence of new skin breakdown  Description: 1. Monitor for areas of redness and/or skin breakdown  2. Assess vascular access sites hourly  3. Every 4-6 hours minimum:  Change oxygen saturation probe site  4. Every 4-6 hours:  If on nasal continuous positive airway pressure, respiratory therapy assess nares and determine need for appliance change or resting period.   11/2/2023 0953 by Lena Crystal RN  Outcome: Progressing  11/1/2023 2350 by Krishan Silva RN  Outcome: Progressing     Problem: Chronic Conditions and Co-morbidities  Goal: Patient's chronic conditions and co-morbidity symptoms are monitored and maintained or improved  11/2/2023 0953 by Lena Crystal RN  Outcome: Progressing  11/1/2023 2350 by Krishan Silva RN  Outcome: Progressing  Flowsheets (Taken 11/1/2023 2000)  Care Plan - Patient's Chronic Conditions and Co-Morbidity Symptoms are Monitored and Maintained or Improved:   Monitor and assess patient's chronic conditions and comorbid symptoms for stability, deterioration, or improvement   Collaborate with multidisciplinary team to address chronic and comorbid conditions and prevent exacerbation or deterioration   Update acute care plan with appropriate goals if chronic or comorbid symptoms are exacerbated and prevent overall improvement and discharge     Problem: ABCDS Injury Assessment  Goal: Absence of physical injury  11/2/2023 0953 by Taiwo Zuniga RN  Outcome: Progressing  11/1/2023 2350 by Mk Arellano RN  Outcome: Progressing  Flowsheets (Taken 11/1/2023 2350)  Absence of Physical Injury: Implement safety measures based on patient assessment

## 2023-11-02 NOTE — PROGRESS NOTES
Patient needs to be NPO at midnight.  Patient can not have any medications before exam , test in the morning for SMA duplex exam

## 2023-11-02 NOTE — PLAN OF CARE
Problem: Occupational Therapy - Adult  Goal: By Discharge: Performs self-care activities at highest level of function for planned discharge setting. See evaluation for individualized goals. Description: Occupational Therapy Goals:  Initiated 10/23/2023 to be met within 7-10 days, re-evaluation 10/30/2023 pt is making slow but steady progress towards goals and is motivated to participate in skilled OT services in order to reach maximal functional potential towards goals    1. Patient will perform upper body dressing with minimal assistance/contact guard assist.   2.  Patient will perform lower body dressing with minimal assistance/contact guard assist.  3.  Patient will perform functional task in standing for 3 minutes with min assist for balance. 4.  Patient will perform toilet transfers with minimal assistance/contact guard assist.  5.  Patient will participate in upper extremity therapeutic exercise/activities with supervision/set-up for 8-10 minutes to increase strength/endurance for ADLs. PLOF: Patient required min to mod assist with basic self care tasks and used a RW for functional mobility PTA. Outcome: Progressing   OCCUPATIONAL THERAPY TREATMENT    Patient: Deshaun Beverly (40 y.o. male)  Date: 11/2/2023  Diagnosis: UTI (urinary tract infection) [N39.0]  Cystitis [N30.90]  Hypoxia [R09.02]  Leukocytosis, unspecified type [D72.829]  Urinary tract infection associated with indwelling urethral catheter, initial encounter ([address]) [T83.511A, N39.0] Severe sepsis ([address])  Procedure(s) (LRB):  EGD ESOPHAGOGASTRODUODENOSCOPY (N/A)    Precautions: Fall Risk, Contact Precautions, General Precautions, Bed Alarm,  ,  ,  ,  ,  ,  ,      Chart, occupational therapy assessment, plan of care, and goals were reviewed. ASSESSMENT:  PT co tx to maximize pt safety and participation. Pt presented supine in bed upon entry, on 5L O2NC, and was found to be soiled from leaking colostomy bag, RN made aware.  Pt required

## 2023-11-03 ENCOUNTER — APPOINTMENT (OUTPATIENT)
Facility: HOSPITAL | Age: 88
End: 2023-11-03
Attending: INTERNAL MEDICINE
Payer: MEDICARE

## 2023-11-03 ENCOUNTER — APPOINTMENT (OUTPATIENT)
Facility: HOSPITAL | Age: 88
End: 2023-11-03
Payer: MEDICARE

## 2023-11-03 LAB
BASOPHILS # BLD: 0 K/UL (ref 0–0.1)
BASOPHILS NFR BLD: 0 % (ref 0–2)
DIFFERENTIAL METHOD BLD: ABNORMAL
EKG ATRIAL RATE: 79 BPM
EKG DIAGNOSIS: NORMAL
EKG P AXIS: 96 DEGREES
EKG P-R INTERVAL: 218 MS
EKG Q-T INTERVAL: 504 MS
EKG QRS DURATION: 142 MS
EKG QTC CALCULATION (BAZETT): 577 MS
EKG R AXIS: -20 DEGREES
EKG T AXIS: 101 DEGREES
EKG VENTRICULAR RATE: 79 BPM
EOSINOPHIL # BLD: 0.4 K/UL (ref 0–0.4)
EOSINOPHIL NFR BLD: 1 % (ref 0–5)
ERYTHROCYTE [DISTWIDTH] IN BLOOD BY AUTOMATED COUNT: 22.1 % (ref 11.6–14.5)
GLUCOSE BLD STRIP.AUTO-MCNC: 104 MG/DL (ref 70–110)
GLUCOSE BLD STRIP.AUTO-MCNC: 134 MG/DL (ref 70–110)
GLUCOSE BLD STRIP.AUTO-MCNC: 142 MG/DL (ref 70–110)
GLUCOSE BLD STRIP.AUTO-MCNC: 96 MG/DL (ref 70–110)
HCT VFR BLD AUTO: 27.7 % (ref 36–48)
HGB BLD-MCNC: 9.1 G/DL (ref 13–16)
IMM GRANULOCYTES # BLD AUTO: 0 K/UL (ref 0–0.04)
IMM GRANULOCYTES NFR BLD AUTO: 0 % (ref 0–0.5)
LYMPHOCYTES # BLD: 1.7 K/UL (ref 0.9–3.6)
LYMPHOCYTES NFR BLD: 5 % (ref 21–52)
MCH RBC QN AUTO: 35.3 PG (ref 24–34)
MCHC RBC AUTO-ENTMCNC: 32.9 G/DL (ref 31–37)
MCV RBC AUTO: 107.4 FL (ref 78–100)
METAMYELOCYTES NFR BLD MANUAL: 1 %
MONOCYTES # BLD: 1 K/UL (ref 0.05–1.2)
MONOCYTES NFR BLD: 3 % (ref 3–10)
MYELOCYTES NFR BLD MANUAL: 3 %
NEUTS BAND NFR BLD MANUAL: 1 %
NEUTS SEG # BLD: 30.4 K/UL (ref 1.8–8)
NEUTS SEG NFR BLD: 86 % (ref 40–73)
NRBC # BLD: 0 K/UL (ref 0–0.01)
NRBC BLD-RTO: 0 PER 100 WBC
PLATELET # BLD AUTO: 358 K/UL (ref 135–420)
PLATELET COMMENT: ABNORMAL
PMV BLD AUTO: 11.1 FL (ref 9.2–11.8)
RBC # BLD AUTO: 2.58 M/UL (ref 4.35–5.65)
RBC MORPH BLD: ABNORMAL
WBC # BLD AUTO: 34.9 K/UL (ref 4.6–13.2)

## 2023-11-03 PROCEDURE — 6370000000 HC RX 637 (ALT 250 FOR IP): Performed by: HOSPITALIST

## 2023-11-03 PROCEDURE — 74018 RADEX ABDOMEN 1 VIEW: CPT

## 2023-11-03 PROCEDURE — 82962 GLUCOSE BLOOD TEST: CPT

## 2023-11-03 PROCEDURE — 6360000002 HC RX W HCPCS: Performed by: INTERNAL MEDICINE

## 2023-11-03 PROCEDURE — 6370000000 HC RX 637 (ALT 250 FOR IP): Performed by: INTERNAL MEDICINE

## 2023-11-03 PROCEDURE — 85025 COMPLETE CBC W/AUTO DIFF WBC: CPT

## 2023-11-03 PROCEDURE — 90935 HEMODIALYSIS ONE EVALUATION: CPT

## 2023-11-03 PROCEDURE — 6360000002 HC RX W HCPCS: Performed by: STUDENT IN AN ORGANIZED HEALTH CARE EDUCATION/TRAINING PROGRAM

## 2023-11-03 PROCEDURE — 2140000001 HC CVICU INTERMEDIATE R&B

## 2023-11-03 PROCEDURE — 36415 COLL VENOUS BLD VENIPUNCTURE: CPT

## 2023-11-03 PROCEDURE — 94660 CPAP INITIATION&MGMT: CPT

## 2023-11-03 PROCEDURE — 93975 VASCULAR STUDY: CPT

## 2023-11-03 PROCEDURE — 99232 SBSQ HOSP IP/OBS MODERATE 35: CPT | Performed by: COLON & RECTAL SURGERY

## 2023-11-03 PROCEDURE — 93010 ELECTROCARDIOGRAM REPORT: CPT | Performed by: INTERNAL MEDICINE

## 2023-11-03 PROCEDURE — 87449 NOS EACH ORGANISM AG IA: CPT

## 2023-11-03 PROCEDURE — P9047 ALBUMIN (HUMAN), 25%, 50ML: HCPCS | Performed by: INTERNAL MEDICINE

## 2023-11-03 PROCEDURE — 2700000000 HC OXYGEN THERAPY PER DAY

## 2023-11-03 PROCEDURE — 94761 N-INVAS EAR/PLS OXIMETRY MLT: CPT

## 2023-11-03 PROCEDURE — 2580000003 HC RX 258: Performed by: STUDENT IN AN ORGANIZED HEALTH CARE EDUCATION/TRAINING PROGRAM

## 2023-11-03 PROCEDURE — 6370000000 HC RX 637 (ALT 250 FOR IP): Performed by: STUDENT IN AN ORGANIZED HEALTH CARE EDUCATION/TRAINING PROGRAM

## 2023-11-03 RX ADMIN — METRONIDAZOLE 500 MG: 500 INJECTION, SOLUTION INTRAVENOUS at 14:57

## 2023-11-03 RX ADMIN — NEPHROCAP 1 MG: 1 CAP ORAL at 09:10

## 2023-11-03 RX ADMIN — ALLOPURINOL 100 MG: 100 TABLET ORAL at 09:16

## 2023-11-03 RX ADMIN — INSULIN GLARGINE 5 UNITS: 100 INJECTION, SOLUTION SUBCUTANEOUS at 20:30

## 2023-11-03 RX ADMIN — HEPARIN SODIUM 5000 UNITS: 5000 INJECTION INTRAVENOUS; SUBCUTANEOUS at 20:30

## 2023-11-03 RX ADMIN — METOCLOPRAMIDE HYDROCHLORIDE 5 MG: 5 INJECTION INTRAMUSCULAR; INTRAVENOUS at 13:17

## 2023-11-03 RX ADMIN — FIDAXOMICIN 200 MG: 200 TABLET, FILM COATED ORAL at 20:30

## 2023-11-03 RX ADMIN — SODIUM CHLORIDE, PRESERVATIVE FREE 10 ML: 5 INJECTION INTRAVENOUS at 09:12

## 2023-11-03 RX ADMIN — ATORVASTATIN CALCIUM 80 MG: 40 TABLET, FILM COATED ORAL at 20:30

## 2023-11-03 RX ADMIN — METOCLOPRAMIDE HYDROCHLORIDE 5 MG: 5 INJECTION INTRAMUSCULAR; INTRAVENOUS at 09:10

## 2023-11-03 RX ADMIN — ACETAMINOPHEN 325MG 650 MG: 325 TABLET ORAL at 22:55

## 2023-11-03 RX ADMIN — METOCLOPRAMIDE HYDROCHLORIDE 5 MG: 5 INJECTION INTRAMUSCULAR; INTRAVENOUS at 20:30

## 2023-11-03 RX ADMIN — LEVOTHYROXINE SODIUM 125 MCG: 125 TABLET ORAL at 09:14

## 2023-11-03 RX ADMIN — ALBUMIN (HUMAN) 25 G: 0.25 INJECTION, SOLUTION INTRAVENOUS at 11:50

## 2023-11-03 RX ADMIN — METRONIDAZOLE 500 MG: 500 INJECTION, SOLUTION INTRAVENOUS at 06:22

## 2023-11-03 RX ADMIN — SODIUM CHLORIDE, PRESERVATIVE FREE 10 ML: 5 INJECTION INTRAVENOUS at 22:55

## 2023-11-03 RX ADMIN — ALBUMIN (HUMAN) 25 G: 0.25 INJECTION, SOLUTION INTRAVENOUS at 09:42

## 2023-11-03 RX ADMIN — METRONIDAZOLE 500 MG: 500 INJECTION, SOLUTION INTRAVENOUS at 22:55

## 2023-11-03 RX ADMIN — MIDODRINE HYDROCHLORIDE 10 MG: 5 TABLET ORAL at 13:16

## 2023-11-03 RX ADMIN — METOCLOPRAMIDE HYDROCHLORIDE 5 MG: 5 INJECTION INTRAMUSCULAR; INTRAVENOUS at 02:11

## 2023-11-03 RX ADMIN — MIDODRINE HYDROCHLORIDE 10 MG: 5 TABLET ORAL at 17:11

## 2023-11-03 RX ADMIN — FIDAXOMICIN 200 MG: 200 TABLET, FILM COATED ORAL at 09:11

## 2023-11-03 RX ADMIN — ACETAMINOPHEN 325MG 650 MG: 325 TABLET ORAL at 13:16

## 2023-11-03 RX ADMIN — TRAMADOL HYDROCHLORIDE 25 MG: 50 TABLET ORAL at 11:51

## 2023-11-03 RX ADMIN — TRAMADOL HYDROCHLORIDE 25 MG: 50 TABLET ORAL at 23:54

## 2023-11-03 RX ADMIN — MIDODRINE HYDROCHLORIDE 10 MG: 5 TABLET ORAL at 09:11

## 2023-11-03 RX ADMIN — HEPARIN SODIUM 5000 UNITS: 5000 INJECTION INTRAVENOUS; SUBCUTANEOUS at 06:21

## 2023-11-03 RX ADMIN — HEPARIN SODIUM 5000 UNITS: 5000 INJECTION INTRAVENOUS; SUBCUTANEOUS at 13:16

## 2023-11-03 ASSESSMENT — PAIN DESCRIPTION - ONSET: ONSET: ON-GOING

## 2023-11-03 ASSESSMENT — PAIN DESCRIPTION - DESCRIPTORS: DESCRIPTORS: ACHING

## 2023-11-03 ASSESSMENT — PAIN SCALES - GENERAL
PAINLEVEL_OUTOF10: 0
PAINLEVEL_OUTOF10: 4
PAINLEVEL_OUTOF10: 0

## 2023-11-03 ASSESSMENT — PAIN DESCRIPTION - PAIN TYPE: TYPE: CHRONIC PAIN

## 2023-11-03 ASSESSMENT — PAIN DESCRIPTION - ORIENTATION: ORIENTATION: RIGHT

## 2023-11-03 ASSESSMENT — PAIN DESCRIPTION - LOCATION: LOCATION: HIP

## 2023-11-03 NOTE — PROGRESS NOTES
[facility]ist [facility]  Progress Note    Patient: Jennifer Briceno Age: 80 y.o. : 1935 MR#: [id number] SSN: [id number]  Date/Time: 11/3/2023     Subjective:   Patient stable. No pain complaints today. No nausea or vomiting. Stable appearing, hungry from wait for duplex. Review of systems  General: No fevers or chills. Cardiovascular: No chest pain or pressure. No palpitations. Pulmonary: No shortness of breath, cough or wheeze. Gastrointestinal: No abdominal pain, nausea, vomiting or diarrhea. Genitourinary: No urinary frequency, urgency, hesitancy or dysuria. Musculoskeletal: No joint or muscle pain, no back pain, no recent trauma. Neurologic: No headache, numbness, tingling or weakness. Assessment/Plan:   Severe sepsis  C. difficile toxin colitis  Diarrhea  Volume overload  UTI secondary to indwelling Carlos, Pseudomonas positive  Nausea vomiting  Distended colon, no signs of bowel obstruction  Acute hypoxic respiratory failure likely secondary to fluid overload  ESRD on HD  Mild hypokalemia  Right lower lung nodule 8 mm, 3-month follow-up  Mild hypertension  Atrial fibrillation rate controlled  Macrocytic anemia  Positive Hemoccult stool  Splenic infarct    ID following  Hematology following for leukocytosis, thrombocytosis and microcytic anemia. On Flagyl and fidaxomycin  Nephrology following for hemodialysis  Continue midodrine  Continue amiodarone and Coreg  PT/OT evaluation  CT shows worsening colitis. Vascular does not think ischemic colitis is likely.  Patient high risk for developing toxic megacolon, Colorectal surgery consulted   Splenic infarct could be from hypotension earlier this admission vs a embolic piece of plaque, patient is a poor candidate for anticoagulation given that he has had numerous bleeding issues in the past  Patient poor surgical candidate, agree with general surgery  Some potential benefit to administration of IVIG, has been

## 2023-11-03 NOTE — PROGRESS NOTES
[email or web address]  [phone]    Gastroenterology Progress Note    Impression:  1. Abn CT of GI tract - 10/31 CT w/ moderate-severe infectious/inflammatory colitis, new splenic hypodensity concerning for acute splenic infarct. Colorectal surgery on board. 11/3 PVL pending   2. C diff  - persistent loose stool. On Dificid. 3. Leukocytosis - severe sepsis on admission   4. Anemia - hgb stable, FOB positive. no overt bleeding  5. S/P colostomy - April 2023-Dr. Chester Jones. 6.  Urothelial carcinoma of the bladder - s/p treatment by Urology [facility]. 7.  End-stage renal disease - on hemodialysis  8.  P. AF  9. Obesity    Plan:  1. Follow imaging given colonic distention; await CRS recommendations. If patient becomes toxic, would recommend surgical intervention if possible. 11/3 PVL pending. 2. Consider repeat C. Diff testing, if positive then patient may be a candidate for fecal transplant. Fecal transplant is recommended for refractory non-toxic moderate C. Diff colitis. In our opinion, colostomy infused vancomycin may not be indicated; however would not be harmful, defer to ID. 3. No plans for invasive procedures w/o overt bleeding with associated drop in H/H.   4. Monitor h/h transfuse per protocol. 5. Medical management per primary team.     Chief Complaint: c. Diff, leukocytosis, abn CT      Subjective:  Still without abd pain. PVL performed this morning, hasn't had breakfast yet. brown loose OP from ostomy. ROS: Denies any fevers, chills, nausea, vomiting.        General:well nourished, no acute distress  Eyes: conjunctiva normal, EOM normal  Cardiovascular: normal rate and regular rhythm  Pulmonary:effort normal  Abdominal: non-distended, active bowel sounds, soft, non-tender, non-acute    Patient Active Problem List   Diagnosis    Proteinuria    Acquired hypothyroidism    Osteoarthrosis    Leukocytosis    Renal failure    Neoplasm of bladder    Anemia of chronic renal failure    Chronic hyperkalemia Intake 700 ml   Output 850 ml   Net -150 ml       CBC w/Diff    Lab Results   Component Value Date/Time    WBC 39.1 (H) 11/02/2023 09:56 AM    RBC 2.62 (L) 11/02/2023 09:56 AM    HGB 9.4 (L) 11/02/2023 09:56 AM    HCT 28.6 (L) 11/02/2023 09:56 AM    .2 (H) 11/02/2023 09:56 AM    MCH 35.9 (H) 11/02/2023 09:56 AM    MCHC 32.9 11/02/2023 09:56 AM    RDW 21.9 (H) 11/02/2023 09:56 AM     11/02/2023 09:56 AM    MPV 11.3 11/02/2023 09:56 AM    Lab Results   Component Value Date/Time    BANDS 7 11/02/2023 09:56 AM      Basic Metabolic Profile   Recent Labs     11/01/23  0151   *   K 3.9      CO2 24   BUN 28*   GLUCOSE 100*        Hepatic Function    No results found for: \"ALB\", \"TP\" No components found for: \"SGOT\", \"GPT\", \"DBILI\", \"TBIL\"       Coags   No results for input(s): \"INR\", \"APTT\" in the last 72 hours. Invalid input(s): \"PTP\"            Jl Arauz PA-C.   11/03/23, 8:59 AM   [facility]  [email or web address]  Phone: [phone]  Pager: [phone]

## 2023-11-03 NOTE — PROGRESS NOTES
Received pre HD report from  MARY Santo , RN. Pt in bed, A+O 3, on O2 via NC @ 5L, no s/s of distress noted. Accessed right CVC w/o complications. Tx initiated at 0941 . CVC flowing with ease. UF goal 2000 ml. Offered assistance with repositioning every 2 hours. Vascular access visible at all times during treatment, line connections intact at all times. PRN albumin administered x 2 for hypotension during hemodialyisis. MD made aware. Tx completed at 1239 , tolerated well 2L removed. De-accessed per protocol. Heparin indwell 1.8ml in arterial, and 1.8ml in venous catheter. Unit nurse MARY Santo, DEAN given report.

## 2023-11-03 NOTE — PROGRESS NOTES
Bedside shift change report given to Fei Tavarez (oncoming nurse) by Josephine Christensen RN (offgoing nurse). Report included the following information Nurse Handoff Report, Cardiac Rhythm Sinus Rhythm, Quality Measures, Neuro Assessment, and Event Log.

## 2023-11-03 NOTE — PROGRESS NOTES
Attempted pt for OT tx X 2. Pt unable to be seen 2/2 FLACO for HD. Will continue to follow up.  Thank you  MOE Aggarwal/PRIYANKA

## 2023-11-03 NOTE — PLAN OF CARE
INTERVENTION:  HEMODYNAMIC STABILIZATION  MAINTAIN BP WNL WHILE ON HD. INTERVENTION:  FLUID MANAGEMENT  WILL ATTEMPT 2000 ML TOTAL FLUID REMOVAL AS TOLERATED. INTERVENTION:  METABOLIC/ELECTROLYTE MANAGEMENT  3.0 POTASSIUM 2.5 CALCIUM DIALYSATE USED WITH HD TODAY. INTERVENTION:  HEMODIALYSIS ACCESS SITE MANAGEMENT  RIGHT CVC ACCESSED USING ASEPTIC TECHNIQUE. GOAL:  SIGNS AND SYMPTOMS OF LISTED POTENTIAL PROBLEMS WILL BE ABSENT OR MANAGEABLE. OUTCOME:  PROGRESSING. HD PLANNED FOR 3 HOURS TODAY.        Problem: Chronic Conditions and Co-morbidities  Goal: Patient's chronic conditions and co-morbidity symptoms are monitored and maintained or improved  Outcome: Progressing

## 2023-11-03 NOTE — PROGRESS NOTES
Unable to see patient as he is FLACO for HD. Will continue to follow up as patient is available. Thank you.    Samina Reed PT, DPT

## 2023-11-03 NOTE — PROGRESS NOTES
0700: Bedside and Verbal shift change report given to Arnulfo Marquez RN (oncoming nurse) by Elham Perkins RN (offgoing nurse). Report included the following information Nurse Handoff Report, Intake/Output, MAR, Cardiac Rhythm NSR, and Quality Measures. 0930: Pt rounded on, call light within reach, bed in lowest position and locked. Pt went to dialysis via transport at this time. Piage (wife) notified. 1255: Report received from [address] in dialysis. Pt tolerated treatment well. Pt received 2 rounds of albumin in dialysis for hypotension. 2L removed during HD. /61 at end of treatment. Pt coming back to unit at this time. 1300: Pt back on unit, VSS. /51. Pt A&O X4. Bed alarm on, call bell within reach, wheels locked. 1635: Colostomy emptied 200ml of loose, mucousy stool. 1715: Pt to be NPO at 0000 for duplex scan tomorrow. 1900: Bedside and Verbal shift change report given to Bhavani Santa RN (oncoming nurse) by Arnulfo Marquez RN (offgoing nurse). Report included the following information Nurse Handoff Report, Intake/Output, MAR, Cardiac Rhythm NSR, and Quality Measures.

## 2023-11-03 NOTE — CARE COORDINATION
Received notification from [address] [place] [address] that [facility] was approved. Met with pt and he is agreeable for transfer to the facility tomorrow. Messaged facility to see if they are able to accept pt for transfer tomorrow. Awaiting for return reply in [place]. Attempted to call by phone multiple times with no answer. Will relay info to the weekend CM to follow-up.     [facility] RN RAJES  Case Management

## 2023-11-04 ENCOUNTER — APPOINTMENT (OUTPATIENT)
Facility: HOSPITAL | Age: 88
End: 2023-11-04
Attending: INTERNAL MEDICINE
Payer: MEDICARE

## 2023-11-04 LAB
BACTERIA SPEC CULT: NORMAL
BACTERIA SPEC CULT: NORMAL
ECHO BSA: 2.17 M2
GLUCOSE BLD STRIP.AUTO-MCNC: 110 MG/DL (ref 70–110)
GLUCOSE BLD STRIP.AUTO-MCNC: 146 MG/DL (ref 70–110)
GLUCOSE BLD STRIP.AUTO-MCNC: 150 MG/DL (ref 70–110)
GLUCOSE BLD STRIP.AUTO-MCNC: 98 MG/DL (ref 70–110)
SERVICE CMNT-IMP: NORMAL
SERVICE CMNT-IMP: NORMAL
VAS AORTA PROX PSV: 57 CM/S
VAS CELIAC EDV: 36.4 CM/S
VAS CELIAC PSV: 133.9 CM/S
VAS COMMON HEPATIC EDV: 29.8 CM/S
VAS COMMON HEPATIC PSV: 99.9 CM/S
VAS DIST SMA EDV: 17.6 CM/S
VAS DIST SMA PSV: 86.3 CM/S
VAS MID SMA EDV: 31.4 CM/S
VAS MID SMA PSV: 169.7 CM/S
VAS ORIGIN SMA EDV: 53.3 CM/S
VAS ORIGIN SMA PSV: 198.2 CM/S
VAS PROX SMA EDV: 53.3 CM/S
VAS PROX SMA PSV: 187.2 CM/S
VAS SPLENIC EDV: 18.8 CM/S
VAS SPLENIC PSV: 110.1 CM/S

## 2023-11-04 PROCEDURE — 36556 INSERT NON-TUNNEL CV CATH: CPT

## 2023-11-04 PROCEDURE — 6370000000 HC RX 637 (ALT 250 FOR IP): Performed by: STUDENT IN AN ORGANIZED HEALTH CARE EDUCATION/TRAINING PROGRAM

## 2023-11-04 PROCEDURE — 6360000002 HC RX W HCPCS: Performed by: INTERNAL MEDICINE

## 2023-11-04 PROCEDURE — 6360000002 HC RX W HCPCS: Performed by: STUDENT IN AN ORGANIZED HEALTH CARE EDUCATION/TRAINING PROGRAM

## 2023-11-04 PROCEDURE — 2580000003 HC RX 258: Performed by: STUDENT IN AN ORGANIZED HEALTH CARE EDUCATION/TRAINING PROGRAM

## 2023-11-04 PROCEDURE — 6370000000 HC RX 637 (ALT 250 FOR IP): Performed by: INTERNAL MEDICINE

## 2023-11-04 PROCEDURE — 2140000001 HC CVICU INTERMEDIATE R&B

## 2023-11-04 PROCEDURE — 93975 VASCULAR STUDY: CPT | Performed by: INTERNAL MEDICINE

## 2023-11-04 PROCEDURE — 99232 SBSQ HOSP IP/OBS MODERATE 35: CPT | Performed by: STUDENT IN AN ORGANIZED HEALTH CARE EDUCATION/TRAINING PROGRAM

## 2023-11-04 PROCEDURE — 6370000000 HC RX 637 (ALT 250 FOR IP): Performed by: HOSPITALIST

## 2023-11-04 PROCEDURE — 2700000000 HC OXYGEN THERAPY PER DAY

## 2023-11-04 PROCEDURE — 82962 GLUCOSE BLOOD TEST: CPT

## 2023-11-04 PROCEDURE — 93975 VASCULAR STUDY: CPT

## 2023-11-04 PROCEDURE — 94761 N-INVAS EAR/PLS OXIMETRY MLT: CPT

## 2023-11-04 RX ADMIN — METRONIDAZOLE 500 MG: 500 INJECTION, SOLUTION INTRAVENOUS at 06:22

## 2023-11-04 RX ADMIN — ATORVASTATIN CALCIUM 80 MG: 40 TABLET, FILM COATED ORAL at 21:21

## 2023-11-04 RX ADMIN — HEPARIN SODIUM 5000 UNITS: 5000 INJECTION INTRAVENOUS; SUBCUTANEOUS at 14:52

## 2023-11-04 RX ADMIN — METRONIDAZOLE 500 MG: 500 INJECTION, SOLUTION INTRAVENOUS at 14:52

## 2023-11-04 RX ADMIN — CARVEDILOL 3.12 MG: 3.12 TABLET, FILM COATED ORAL at 21:21

## 2023-11-04 RX ADMIN — SODIUM CHLORIDE, PRESERVATIVE FREE 10 ML: 5 INJECTION INTRAVENOUS at 09:45

## 2023-11-04 RX ADMIN — ACETAMINOPHEN 325MG 650 MG: 325 TABLET ORAL at 14:52

## 2023-11-04 RX ADMIN — MIDODRINE HYDROCHLORIDE 10 MG: 5 TABLET ORAL at 12:41

## 2023-11-04 RX ADMIN — MIDODRINE HYDROCHLORIDE 10 MG: 5 TABLET ORAL at 17:33

## 2023-11-04 RX ADMIN — MIDODRINE HYDROCHLORIDE 10 MG: 5 TABLET ORAL at 09:43

## 2023-11-04 RX ADMIN — METOCLOPRAMIDE HYDROCHLORIDE 5 MG: 5 INJECTION INTRAMUSCULAR; INTRAVENOUS at 14:52

## 2023-11-04 RX ADMIN — FIDAXOMICIN 200 MG: 200 TABLET, FILM COATED ORAL at 21:21

## 2023-11-04 RX ADMIN — ACETAMINOPHEN 325MG 650 MG: 325 TABLET ORAL at 22:25

## 2023-11-04 RX ADMIN — HEPARIN SODIUM 5000 UNITS: 5000 INJECTION INTRAVENOUS; SUBCUTANEOUS at 21:21

## 2023-11-04 RX ADMIN — INSULIN GLARGINE 5 UNITS: 100 INJECTION, SOLUTION SUBCUTANEOUS at 21:22

## 2023-11-04 RX ADMIN — NEPHROCAP 1 MG: 1 CAP ORAL at 09:40

## 2023-11-04 RX ADMIN — FIDAXOMICIN 200 MG: 200 TABLET, FILM COATED ORAL at 09:40

## 2023-11-04 RX ADMIN — METOCLOPRAMIDE HYDROCHLORIDE 5 MG: 5 INJECTION INTRAMUSCULAR; INTRAVENOUS at 09:40

## 2023-11-04 RX ADMIN — SODIUM CHLORIDE, PRESERVATIVE FREE 10 ML: 5 INJECTION INTRAVENOUS at 21:26

## 2023-11-04 RX ADMIN — AMIODARONE HYDROCHLORIDE 200 MG: 200 TABLET ORAL at 09:40

## 2023-11-04 RX ADMIN — HEPARIN SODIUM 5000 UNITS: 5000 INJECTION INTRAVENOUS; SUBCUTANEOUS at 06:22

## 2023-11-04 RX ADMIN — METOCLOPRAMIDE HYDROCHLORIDE 5 MG: 5 INJECTION INTRAMUSCULAR; INTRAVENOUS at 21:27

## 2023-11-04 RX ADMIN — METRONIDAZOLE 500 MG: 500 INJECTION, SOLUTION INTRAVENOUS at 22:26

## 2023-11-04 RX ADMIN — TRAMADOL HYDROCHLORIDE 25 MG: 50 TABLET ORAL at 12:41

## 2023-11-04 RX ADMIN — CARVEDILOL 3.12 MG: 3.12 TABLET, FILM COATED ORAL at 09:40

## 2023-11-04 ASSESSMENT — PAIN DESCRIPTION - ORIENTATION
ORIENTATION: RIGHT
ORIENTATION: LOWER

## 2023-11-04 ASSESSMENT — PAIN - FUNCTIONAL ASSESSMENT
PAIN_FUNCTIONAL_ASSESSMENT: ACTIVITIES ARE NOT PREVENTED
PAIN_FUNCTIONAL_ASSESSMENT: ACTIVITIES ARE NOT PREVENTED
PAIN_FUNCTIONAL_ASSESSMENT: PREVENTS OR INTERFERES SOME ACTIVE ACTIVITIES AND ADLS

## 2023-11-04 ASSESSMENT — PAIN DESCRIPTION - LOCATION
LOCATION: BACK
LOCATION: LEG

## 2023-11-04 ASSESSMENT — PAIN SCALES - GENERAL
PAINLEVEL_OUTOF10: 10
PAINLEVEL_OUTOF10: 0
PAINLEVEL_OUTOF10: 5
PAINLEVEL_OUTOF10: 7

## 2023-11-04 ASSESSMENT — PAIN DESCRIPTION - DESCRIPTORS
DESCRIPTORS: ACHING;SORE;SHARP
DESCRIPTORS: ACHING

## 2023-11-04 NOTE — PROGRESS NOTES
[email or web address]  [phone]    Gastroenterology Progress Note    Impression:  81yo gentleman with complex PMHx, notable for ESRD on HD, Urothelial carcinoma of the bladder with complicated cystoscopy and TURP in April/23 requiring colectomy/Roxie's pouch who is brought to the hospital with sepsis. Found to have significant leukocytosis and C difficile colitis, patient has been managed as severe C diff with Tonya's, though would meet criteria for fulminant C diff. Cross-sectional imaging shows pancolitis and even likely infarcted spleen, but interestingly patient has no abdominal symptoms, which is puzzling. Plan:  1. Appreciate ID and CRS recommendations. No plans on colectomy so far, on fidaxomycin and metronidazole. 2. Patient may be a candidate for fecal transplant if improving from what's thought to be Wagoner's. Fecal transplant is recommended for refractory non-toxic moderate C diff colitis. Chief Complaint: C diff colitis, leukocytosis, abnormal CT    Subjective: In good spirits this morning. No abd pain. Had to empty colostomy bag 2 times in the past 24 hours.     PMH:   Past Medical History:   Diagnosis Date    Anemia     Arthritis     Atrial fibrillation ([address])     Broken leg 09/2022    CHF exacerbation ([address]) 01/08/2021    CKD (chronic kidney disease), stage III ([address])     Essential hypertension 01/18/2016    Exercise tolerance finding 10/2020    WORKS 8HRS/DAY NO CP OR SOB ON EXERTION    Exercise tolerance finding 05/2019    sob when up 2 flights of stairs no cp    Gout     Hay fever     Hearing impaired     History of pneumonia     Hyperkalemia 10/2020    Hypothyroidism     Leukocytosis     Renal insufficiency     Varicella     Vertigo     Vitamin D deficiency        PSH:   Past Surgical History:   Procedure Laterality Date    BLADDER SURGERY  04/12/2023    CARPAL TUNNEL RELEASE Left     COLOSTOMY  04/11/2023    CORONARY ANGIOPLASTY WITH STENT PLACEMENT  12/04/2018    ORTHOPEDIC SURGERY

## 2023-11-04 NOTE — PROGRESS NOTES
[facility] Hospitalist Group  Progress Note    Patient: Moris Kna Age: 80 y.o. : 1935 MR#: [id number] SSN: [id number]  Date/Time: 2023     Subjective:   Patient stable. No pain complaints today. No nausea or vomiting. SMA duplex this morning. Otherwise stable, similar to prior      Review of systems  General: No fevers or chills. Cardiovascular: No chest pain or pressure. No palpitations. Pulmonary: No shortness of breath, cough or wheeze. Gastrointestinal: No abdominal pain, nausea, vomiting or diarrhea. Genitourinary: No urinary frequency, urgency, hesitancy or dysuria. Musculoskeletal: No joint or muscle pain, no back pain, no recent trauma. Neurologic: No headache, numbness, tingling or weakness.    Assessment/Plan:   Severe sepsis  C. difficile toxin colitis  Diarrhea  Volume overload  UTI secondary to indwelling Carlos, Pseudomonas positive  Nausea vomiting  Distended colon, no signs of bowel obstruction  Acute hypoxic respiratory failure likely secondary to fluid overload  ESRD on HD  Mild hypokalemia  Right lower lung nodule 8 mm, 3-month follow-up  Mild hypertension  Atrial fibrillation rate controlled  Macrocytic anemia  Positive Hemoccult stool  Splenic infarct    Consults: Infectious disease, nephrology, gastroenterology, wound care, cardiology, palliative care, heme-onc, colorectal surgery  On Flagyl and fidaxomycin  Hemodialysis per nephrology  Continue midodrine  Continue amiodarone and Coreg  Status post IVIG x1  Can consider intracolonic instillation of vancomycin  Can consider fecal transplant  SMA duplex successfully done  No clear sign of stenosis on SMA duplex  Seems to overall be improving at this time, cautiously hopeful  Labs refused a.m., will repeat tomorrow     Case discussed with:  [x]Patient  []Family  []Nursing  []Case Management  DVT Prophylaxis:  []Lovenox  [x]Hep SQ  []SCDs  []Coumadin   []Eliquis/Xarelto     Objective:   VS: BP atorvastatin (LIPITOR) tablet 80 mg  80 mg Oral Nightly    albumin human 25% IV solution 25 g  25 g IntraVENous PRN    allopurinol (ZYLOPRIM) tablet 100 mg  100 mg Oral Q MWF    metronidazole (FLAGYL) 500 mg in 0.9% NaCl 100 mL IVPB premix  500 mg IntraVENous Q8H    diphenhydrAMINE (BENYLIN) 12.5 MG/5ML liquid 12.5 mg  12.5 mg Oral Nightly PRN    Virt-Caps 1 mg  1 capsule Oral Daily    melatonin tablet 6 mg  6 mg Oral Nightly PRN    sodium chloride flush 0.9 % injection 5-40 mL  5-40 mL IntraVENous 2 times per day    sodium chloride flush 0.9 % injection 5-40 mL  5-40 mL IntraVENous PRN    0.9 % sodium chloride infusion   IntraVENous PRN    ondansetron (ZOFRAN-ODT) disintegrating tablet 4 mg  4 mg Oral Q8H PRN    Or    ondansetron (ZOFRAN) injection 4 mg  4 mg IntraVENous Q6H PRN    polyethylene glycol (GLYCOLAX) packet 17 g  17 g Oral Daily PRN    heparin (porcine) injection 5,000 Units  5,000 Units SubCUTAneous 3 times per day    amiodarone (CORDARONE) tablet 200 mg  200 mg Oral Daily    carvedilol (COREG) tablet 3.125 mg  3.125 mg Oral BID    levothyroxine (SYNTHROID) tablet 125 mcg  125 mcg Oral Daily    midodrine (PROAMATINE) tablet 10 mg  10 mg Oral TID WC       Labs:    Recent Results (from the past 24 hour(s))   POCT Glucose    Collection Time: 11/03/23  8:14 PM   Result Value Ref Range    POC Glucose 134 (H) 70 - 110 mg/dL   POCT Glucose    Collection Time: 11/04/23  7:56 AM   Result Value Ref Range    POC Glucose 110 70 - 110 mg/dL   Vascular duplex mesenteric arteries/abdominal complete    Collection Time: 11/04/23  8:59 AM   Result Value Ref Range    Body Surface Area 2.17 m2    Ao prox PSV 57.0 cm/s    Celiac .9 cm/s    Celiac EDV 36.4 cm/s    Common Hepatic PSV 99.9 cm/s    Common Hepatic EDV 29.8 cm/s    Splenic .1 cm/s    Splenic EDV 18.8 cm/s    Origin SMA .2 cm/s    Origin SMA EDV 53.3 cm/s    Prox SMA .2 cm/s    Prox SMA EDV 53.3 cm/s    Mid SMA .7 cm/s    Mid

## 2023-11-04 NOTE — PROGRESS NOTES
0700: Bedside and Verbal shift change report given to Nori Love RN and Betsy Santacruz RN (oncoming nurse) by Gray Lindo RN (offgoing nurse). Report included the following information Nurse Handoff Report, Adult Overview, Intake/Output, MAR, Recent Results, and Cardiac Rhythm NSR . Upon assessment, patient in bed with even, unlabored respirations. 1200: Provided Carlos Care and ostomy care. 1321: Family at bedside. 1910: Bedside and Verbal shift change report given to Zainab Leon RN and DEAN Ramriez (oncoming nurse) by Nori Love RN and Betsy Santacruz RN (offgoing nurse). Report included the following information Nurse Handoff Report, Adult Overview, Intake/Output, MAR, Recent Results, and Cardiac Rhythm NSR .

## 2023-11-04 NOTE — PROGRESS NOTES
conducted a Follow up consultation and Spiritual Assessment for Joel Lawrence, who is a 80 y. o.,male.       The reason patient came to hospital is:   Patient Active Problem List    Diagnosis Date Noted    Leukocytosis 09/23/2014    Anemia of chronic renal failure 09/12/2014    Goals of care, counseling/discussion 11/01/2023    Debility 11/01/2023    End stage renal disease on dialysis ([address]) 11/01/2023    Hypoxia 10/27/2023    C. difficile colitis 10/27/2023    C. difficile diarrhea 10/27/2023    Macrocytic anemia 10/27/2023    Occult blood positive stool 10/27/2023    UTI (urinary tract infection) 10/21/2023    Cystitis 10/21/2023    Acute respiratory failure with hypoxia ([address]) 10/21/2023    Hypokalemia 10/21/2023    Hearing impaired 09/21/2023    Hypercoagulable state due to paroxysmal atrial fibrillation ([address]) 09/21/2023    Colostomy present ([address]) 09/20/2023    Indwelling Carlos catheter present 09/20/2023    Cerebral infarction, unspecified ([address]) 08/23/2023    Gastro-esophageal reflux disease without esophagitis 08/23/2023    Hyperlipidemia, unspecified 36/61/6759    Metabolic encephalopathy 81/16/1329    Muscle weakness (generalized) 08/23/2023    Obstructive and reflux uropathy, unspecified 08/23/2023    Severe sepsis ([address]) 08/23/2023    Protein-calorie malnutrition ([address]) 08/23/2023    Unspecified abnormalities of gait and mobility 08/23/2023    ESRD (end stage renal disease) on dialysis ([address]) 08/12/2023    Ischemic cardiomyopathy 08/12/2023    Steroid-induced hyperglycemia 07/31/2023    Secondary hyperparathyroidism of renal origin ([address]) 05/24/2023    Thoracic aortic ectasia ([address]) 05/24/2023    Pulmonary hypertension, unspecified ([address]) 02/01/2023    Periprosthetic fracture around internal prosthetic right knee joint 09/07/2022    Bronchiectasis without complication ([address]) 89/73/0322    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    Neoplasm of bladder 05/21/2019    Coronary artery disease without angina pectoris 04/09/2019    Paroxysmal atrial fibrillation ([address]) 01/10/2019    Pure hypercholesterolemia 01/10/2019    S/P right coronary artery (RCA) stent placement 12/05/2018    Vitamin D deficiency 10/16/2017    Chronic hyperkalemia 09/03/2013    Proteinuria 07/13/2013    Acquired hypothyroidism 07/13/2013    Osteoarthrosis 07/13/2013    Renal failure 07/12/2013    Carpal tunnel syndrome 06/17/2010   . The  provided the following Interventions:  Continued the relationship of care and support. Listened empathically. Offered prayer and assurance of continued prayer on patients behalf. Chart reviewed. The following outcomes were achieved:  Patient expressed gratitude for 's visit. Assessment:  There are no further spiritual or Yarsani issues which require Spiritual Care Services interventions at this time. Plan:  Chaplains will continue to follow and will provide pastoral care on an as needed/requested basis.  recommends bedside caregivers page  on duty if patient shows signs of acute spiritual or emotional distress.        [facility]  Staff [facility]   [phone]

## 2023-11-05 ENCOUNTER — APPOINTMENT (OUTPATIENT)
Facility: HOSPITAL | Age: 88
End: 2023-11-05
Payer: MEDICARE

## 2023-11-05 LAB
APPEARANCE UR: ABNORMAL
BACTERIA URNS QL MICRO: ABNORMAL /HPF
BASOPHILS # BLD: 0 K/UL (ref 0–0.1)
BASOPHILS NFR BLD: 0 % (ref 0–2)
BILIRUB UR QL: ABNORMAL
COLOR UR: ABNORMAL
DIFFERENTIAL METHOD BLD: ABNORMAL
EOSINOPHIL # BLD: 0 K/UL (ref 0–0.4)
EOSINOPHIL NFR BLD: 0 % (ref 0–5)
EPITH CASTS URNS QL MICRO: NEGATIVE /LPF (ref 0–5)
ERYTHROCYTE [DISTWIDTH] IN BLOOD BY AUTOMATED COUNT: 21.9 % (ref 11.6–14.5)
GLUCOSE BLD STRIP.AUTO-MCNC: 117 MG/DL (ref 70–110)
GLUCOSE BLD STRIP.AUTO-MCNC: 128 MG/DL (ref 70–110)
GLUCOSE BLD STRIP.AUTO-MCNC: 132 MG/DL (ref 70–110)
GLUCOSE BLD STRIP.AUTO-MCNC: 137 MG/DL (ref 70–110)
GLUCOSE BLD STRIP.AUTO-MCNC: 178 MG/DL (ref 70–110)
GLUCOSE UR STRIP.AUTO-MCNC: NEGATIVE MG/DL
HCT VFR BLD AUTO: 26.4 % (ref 36–48)
HGB BLD-MCNC: 8.4 G/DL (ref 13–16)
HGB UR QL STRIP: ABNORMAL
IMM GRANULOCYTES # BLD AUTO: 0 K/UL (ref 0–0.04)
IMM GRANULOCYTES NFR BLD AUTO: 0 % (ref 0–0.5)
KETONES UR QL STRIP.AUTO: NEGATIVE MG/DL
LEUKOCYTE ESTERASE UR QL STRIP.AUTO: ABNORMAL
LYMPHOCYTES # BLD: 2.6 K/UL (ref 0.9–3.6)
LYMPHOCYTES NFR BLD: 9 % (ref 21–52)
MCH RBC QN AUTO: 34.9 PG (ref 24–34)
MCHC RBC AUTO-ENTMCNC: 31.8 G/DL (ref 31–37)
MCV RBC AUTO: 109.5 FL (ref 78–100)
MONOCYTES # BLD: 0 K/UL (ref 0.05–1.2)
MONOCYTES NFR BLD: 0 % (ref 3–10)
NEUTS BAND NFR BLD MANUAL: 4 %
NEUTS SEG # BLD: 25.9 K/UL (ref 1.8–8)
NEUTS SEG NFR BLD: 87 % (ref 40–73)
NITRITE UR QL STRIP.AUTO: POSITIVE
NRBC # BLD: 0 K/UL (ref 0–0.01)
NRBC BLD-RTO: 0 PER 100 WBC
PH UR STRIP: 5 (ref 5–8)
PLATELET # BLD AUTO: 345 K/UL (ref 135–420)
PLATELET COMMENT: ABNORMAL
PMV BLD AUTO: 11.6 FL (ref 9.2–11.8)
PROT UR STRIP-MCNC: 100 MG/DL
RBC # BLD AUTO: 2.41 M/UL (ref 4.35–5.65)
RBC #/AREA URNS HPF: ABNORMAL /HPF (ref 0–5)
RBC MORPH BLD: ABNORMAL
SP GR UR REFRACTOMETRY: 1.02 (ref 1–1.03)
UROBILINOGEN UR QL STRIP.AUTO: 0.2 EU/DL (ref 0.2–1)
WBC # BLD AUTO: 28.5 K/UL (ref 4.6–13.2)
WBC URNS QL MICRO: ABNORMAL /HPF (ref 0–4)
YEAST BUDDING URNS QL: POSITIVE
YEAST URNS QL MICRO: ABNORMAL

## 2023-11-05 PROCEDURE — 2580000003 HC RX 258: Performed by: STUDENT IN AN ORGANIZED HEALTH CARE EDUCATION/TRAINING PROGRAM

## 2023-11-05 PROCEDURE — P9047 ALBUMIN (HUMAN), 25%, 50ML: HCPCS | Performed by: STUDENT IN AN ORGANIZED HEALTH CARE EDUCATION/TRAINING PROGRAM

## 2023-11-05 PROCEDURE — 6370000000 HC RX 637 (ALT 250 FOR IP): Performed by: STUDENT IN AN ORGANIZED HEALTH CARE EDUCATION/TRAINING PROGRAM

## 2023-11-05 PROCEDURE — 85025 COMPLETE CBC W/AUTO DIFF WBC: CPT

## 2023-11-05 PROCEDURE — 71045 X-RAY EXAM CHEST 1 VIEW: CPT

## 2023-11-05 PROCEDURE — 81001 URINALYSIS AUTO W/SCOPE: CPT

## 2023-11-05 PROCEDURE — 2700000000 HC OXYGEN THERAPY PER DAY

## 2023-11-05 PROCEDURE — 6370000000 HC RX 637 (ALT 250 FOR IP): Performed by: HOSPITALIST

## 2023-11-05 PROCEDURE — 6360000002 HC RX W HCPCS: Performed by: STUDENT IN AN ORGANIZED HEALTH CARE EDUCATION/TRAINING PROGRAM

## 2023-11-05 PROCEDURE — 6370000000 HC RX 637 (ALT 250 FOR IP): Performed by: INTERNAL MEDICINE

## 2023-11-05 PROCEDURE — 36415 COLL VENOUS BLD VENIPUNCTURE: CPT

## 2023-11-05 PROCEDURE — 87086 URINE CULTURE/COLONY COUNT: CPT

## 2023-11-05 PROCEDURE — 99233 SBSQ HOSP IP/OBS HIGH 50: CPT | Performed by: INTERNAL MEDICINE

## 2023-11-05 PROCEDURE — 87077 CULTURE AEROBIC IDENTIFY: CPT

## 2023-11-05 PROCEDURE — 6360000002 HC RX W HCPCS: Performed by: INTERNAL MEDICINE

## 2023-11-05 PROCEDURE — P9047 ALBUMIN (HUMAN), 25%, 50ML: HCPCS | Performed by: INTERNAL MEDICINE

## 2023-11-05 PROCEDURE — 2140000001 HC CVICU INTERMEDIATE R&B

## 2023-11-05 PROCEDURE — 87186 SC STD MICRODIL/AGAR DIL: CPT

## 2023-11-05 PROCEDURE — 82962 GLUCOSE BLOOD TEST: CPT

## 2023-11-05 RX ORDER — ALBUMIN (HUMAN) 12.5 G/50ML
25 SOLUTION INTRAVENOUS EVERY 6 HOURS
Status: COMPLETED | OUTPATIENT
Start: 2023-11-05 | End: 2023-11-05

## 2023-11-05 RX ORDER — ACETAMINOPHEN 500 MG
1000 TABLET ORAL EVERY 8 HOURS
Status: DISCONTINUED | OUTPATIENT
Start: 2023-11-05 | End: 2023-11-11

## 2023-11-05 RX ORDER — ACETAMINOPHEN 325 MG/1
650 TABLET ORAL EVERY 6 HOURS PRN
Status: DISCONTINUED | OUTPATIENT
Start: 2023-11-05 | End: 2023-11-14

## 2023-11-05 RX ORDER — ALBUMIN (HUMAN) 12.5 G/50ML
25 SOLUTION INTRAVENOUS ONCE
Status: COMPLETED | OUTPATIENT
Start: 2023-11-05 | End: 2023-11-05

## 2023-11-05 RX ORDER — DRONABINOL 2.5 MG/1
2.5 CAPSULE ORAL 2 TIMES DAILY
Status: DISCONTINUED | OUTPATIENT
Start: 2023-11-05 | End: 2023-11-07

## 2023-11-05 RX ORDER — CARVEDILOL 3.12 MG/1
3.12 TABLET ORAL 2 TIMES DAILY
Status: DISCONTINUED | OUTPATIENT
Start: 2023-11-05 | End: 2023-11-25 | Stop reason: HOSPADM

## 2023-11-05 RX ADMIN — HEPARIN SODIUM 5000 UNITS: 5000 INJECTION INTRAVENOUS; SUBCUTANEOUS at 06:17

## 2023-11-05 RX ADMIN — AMIODARONE HYDROCHLORIDE 200 MG: 200 TABLET ORAL at 08:44

## 2023-11-05 RX ADMIN — ALBUMIN (HUMAN) 25 G: 0.25 INJECTION, SOLUTION INTRAVENOUS at 01:31

## 2023-11-05 RX ADMIN — METRONIDAZOLE 500 MG: 500 INJECTION, SOLUTION INTRAVENOUS at 14:33

## 2023-11-05 RX ADMIN — INSULIN GLARGINE 5 UNITS: 100 INJECTION, SOLUTION SUBCUTANEOUS at 22:08

## 2023-11-05 RX ADMIN — ALBUMIN (HUMAN) 25 G: 0.25 INJECTION, SOLUTION INTRAVENOUS at 16:47

## 2023-11-05 RX ADMIN — DRONABINOL 2.5 MG: 2.5 CAPSULE ORAL at 14:31

## 2023-11-05 RX ADMIN — LEVOTHYROXINE SODIUM 125 MCG: 125 TABLET ORAL at 06:18

## 2023-11-05 RX ADMIN — FIDAXOMICIN 200 MG: 200 TABLET, FILM COATED ORAL at 08:44

## 2023-11-05 RX ADMIN — MIDODRINE HYDROCHLORIDE 15 MG: 10 TABLET ORAL at 11:49

## 2023-11-05 RX ADMIN — NEPHROCAP 1 MG: 1 CAP ORAL at 08:44

## 2023-11-05 RX ADMIN — MIDODRINE HYDROCHLORIDE 15 MG: 5 TABLET ORAL at 02:09

## 2023-11-05 RX ADMIN — ATORVASTATIN CALCIUM 80 MG: 40 TABLET, FILM COATED ORAL at 22:08

## 2023-11-05 RX ADMIN — ACETAMINOPHEN 325MG 650 MG: 325 TABLET ORAL at 14:31

## 2023-11-05 RX ADMIN — HEPARIN SODIUM 5000 UNITS: 5000 INJECTION INTRAVENOUS; SUBCUTANEOUS at 14:31

## 2023-11-05 RX ADMIN — MIDODRINE HYDROCHLORIDE 15 MG: 10 TABLET ORAL at 08:44

## 2023-11-05 RX ADMIN — ALBUMIN (HUMAN) 25 G: 0.25 INJECTION, SOLUTION INTRAVENOUS at 22:09

## 2023-11-05 RX ADMIN — ACETAMINOPHEN 1000 MG: 500 TABLET ORAL at 22:07

## 2023-11-05 RX ADMIN — ALBUMIN (HUMAN) 25 G: 0.25 INJECTION, SOLUTION INTRAVENOUS at 11:49

## 2023-11-05 RX ADMIN — METOCLOPRAMIDE HYDROCHLORIDE 5 MG: 5 INJECTION INTRAMUSCULAR; INTRAVENOUS at 08:44

## 2023-11-05 RX ADMIN — SODIUM CHLORIDE, PRESERVATIVE FREE 10 ML: 5 INJECTION INTRAVENOUS at 08:46

## 2023-11-05 RX ADMIN — METRONIDAZOLE 500 MG: 500 INJECTION, SOLUTION INTRAVENOUS at 06:18

## 2023-11-05 RX ADMIN — ACETAMINOPHEN 325MG 650 MG: 325 TABLET ORAL at 06:18

## 2023-11-05 RX ADMIN — MIDODRINE HYDROCHLORIDE 15 MG: 10 TABLET ORAL at 16:47

## 2023-11-05 RX ADMIN — Medication 6 MG: at 22:38

## 2023-11-05 RX ADMIN — SODIUM CHLORIDE, PRESERVATIVE FREE 10 ML: 5 INJECTION INTRAVENOUS at 22:09

## 2023-11-05 RX ADMIN — FIDAXOMICIN 200 MG: 200 TABLET, FILM COATED ORAL at 22:07

## 2023-11-05 RX ADMIN — DRONABINOL 2.5 MG: 2.5 CAPSULE ORAL at 22:08

## 2023-11-05 RX ADMIN — ACETAMINOPHEN 325MG 650 MG: 325 TABLET ORAL at 18:06

## 2023-11-05 RX ADMIN — TRAMADOL HYDROCHLORIDE 25 MG: 50 TABLET ORAL at 11:49

## 2023-11-05 RX ADMIN — CARVEDILOL 3.12 MG: 3.12 TABLET, FILM COATED ORAL at 22:07

## 2023-11-05 RX ADMIN — METRONIDAZOLE 500 MG: 500 INJECTION, SOLUTION INTRAVENOUS at 22:09

## 2023-11-05 RX ADMIN — HEPARIN SODIUM 5000 UNITS: 5000 INJECTION INTRAVENOUS; SUBCUTANEOUS at 22:08

## 2023-11-05 ASSESSMENT — PAIN DESCRIPTION - PAIN TYPE: TYPE: CHRONIC PAIN

## 2023-11-05 ASSESSMENT — PAIN DESCRIPTION - LOCATION
LOCATION: BACK
LOCATION: BACK

## 2023-11-05 ASSESSMENT — PAIN SCALES - GENERAL
PAINLEVEL_OUTOF10: 5
PAINLEVEL_OUTOF10: 6
PAINLEVEL_OUTOF10: 5
PAINLEVEL_OUTOF10: 0
PAINLEVEL_OUTOF10: 7

## 2023-11-05 ASSESSMENT — PAIN DESCRIPTION - ORIENTATION: ORIENTATION: RIGHT;LEFT

## 2023-11-05 ASSESSMENT — PAIN - FUNCTIONAL ASSESSMENT: PAIN_FUNCTIONAL_ASSESSMENT: ACTIVITIES ARE NOT PREVENTED

## 2023-11-05 ASSESSMENT — PAIN DESCRIPTION - DESCRIPTORS: DESCRIPTORS: ACHING

## 2023-11-05 NOTE — PROGRESS NOTES
0126- Patient blood pressure 88/39 with a MAP of 51. MD paged. Anticipating albumin order as discussed. 2390- Albumin started. *Daylight savings time change*    0100- BP 87/36 MAP 48    0115- BP 87/35 MAP 47    0130- BP 95/40 MAP 52    0153- BP 74/37 MAP 47. MD paged. Lara.Sherri- MD bedside to assess patient. Awaiting pharmacy verification of midodrine. MD verbalized no bolus and no new orders for albumin. Okay with MAP under 65 if patient asymptomatic.     0211- 15 mg midodrine PO given.      0300- BP 89/39 MAP 52    0345- BP 99/39 MAP 52

## 2023-11-05 NOTE — PROGRESS NOTES
Occupational Therapy  OT re-eval attempted 10:47. Pt visiting with visitor at this time. Will f/u later as schedule allows. Thank you.     Jacque Varner OTJESUS MANUEL, OTR/L

## 2023-11-05 NOTE — PROGRESS NOTES
Hospitalist Progress Note    Patient: Keila Hanna Age: 80 y.o. : 1935 MR#: [id number]: [id number]  Date/Time: 2023 12:30 PM    DOA: 10/21/2023  PCP: Alejandro Ruano MD    Assessment/Plan:     Severe sepsis POA, due to C. difficile colitis, also treated for Pseudomonas cystitis secondary to indwelling Carlos catheter, resolved. Severe C. difficile colitis, with concern for Hollis's vs fulminant C.diff with pancolitis and infarct spleen. CT abdomen/pelvic showed near entirely and colonic wall thickening, extend's of pericolonic fat stranding with dilated transverse colon up to 10 cm. Mesenteric duplex is without obstruction or ischemic pathology, SMA without signs of stenosis. KUB without evidence of ileus or bowel obstruction. Nausea with vomiting have resolved. Diarrhea has decreased via his colostomy output. New splenic wedge-shaped hypodensity concerning for acute splenic infarct in recent CT abdomen/pelvic  Small volume ascites noted, likely reactive to colitis. ESRD on HD  Acute respiratory failure with hypoxia, currently requiring 5 L/min NC oxygen supplementation. Chronic HFmrEF, acute exacerbation with volume overload in the settings of ESRD, sepsis, require HD for volume management  Paroxysmal atrial fibrillation, elevated [id number], on chronic DOAC outpatient  CAD, s/p RCA stent in 2018, currently stable  Hyperlipidemia  Hypertension, currently with episode of hypotension  History of bladder cancer, require indwelling Carlos catheter  Treated for Pseudomonas cystitis secondary to indwelling catheter on admission, resolved  Anemia of chronic disease, recent finding of occult stool blood positive, currently no active bleeding  Left sacral, stage III pressure ulcer POA  Right lower lung nodule 8 mm, follow-up repeat CT in 3 months  Severe leukocytosis with left shift in the setting of C. difficile infection/sepsis.   Hematology consulted and noted secondary leukocytosis and findings. Disclaimer: Sections of this note are dictated utilizing voice recognition software, which may have resulted in some phonetic based errors in grammar and contents. Even though attempts were made to correct all the mistakes, some may have been missed, and remained in the body of the document. If questions arise, please contact our department.

## 2023-11-05 NOTE — PROGRESS NOTES
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 Found to have significant leukocytosis and C difficile colitis, patient has been managed as severe C diff with Tonya's, though would meet criteria for fulminant C diff. Cross-sectional imaging shows pancolitis and even likely infarcted spleen, but interestingly patient has no abdominal symptoms, which is puzzling. Plan:  1. Appreciate ID and CRS recommendations. No plans on colectomy so far, on fidaxomycin and metronidazole. 2. Patient may be a candidate for fecal transplant if improving from what's thought to be W[place]aka's. Fecal transplant is recommended for refractory non-toxic C diff colitis. Chief Complaint: C diff colitis, leukocytosis, abnormal CT    Subjective: In good spirits this morning. No abd pain. Had to empty colostomy bag 2 times in the past 24 hours.     PMH:   Past Medical History:   Diagnosis Date    Anemia     Arthritis     Atrial fibrillation ([address])     Broken leg 09/2022    CHF exacerbation ([address]) 01/08/2021    CKD (chronic kidney disease), stage III ([address])     Essential hypertension 01/18/2016    Exercise tolerance finding 10/2020    WORKS 8HRS/DAY NO CP OR SOB ON EXERTION    Exercise tolerance finding 05/2019    sob when up 2 flights of stairs no cp    Gout     Hay fever     Hearing impaired     History of pneumonia     Hyperkalemia 10/2020    Hypothyroidism     Leukocytosis     Renal insufficiency     Varicella     Vertigo     Vitamin D deficiency        PSH:   Past Surgical History:   Procedure Laterality Date    BLADDER SURGERY  04/12/2023    CARPAL TUNNEL RELEASE Left     COLOSTOMY  04/11/2023    CORONARY ANGIOPLASTY WITH STENT PLACEMENT  12/04/2018    ORTHOPEDIC SURGERY      hip

## 2023-11-05 NOTE — PROGRESS NOTES
0730: Bedside and Verbal shift change report given to Christopher Werner RN and Mala Singer RN oncoming nurse) by Jero Live RN and DEAN Ramirez (offgoing nurse). Report included the following information Nurse Handoff Report, Adult Overview, Intake/Output, MAR, Recent Results, and Cardiac Rhythm NSR . Upon morning assessment, patient in bed with even, unlabored respirations. 0745: BP of 98/40. Pt denies any pain or discomfort at this time. 3756:  Midodrine given for pt's BP. See MAR. Coreg held due to pt's BP.    1100: MD made aware of pt's BP. Albumin 25% IV q6 on board. MD added parameters for Coreg. 1446: Provided ostomy care. 1900: Bedside and Verbal shift change report given to Jero Live RN (oncoming nurse) by Nathan Ariza and Mala Singer RN (offgoing nurse). Report included the following information Nurse Handoff Report, Adult Overview, Intake/Output, MAR, Recent Results, and Cardiac Rhythm NSR .     1905: Pt repositioned to left side. Ostomy care provided. Primary RNЕкатерина notified. Care relinquished.

## 2023-11-05 NOTE — PLAN OF CARE
Problem: Nutrition Deficit:  Goal: Optimize nutritional status  Outcome: Not Progressing     Problem: Discharge Planning  Goal: Discharge to home or other facility with appropriate resources  Outcome: Progressing     Problem: Safety - Adult  Goal: Free from fall injury  Outcome: Progressing     Problem: Skin/Tissue Integrity  Goal: Absence of new skin breakdown  Description: 1. Monitor for areas of redness and/or skin breakdown  2. Assess vascular access sites hourly  3. Every 4-6 hours minimum:  Change oxygen saturation probe site  4. Every 4-6 hours:  If on nasal continuous positive airway pressure, respiratory therapy assess nares and determine need for appliance change or resting period.   Outcome: Progressing     Problem: Chronic Conditions and Co-morbidities  Goal: Patient's chronic conditions and co-morbidity symptoms are monitored and maintained or improved  Outcome: Progressing     Problem: ABCDS Injury Assessment  Goal: Absence of physical injury  Outcome: Progressing     Problem: Pain  Goal: Verbalizes/displays adequate comfort level or baseline comfort level  Outcome: Progressing     Problem: Neurosensory - Adult  Goal: Achieves stable or improved neurological status  Outcome: Progressing     Problem: Respiratory - Adult  Goal: Achieves optimal ventilation and oxygenation  Outcome: Progressing     Problem: Skin/Tissue Integrity - Adult  Goal: Skin integrity remains intact  Outcome: Progressing  Goal: Incisions, wounds, or drain sites healing without S/S of infection  Outcome: Progressing     Problem: Musculoskeletal - Adult  Goal: Return mobility to safest level of function  Outcome: Progressing  Goal: Return ADL status to a safe level of function  Outcome: Progressing     Problem: Gastrointestinal - Adult  Goal: Minimal or absence of nausea and vomiting  Outcome: Progressing  Goal: Maintains or returns to baseline bowel function  Outcome: Progressing  Goal: Maintains adequate nutritional intake  Outcome: Progressing  Goal: Establish and maintain optimal ostomy function  Outcome: Progressing     Problem: Genitourinary - Adult  Goal: Absence of urinary retention  Outcome: Progressing     Problem: Infection - Adult  Goal: Absence of infection at discharge  Outcome: Progressing     Problem: Metabolic/Fluid and Electrolytes - Adult  Goal: Electrolytes maintained within normal limits  Outcome: Progressing  Goal: Glucose maintained within prescribed range  Outcome: Progressing     Problem: Cardiovascular - Adult  Goal: Maintains optimal cardiac output and hemodynamic stability  Outcome: Progressing  Goal: Absence of cardiac dysrhythmias or at baseline  Outcome: Progressing     Problem: Nutrition Deficit:  Goal: Optimize nutritional status  Outcome: Not Progressing

## 2023-11-06 ENCOUNTER — APPOINTMENT (OUTPATIENT)
Facility: HOSPITAL | Age: 88
End: 2023-11-06
Attending: INTERNAL MEDICINE
Payer: MEDICARE

## 2023-11-06 LAB
ALBUMIN SERPL-MCNC: 3 G/DL (ref 3.4–5)
ANION GAP SERPL CALC-SCNC: 8 MMOL/L (ref 3–18)
BASOPHILS # BLD: 0.4 K/UL (ref 0–0.1)
BASOPHILS NFR BLD: 2 % (ref 0–2)
BUN SERPL-MCNC: 31 MG/DL (ref 7–18)
BUN/CREAT SERPL: 5 (ref 12–20)
CALCIUM SERPL-MCNC: 7.6 MG/DL (ref 8.5–10.1)
CHLORIDE SERPL-SCNC: 102 MMOL/L (ref 100–111)
CO2 SERPL-SCNC: 24 MMOL/L (ref 21–32)
CREAT SERPL-MCNC: 6.5 MG/DL (ref 0.6–1.3)
DIFFERENTIAL METHOD BLD: ABNORMAL
EKG ATRIAL RATE: 78 BPM
EKG DIAGNOSIS: NORMAL
EKG P AXIS: 5 DEGREES
EKG P-R INTERVAL: 208 MS
EKG Q-T INTERVAL: 390 MS
EKG QRS DURATION: 126 MS
EKG QTC CALCULATION (BAZETT): 444 MS
EKG R AXIS: 1 DEGREES
EKG T AXIS: -35 DEGREES
EKG VENTRICULAR RATE: 78 BPM
EOSINOPHIL # BLD: 0 K/UL (ref 0–0.4)
EOSINOPHIL NFR BLD: 0 % (ref 0–5)
ERYTHROCYTE [DISTWIDTH] IN BLOOD BY AUTOMATED COUNT: 21.7 % (ref 11.6–14.5)
GLUCOSE BLD STRIP.AUTO-MCNC: 100 MG/DL (ref 70–110)
GLUCOSE BLD STRIP.AUTO-MCNC: 114 MG/DL (ref 70–110)
GLUCOSE BLD STRIP.AUTO-MCNC: 129 MG/DL (ref 70–110)
GLUCOSE BLD STRIP.AUTO-MCNC: 81 MG/DL (ref 70–110)
GLUCOSE SERPL-MCNC: 93 MG/DL (ref 74–99)
HCT VFR BLD AUTO: 23 % (ref 36–48)
HCT VFR BLD AUTO: 23.9 % (ref 36–48)
HCT VFR BLD AUTO: 25 % (ref 36–48)
HGB BLD-MCNC: 7.4 G/DL (ref 13–16)
HGB BLD-MCNC: 7.6 G/DL (ref 13–16)
HGB BLD-MCNC: 8.1 G/DL (ref 13–16)
IMM GRANULOCYTES # BLD AUTO: 0 K/UL
IMM GRANULOCYTES NFR BLD AUTO: 0 %
LYMPHOCYTES # BLD: 1.8 K/UL (ref 0.9–3.6)
LYMPHOCYTES NFR BLD: 9 % (ref 21–52)
MAGNESIUM SERPL-MCNC: 1.8 MG/DL (ref 1.6–2.6)
MCH RBC QN AUTO: 34.7 PG (ref 24–34)
MCHC RBC AUTO-ENTMCNC: 32.2 G/DL (ref 31–37)
MCV RBC AUTO: 108 FL (ref 78–100)
MONOCYTES # BLD: 0.8 K/UL (ref 0.05–1.2)
MONOCYTES NFR BLD: 4 % (ref 3–10)
MYELOCYTES NFR BLD MANUAL: 2 %
NEUTS BAND NFR BLD MANUAL: 3 % (ref 0–5)
NEUTS SEG # BLD: 16.5 K/UL (ref 1.8–8)
NEUTS SEG NFR BLD: 80 % (ref 40–73)
NRBC # BLD: 0 K/UL (ref 0–0.01)
NRBC BLD-RTO: 0 PER 100 WBC
PHOSPHATE SERPL-MCNC: 4.7 MG/DL (ref 2.5–4.9)
PLATELET # BLD AUTO: 316 K/UL (ref 135–420)
PLATELET COMMENT: ABNORMAL
PMV BLD AUTO: 11.5 FL (ref 9.2–11.8)
POTASSIUM SERPL-SCNC: 3.5 MMOL/L (ref 3.5–5.5)
RBC # BLD AUTO: 2.13 M/UL (ref 4.35–5.65)
RBC MORPH BLD: ABNORMAL
SODIUM SERPL-SCNC: 134 MMOL/L (ref 136–145)
WBC # BLD AUTO: 19.9 K/UL (ref 4.6–13.2)

## 2023-11-06 PROCEDURE — 85025 COMPLETE CBC W/AUTO DIFF WBC: CPT

## 2023-11-06 PROCEDURE — 6370000000 HC RX 637 (ALT 250 FOR IP): Performed by: INTERNAL MEDICINE

## 2023-11-06 PROCEDURE — 6370000000 HC RX 637 (ALT 250 FOR IP): Performed by: STUDENT IN AN ORGANIZED HEALTH CARE EDUCATION/TRAINING PROGRAM

## 2023-11-06 PROCEDURE — 94761 N-INVAS EAR/PLS OXIMETRY MLT: CPT

## 2023-11-06 PROCEDURE — 6360000002 HC RX W HCPCS: Performed by: INTERNAL MEDICINE

## 2023-11-06 PROCEDURE — 93010 ELECTROCARDIOGRAM REPORT: CPT | Performed by: INTERNAL MEDICINE

## 2023-11-06 PROCEDURE — 83735 ASSAY OF MAGNESIUM: CPT

## 2023-11-06 PROCEDURE — 85014 HEMATOCRIT: CPT

## 2023-11-06 PROCEDURE — 82962 GLUCOSE BLOOD TEST: CPT

## 2023-11-06 PROCEDURE — 90935 HEMODIALYSIS ONE EVALUATION: CPT

## 2023-11-06 PROCEDURE — 80069 RENAL FUNCTION PANEL: CPT

## 2023-11-06 PROCEDURE — 6360000002 HC RX W HCPCS: Performed by: STUDENT IN AN ORGANIZED HEALTH CARE EDUCATION/TRAINING PROGRAM

## 2023-11-06 PROCEDURE — 85018 HEMOGLOBIN: CPT

## 2023-11-06 PROCEDURE — 36415 COLL VENOUS BLD VENIPUNCTURE: CPT

## 2023-11-06 PROCEDURE — 2700000000 HC OXYGEN THERAPY PER DAY

## 2023-11-06 PROCEDURE — 99233 SBSQ HOSP IP/OBS HIGH 50: CPT | Performed by: INTERNAL MEDICINE

## 2023-11-06 PROCEDURE — 99232 SBSQ HOSP IP/OBS MODERATE 35: CPT | Performed by: COLON & RECTAL SURGERY

## 2023-11-06 PROCEDURE — 93005 ELECTROCARDIOGRAM TRACING: CPT | Performed by: INTERNAL MEDICINE

## 2023-11-06 PROCEDURE — 99232 SBSQ HOSP IP/OBS MODERATE 35: CPT | Performed by: NURSE PRACTITIONER

## 2023-11-06 PROCEDURE — 2580000003 HC RX 258: Performed by: STUDENT IN AN ORGANIZED HEALTH CARE EDUCATION/TRAINING PROGRAM

## 2023-11-06 PROCEDURE — 2140000001 HC CVICU INTERMEDIATE R&B

## 2023-11-06 PROCEDURE — 6370000000 HC RX 637 (ALT 250 FOR IP): Performed by: HOSPITALIST

## 2023-11-06 PROCEDURE — P9047 ALBUMIN (HUMAN), 25%, 50ML: HCPCS | Performed by: INTERNAL MEDICINE

## 2023-11-06 RX ORDER — FLUCONAZOLE 200 MG/1
200 TABLET ORAL DAILY
Status: COMPLETED | OUTPATIENT
Start: 2023-11-06 | End: 2023-11-15

## 2023-11-06 RX ADMIN — FLUCONAZOLE 200 MG: 200 TABLET ORAL at 13:19

## 2023-11-06 RX ADMIN — ACETAMINOPHEN 325MG 650 MG: 325 TABLET ORAL at 18:21

## 2023-11-06 RX ADMIN — INSULIN GLARGINE 5 UNITS: 100 INJECTION, SOLUTION SUBCUTANEOUS at 21:23

## 2023-11-06 RX ADMIN — AMIODARONE HYDROCHLORIDE 200 MG: 200 TABLET ORAL at 08:08

## 2023-11-06 RX ADMIN — FIDAXOMICIN 200 MG: 200 TABLET, FILM COATED ORAL at 08:36

## 2023-11-06 RX ADMIN — ACETAMINOPHEN 1000 MG: 500 TABLET ORAL at 13:18

## 2023-11-06 RX ADMIN — NEPHROCAP 1 MG: 1 CAP ORAL at 08:08

## 2023-11-06 RX ADMIN — LEVOTHYROXINE SODIUM 125 MCG: 125 TABLET ORAL at 06:11

## 2023-11-06 RX ADMIN — SODIUM CHLORIDE, PRESERVATIVE FREE 10 ML: 5 INJECTION INTRAVENOUS at 08:11

## 2023-11-06 RX ADMIN — HEPARIN SODIUM 5000 UNITS: 5000 INJECTION INTRAVENOUS; SUBCUTANEOUS at 06:11

## 2023-11-06 RX ADMIN — MIDODRINE HYDROCHLORIDE 15 MG: 10 TABLET ORAL at 08:08

## 2023-11-06 RX ADMIN — FIDAXOMICIN 200 MG: 200 TABLET, FILM COATED ORAL at 21:24

## 2023-11-06 RX ADMIN — SODIUM CHLORIDE, PRESERVATIVE FREE 10 ML: 5 INJECTION INTRAVENOUS at 21:27

## 2023-11-06 RX ADMIN — MIDODRINE HYDROCHLORIDE 15 MG: 10 TABLET ORAL at 13:20

## 2023-11-06 RX ADMIN — ALBUMIN (HUMAN) 25 G: 0.25 INJECTION, SOLUTION INTRAVENOUS at 09:23

## 2023-11-06 RX ADMIN — METRONIDAZOLE 500 MG: 500 INJECTION, SOLUTION INTRAVENOUS at 06:10

## 2023-11-06 RX ADMIN — ACETAMINOPHEN 1000 MG: 500 TABLET ORAL at 06:10

## 2023-11-06 RX ADMIN — ATORVASTATIN CALCIUM 80 MG: 40 TABLET, FILM COATED ORAL at 21:24

## 2023-11-06 RX ADMIN — MIDODRINE HYDROCHLORIDE 15 MG: 10 TABLET ORAL at 17:12

## 2023-11-06 RX ADMIN — ALLOPURINOL 100 MG: 100 TABLET ORAL at 08:08

## 2023-11-06 RX ADMIN — DRONABINOL 2.5 MG: 2.5 CAPSULE ORAL at 08:08

## 2023-11-06 RX ADMIN — DRONABINOL 2.5 MG: 2.5 CAPSULE ORAL at 21:24

## 2023-11-06 ASSESSMENT — PAIN SCALES - GENERAL
PAINLEVEL_OUTOF10: 6
PAINLEVEL_OUTOF10: 0

## 2023-11-06 NOTE — PROGRESS NOTES
Attempted to see patient for PT treatment however he is currently off the floor at dialysis. Will continue to follow.   Miriam Plasencia, PT

## 2023-11-06 NOTE — PROGRESS NOTES
Bedside shift change report given to Yoselyn Ocampo RN (oncoming nurse) by Rossy Pyle RN (offgoing nurse). Report included the following information Nurse Handoff Report, Recent Results, Cardiac Rhythm NSR, and Event Log.

## 2023-11-06 NOTE — CARE COORDINATION
Spoke with pts wife Paige regarding SNF disposition at discharge, and having difficulty finding an affordable stretcher transport company to transport him to and from the facility to dialysis. Informed Paige that a dialysis chair bed has opened up at [facility]. She is agreeable to consider [facility]. Clinicals sent to [facility] via [employer]. Jada Gudino from [facility] notified that clinicals sent for review. Will follow up with facility. Patient has been accepted to [facility], but is not medically stable at this time. Also, no affordable arrangement has been made for transport to and from facility to dialysis. [facility] is a great option if they accept.     [facility] RN CDCES  Case Management

## 2023-11-06 NOTE — PROGRESS NOTES
[address]: [phone] ([phone])  [facility]: [address]: [phone]    Patient Name: Bhavani Miller  YOB: 1935    Date of Initial Consult: 11/1/2023   Follow up 11/6/2023   Reason for Consult: goals of care  Requesting Provider: Dr Ezekiel Olmedo    Primary Care Physician: Kathryn Rosado MD      SUMMARY:   Bhavani Miller is a 80y.o. year old with a past history of urothelial carcinoma of bladder ( 2019), chronic indwelling hebert, lap sigmoid colectomy and colostomy ( 4/2023), Afib no on [facility], ESRD on HD, who was admitted on 10/21/2023 from home with a diagnosis of severe sepsis due to diverticulitis and cystitis . Current medical issues leading to Palliative Medicine involvement include: 80year old gentleman with a long and complicated hospital stay. Patient with nausea dry heaves on admission he was noted to have a distended colon however no signs of bowel obstruction. During hospitalization he started having increased stool output in his colostomy bag with liquid stool. Stool was tested for C. difficile which came back positive. He also developed significant worsening of leukocytosis despite IV antibiotics ID was consulted and is currently following patient. GI was also consulted and involved. Patient continues to have persistent leukocytosis he is no longer nauseous CT of the abdomen pelvis on 10/31 showed entire colon wall thickening with extensive pericolic fat stranding dilated transverse colon was also noted to have a possible acute splenic infarct. He currently has a poor p.o. intake vascular surgery is also awaiting they felt it was low probability for ischemic colitis. Currently ID is suggesting atypical C. difficile colitis he continues with IV antibiotics GI continues to follow. Palliative medicine is consulted for goals of care discussions and support. 11/6/2023 alert in good spirits this afternoon.  Tells me allopurinol (ZYLOPRIM) tablet 100 mg  100 mg Oral Q MWF    diphenhydrAMINE (BENYLIN) 12.5 MG/5ML liquid 12.5 mg  12.5 mg Oral Nightly PRN    Virt-Caps 1 mg  1 capsule Oral Daily    melatonin tablet 6 mg  6 mg Oral Nightly PRN    sodium chloride flush 0.9 % injection 5-40 mL  5-40 mL IntraVENous 2 times per day    sodium chloride flush 0.9 % injection 5-40 mL  5-40 mL IntraVENous PRN    0.9 % sodium chloride infusion   IntraVENous PRN    ondansetron (ZOFRAN-ODT) disintegrating tablet 4 mg  4 mg Oral Q8H PRN    Or    ondansetron (ZOFRAN) injection 4 mg  4 mg IntraVENous Q6H PRN    [Held by provider] heparin (porcine) injection 5,000 Units  5,000 Units SubCUTAneous 3 times per day    amiodarone (CORDARONE) tablet 200 mg  200 mg Oral Daily    levothyroxine (SYNTHROID) tablet 125 mcg  125 mcg Oral Daily        LAB AND IMAGING FINDINGS:     Lab Results   Component Value Date/Time    WBC 19.9 11/06/2023 02:58 AM    HGB 8.1 11/06/2023 02:36 PM     11/06/2023 02:58 AM     Lab Results   Component Value Date/Time     11/06/2023 02:58 AM    K 3.5 11/06/2023 02:58 AM     11/06/2023 02:58 AM    CO2 24 11/06/2023 02:58 AM    BUN 31 11/06/2023 02:58 AM    MG 1.8 11/06/2023 02:58 AM    PHOS 4.7 11/06/2023 02:58 AM      Lab Results   Component Value Date/Time    GLOB 1.8 11/01/2023 01:51 AM     No results found for: \"INR\", \"PTMR\", \"PT1\", \"APTT\"   Lab Results   Component Value Date/Time    IRON 37 10/26/2023 07:19 AM    TIBC 94 10/26/2023 07:19 AM      No results found for: \"PH\", \"PCO2\", \"PO2\"  No components found for: \"GLPOC\"   No results found for: \"CPK\", \"CKMB\", \"TROPONINI\"           Total time: 35 minutes

## 2023-11-06 NOTE — PROGRESS NOTES
11/05/23 2157   Oxygen Therapy/Pulse Ox   O2 Device Nasal cannula   O2 Flow Rate (L/min) 5 L/min   Pulse 69   Respirations 19   SpO2 100 %     Pt rresting comfortably, no respiratory distress noted. No indication for Bipap at this time.

## 2023-11-06 NOTE — PROGRESS NOTES
HD Care plan  Time: 3 hrs  Dialysate:   3 K   2.5  Ca  [place]  Net UF: 2.0 L  Access: Aseptically care for Rt Subclavian TDC  Hemodynamic stability: Maintain BP WNL    Pre Dialysis:  Pt received from Jamie Barton RN. Pt on a bed. A&O X 4. No s/s of distress noted Nasal cannula @ 4 L/M. RT Chest [facility]  assessed no abnormalities noted, line patent with good flow. TDC accessed per protocol without any difficulty    Intra Dialysis:  Time out / safety process performed per policy. Tx initiated at 0854. TDC flowing with ease. For hemodynamic stability UF goal  set at 500 ml as tolerated. Vascular access visible  and line connections remained intact throughout entire duration of treatment. Vital signs checked every 15 mins. Post Dialysis: Tx completed at 1159. Tolerated well. 0 ml's due to low BP during HD. Albumin x1 for BP. MD aware. De-accessed per protocol. Dialysis catheter  locked accordingly with saline. Catheter dressing changed. Post Dialysis report given to Jamie Barton RN.

## 2023-11-06 NOTE — PROGRESS NOTES
RENAL DAILY PROGRESS NOTE    Subjective:       Complaint:     Overnight events noted  No abdominal pain reported      IMPRESSION:   ESRD on MWF dialysis  Access: TDC  Sob,fluid overload  UTI, C diff, Leucocytosis  Anemia, refused epogen  Chronic indwelling hebert due to hx of bladder cancer   PLAN:   Seen during dialysis at 11 30 am,bp is low despite midodrine and iv albumin,unable to pull fluids. pt is aware           Current Facility-Administered Medications   Medication Dose Route Frequency    fluconazole (DIFLUCAN) tablet 200 mg  200 mg Oral Daily    midodrine (PROAMATINE) tablet 15 mg  15 mg Oral TID WC    carvedilol (COREG) tablet 3.125 mg  3.125 mg Oral BID    dronabinol (MARINOL) capsule 2.5 mg  2.5 mg Oral BID    acetaminophen (TYLENOL) tablet 650 mg  650 mg Oral Q6H PRN    acetaminophen (TYLENOL) tablet 1,000 mg  1,000 mg Oral q8h    diatrizoate meglumine-sodium (GASTROGRAFIN) 66-10 % solution 30 mL  30 mL Oral ONCE PRN    traMADol (ULTRAM) tablet 25 mg  25 mg Oral Q12H PRN    insulin lispro (HUMALOG) injection vial 0-4 Units  0-4 Units SubCUTAneous TID WC    insulin lispro (HUMALOG) injection vial 0-4 Units  0-4 Units SubCUTAneous Nightly    glucose chewable tablet 16 g  4 tablet Oral PRN    dextrose bolus 10% 125 mL  125 mL IntraVENous PRN    Or    dextrose bolus 10% 250 mL  250 mL IntraVENous PRN    glucagon (rDNA) injection 1 mg  1 mg SubCUTAneous PRN    dextrose 10 % infusion   IntraVENous Continuous PRN    insulin glargine (LANTUS) injection vial 5 Units  5 Units SubCUTAneous Nightly    Fidaxomicin (DIFICID) tablet 200 mg  200 mg Oral BID    simethicone (MYLICON) chewable tablet 80 mg  80 mg Oral Q6H PRN    atorvastatin (LIPITOR) tablet 80 mg  80 mg Oral Nightly    albumin human 25% IV solution 25 g  25 g IntraVENous PRN    allopurinol (ZYLOPRIM) tablet 100 mg  100 mg Oral Q MWF    diphenhydrAMINE (BENYLIN) 12.5 MG/5ML liquid 12.5 mg  12.5 mg Oral Nightly PRN    Virt-Caps 1 mg  1 capsule Oral

## 2023-11-06 NOTE — PROGRESS NOTES
Infectious Disease progress Note        Reason: Severe sepsis    Current abx Prior abx     Metronidazole iv since 10/25  Fidaxomicin since 10/28   Piperacillin/tazobactam, vancomycin since 10/21-10/23  Po vancomycin 10/24-10/28  Gentamicin since 10/23-10/27     Lines:       Assessment :   80 y.o.  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 Clinical presentation consistent with severe sepsis-present on admission due to Hebert catheter associated cystitis    Possible ileus versus pseudoobstruction:  GI follow-up appreciated. Significant worsening leukocytosis on 10/25/23 -despite current broad-spectrum antibiotics, treatment for infection/UTI - likely due to severe c.diff colitis (refractory to treatment)    Stool c.diff 10/23- positive likely suggest evolving c.diff as the cause of persistent leukocytosis. Patient's recent diarrhea as outpt could have been due to evolving c.diff with subsequent ileus due to immodium in setting of c.diff infection. Urine culture 10/21 positive for 70,000 colonies of pseudomonas (gentamicin NIKA 2, levofloxacin NIKA:1, cefepime NIKA:4, pip/tazo NIKA 8), Klebsiella (R to levofloxacin, gentamicin NIKA:<1)    S/p hebert exchange 10/27. >100,000 colonies of pseudomonas in Urine cx 10/29- likely colonizer. No dysuria.      persistent leukocytosis. Leukocytosis out of proportion to physical findings    nausea, increased abdominal distension on 10/28 concerning for partially treated c.diff- hence, switched to fidaxomicin on 10/28. No blasts noted on peripheral smear.   Toxic granulation noted consistent with infection  Negative fungitell 10/25 argues acute distress. HEENT:  Normocephalic, atraumatic, , EOMI, no scleral icterus or pallor; no conjunctival hemmohage;  nasal and oral mucous are moist and without evidence of lesions. Neck supple, no bruits. Lymph Nodes:   no cervical, axillary or inguinal adenopathy   Lungs:   non-labored, bilaterally clear to auscultation- no crackles wheezes rales or rhonchi   Heart:  RRR, s1 and s2; no  rubs or gallops, no edema, + pedal pulses   Abdomen:  soft, distended, no hepatomegaly, no splenomegaly. Non-tender. Dark formed stools in colostomy bag   Genitourinary: Carlos in place with bloody urine   Extremities:   no clubbing, cyanosis; no joint effusions or swelling; Full ROM of all large joints to the upper and lower extremities; muscle mass appropriate for age   Neurologic:  No gross focal sensory or motor abnormalities; . Speech appropriate.  Cranial nerves intact                        Skin:  No rash or ulcers noted on visible skin, changes of abdomen   Back:  Not examined since currently on hemodialysis      Psychiatric:  Mildly agitated            Labs: Results:   Chemistry Recent Labs     11/06/23  0258   GLUCOSE 93   *   K 3.5      CO2 24   BUN 31*   CREATININE 6.50*        CBC w/Diff Recent Labs     11/03/23  1306 11/05/23  0350 11/06/23  0258   WBC 34.9* 28.5* 19.9*   RBC 2.58* 2.41* 2.13*   HGB 9.1* 8.4* 7.4*   HCT 27.7* 26.4* 23.0*    345 316        Microbiology Results       Procedure Component Value Units Date/Time    Culture, Urine [[id number]] Collected: 11/05/23 1745    Order Status: Sent Specimen: Urine, clean catch Updated: 11/06/23 0720    Enteric Bact Panel, DNA [[id number]] Collected: 10/31/23 1715    Order Status: Completed Specimen: Stool Updated: 11/02/23 2140     SHIGELLA SPECIES, DNA Negative        CAMPYLOBACTER SPECIES, DNA Negative        VIBRIO SPECIES, DNA Negative        ENTEROTOXIGEN E COLI, DNA Negative        SHIGA TOXIN PRODUCING, DNA Negative        SALMONELLA

## 2023-11-06 NOTE — CONSULTS
1. Urinary tract infection associated with indwelling urethral catheter, initial encounter ([address])    2. Occult blood positive stool    3. C. difficile colitis    4. Leukocytosis, unspecified type    5. Hypoxia    6. Abdominal pain    7. Debility        ASSESSMENT:   Gross Hematuria   Hgb 7.4<8.4    Chronic catheter in place for retention    H/o Clinical stage TaNxMx HG urothelial carcinoma of the bladder diagnosed in 5/14/19  H/o Phimosis    Admitted for Sepsis,  Complicated UTI,  Acute Hypoxic Respiratory Failure,  C.diff   Tmax 100.6   WBC 19.9<28.5   UCX 10/29 >100k P.aeruginosa   UCX 11/5: pending    H/o ESRD on HD,  A.fib      PLAN:    CT reviewed. Non diagnostic due to under distended bladder and  artifact. Obtain ASHLYN to evaluate for clot in bladder. 16 fr catheter in place with maroon colored urine. Hand irrigated- patient tolerated poorly. No clots obtained. Does not flush easily. Please flush catheter with 120 cc NS TID, PRN decreased UOP/clots. Please obtain bladder scan to make sure patient not in retention. Recommend exchange 16 fr to 3 way 22 fr if bladder scan >350 cc. Plan for TURBT outpatient. Will follow. Follow up arranged? pending      [place], [place]    [phone]        Chief Complaint   Patient presents with    Hematuria       HISTORY OF PRESENT ILLNESS:  Brenda Rodriguez is a 80 y.o. male who is seen in consultation as referred by Maryann Frances  for gross hematuria. Past urological hx significant for hematuria, bladder cancer. Patient last seen on 9/21/23 by Dr. Adrienne Werner. Patient with PMHX significant for below, chronic catheter, presented to ED on 10/21/23 for decreased appetite, hematuria, n/v x 2 days. Patient with ostomy, reports increased watery output. Complains of dry cough for the past few weeks. On presentation, patient febrile with LA 2.0. Patient admitted for urosepsis and acute hypoxic respiratory failure. Patient with increased hematuria today.  Urology then

## 2023-11-06 NOTE — PROGRESS NOTES
Hospitalist Progress Note    Patient: Moris Kan Age: 80 y.o. : 1935 MR#: [id number] SSN: [id number]  Date/Time: 2023 1:59 PM    DOA: 10/21/2023  PCP: Andrés Molina MD    Assessment/Plan:     Severe sepsis POA, due to C. difficile colitis, also treated for Pseudomonas cystitis secondary to indwelling Hebert catheter, resolved. Severe C. difficile colitis, with concern for Tonya's vs fulminant C.diff with pancolitis and infarct spleen. CT abdomen/pelvic showed near entirely and colonic wall thickening, extend's of pericolonic fat stranding with dilated transverse colon up to 10 cm. Mesenteric duplex is without obstruction or ischemic pathology, SMA without signs of stenosis. KUB without evidence of ileus or bowel obstruction. Nausea with vomiting have resolved. Diarrhea has decreased via his colostomy output. New splenic wedge-shaped hypodensity concerning for acute splenic infarct in recent CT abdomen/pelvic  Small volume ascites noted, likely reactive to colitis. ESRD on HD  Acute respiratory failure with hypoxia, currently requiring 5 L/min NC oxygen supplementation. Chronic HFmrEF, acute exacerbation with volume overload in the settings of ESRD, sepsis, require HD for volume management  Paroxysmal atrial fibrillation, elevated [id number], on chronic DOAC outpatient  CAD, s/p RCA stent in 2018, currently stable  Hyperlipidemia  Hypertension, currently with episode of hypotension  History of bladder cancer, require indwelling Hebert catheter, Currently with Gross hematuria since his hebert was exchanged 23  Treated for Pseudomonas cystitis secondary to indwelling catheter on admission, resolved. Repeat UA 23 was abnormal, expected from ESRD patient however, could be partially treated UTI. Urine culture is pending   Anemia of chronic disease, recent finding of occult stool blood positive, currently no active bleeding. Hgb/Hct is slowly decreasing.  Multifactorial (gross tip in the right  atrium up to the inferior cavoatrial junction. Base of neck: Unremarkable. Lymph nodes:  No enlarged or abnormal appearing lymph nodes. Cardiovascular: Extensive coronary calcification. Mild cardiomegaly. Moderate  aortic valve calcification. Mediastinum: Unremarkable. Chest wall: Unremarkable. Pleura: Trace bilateral pleural effusions with basilar atelectasis (right  greater than left), similar. Lung and Airway:  Patent airways. Bilateral posterior lung bases atelectasis,  slightly increased. ABDOMEN & PELVIS    Liver: Unremarkable. Biliary/gallbladder: Unremarkable. Spleen: New 2.8 cm wedge-shaped hypodensity in the spleen reaching the capsule  (3:39), concerning for acute splenic infarct. Pancreas: Unremarkable. Adrenal glands: Unremarkable. Kidneys: Bilateral mild to moderate renal atrophy. Left kidney exophytic 5.2 cm  simple cyst.    Bladder: Urinary bladder could not be evaluated due to decompression by hebert's  catheter and streak artifacts due to bilateral hip prostheses. Reproductive: Unremarkable. Bowel: Left lower quadrant end colostomy. Interval development of long segment  mural thickening, layered enhancement and extensive pericolonic fatty stranding  mainly involving the distal descending colon, sigmoid colon and cecum-raising  concern for moderate to severe infectious/inflammatory colitis. The rectosigmoid  stump and distal colonic segment is unremarkable. The intervening transverse  colon appears to be dilated measuring up to 10.0 cm (604:14). Great vessels/Retroperitoneum:  Unremarkable for age. Lymph nodes: Unremarkable. Peritoneal Spaces: New small ascites, predominantly along bilateral paracolic  gutters and liver. Atherosclerosis. Body wall/MSK: Bilateral hip prostheses with significant streak artifact  limiting evaluation of the adjacent structures. Mild generalized anasarca. Impression  1.   Near entire length

## 2023-11-06 NOTE — PROGRESS NOTES
Second attempt to see patient however he is currently receiving nursing care. Will continue to follow.   Faheem Hdez

## 2023-11-06 NOTE — PROGRESS NOTES
IDR pre-round:  Patient was admitted for severe sepsis with C. Diff colitis, cystitis, currently, improved. He requires oxygen support at 5 L/min NC in the setting of volume overload and CHF. He needs PT/OT for assessment of functional status. Discharge home vs SNF. Likely needs 2+ days while he is clinically improving.        Lexa Tolbert MD

## 2023-11-06 NOTE — PROGRESS NOTES
Comprehensive Nutrition Assessment    Type and Reason for Visit:  Reassess    Nutrition Recommendations/Plan:   Continue Current Diet. Start Oral Nutrition Supplement- Plan to add Nepro (each provides 420 kcal, 19 g protein) once daily. Pt requesting soup with meals. Continue Virt-Caps d/t Malnutrition. Continue to monitor tolerance of PO, compliance of oral supplements, weight, labs, and plan of care during admission. Malnutrition Assessment:  Malnutrition Status:  Mild malnutrition (10/30/23 1133)    Context:  Acute Illness     Findings of the 6 clinical characteristics of malnutrition:  Energy Intake:  50% or less of estimated energy requirements for 5 or more days  Weight Loss:  No significant weight loss     Body Fat Loss:  No significant body fat loss Orbital   Muscle Mass Loss:  No significant muscle mass loss Temples (temporalis)  Fluid Accumulation:  Unable to assess     Strength:  Not Performed    Nutrition Assessment:    Pt admitted w/ hematuria, unable tolerate food w/ nausea for a few days PTA; increased watery bowel movements from ostomy. Work up showed severe sepsis. Follow-up. Per GI 11/5/23 notes- Patient may be a candidate for fecal transplant if improving from what's thought to be Tonya's. Pt seen at bedside. Pt reports poor intake d/t dislike of certain foods in house. Observed pt's lunch tray, untouched. Majority of meals documented at 1-25%. Pt's nutritional needs are not being met. Will add oral supplements to increase calorie/protein intake opportunity, pt agreeable. Per MD 11/5/23 note, change his diet order so that he can have soup per family request. Helen White will update this food preference. Will continue to monitor intake. Nutrition Related Findings:    Last BM (including prior to admit): 11/06/23  Edema: Generalized  Edema Generalized: +1. Pertinent Meds: Lipitor, coreg, marinol, heparin, lantus, humalog, synthroid, virt-caps, zofran (prn).  Pertinent Labs: Na 134

## 2023-11-06 NOTE — PROGRESS NOTES
0800:  Report received, care assumed. Assessment completed without acute changes noted. AAOx4. Denies pain, SOB or discomforts. Assisted with reposition for comfort. Breakfast served and assisted. BP soft 89/39 now; Coreg held per MD written parameters; Midodrine given; see MAR for details. 5L/min NC =98%; wean oxygen as tolerated; decreased to 4L/min NC now. Full fall and aspiration precautions in effect. Call bell and phones at side. Monitor. 0840:  TRANSFER - OUT REPORT:  Verbal report given to GUSTABO Wong RN on Emerson Saenz  being transferred to Hemodialysis Unit for ordered procedure     Report consisted of patient's Situation, Background, Assessment and   Recommendations(SBAR). Information from the following report(s) Nurse Handoff Report was reviewed with the receiving nurse. Lines:   Peripheral IV 10/21/23 Right Wrist (Active)   Site Assessment Clean, dry & intact 11/05/23 1600   Line Status Capped;Normal saline locked 11/05/23 1600   Line Care Cap changed 11/05/23 1600   Phlebitis Assessment No symptoms 11/05/23 1600   Infiltration Assessment 0 11/05/23 1600   Alcohol Cap Used Yes 11/05/23 1600   Dressing Status Clean, dry & intact 11/05/23 1600   Dressing Type Transparent 11/05/23 1600       Peripheral IV 10/29/23 Left Antecubital (Active)   Site Assessment Clean, dry & intact 11/05/23 1600   Line Status Infusing 11/05/23 1600   Line Care Connections checked and tightened 11/05/23 1600   Phlebitis Assessment No symptoms 11/05/23 1600   Infiltration Assessment 0 11/05/23 1600   Alcohol Cap Used Yes 11/05/23 1600   Dressing Status Clean, dry & intact 11/05/23 1600   Dressing Type Transparent 11/05/23 1600   Dressing Intervention New 11/04/23 0340       Tunneled Hemodialysis Catheter Right Subclavian (Active)   $Tunneled Hemodialysis Catheter $Yes 11/04/23 0340   Access Status  Accessed 11/06/23 0854   Continued need for line?  Yes 11/06/23 0854   Site Assessment Clean, dry & intact 11/06/23 0849 MD states he called wife to update, no answer when he called. Repositioned. Encouraging use Incentive Spirometer q1hr while awake; pulling 2,000cc. 1800:  Repositioned for comfort. 1945:  No signs bleeding noted. H/H q8hr continues, next due at 2200 tonight. Shift change report given to [facility] at bedside with rounds.

## 2023-11-06 NOTE — PROGRESS NOTES
1715: MD paged regarding pt's temp and no PRN order for tylenol. MD informed of pt's sudden confusion, and pt's family wanting to test for a UTI.     1720: MD returned page. See new orders. 1730: Current indwelling hebert removed to collect UA. 1745: New indwelling hebert catheter placed. Urine sample collected and sent to lab.     1806: Pt medicated per [facility] for fever. Primary RN, Marva Rodríguez, notified.

## 2023-11-06 NOTE — PROGRESS NOTES
Pt not seen for skilled OT due to:     [ ]  Nausea/vomiting  [ ]  Eating  [ ]  Pain  [ ]  Pt lethargic  [x]  Off Unit x 2 attempts - at HD  Other:    OT to follow

## 2023-11-07 ENCOUNTER — APPOINTMENT (OUTPATIENT)
Facility: HOSPITAL | Age: 88
End: 2023-11-07
Attending: INTERNAL MEDICINE
Payer: MEDICARE

## 2023-11-07 ENCOUNTER — APPOINTMENT (OUTPATIENT)
Facility: HOSPITAL | Age: 88
End: 2023-11-07
Payer: MEDICARE

## 2023-11-07 LAB
ALBUMIN SERPL-MCNC: 2.9 G/DL (ref 3.4–5)
ANION GAP SERPL CALC-SCNC: 6 MMOL/L (ref 3–18)
BASOPHILS # BLD: 0.2 K/UL (ref 0–0.1)
BASOPHILS NFR BLD: 1 % (ref 0–2)
BUN SERPL-MCNC: 18 MG/DL (ref 7–18)
BUN/CREAT SERPL: 4 (ref 12–20)
CALCIUM SERPL-MCNC: 8.3 MG/DL (ref 8.5–10.1)
CHLORIDE SERPL-SCNC: 102 MMOL/L (ref 100–111)
CO2 SERPL-SCNC: 29 MMOL/L (ref 21–32)
CREAT SERPL-MCNC: 4.47 MG/DL (ref 0.6–1.3)
DIFFERENTIAL METHOD BLD: ABNORMAL
EOSINOPHIL # BLD: 0.2 K/UL (ref 0–0.4)
EOSINOPHIL NFR BLD: 1 % (ref 0–5)
ERYTHROCYTE [DISTWIDTH] IN BLOOD BY AUTOMATED COUNT: 22.1 % (ref 11.6–14.5)
GLUCOSE BLD STRIP.AUTO-MCNC: 136 MG/DL (ref 70–110)
GLUCOSE BLD STRIP.AUTO-MCNC: 140 MG/DL (ref 70–110)
GLUCOSE BLD STRIP.AUTO-MCNC: 79 MG/DL (ref 70–110)
GLUCOSE BLD STRIP.AUTO-MCNC: 93 MG/DL (ref 70–110)
GLUCOSE SERPL-MCNC: 84 MG/DL (ref 74–99)
HCT VFR BLD AUTO: 23.9 % (ref 36–48)
HGB BLD-MCNC: 7.8 G/DL (ref 13–16)
HISTORY CHECK: NORMAL
IMM GRANULOCYTES # BLD AUTO: 0 K/UL (ref 0–0.04)
IMM GRANULOCYTES NFR BLD AUTO: 0 % (ref 0–0.5)
LACTATE SERPL-SCNC: 0.8 MMOL/L (ref 0.4–2)
LYMPHOCYTES # BLD: 1 K/UL (ref 0.9–3.6)
LYMPHOCYTES NFR BLD: 5 % (ref 21–52)
MCH RBC QN AUTO: 34.7 PG (ref 24–34)
MCHC RBC AUTO-ENTMCNC: 32.6 G/DL (ref 31–37)
MCV RBC AUTO: 106.2 FL (ref 78–100)
MONOCYTES # BLD: 0.6 K/UL (ref 0.05–1.2)
MONOCYTES NFR BLD: 3 % (ref 3–10)
NEUTS SEG # BLD: 17.9 K/UL (ref 1.8–8)
NEUTS SEG NFR BLD: 90 % (ref 40–73)
NRBC # BLD: 0 K/UL (ref 0–0.01)
NRBC BLD-RTO: 0 PER 100 WBC
PHOSPHATE SERPL-MCNC: 3.4 MG/DL (ref 2.5–4.9)
PLATELET # BLD AUTO: 331 K/UL (ref 135–420)
PLATELET COMMENT: ABNORMAL
PMV BLD AUTO: 11.7 FL (ref 9.2–11.8)
POTASSIUM SERPL-SCNC: 3.3 MMOL/L (ref 3.5–5.5)
RBC # BLD AUTO: 2.25 M/UL (ref 4.35–5.65)
RBC MORPH BLD: ABNORMAL
SODIUM SERPL-SCNC: 137 MMOL/L (ref 136–145)
WBC # BLD AUTO: 19.9 K/UL (ref 4.6–13.2)

## 2023-11-07 PROCEDURE — 74177 CT ABD & PELVIS W/CONTRAST: CPT

## 2023-11-07 PROCEDURE — 94761 N-INVAS EAR/PLS OXIMETRY MLT: CPT

## 2023-11-07 PROCEDURE — 82962 GLUCOSE BLOOD TEST: CPT

## 2023-11-07 PROCEDURE — 6370000000 HC RX 637 (ALT 250 FOR IP): Performed by: STUDENT IN AN ORGANIZED HEALTH CARE EDUCATION/TRAINING PROGRAM

## 2023-11-07 PROCEDURE — 99233 SBSQ HOSP IP/OBS HIGH 50: CPT | Performed by: INTERNAL MEDICINE

## 2023-11-07 PROCEDURE — 76770 US EXAM ABDO BACK WALL COMP: CPT

## 2023-11-07 PROCEDURE — 6370000000 HC RX 637 (ALT 250 FOR IP): Performed by: INTERNAL MEDICINE

## 2023-11-07 PROCEDURE — 83605 ASSAY OF LACTIC ACID: CPT

## 2023-11-07 PROCEDURE — 6360000004 HC RX CONTRAST MEDICATION: Performed by: PHYSICIAN ASSISTANT

## 2023-11-07 PROCEDURE — 86923 COMPATIBILITY TEST ELECTRIC: CPT

## 2023-11-07 PROCEDURE — P9047 ALBUMIN (HUMAN), 25%, 50ML: HCPCS | Performed by: INTERNAL MEDICINE

## 2023-11-07 PROCEDURE — 6360000002 HC RX W HCPCS: Performed by: INTERNAL MEDICINE

## 2023-11-07 PROCEDURE — 85025 COMPLETE CBC W/AUTO DIFF WBC: CPT

## 2023-11-07 PROCEDURE — 93970 EXTREMITY STUDY: CPT

## 2023-11-07 PROCEDURE — 6370000000 HC RX 637 (ALT 250 FOR IP): Performed by: HOSPITALIST

## 2023-11-07 PROCEDURE — 36415 COLL VENOUS BLD VENIPUNCTURE: CPT

## 2023-11-07 PROCEDURE — 86901 BLOOD TYPING SEROLOGIC RH(D): CPT

## 2023-11-07 PROCEDURE — 97530 THERAPEUTIC ACTIVITIES: CPT

## 2023-11-07 PROCEDURE — 97168 OT RE-EVAL EST PLAN CARE: CPT

## 2023-11-07 PROCEDURE — P9016 RBC LEUKOCYTES REDUCED: HCPCS

## 2023-11-07 PROCEDURE — 86900 BLOOD TYPING SEROLOGIC ABO: CPT

## 2023-11-07 PROCEDURE — 82533 TOTAL CORTISOL: CPT

## 2023-11-07 PROCEDURE — 86850 RBC ANTIBODY SCREEN: CPT

## 2023-11-07 PROCEDURE — 36430 TRANSFUSION BLD/BLD COMPNT: CPT

## 2023-11-07 PROCEDURE — 2580000003 HC RX 258: Performed by: STUDENT IN AN ORGANIZED HEALTH CARE EDUCATION/TRAINING PROGRAM

## 2023-11-07 PROCEDURE — 2140000001 HC CVICU INTERMEDIATE R&B

## 2023-11-07 PROCEDURE — 97535 SELF CARE MNGMENT TRAINING: CPT

## 2023-11-07 PROCEDURE — 97164 PT RE-EVAL EST PLAN CARE: CPT

## 2023-11-07 PROCEDURE — 2700000000 HC OXYGEN THERAPY PER DAY

## 2023-11-07 PROCEDURE — 80069 RENAL FUNCTION PANEL: CPT

## 2023-11-07 RX ORDER — DRONABINOL 2.5 MG/1
5 CAPSULE ORAL
Status: DISCONTINUED | OUTPATIENT
Start: 2023-11-07 | End: 2023-11-12

## 2023-11-07 RX ORDER — POTASSIUM CHLORIDE 20 MEQ/1
20 TABLET, EXTENDED RELEASE ORAL ONCE
Status: COMPLETED | OUTPATIENT
Start: 2023-11-07 | End: 2023-11-07

## 2023-11-07 RX ORDER — SODIUM CHLORIDE 9 MG/ML
INJECTION, SOLUTION INTRAVENOUS PRN
Status: DISCONTINUED | OUTPATIENT
Start: 2023-11-07 | End: 2023-11-25 | Stop reason: HOSPADM

## 2023-11-07 RX ADMIN — POTASSIUM CHLORIDE 20 MEQ: 1500 TABLET, EXTENDED RELEASE ORAL at 11:43

## 2023-11-07 RX ADMIN — NEPHROCAP 1 MG: 1 CAP ORAL at 08:12

## 2023-11-07 RX ADMIN — ATORVASTATIN CALCIUM 80 MG: 40 TABLET, FILM COATED ORAL at 21:55

## 2023-11-07 RX ADMIN — Medication 6 MG: at 21:54

## 2023-11-07 RX ADMIN — MIDODRINE HYDROCHLORIDE 15 MG: 10 TABLET ORAL at 17:09

## 2023-11-07 RX ADMIN — MIDODRINE HYDROCHLORIDE 15 MG: 10 TABLET ORAL at 11:36

## 2023-11-07 RX ADMIN — ACETAMINOPHEN 325MG 650 MG: 325 TABLET ORAL at 17:15

## 2023-11-07 RX ADMIN — LEVOTHYROXINE SODIUM 125 MCG: 125 TABLET ORAL at 06:23

## 2023-11-07 RX ADMIN — SODIUM CHLORIDE, PRESERVATIVE FREE 10 ML: 5 INJECTION INTRAVENOUS at 21:00

## 2023-11-07 RX ADMIN — AMIODARONE HYDROCHLORIDE 200 MG: 200 TABLET ORAL at 08:13

## 2023-11-07 RX ADMIN — FLUCONAZOLE 200 MG: 200 TABLET ORAL at 08:13

## 2023-11-07 RX ADMIN — TRAMADOL HYDROCHLORIDE 25 MG: 50 TABLET ORAL at 21:55

## 2023-11-07 RX ADMIN — MIDODRINE HYDROCHLORIDE 15 MG: 10 TABLET ORAL at 02:35

## 2023-11-07 RX ADMIN — DRONABINOL 2.5 MG: 2.5 CAPSULE ORAL at 08:13

## 2023-11-07 RX ADMIN — ALBUMIN (HUMAN) 25 G: 0.25 INJECTION, SOLUTION INTRAVENOUS at 02:35

## 2023-11-07 RX ADMIN — ACETAMINOPHEN 1000 MG: 500 TABLET ORAL at 11:36

## 2023-11-07 RX ADMIN — ACETAMINOPHEN 1000 MG: 500 TABLET ORAL at 21:56

## 2023-11-07 RX ADMIN — INSULIN GLARGINE 5 UNITS: 100 INJECTION, SOLUTION SUBCUTANEOUS at 21:54

## 2023-11-07 RX ADMIN — FIDAXOMICIN 200 MG: 200 TABLET, FILM COATED ORAL at 08:13

## 2023-11-07 RX ADMIN — ACETAMINOPHEN 1000 MG: 500 TABLET ORAL at 00:20

## 2023-11-07 RX ADMIN — IOPAMIDOL 100 ML: 612 INJECTION, SOLUTION INTRAVENOUS at 15:56

## 2023-11-07 RX ADMIN — DRONABINOL 5 MG: 2.5 CAPSULE ORAL at 17:09

## 2023-11-07 ASSESSMENT — PAIN DESCRIPTION - ORIENTATION
ORIENTATION: RIGHT;LEFT

## 2023-11-07 ASSESSMENT — PAIN SCALES - GENERAL
PAINLEVEL_OUTOF10: 7
PAINLEVEL_OUTOF10: 0
PAINLEVEL_OUTOF10: 6
PAINLEVEL_OUTOF10: 8
PAINLEVEL_OUTOF10: 0
PAINLEVEL_OUTOF10: 7
PAINLEVEL_OUTOF10: 5

## 2023-11-07 ASSESSMENT — PAIN DESCRIPTION - LOCATION
LOCATION: LEG;BACK
LOCATION: BACK

## 2023-11-07 ASSESSMENT — PAIN DESCRIPTION - DESCRIPTORS: DESCRIPTORS: ACHING;DISCOMFORT;TENDER

## 2023-11-07 NOTE — PROGRESS NOTES
Hospitalist Progress Note    Patient: Amado Sebastian Age: 80 y.o. : 1935 MR#: [id number] SSN: [id number]  Date/Time: 2023 12:22 PM    DOA: 10/21/2023  PCP: Peter Hunter MD    Assessment/Plan:     Severe sepsis POA, due to C. difficile colitis, also treated for Pseudomonas cystitis secondary to indwelling Hebert catheter, resolved. Severe C. difficile colitis, with concern for [place]'s vs fulminant C.diff with pancolitis and infarct spleen. CT abdomen/pelvic showed near entirely and colonic wall thickening, extend's of pericolonic fat stranding with dilated transverse colon up to 10 cm. Mesenteric duplex is without obstruction or ischemic pathology, SMA without signs of stenosis. KUB without evidence of ileus or bowel obstruction. Nausea with vomiting have resolved. Diarrhea has decreased via his colostomy output. New splenic wedge-shaped hypodensity concerning for acute splenic infarct in recent CT abdomen/pelvic  Small volume ascites noted, likely reactive to colitis. ESRD on HD  Acute respiratory failure with hypoxia, currently requiring 5 L/min NC oxygen supplementation. Chronic HFmrEF, acute exacerbation with volume overload in the settings of ESRD, sepsis, require HD for volume management  Paroxysmal atrial fibrillation, elevated [id number], on chronic DOAC outpatient  CAD, s/p RCA stent in 2018, currently stable  Hyperlipidemia  Hypertension, currently with episode of hypotension  History of bladder cancer, require indwelling Hebert catheter, Currently with Gross hematuria since his hebert was exchanged 23  Treated for Pseudomonas cystitis secondary to indwelling catheter on admission, resolved. Repeat UA 23 was abnormal, expected from ESRD patient however, could be partially treated UTI. Urine culture is pending   Anemia of chronic disease, recent finding of occult stool blood positive, currently no active bleeding. Hgb/Hct is slowly decreasing.  Multifactorial (gross mistakes, some may have been missed, and remained in the body of the document. If questions arise, please contact our department.

## 2023-11-07 NOTE — CONSENT
Informed Consent for Blood Component Transfusion Note    I have discussed with the patient and wife the rationale for blood component transfusion; its benefits in treating or preventing fatigue, organ damage, or death; and its risk which includes mild transfusion reactions, rare risk of blood borne infection, or more serious but rare reactions. I have discussed the alternatives to transfusion, including the risk and consequences of not receiving transfusion. The patient and wife had an opportunity to ask questions and had agreed to proceed with transfusion of blood components.     Electronically signed by Irina Monte MD on 11/7/23 at 12:56 PM EST

## 2023-11-07 NOTE — PROGRESS NOTES
RADIOLOGY:    All available imaging studies/reports in [facility] for this admission were reviewed     High complexity decision making was performed during the evaluation of this patient at high risk for decompensation with multiple organ involvement     Above mentioned total time spent on reviewing the case/medical record/data/notes/EMR/patient examination/documentation/coordinating care with nurse/consultants, exclusive of procedures with complex decision making performed and > 50% time spent in face to face evaluation. Disclaimer: Sections of this note are dictated utilizing voice recognition software, which may have resulted in some phonetic based errors in grammar and contents. Even though attempts were made to correct all the mistakes, some may have been missed, and remained in the body of the document. If questions arise, please contact our department.     Dr. Regenia Closs, Infectious Disease Specialist  [phone]  November 7, 2023  9:23 AM

## 2023-11-07 NOTE — PROGRESS NOTES
0224: Rounding.  Pt BP 84/37, MD notified    0228: per MD give scheduled 0800 midodrine 15mg po early and start PRN albumin 25g IV

## 2023-11-07 NOTE — PLAN OF CARE
Problem: Discharge Planning  Goal: Discharge to home or other facility with appropriate resources  Outcome: Progressing  Flowsheets (Taken 11/6/2023 2102 by Sonya Stephen RN)  Discharge to home or other facility with appropriate resources:   Identify barriers to discharge with patient and caregiver   Identify discharge learning needs (meds, wound care, etc)     Problem: Safety - Adult  Goal: Free from fall injury  Outcome: Progressing     Problem: Skin/Tissue Integrity  Goal: Absence of new skin breakdown  Description: 1. Monitor for areas of redness and/or skin breakdown  2. Assess vascular access sites hourly  3. Every 4-6 hours minimum:  Change oxygen saturation probe site  4. Every 4-6 hours:  If on nasal continuous positive airway pressure, respiratory therapy assess nares and determine need for appliance change or resting period.   Outcome: Progressing     Problem: Chronic Conditions and Co-morbidities  Goal: Patient's chronic conditions and co-morbidity symptoms are monitored and maintained or improved  Outcome: Progressing  Flowsheets (Taken 11/6/2023 2102 by Sonya Stephen RN)  Care Plan - Patient's Chronic Conditions and Co-Morbidity Symptoms are Monitored and Maintained or Improved: Monitor and assess patient's chronic conditions and comorbid symptoms for stability, deterioration, or improvement

## 2023-11-07 NOTE — PROGRESS NOTES
IDR pre-round:    He remains medically stable but still requires close monitoring in the stepdown unit. He needs close monitoring of his Hgb/Hct and on-going infectious treatment. Disposition: likely home vs SNF with dialysis. 2+ days expected for clinical improvement.        Emely Wilks MD

## 2023-11-07 NOTE — PROGRESS NOTES
conducted a Follow up consultation and Spiritual Assessment for Brenda Rodriguez, who is a 80 y. o.,male. The  provided the following Interventions:  Continued the relationship of care and support. Listened empathically. Offered prayer and assurance of continued prayer on patients behalf. Chart reviewed. The following outcomes were achieved:  Patient expressed gratitude for 's visit. Assessment:  There are no further spiritual or Alevism issues which require Spiritual Care Services interventions at this time. Plan:  Chaplains will continue to follow and will provide pastoral care on an as needed/requested basis.  recommends bedside caregivers page  on duty if patient shows signs of acute spiritual or emotional distress.        [address] [facility]  Staff [id number] [facility]   [phone]

## 2023-11-07 NOTE — PROGRESS NOTES
Notified final Duplex venous of legs, however, not a candidate for anticoagulation due to active bleed  Will monitor. Will need evaluation for IVC filter by IR tomorrow     Tashia Marino MD     Interpretation Summary         Study was technically difficult due to: patient unable to withstand compression maneuvers, patient positioning and acoustic window. Acute occlusive deep vein thrombosis in the right peroneal vein. No evidence of deep vein thrombosis in the right common femoral, femoral, profunda femoris, popliteal, and posterior tibial veins. No evidence of deep vein thrombosis in the left commonf femoral, femoral, profunda femoris, and popliteal veins. Technically compromised study, therefore; non-occlusive deep vein thrombosis cannot be ruled out in the left posterior tibial and peroneal veins as described in the findings.

## 2023-11-07 NOTE — PROGRESS NOTES
1320- Orders given for blood transfusion. Will transfuse patient once he is done drinking the contrast. Patient will have to go downstairs for CT scan. Coordinating with CT tech. 1330 - Also awaiting call that blood is ready. 1350- Blood ready at this time notifie by blood bank. 26- MD made aware about CT scan and waiting to give blood transfusion    1420 - contrast completed.  CT notified will come get the patient at [phone]: patient blood transfusion started

## 2023-11-07 NOTE — CARE COORDINATION
Bedside evaluation done by Erinn Duncan from [facility] and [facility] to review for possible acceptance to their facility. Bedside eval was successful and pt will be accepted to [facility] pending dialysis bed availability. Erinn Duncan states she one may be available as early as 11/09.       [facility] RN CDCES  Case Management

## 2023-11-07 NOTE — SIGNIFICANT EVENT
RN notified of repeated hypotensive BP readings. Patient admitted for Cdiff colitis w/ sespsis. Reportedly menta status unchanged A&Ox4, no tachycardia.      Plan:  - Check CBC given rpevious downtrend  - Type/Screen given borderline to transfusion threshold <7  - Check lactic  - Give midodrine now and 25g Albumin per PRN order    Apolinar De La Rosa MD  2:29 AM

## 2023-11-08 LAB
1,3 BETA GLUCAN SER-MCNC: 166 PG/ML
ABO + RH BLD: NORMAL
ALBUMIN SERPL-MCNC: 2.8 G/DL (ref 3.4–5)
ANION GAP SERPL CALC-SCNC: 8 MMOL/L (ref 3–18)
APTT PPP: 36.7 SEC (ref 23–36.4)
BACTERIA SPEC CULT: ABNORMAL
BACTERIA SPEC CULT: ABNORMAL
BLD PROD TYP BPU: NORMAL
BLOOD BANK DISPENSE STATUS: NORMAL
BLOOD GROUP ANTIBODIES SERPL: NORMAL
BPU ID: NORMAL
BUN SERPL-MCNC: 26 MG/DL (ref 7–18)
BUN/CREAT SERPL: 5 (ref 12–20)
CALCIUM SERPL-MCNC: 8.4 MG/DL (ref 8.5–10.1)
CALLED TO: NORMAL
CC UR VC: ABNORMAL
CHLORIDE SERPL-SCNC: 103 MMOL/L (ref 100–111)
CO2 SERPL-SCNC: 26 MMOL/L (ref 21–32)
CORTIS AM PEAK SERPL-MCNC: 15.9 UG/DL (ref 4.3–22.45)
CREAT SERPL-MCNC: 5.67 MG/DL (ref 0.6–1.3)
CROSSMATCH RESULT: NORMAL
ECHO BSA: 2.17 M2
ERYTHROCYTE [DISTWIDTH] IN BLOOD BY AUTOMATED COUNT: 23.3 % (ref 11.6–14.5)
GLUCOSE BLD STRIP.AUTO-MCNC: 105 MG/DL (ref 70–110)
GLUCOSE BLD STRIP.AUTO-MCNC: 123 MG/DL (ref 70–110)
GLUCOSE BLD STRIP.AUTO-MCNC: 95 MG/DL (ref 70–110)
GLUCOSE BLD STRIP.AUTO-MCNC: 96 MG/DL (ref 70–110)
GLUCOSE SERPL-MCNC: 114 MG/DL (ref 74–99)
HCT VFR BLD AUTO: 27.3 % (ref 36–48)
HGB BLD-MCNC: 8.9 G/DL (ref 13–16)
INR PPP: 1.3 (ref 0.9–1.1)
MCH RBC QN AUTO: 33.8 PG (ref 24–34)
MCHC RBC AUTO-ENTMCNC: 32.6 G/DL (ref 31–37)
MCV RBC AUTO: 103.8 FL (ref 78–100)
NRBC # BLD: 0 K/UL (ref 0–0.01)
NRBC BLD-RTO: 0 PER 100 WBC
PHOSPHATE SERPL-MCNC: 4.1 MG/DL (ref 2.5–4.9)
PLATELET # BLD AUTO: 327 K/UL (ref 135–420)
PMV BLD AUTO: 11.7 FL (ref 9.2–11.8)
POTASSIUM SERPL-SCNC: 3.6 MMOL/L (ref 3.5–5.5)
PROTHROMBIN TIME: 16.4 SEC (ref 11.9–14.7)
RBC # BLD AUTO: 2.63 M/UL (ref 4.35–5.65)
SERVICE CMNT-IMP: ABNORMAL
SODIUM SERPL-SCNC: 137 MMOL/L (ref 136–145)
SPECIMEN EXP DATE BLD: NORMAL
UNIT DIVISION: 0
WBC # BLD AUTO: 17.9 K/UL (ref 4.6–13.2)

## 2023-11-08 PROCEDURE — 36415 COLL VENOUS BLD VENIPUNCTURE: CPT

## 2023-11-08 PROCEDURE — 85027 COMPLETE CBC AUTOMATED: CPT

## 2023-11-08 PROCEDURE — 82962 GLUCOSE BLOOD TEST: CPT

## 2023-11-08 PROCEDURE — 85610 PROTHROMBIN TIME: CPT

## 2023-11-08 PROCEDURE — 99232 SBSQ HOSP IP/OBS MODERATE 35: CPT | Performed by: INTERNAL MEDICINE

## 2023-11-08 PROCEDURE — 2140000001 HC CVICU INTERMEDIATE R&B

## 2023-11-08 PROCEDURE — 94761 N-INVAS EAR/PLS OXIMETRY MLT: CPT

## 2023-11-08 PROCEDURE — 6370000000 HC RX 637 (ALT 250 FOR IP): Performed by: INTERNAL MEDICINE

## 2023-11-08 PROCEDURE — 6360000002 HC RX W HCPCS: Performed by: INTERNAL MEDICINE

## 2023-11-08 PROCEDURE — 2580000003 HC RX 258: Performed by: STUDENT IN AN ORGANIZED HEALTH CARE EDUCATION/TRAINING PROGRAM

## 2023-11-08 PROCEDURE — 80069 RENAL FUNCTION PANEL: CPT

## 2023-11-08 PROCEDURE — 93970 EXTREMITY STUDY: CPT | Performed by: SURGERY

## 2023-11-08 PROCEDURE — P9047 ALBUMIN (HUMAN), 25%, 50ML: HCPCS | Performed by: INTERNAL MEDICINE

## 2023-11-08 PROCEDURE — 85730 THROMBOPLASTIN TIME PARTIAL: CPT

## 2023-11-08 PROCEDURE — 2580000003 HC RX 258: Performed by: INTERNAL MEDICINE

## 2023-11-08 PROCEDURE — 2700000000 HC OXYGEN THERAPY PER DAY

## 2023-11-08 PROCEDURE — 90935 HEMODIALYSIS ONE EVALUATION: CPT

## 2023-11-08 PROCEDURE — 6370000000 HC RX 637 (ALT 250 FOR IP): Performed by: HOSPITALIST

## 2023-11-08 PROCEDURE — 6370000000 HC RX 637 (ALT 250 FOR IP): Performed by: STUDENT IN AN ORGANIZED HEALTH CARE EDUCATION/TRAINING PROGRAM

## 2023-11-08 RX ADMIN — MIDODRINE HYDROCHLORIDE 15 MG: 10 TABLET ORAL at 17:23

## 2023-11-08 RX ADMIN — ACETAMINOPHEN 1000 MG: 500 TABLET ORAL at 05:04

## 2023-11-08 RX ADMIN — DRONABINOL 5 MG: 2.5 CAPSULE ORAL at 17:23

## 2023-11-08 RX ADMIN — TRAMADOL HYDROCHLORIDE 25 MG: 50 TABLET ORAL at 17:37

## 2023-11-08 RX ADMIN — SODIUM CHLORIDE, PRESERVATIVE FREE 10 ML: 5 INJECTION INTRAVENOUS at 20:20

## 2023-11-08 RX ADMIN — VANCOMYCIN HYDROCHLORIDE 125 MG: 5 INJECTION, POWDER, LYOPHILIZED, FOR SOLUTION INTRAVENOUS at 17:58

## 2023-11-08 RX ADMIN — INSULIN GLARGINE 5 UNITS: 100 INJECTION, SOLUTION SUBCUTANEOUS at 20:22

## 2023-11-08 RX ADMIN — LEVOTHYROXINE SODIUM 125 MCG: 125 TABLET ORAL at 05:04

## 2023-11-08 RX ADMIN — ACETAMINOPHEN 1000 MG: 500 TABLET ORAL at 20:21

## 2023-11-08 RX ADMIN — MIDODRINE HYDROCHLORIDE 15 MG: 10 TABLET ORAL at 13:42

## 2023-11-08 RX ADMIN — AMIODARONE HYDROCHLORIDE 200 MG: 200 TABLET ORAL at 10:35

## 2023-11-08 RX ADMIN — ATORVASTATIN CALCIUM 80 MG: 40 TABLET, FILM COATED ORAL at 20:20

## 2023-11-08 RX ADMIN — NEPHROCAP 1 MG: 1 CAP ORAL at 13:43

## 2023-11-08 RX ADMIN — FLUCONAZOLE 200 MG: 200 TABLET ORAL at 13:45

## 2023-11-08 RX ADMIN — ALBUMIN (HUMAN) 25 G: 0.25 INJECTION, SOLUTION INTRAVENOUS at 10:30

## 2023-11-08 RX ADMIN — ACETAMINOPHEN 1000 MG: 500 TABLET ORAL at 13:44

## 2023-11-08 RX ADMIN — TRAMADOL HYDROCHLORIDE 25 MG: 50 TABLET ORAL at 10:22

## 2023-11-08 ASSESSMENT — PAIN DESCRIPTION - LOCATION
LOCATION: ABDOMEN
LOCATION: BACK

## 2023-11-08 ASSESSMENT — PAIN DESCRIPTION - ORIENTATION: ORIENTATION: MID

## 2023-11-08 ASSESSMENT — PAIN SCALES - GENERAL
PAINLEVEL_OUTOF10: 0
PAINLEVEL_OUTOF10: 0
PAINLEVEL_OUTOF10: 8
PAINLEVEL_OUTOF10: 0

## 2023-11-08 NOTE — CONSULTS
Interventional Radiology Consult Note  Patient: Brennen Lowery               Sex: male          DOA: 10/21/2023       YOB: 1935      Age:  80 y.o.        LOS:  LOS: 18 days              Assessment   DVT, right peroneal vein  Gross hematuria  Anticoagulation contraindicated  Sepsis 2/2 C diff    IVC filter is indicated for further management. Case and images reviewed by Dr. Harika Guerrero . Plan     Image guided IVC filter with moderate sedation tomorrow as schedule allows  Consent for procedure and sedation obtained. Pt and wife to consider DNR waiver tonight.    - NPO after midnight  - Hold AM SC heparin prior to procedure  - Labs reviewed -- okay to proceed  pending repeat PT/INR    Thank you,  YOSELIN Cook  0726    HPI:     Consult for IVC filter received from Dr Manohar Thorne and reviewed with Dr. Harika Guerrero. 81 yo male with h/o urothelial carcinoma of the bladder (dx 2019), chronic hebert catheter, lap sigmoid colectomy / colostomy (4/2023), and atrial fibrillation not on A/C was admitted with c/o intolerant to food, n/v, and worsening watery BM's within the ostomy and presents with persistent gross hematuria and right peroneal DVT requiring an IVC filter. The anticipated procedure was discussed in detail including risk of injury, infection, and bleeding. All questions were answered and concerns addressed. Informed consents were obtained. Currently, pt is resting comfortably laying in bed with wife at bedside. Pt and wife report the pt's diarrhea started about 3 weeks ago with no associated f/c, n/v, or abdominal pain. At this time pt denies f/c, n/v, CP, SOB, abdominal pain.       Past Medical History:   Diagnosis Date    Anemia     Arthritis     Atrial fibrillation ([address])     Broken leg 09/2022    CHF exacerbation ([address]) 01/08/2021    CKD (chronic kidney disease), stage III ([address])     Essential hypertension 01/18/2016    Exercise tolerance finding 10/2020    WORKS 8HRS/DAY NO (MYCOSTATIN) 982176 UNIT/ML suspension TAKE ONE TEASPOONFUL (5 ML) BY MOUTH FOUR TIMES A DAY - SHAKE WELL  Patient not taking: Reported on 10/21/2023 9/19/23   Scott Montnaa MD   ondansetron (ZOFRAN) 4 MG tablet  9/13/23   Scott Montana MD   QUEtiapine (SEROQUEL) 25 MG tablet  9/9/23   Scott Montana MD   predniSONE (DELTASONE) 20 MG tablet  9/15/23   Scott Montana MD   ezetimibe (ZETIA) 10 MG tablet  9/13/23   Scott Montana MD   dutasteride (AVODART) 0.5 MG capsule Take 1 capsule by mouth daily    Scott Montana MD   Epoetin Remberto (PROCRIT IJ) Inject 5,000 Units as directed three times a week    Scott Montana MD   trospium (SANCTURA) 20 MG tablet Take 1 tablet by mouth 2 times daily 9/21/23   Severa Rummer, MD   atorvastatin (LIPITOR) 80 MG tablet  6/17/23   Scott Montana MD   tamsulosin (FLOMAX) 0.4 MG capsule Take 1 capsule by mouth daily 6/28/23   Severa Rummer, MD   Multiple Vitamin (MULTIVITAMIN) capsule Take by mouth 8/20/19   Scott Montana MD   midodrine (PROAMATINE) 10 MG tablet  5/6/23   Scott Montana MD   omeprazole (PRILOSEC) 20 MG delayed release capsule  5/6/23   Scott Montana MD   allopurinol (ZYLOPRIM) 100 MG tablet Take by mouth daily    Automatic Reconciliation, Ar   amiodarone (CORDARONE) 200 MG tablet Take 1 tablet by mouth daily    Automatic Reconciliation, Ar   aspirin 81 MG EC tablet 1 tablet 12/11/18   Automatic Reconciliation, Ar   carvedilol (COREG) 3.125 MG tablet ceived the following from [address]: Outside name: carvediloL (COREG) 3.125 mg tablet 8/29/22   Automatic Reconciliation, Ar   ferrous sulfate (IRON 325) 325 (65 Fe) MG tablet Take 1 tablet by mouth    Automatic Reconciliation, Ar   meclizine (ANTIVERT) 25 MG tablet Take by mouth 3 times daily as needed    Automatic Reconciliation, Ar   sodium polystyrene (KAYEXALATE) 15 GM/60ML suspension TAKE 60 MLS BY MOUTH TWICE

## 2023-11-08 NOTE — CONSULTS
IR CONSULT NOTE      IR Consult for IVC filter by Dr Jai Arreaga was received and reviewed with Dr Rachel Vergara. 79 yo male with h/o urothelial carcinoma of the bladder (dx 2019), chronic hebert catheter, lap sigmoid colectomy / colostomy (4/2023), and atrial fibrillation not on A/C was admitted with c/o intolerant to food, n/v, and worsening watery BM's within the ostomy and presents with persistent gross hematuria and right peroneal DVT requiring an IVC filter. Images were reviewed with Dr Rachel Vergara. IVC filter with moderate sedation planned for  tomorrow as schedule allows pending updated coags. NPO at MN. AM SC heparin on hold. Updated coags ordered. Plt ct OK.       Tiara Castro PA-C

## 2023-11-08 NOTE — PROGRESS NOTES
Hospitalist Progress Note    Patient: Noman Connelly Age: 80 y.o. : 1935 MR#: [id number] SSN: [id number]  Date/Time: 2023 5:46 PM    DOA: 10/21/2023  PCP: Mk Parham MD    Assessment/Plan:     Severe sepsis POA, due to C. difficile colitis, also treated for Pseudomonas cystitis secondary to indwelling Hebert catheter, resolved. Severe C. difficile colitis, with concern for Davilla's vs fulminant C.diff with pancolitis and infarct spleen. CT abdomen/pelvic showed near entirely and colonic wall thickening, extend's of pericolonic fat stranding with dilated transverse colon up to 10 cm. Mesenteric duplex is without obstruction or ischemic pathology, SMA without signs of stenosis. KUB without evidence of ileus or bowel obstruction. Nausea with vomiting have resolved. Diarrhea has decreased via his colostomy output. New splenic wedge-shaped hypodensity concerning for acute splenic infarct in recent CT abdomen/pelvic  Small volume ascites noted, likely reactive to colitis. ESRD on HD  Acute respiratory failure with hypoxia, currently requiring 5 L/min NC oxygen supplementation. Chronic HFmrEF, acute exacerbation with volume overload in the settings of ESRD, sepsis, require HD for volume management  Paroxysmal atrial fibrillation, elevated [id number], on chronic DOAC outpatient  CAD, s/p RCA stent in 2018, currently stable  Hyperlipidemia  Hypertension, currently with episode of hypotension  History of bladder cancer, require indwelling Hebert catheter, Currently with Gross hematuria since his hebert was exchanged 23  Treated for Pseudomonas cystitis secondary to indwelling catheter on admission, resolved. Repeat UA 23 was abnormal, expected from ESRD patient however, could be partially treated UTI. Urine culture is pending   Anemia of chronic disease, recent finding of occult stool blood positive, currently no active bleeding. Hgb/Hct is slowly decreasing.  Multifactorial (gross ug/mL Sensitive      levofloxacin 1 ug/mL Sensitive      meropenem <=0.25 ug/mL Sensitive      piperacillin-tazobactam 8 ug/mL Sensitive      tobramycin <=1 ug/mL Sensitive                           Culture, Tissue [[id number]]     Order Status: Canceled Specimen: Tissue                 Images:     Vascular duplex mesenteric arteries/abdominal complete    Result Date: 11/4/2023    Study was technically difficult due to: body habitus and bowel gas. Mesenteric arteries are without evidence of significant stenosis. XR ABDOMEN (KUB) (SINGLE AP VIEW)    Result Date: 11/3/2023  Abdomen HISTORY: Abdominal pain. COMPARISON: 10/30/2023 Normalizing bowel gas pattern with no distended loops. Rectal tube noted. Mild fecal retention in the colon. No obstruction or ileus. CT Result (most recent):  CT CHEST ABDOMEN PELVIS W CONTRAST 10/31/2023    Narrative  EXAM: CT CHEST, ABDOMEN AND PELVIS WITH CONTRAST    CLINICAL INDICATION/HISTORY: Evaluate for aspiration pneumonia, severe colitis;  Persistent leukocytosis. .. COMPARISON: CTA chest, CT abdomen and pelvis 10/21/2023. TECHNIQUE: Helical CT imaging of the chest, abdomen, and pelvis was performed  with intravenous contrast. Multiplanar reformats were generated. One or more  dose reduction techniques were used on this CT: automated exposure control,  adjustment of the mAs and/or kVp according to patient size, and iterative  reconstruction techniques. LIMITATIONS: None. FINDINGS:    CHEST:    Lines and tubes: Right neck central line with distal catheter tip in the right  atrium up to the inferior cavoatrial junction. Base of neck: Unremarkable. Lymph nodes:  No enlarged or abnormal appearing lymph nodes. Cardiovascular: Extensive coronary calcification. Mild cardiomegaly. Moderate  aortic valve calcification. Mediastinum: Unremarkable. Chest wall: Unremarkable.     Pleura: Trace bilateral pleural effusions with basilar atelectasis

## 2023-11-08 NOTE — PROGRESS NOTES
[email or web address]  [phone]    Gastroenterology follow up-Progress note    Impression:  1. Sepsis  2. C diff - Found to have significant leukocytosis and C difficile colitis, patient has been managed as severe C diff with Magnolia's, though would meet criteria for fulminant C diff. Cross-sectional imaging shows pancolitis and even likely infarcted spleen, but interestingly patient has no abdominal symptoms, which is puzzling.  - CT 11/7/23 shows unchanged severe colonic wall thickening and pericolic fat stranding w/ colon measuring up to 9cm, new cystic focus at duodenal groove - favor pseudocyst or loculated ascites, consider MRI to further characterize, moderate volume ascites  3. Urothelial carcinoma of the bladder with complicated cystoscopy and TURP in April/23 requiring colectomy/Roxie's pouch   4. ESRD on HD  5. CHF  6. A fib  7. Anemia - chronic  8. Severe leukocytosis      Plan:  1. Continue Dificid and metronidazole per ID  2. Monitor stool output. Subjectively this is improving  3. Continue medical management per primary team    Chief Complaint: C diff      Subjective: Attempted to see patient but he is off the floor. Discussed w/ RN - He had increased stool output via ostomy yesterday after CT scan performed. This improved overnight per report. Wound care consulted regarding difficulties w/ colostomy bag. Jayden Serrano [place], [place]  11/8/2023, 12:14 PM   [facility].  www. [employer]  Office: [phone]  Cell: [phone]

## 2023-11-08 NOTE — CARE COORDINATION
Spoke with Ricco Askew at [facility], and pt has officially been accepted at their facility. Once approval for in house dialysis is obtained, Lalito De La Torre will be started.     [facility] RN CDCES  Case Management

## 2023-11-08 NOTE — PROGRESS NOTES
HD Care plan  Time:  Dialysate:   K     Ca  Bath  Net UF: Access: Aseptically care for  Hemodynamic stability: Maintain BP WNL    Pre Dialysis:  Pt received from Unit Nurse , pt on a bed/stretcher ,A+O X 4, No s/s of distress noted  on RA . DENIZ AVF assessed no abnormalities  noted ,bruit and Thrill strong, AVF accessed using 15 G Needles. Without any difficulty  Good flows achieved from both needles. RT Chest [facility]  assessed no abnormalities noted, line patent with good flow. TDC accessed  per protocol without any difficulty    Intra Dialysis:  Time out / safety process performed per policy, Tx initiated at 0944. AVF/ AVG flowing with ease. For hemodynamic stability UF goal  set  at 2500 ml. /TDC flowing with ease. For hemodynamic stability UF goal  set at 2500 ml as tolerated   Pt offered assistance with repositioning every 2 hours/prn    Vascular access visible  and line connections remained intact throughout entire duration of treatment. Vital signs checked every 15 mins, WNL    Post Dialysis: Tx completed at 1200,   Tolerated well ,2L removed. De-accessed per protocol. Clot time 5 minutes for arterial, and 5 minutes for venous. Dry dressings applied. Bruit/Thrill present above and below dressings./Dialysis catheter  locked accordingly with Heparin 1.6ml in arterial port, and 1.6ml in venous port ,catheter dressing clean, dry and intact.   Post Dialysis report given to unit  Nurse

## 2023-11-08 NOTE — CONSULTS
Room 367: Focused wound assess with primary nurse Nargis Jaquez & discussed with   Blanche RN. LUQ abdomen end colostomy, stoma size is 1 1/4\" long by 1 5/8\" wide, oval shaped,  Mucosa is pink to red in color, slightly budded but empties at 8 o'clock position  Onto the skin level, peristomal skin is intact, pinpoint bleeding on stoma stopped  By using qtips & gentle pressure at 12 o'clock position & 6 o'clock position,  Skin care provided with skin prep & barrier ring, fitted with 2 piece Coloplast  Cut to fit appliance, cut out pattern left in room on wow cart. Left sacrum, stage 3 pressure injury,   Measurements 2.9x1.5x0.2cm  Base tissue 60% white, 40% red. Drainage brown & serosanguinous cloudy. No wound odor. Edges attached & clearly defined. Periwound dusky purple on left side. Silicone dressing in use, dressing changed [place] applied. POA. Both heels are bright red, blanchable, intact. Heel prevention boots applied bilaterally. [place] bed in use, wedges in place under TAPS system. Pt sleeps thru care, so education not provided at this time. Pt had sister-in-law in room in the bathroom. Pt yells out when turned in bed. Will turn over care to unit nursing staff at this time & sign off.    Ricardo NAVARRON, RN, [facility], [address] [place] [address]

## 2023-11-08 NOTE — PROGRESS NOTES
Infectious Disease progress Note        Reason: Severe sepsis    Current abx Prior abx       Fluconazole since 11/6/23   Piperacillin/tazobactam, vancomycin since 10/21-10/23  Po vancomycin 10/24-10/28  Gentamicin since 10/23-10/27  Metronidazole iv since 10/25-11/6  Fidaxomicin since 10/28-11/7     Lines:       Assessment :   80 y.o.  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 Clinical presentation consistent with severe sepsis-present on admission due to Hebert catheter associated cystitis    Possible ileus versus pseudoobstruction:  GI follow-up appreciated. Significant worsening leukocytosis on 10/25/23 -despite current broad-spectrum antibiotics, treatment for infection/UTI - likely due to severe c.diff colitis (refractory to treatment)    Stool c.diff 10/23- positive likely suggest evolving c.diff as the cause of persistent leukocytosis. Patient's recent diarrhea as outpt could have been due to evolving c.diff with subsequent ileus due to immodium in setting of c.diff infection. Urine culture 10/21 positive for 70,000 colonies of pseudomonas (gentamicin NIKA 2, levofloxacin NIKA:1, cefepime NIKA:4, pip/tazo NIKA 8), Klebsiella (R to levofloxacin, gentamicin NIKA:<1)    S/p hebert exchange 10/27. >100,000 colonies of pseudomonas in Urine cx 10/29- likely colonizer. No dysuria.      persistent leukocytosis. Leukocytosis out of proportion to physical findings    nausea, increased abdominal distension on 10/28 concerning for partially treated c.diff- hence, switched to fidaxomicin on 10/28. No blasts noted on peripheral smear.   Toxic granulation noted consistent with

## 2023-11-08 NOTE — PLAN OF CARE
Progressing  11/8/2023 1147 by Kathy Pena RN  Outcome: Progressing     Problem: Respiratory - Adult  Goal: Achieves optimal ventilation and oxygenation  11/8/2023 1148 by Kathy Pena RN  Outcome: Progressing  11/8/2023 1147 by Kathy Pena RN  Outcome: Progressing     Problem: Skin/Tissue Integrity - Adult  Goal: Skin integrity remains intact  11/8/2023 1148 by Kathy Pena RN  Outcome: Progressing  11/8/2023 1147 by Kathy Pena RN  Outcome: Progressing     Problem: Skin/Tissue Integrity - Adult  Goal: Incisions, wounds, or drain sites healing without S/S of infection  11/8/2023 1148 by Kathy Pena RN  Outcome: Progressing  11/8/2023 1147 by Kathy Pena RN  Outcome: Progressing     Problem: Musculoskeletal - Adult  Goal: Return mobility to safest level of function  11/8/2023 1148 by Kathy Pena RN  Outcome: Progressing  11/8/2023 1147 by Kathy Pena RN  Outcome: Progressing     Problem: Musculoskeletal - Adult  Goal: Return ADL status to a safe level of function  Outcome: Progressing     Problem: Gastrointestinal - Adult  Goal: Minimal or absence of nausea and vomiting  11/8/2023 1148 by Kathy Pena RN  Outcome: Progressing  11/8/2023 1147 by Kathy Pena RN  Outcome: Progressing     Problem: Gastrointestinal - Adult  Goal: Maintains or returns to baseline bowel function  11/8/2023 1148 by Kathy Pena RN  Outcome: Progressing  11/8/2023 1147 by Kathy Pena RN  Outcome: Progressing     Problem: Gastrointestinal - Adult  Goal: Maintains adequate nutritional intake  11/8/2023 1148 by Kathy Pena RN  Outcome: Progressing  11/8/2023 1147 by Kathy Pena RN  Outcome: Progressing     Problem: Gastrointestinal - Adult  Goal: Establish and maintain optimal ostomy function  Outcome: Progressing     Problem: Genitourinary - Adult  Goal: Absence of urinary retention  11/8/2023 1148 by Kathy Pena RN  Outcome: Progressing  11/8/2023 1147 by Kathy Pena RN  Outcome:

## 2023-11-08 NOTE — PROGRESS NOTES
RENAL DAILY PROGRESS NOTE    Subjective:       Complaint:     Overnight events noted  No abdominal pain reported      IMPRESSION:   ESRD on MWF dialysis  Access: TDC  Sob,fluid overload  UTI, C diff, Leucocytosis  Anemia, refused epogen  Chronic indwelling hebert due to hx of bladder cancer   PLAN:   Seen during dialysis at 11 10 am,bp is low despite midodrine and iv albumin,pull fluids as tolerated           Current Facility-Administered Medications   Medication Dose Route Frequency    dronabinol (MARINOL) capsule 5 mg  5 mg Oral TID AC    0.9 % sodium chloride infusion   IntraVENous PRN    diatrizoate meglumine-sodium (GASTROGRAFIN) 66-10 % solution 30 mL  30 mL Oral ONCE PRN    fluconazole (DIFLUCAN) tablet 200 mg  200 mg Oral Daily    midodrine (PROAMATINE) tablet 15 mg  15 mg Oral TID WC    [Held by provider] carvedilol (COREG) tablet 3.125 mg  3.125 mg Oral BID    acetaminophen (TYLENOL) tablet 650 mg  650 mg Oral Q6H PRN    acetaminophen (TYLENOL) tablet 1,000 mg  1,000 mg Oral q8h    traMADol (ULTRAM) tablet 25 mg  25 mg Oral Q12H PRN    insulin lispro (HUMALOG) injection vial 0-4 Units  0-4 Units SubCUTAneous TID WC    insulin lispro (HUMALOG) injection vial 0-4 Units  0-4 Units SubCUTAneous Nightly    glucose chewable tablet 16 g  4 tablet Oral PRN    dextrose bolus 10% 125 mL  125 mL IntraVENous PRN    Or    dextrose bolus 10% 250 mL  250 mL IntraVENous PRN    glucagon (rDNA) injection 1 mg  1 mg SubCUTAneous PRN    dextrose 10 % infusion   IntraVENous Continuous PRN    insulin glargine (LANTUS) injection vial 5 Units  5 Units SubCUTAneous Nightly    simethicone (MYLICON) chewable tablet 80 mg  80 mg Oral Q6H PRN    atorvastatin (LIPITOR) tablet 80 mg  80 mg Oral Nightly    albumin human 25% IV solution 25 g  25 g IntraVENous PRN    allopurinol (ZYLOPRIM) tablet 100 mg  100 mg Oral Q MWF    diphenhydrAMINE (BENYLIN) 12.5 MG/5ML liquid 12.5 mg  12.5 mg Oral Nightly PRN    Virt-Caps 1 mg  1 capsule

## 2023-11-08 NOTE — PROGRESS NOTES
Urology Progress Note    1. Urinary tract infection associated with indwelling urethral catheter, initial encounter ([address])    2. Occult blood positive stool    3. C. difficile colitis    4. Leukocytosis, unspecified type    5. Hypoxia    6. Abdominal pain    7. Debility        Assessment/Plan:   ASSESSMENT:   Gross Hematuria              Hgb 8.9>7.8>7.6<8.1>7.4<8.4  CT AP w contrast 11/8: Atrophic bilateral kidneys with left renal cyst. Hebert catheter noted. ASHLYN 11/8/23: Mild atrophy and increased renal echotexture likely due to chronic medical renal disease. Left renal simple cyst 6 cm. Chronic catheter in place for retention     H/o Clinical stage TaNxMx HG urothelial carcinoma of the bladder diagnosed in 5/14/19  H/o Phimosis     Admitted for Sepsis,  Complicated UTI,  Acute Hypoxic Respiratory Failure,  C.diff              Tmax 100.6              WBC 17.9<19.9<28.5              UCX 10/29 >100k P.aeruginosa              UCX 11/5: prelim 80 yeast, pseudomonas species     H/o ESRD on HD,  A.fib        PLAN:    Abx per primary   CT A/P and ASHLYN 11/8 reviewed - no hydronephrosis, hebert in bladder, L renal cyst.   Bladder scan 24 cc. 16 fr catheter in place with maroon colored urine. Please flush catheter with 120 cc NS TID, PRN decreased UOP/clots. Plan for TURBT outpatient. Will follow. Follow up arranged? No    William Regan PA-C  [facility] [address] [place] [address]  Available M-Fri, [address] [facility]  Pager: [phone]     I discussed the case and independently reviewed the pertinent vital signs, lab values, and imaging. I performed a substantive portion of the medical decision making. I agree with the findings and the plan of care, as documented in the note. Virgil Partida MD    [place] [phone]  [phone] Pager      Subjective:     Daily Progress Note: 11/8/2023 1:27 PM    Tolerating hebert catheter well. Patient getting ostomy changed.     Objective:     BP  102 103   CO2 24 29 26   BUN 31* 18 26*      CBC w/Diff Recent Labs     11/06/23  0258 11/06/23  1436 11/06/23  2148 11/07/23  0324 11/08/23  0226   WBC 19.9*  --   --  19.9* 17.9*   RBC 2.13*  --   --  2.25* 2.63*   HGB 7.4*   < > 7.6* 7.8* 8.9*   HCT 23.0*   < > 23.9* 23.9* 27.3*     --   --  331 327    < > = values in this interval not displayed. Cultures Invalid input(s): \"CULT\"  [unfilled]      Urinalysis Potassium   Date Value Ref Range Status   11/08/2023 3.6 3.5 - 5.5 mmol/L Final     BUN   Date Value Ref Range Status   11/08/2023 26 (H) 7.0 - 18 MG/DL Final     Pros. Spec. Antigen   Date Value Ref Range Status   02/05/2018 2.910 0.0 - 4.400 ng/ml Final      PSA No results for input(s): \"PSA\" in the last 72 hours.    Coagulation No results found for: \"INR\", \"APTT\"

## 2023-11-09 LAB
GLUCOSE BLD STRIP.AUTO-MCNC: 76 MG/DL (ref 70–110)
GLUCOSE BLD STRIP.AUTO-MCNC: 85 MG/DL (ref 70–110)
GLUCOSE BLD STRIP.AUTO-MCNC: 90 MG/DL (ref 70–110)
GLUCOSE BLD STRIP.AUTO-MCNC: 93 MG/DL (ref 70–110)
GLUCOSE BLD STRIP.AUTO-MCNC: 93 MG/DL (ref 70–110)

## 2023-11-09 PROCEDURE — 94761 N-INVAS EAR/PLS OXIMETRY MLT: CPT

## 2023-11-09 PROCEDURE — 6370000000 HC RX 637 (ALT 250 FOR IP): Performed by: STUDENT IN AN ORGANIZED HEALTH CARE EDUCATION/TRAINING PROGRAM

## 2023-11-09 PROCEDURE — 2580000003 HC RX 258: Performed by: INTERNAL MEDICINE

## 2023-11-09 PROCEDURE — 6370000000 HC RX 637 (ALT 250 FOR IP): Performed by: INTERNAL MEDICINE

## 2023-11-09 PROCEDURE — 2140000001 HC CVICU INTERMEDIATE R&B

## 2023-11-09 PROCEDURE — 6370000000 HC RX 637 (ALT 250 FOR IP): Performed by: HOSPITALIST

## 2023-11-09 PROCEDURE — 2580000003 HC RX 258: Performed by: STUDENT IN AN ORGANIZED HEALTH CARE EDUCATION/TRAINING PROGRAM

## 2023-11-09 PROCEDURE — 2700000000 HC OXYGEN THERAPY PER DAY

## 2023-11-09 PROCEDURE — 99232 SBSQ HOSP IP/OBS MODERATE 35: CPT | Performed by: HOSPITALIST

## 2023-11-09 PROCEDURE — 82962 GLUCOSE BLOOD TEST: CPT

## 2023-11-09 PROCEDURE — 6360000002 HC RX W HCPCS: Performed by: INTERNAL MEDICINE

## 2023-11-09 RX ADMIN — DRONABINOL 5 MG: 2.5 CAPSULE ORAL at 06:07

## 2023-11-09 RX ADMIN — VANCOMYCIN HYDROCHLORIDE 125 MG: 5 INJECTION, POWDER, LYOPHILIZED, FOR SOLUTION INTRAVENOUS at 13:58

## 2023-11-09 RX ADMIN — VANCOMYCIN HYDROCHLORIDE 125 MG: 5 INJECTION, POWDER, LYOPHILIZED, FOR SOLUTION INTRAVENOUS at 00:03

## 2023-11-09 RX ADMIN — LEVOTHYROXINE SODIUM 125 MCG: 125 TABLET ORAL at 06:08

## 2023-11-09 RX ADMIN — TRAMADOL HYDROCHLORIDE 25 MG: 50 TABLET ORAL at 06:06

## 2023-11-09 RX ADMIN — MIDODRINE HYDROCHLORIDE 15 MG: 10 TABLET ORAL at 11:36

## 2023-11-09 RX ADMIN — Medication 6 MG: at 20:33

## 2023-11-09 RX ADMIN — VANCOMYCIN HYDROCHLORIDE 125 MG: 5 INJECTION, POWDER, LYOPHILIZED, FOR SOLUTION INTRAVENOUS at 06:43

## 2023-11-09 RX ADMIN — INSULIN GLARGINE 5 UNITS: 100 INJECTION, SOLUTION SUBCUTANEOUS at 20:32

## 2023-11-09 RX ADMIN — TRAMADOL HYDROCHLORIDE 25 MG: 50 TABLET ORAL at 20:32

## 2023-11-09 RX ADMIN — DRONABINOL 5 MG: 2.5 CAPSULE ORAL at 11:36

## 2023-11-09 RX ADMIN — ACETAMINOPHEN 1000 MG: 500 TABLET ORAL at 20:32

## 2023-11-09 RX ADMIN — ACETAMINOPHEN 1000 MG: 500 TABLET ORAL at 13:58

## 2023-11-09 RX ADMIN — ACETAMINOPHEN 1000 MG: 500 TABLET ORAL at 06:07

## 2023-11-09 RX ADMIN — FLUCONAZOLE 200 MG: 200 TABLET ORAL at 08:27

## 2023-11-09 RX ADMIN — AMIODARONE HYDROCHLORIDE 200 MG: 200 TABLET ORAL at 08:27

## 2023-11-09 RX ADMIN — NEPHROCAP 1 MG: 1 CAP ORAL at 08:27

## 2023-11-09 RX ADMIN — ATORVASTATIN CALCIUM 80 MG: 40 TABLET, FILM COATED ORAL at 20:33

## 2023-11-09 RX ADMIN — DRONABINOL 5 MG: 2.5 CAPSULE ORAL at 16:16

## 2023-11-09 RX ADMIN — MIDODRINE HYDROCHLORIDE 15 MG: 10 TABLET ORAL at 16:16

## 2023-11-09 RX ADMIN — VANCOMYCIN HYDROCHLORIDE 125 MG: 5 INJECTION, POWDER, LYOPHILIZED, FOR SOLUTION INTRAVENOUS at 20:32

## 2023-11-09 RX ADMIN — SODIUM CHLORIDE, PRESERVATIVE FREE 10 ML: 5 INJECTION INTRAVENOUS at 22:00

## 2023-11-09 RX ADMIN — MIDODRINE HYDROCHLORIDE 15 MG: 10 TABLET ORAL at 08:27

## 2023-11-09 ASSESSMENT — PAIN SCALES - GENERAL
PAINLEVEL_OUTOF10: 1
PAINLEVEL_OUTOF10: 0
PAINLEVEL_OUTOF10: 8
PAINLEVEL_OUTOF10: 7
PAINLEVEL_OUTOF10: 9
PAINLEVEL_OUTOF10: 0

## 2023-11-09 ASSESSMENT — PAIN DESCRIPTION - DESCRIPTORS: DESCRIPTORS: ACHING;DISCOMFORT;TENDER

## 2023-11-09 ASSESSMENT — PAIN DESCRIPTION - LOCATION
LOCATION: BACK;LEG
LOCATION: BACK;COCCYX

## 2023-11-09 ASSESSMENT — PAIN DESCRIPTION - ORIENTATION: ORIENTATION: POSTERIOR

## 2023-11-09 NOTE — CARE COORDINATION
Spoke with Robi Alcantara with [facility]. She states pt has been accepted and they do have a dialysis bed available. The facility is wanting to see the next set of dialysis flow sheets prior to starting auth. Patient received albumin during his dialysis session yesterday, and they want to make sure that this isn't a consistent thing because they do not administer albumin at their facility. The facility is holding the dialysis bed for Mr. Jose Calderon.     [facility] RN RAJES  Case Management

## 2023-11-09 NOTE — PROGRESS NOTES
Interventional Radiology    The patient is seen in follow up for IVC filter placement  He and his wife consented to the procedure and DNR waiver today, however, due to urgent add on cases we will not be able to accommodate the IVC filter this afternoon. Discussed with vascular - they are also unable to accommodate this afternoon   OK to resume diet this evening.    NPO at midnight for IVC filter placement procedure with moderate sedation    Thank you,  Pino Marsh, [facility]

## 2023-11-09 NOTE — PROGRESS NOTES
Infectious Disease progress Note        Reason: Severe sepsis    Current abx Prior abx       Fluconazole since 11/6/23  Po vancomycin since 11/8   Piperacillin/tazobactam, vancomycin since 10/21-10/23  Po vancomycin 10/24-10/28  Gentamicin since 10/23-10/27  Metronidazole iv since 10/25-11/6  Fidaxomicin since 10/28-11/7     Lines:       Assessment :   80 y.o.  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 Clinical presentation consistent with severe sepsis-present on admission due to Hebert catheter associated cystitis    Possible ileus versus pseudoobstruction:  GI follow-up appreciated. Significant worsening leukocytosis on 10/25/23 -despite current broad-spectrum antibiotics, treatment for infection/UTI - likely due to severe c.diff colitis (refractory to treatment)    Stool c.diff 10/23- positive likely suggest evolving c.diff as the cause of persistent leukocytosis. Patient's recent diarrhea as outpt could have been due to evolving c.diff with subsequent ileus due to immodium in setting of c.diff infection. Urine culture 10/21 positive for 70,000 colonies of pseudomonas (gentamicin NIKA 2, levofloxacin NIKA:1, cefepime NIKA:4, pip/tazo NIKA 8), Klebsiella (R to levofloxacin, gentamicin NIKA:<1)    S/p hebert exchange 10/27. >100,000 colonies of pseudomonas in Urine cx 10/29- likely colonizer. No dysuria.      persistent leukocytosis. Leukocytosis out of proportion to physical findings    nausea, increased abdominal distension on 10/28 concerning for partially treated c.diff- hence, switched to fidaxomicin on 10/28. No blasts noted on peripheral smear.   Toxic granulation noted decompensation with multiple organ involvement     Above mentioned total time spent on reviewing the case/medical record/data/notes/EMR/patient examination/documentation/coordinating care with nurse/consultants, exclusive of procedures with complex decision making performed and > 50% time spent in face to face evaluation. Disclaimer: Sections of this note are dictated utilizing voice recognition software, which may have resulted in some phonetic based errors in grammar and contents. Even though attempts were made to correct all the mistakes, some may have been missed, and remained in the body of the document. If questions arise, please contact our department.     Dr. Moncho Gonzalez, Infectious Disease Specialist  [phone]  November 9, 2023  11:10 AM

## 2023-11-09 NOTE — PLAN OF CARE
Problem: Discharge Planning  Goal: Discharge to home or other facility with appropriate resources  Outcome: Progressing     Problem: Safety - Adult  Goal: Free from fall injury  11/9/2023 1545 by Logan Lund RN  Outcome: Progressing  11/9/2023 1503 by Dave Hargrove RN  Outcome: Progressing     Problem: Skin/Tissue Integrity  Goal: Absence of new skin breakdown  Description: 1. Monitor for areas of redness and/or skin breakdown  2. Assess vascular access sites hourly  3. Every 4-6 hours minimum:  Change oxygen saturation probe site  4. Every 4-6 hours:  If on nasal continuous positive airway pressure, respiratory therapy assess nares and determine need for appliance change or resting period. 11/9/2023 1545 by Logan Lund RN  Outcome: Progressing  11/9/2023 1503 by Dave Hargrove RN  Outcome: Progressing     Problem: Chronic Conditions and Co-morbidities  Goal: Patient's chronic conditions and co-morbidity symptoms are monitored and maintained or improved  11/9/2023 1545 by Logan Lund RN  Outcome: Progressing  11/9/2023 1503 by Dave Hargrove RN  Outcome: Progressing     Problem: Genitourinary - Adult  Goal: Absence of urinary retention  11/9/2023 1545 by Logan Lund RN  Outcome: Progressing  11/9/2023 1503 by Dave Hargrove RN  Outcome: Not Progressing  Note: Carlos catheter in place.  Void trial in am

## 2023-11-09 NOTE — PROGRESS NOTES
Urology Progress Note    1. Urinary tract infection associated with indwelling urethral catheter, initial encounter ([address])    2. Occult blood positive stool    3. C. difficile colitis    4. Leukocytosis, unspecified type    5. Hypoxia    6. Abdominal pain    7. Debility        Assessment/Plan:   ASSESSMENT:   Gross Hematuria              Hgb 8.9>7.8>7.6<8.1>7.4<8.4  CT AP w contrast 11/8: Atrophic bilateral kidneys with left renal cyst. Carlos catheter noted. ASHLYN 11/8/23: Mild atrophy and increased renal echotexture likely due to chronic medical renal disease. Left renal simple cyst 6 cm. Chronic catheter in place for retention     H/o Clinical stage TaNxMx HG urothelial carcinoma of the bladder diagnosed in 5/14/19  H/o Phimosis     Admitted for Sepsis,  Complicated UTI,  Acute Hypoxic Respiratory Failure,  C.diff                WBC 17.9<19.9<28.5              UCX 10/29 >100k P.aeruginosa              UCX 11/5: 80K P. Aeruginosa, candida glabrate     H/o ESRD on HD,  A.fib        PLAN:    UCX reviewed. Patient on Diflucan and Vanc. ID following. 16 fr catheter in place with light brown urine in bag, orange in tubing. Recommend VT today. Family would like to think about it. Please flush catheter with 120 cc NS TID, PRN decreased UOP/clots. Plan for TURBT outpatient. Will follow. Follow up arranged? No    Vidya Duong PA-C  [facility]  Available M-Fri, [address] [facility]  Pager: [phone]       I have seen and examined the patient and discussed the plan with the patient and family at bedside. VT in AM. Urine clear in tubing. Explained need for abx and imaging.  VT at 6 am..    Zahraa Curtis M.D.,   [facility]  a division of [facility]    [address] [facility]  [phone] ext [phone] fax      Subjective:     Daily Progress Note: 11/9/2023 10:02

## 2023-11-09 NOTE — PROGRESS NOTES
Hospitalist Progress Note    Patient: Keila Hanna Age: 80 y.o. : 1935 MR#: [id number] SSN: [id number]  Date/Time: 2023 9:28 AM    DOA: 10/21/2023  PCP: Alejandro Ruano MD    Assessment/Plan:     Severe sepsis POA, due to C. difficile colitis, also treated for Pseudomonas cystitis secondary to indwelling Hebert catheter, resolved. Severe C. difficile colitis, with concern for [place]'s vs fulminant C.diff with pancolitis and infarct spleen. CT abdomen/pelvic showed near entirely and colonic wall thickening, extend's of pericolonic fat stranding with dilated transverse colon up to 10 cm. Mesenteric duplex is without obstruction or ischemic pathology, SMA without signs of stenosis. KUB without evidence of ileus or bowel obstruction. Nausea with vomiting have resolved. Diarrhea has decreased via his colostomy output. New splenic wedge-shaped hypodensity concerning for acute splenic infarct in recent CT abdomen/pelvic  Small volume ascites noted, likely reactive to colitis. ESRD on HD  Acute respiratory failure with hypoxia, currently requiring 5 L/min NC oxygen supplementation. Chronic HFmrEF, acute exacerbation with volume overload in the settings of ESRD, sepsis, require HD for volume management  Paroxysmal atrial fibrillation, elevated [id number], on chronic DOAC outpatient  CAD, s/p RCA stent in 2018, currently stable  Hyperlipidemia  Hypertension, currently with episode of hypotension  History of bladder cancer, require indwelling Hebert catheter, Currently with Gross hematuria since his hebert was exchanged 23. Voiding trial today. Urology input appreciated. Treated for Pseudomonas cystitis secondary to indwelling catheter on admission, resolved. Repeat UA 23 was abnormal, expected from ESRD patient however, could be partially treated UTI. Urine culture is pending   Anemia of chronic disease, recent finding of occult stool blood positive, currently no active bleeding. Atherosclerosis. Body wall/MSK: Bilateral hip prostheses with significant streak artifact  limiting evaluation of the adjacent structures. Mild generalized anasarca. Impression  1. Near entire length colonic wall thickening, extensive pericolic fat  stranding and dilated transverse colon up to 10 cm, highly concerning for  moderate to severe infectious/inflammatory colitis. 2.  New small volume ascites, likely reactive in the setting of colitis. 3.  New splenic wedge shaped hypodensity, concerning for acute splenic infarct. 4.  Trace bilateral pleural effusions (right greater than left) and slightly  increased bibasilar atelectasis. 5.  Previously described 0.8 cm right lung lower lobe nodule is not confidently  visualized on current study, possibly due to significant atelectasis. Attention  on follow-up. 6.  Extensive coronary calcification. Findings were discussed over the telephone at 4:39 PM 10/31/2023 with Tish Adames MD  who verbalized understanding of the findings. Disclaimer: Sections of this note are dictated utilizing voice recognition software, which may have resulted in some phonetic based errors in grammar and contents. Even though attempts were made to correct all the mistakes, some may have been missed, and remained in the body of the document. If questions arise, please contact our department.

## 2023-11-09 NOTE — PROGRESS NOTES
PT withheld today. Patient with + DVT in right peroneal vein and planning for IVC filter placement. Will continue to follow as patient is medically appropriate. Thank you.    Kadie Farrell PT, DPT

## 2023-11-10 ENCOUNTER — HOSPITAL ENCOUNTER (INPATIENT)
Facility: HOSPITAL | Age: 88
Discharge: HOME | End: 2023-11-13
Payer: MEDICARE

## 2023-11-10 LAB
ANION GAP SERPL CALC-SCNC: 8 MMOL/L (ref 3–18)
BASOPHILS # BLD: 0.1 K/UL (ref 0–0.1)
BASOPHILS NFR BLD: 0 % (ref 0–2)
BUN SERPL-MCNC: 28 MG/DL (ref 7–18)
BUN/CREAT SERPL: 5 (ref 12–20)
CALCIUM SERPL-MCNC: 8.3 MG/DL (ref 8.5–10.1)
CHLORIDE SERPL-SCNC: 100 MMOL/L (ref 100–111)
CO2 SERPL-SCNC: 26 MMOL/L (ref 21–32)
CREAT SERPL-MCNC: 5.79 MG/DL (ref 0.6–1.3)
DIFFERENTIAL METHOD BLD: ABNORMAL
EOSINOPHIL # BLD: 0.1 K/UL (ref 0–0.4)
EOSINOPHIL NFR BLD: 1 % (ref 0–5)
ERYTHROCYTE [DISTWIDTH] IN BLOOD BY AUTOMATED COUNT: 22.9 % (ref 11.6–14.5)
GLUCOSE BLD STRIP.AUTO-MCNC: 81 MG/DL (ref 70–110)
GLUCOSE BLD STRIP.AUTO-MCNC: 85 MG/DL (ref 70–110)
GLUCOSE BLD STRIP.AUTO-MCNC: 88 MG/DL (ref 70–110)
GLUCOSE BLD STRIP.AUTO-MCNC: 95 MG/DL (ref 70–110)
GLUCOSE BLD STRIP.AUTO-MCNC: 97 MG/DL (ref 70–110)
GLUCOSE SERPL-MCNC: 84 MG/DL (ref 74–99)
HCT VFR BLD AUTO: 28.8 % (ref 36–48)
HGB BLD-MCNC: 9.1 G/DL (ref 13–16)
IMM GRANULOCYTES # BLD AUTO: 0.5 K/UL (ref 0–0.04)
IMM GRANULOCYTES NFR BLD AUTO: 3 % (ref 0–0.5)
LYMPHOCYTES # BLD: 2.3 K/UL (ref 0.9–3.6)
LYMPHOCYTES NFR BLD: 14 % (ref 21–52)
MCH RBC QN AUTO: 34.1 PG (ref 24–34)
MCHC RBC AUTO-ENTMCNC: 31.6 G/DL (ref 31–37)
MCV RBC AUTO: 107.9 FL (ref 78–100)
MONOCYTES # BLD: 0.7 K/UL (ref 0.05–1.2)
MONOCYTES NFR BLD: 5 % (ref 3–10)
NEUTS SEG # BLD: 12.3 K/UL (ref 1.8–8)
NEUTS SEG NFR BLD: 77 % (ref 40–73)
NRBC # BLD: 0 K/UL (ref 0–0.01)
NRBC BLD-RTO: 0 PER 100 WBC
PLATELET # BLD AUTO: 332 K/UL (ref 135–420)
PMV BLD AUTO: 11.4 FL (ref 9.2–11.8)
POTASSIUM SERPL-SCNC: 4.1 MMOL/L (ref 3.5–5.5)
RBC # BLD AUTO: 2.67 M/UL (ref 4.35–5.65)
SODIUM SERPL-SCNC: 134 MMOL/L (ref 136–145)
WBC # BLD AUTO: 16.1 K/UL (ref 4.6–13.2)

## 2023-11-10 PROCEDURE — 80048 BASIC METABOLIC PNL TOTAL CA: CPT

## 2023-11-10 PROCEDURE — 36415 COLL VENOUS BLD VENIPUNCTURE: CPT

## 2023-11-10 PROCEDURE — 2580000003 HC RX 258: Performed by: INTERNAL MEDICINE

## 2023-11-10 PROCEDURE — 6360000002 HC RX W HCPCS: Performed by: RADIOLOGY

## 2023-11-10 PROCEDURE — 06H03DZ INSERTION OF INTRALUMINAL DEVICE INTO INFERIOR VENA CAVA, PERCUTANEOUS APPROACH: ICD-10-PCS | Performed by: RADIOLOGY

## 2023-11-10 PROCEDURE — 94761 N-INVAS EAR/PLS OXIMETRY MLT: CPT

## 2023-11-10 PROCEDURE — P9047 ALBUMIN (HUMAN), 25%, 50ML: HCPCS | Performed by: INTERNAL MEDICINE

## 2023-11-10 PROCEDURE — 99232 SBSQ HOSP IP/OBS MODERATE 35: CPT | Performed by: HOSPITALIST

## 2023-11-10 PROCEDURE — 6360000004 HC RX CONTRAST MEDICATION: Performed by: RADIOLOGY

## 2023-11-10 PROCEDURE — 82962 GLUCOSE BLOOD TEST: CPT

## 2023-11-10 PROCEDURE — 6360000002 HC RX W HCPCS: Performed by: INTERNAL MEDICINE

## 2023-11-10 PROCEDURE — 2140000001 HC CVICU INTERMEDIATE R&B

## 2023-11-10 PROCEDURE — 6370000000 HC RX 637 (ALT 250 FOR IP): Performed by: STUDENT IN AN ORGANIZED HEALTH CARE EDUCATION/TRAINING PROGRAM

## 2023-11-10 PROCEDURE — 2580000003 HC RX 258: Performed by: STUDENT IN AN ORGANIZED HEALTH CARE EDUCATION/TRAINING PROGRAM

## 2023-11-10 PROCEDURE — 85025 COMPLETE CBC W/AUTO DIFF WBC: CPT

## 2023-11-10 PROCEDURE — 6370000000 HC RX 637 (ALT 250 FOR IP): Performed by: INTERNAL MEDICINE

## 2023-11-10 PROCEDURE — 6370000000 HC RX 637 (ALT 250 FOR IP): Performed by: HOSPITALIST

## 2023-11-10 PROCEDURE — 2500000003 HC RX 250 WO HCPCS: Performed by: RADIOLOGY

## 2023-11-10 PROCEDURE — 90935 HEMODIALYSIS ONE EVALUATION: CPT

## 2023-11-10 PROCEDURE — 36011 PLACE CATHETER IN VEIN: CPT

## 2023-11-10 RX ORDER — IODIXANOL 320 MG/ML
INJECTION, SOLUTION INTRAVASCULAR PRN
Status: COMPLETED | OUTPATIENT
Start: 2023-11-10 | End: 2023-11-10

## 2023-11-10 RX ORDER — LIDOCAINE HYDROCHLORIDE 10 MG/ML
INJECTION, SOLUTION EPIDURAL; INFILTRATION; INTRACAUDAL; PERINEURAL PRN
Status: COMPLETED | OUTPATIENT
Start: 2023-11-10 | End: 2023-11-10

## 2023-11-10 RX ORDER — FENTANYL CITRATE 50 UG/ML
INJECTION, SOLUTION INTRAMUSCULAR; INTRAVENOUS PRN
Status: COMPLETED | OUTPATIENT
Start: 2023-11-10 | End: 2023-11-10

## 2023-11-10 RX ADMIN — LIDOCAINE HYDROCHLORIDE 5 ML: 10 INJECTION, SOLUTION EPIDURAL; INFILTRATION; INTRACAUDAL; PERINEURAL at 14:15

## 2023-11-10 RX ADMIN — ACETAMINOPHEN 1000 MG: 500 TABLET ORAL at 21:36

## 2023-11-10 RX ADMIN — VANCOMYCIN HYDROCHLORIDE 125 MG: 5 INJECTION, POWDER, LYOPHILIZED, FOR SOLUTION INTRAVENOUS at 05:35

## 2023-11-10 RX ADMIN — SODIUM CHLORIDE, PRESERVATIVE FREE 10 ML: 5 INJECTION INTRAVENOUS at 21:38

## 2023-11-10 RX ADMIN — LEVOTHYROXINE SODIUM 125 MCG: 125 TABLET ORAL at 05:35

## 2023-11-10 RX ADMIN — MIDODRINE HYDROCHLORIDE 15 MG: 10 TABLET ORAL at 12:47

## 2023-11-10 RX ADMIN — MIDODRINE HYDROCHLORIDE 15 MG: 10 TABLET ORAL at 17:04

## 2023-11-10 RX ADMIN — ACETAMINOPHEN 1000 MG: 500 TABLET ORAL at 05:35

## 2023-11-10 RX ADMIN — DRONABINOL 5 MG: 2.5 CAPSULE ORAL at 17:04

## 2023-11-10 RX ADMIN — ACETAMINOPHEN 1000 MG: 500 TABLET ORAL at 12:47

## 2023-11-10 RX ADMIN — MIDODRINE HYDROCHLORIDE 15 MG: 10 TABLET ORAL at 08:22

## 2023-11-10 RX ADMIN — FENTANYL CITRATE 50 MCG: 50 INJECTION INTRAMUSCULAR; INTRAVENOUS at 14:15

## 2023-11-10 RX ADMIN — VANCOMYCIN HYDROCHLORIDE 125 MG: 5 INJECTION, POWDER, LYOPHILIZED, FOR SOLUTION INTRAVENOUS at 17:04

## 2023-11-10 RX ADMIN — VANCOMYCIN HYDROCHLORIDE 125 MG: 5 INJECTION, POWDER, LYOPHILIZED, FOR SOLUTION INTRAVENOUS at 00:28

## 2023-11-10 RX ADMIN — VANCOMYCIN HYDROCHLORIDE 125 MG: 5 INJECTION, POWDER, LYOPHILIZED, FOR SOLUTION INTRAVENOUS at 12:47

## 2023-11-10 RX ADMIN — TRAMADOL HYDROCHLORIDE 25 MG: 50 TABLET ORAL at 08:22

## 2023-11-10 RX ADMIN — ATORVASTATIN CALCIUM 80 MG: 40 TABLET, FILM COATED ORAL at 21:37

## 2023-11-10 RX ADMIN — ALBUMIN (HUMAN) 25 G: 0.25 INJECTION, SOLUTION INTRAVENOUS at 08:55

## 2023-11-10 RX ADMIN — DRONABINOL 5 MG: 2.5 CAPSULE ORAL at 05:35

## 2023-11-10 RX ADMIN — FLUCONAZOLE 200 MG: 200 TABLET ORAL at 17:05

## 2023-11-10 RX ADMIN — FENTANYL CITRATE 25 MCG: 50 INJECTION INTRAMUSCULAR; INTRAVENOUS at 14:05

## 2023-11-10 RX ADMIN — FENTANYL CITRATE 25 MCG: 50 INJECTION INTRAMUSCULAR; INTRAVENOUS at 14:26

## 2023-11-10 RX ADMIN — IODIXANOL 35 ML: 320 INJECTION, SOLUTION INTRAVASCULAR at 14:30

## 2023-11-10 RX ADMIN — AMIODARONE HYDROCHLORIDE 200 MG: 200 TABLET ORAL at 17:05

## 2023-11-10 RX ADMIN — NEPHROCAP 1 MG: 1 CAP ORAL at 17:06

## 2023-11-10 ASSESSMENT — PAIN DESCRIPTION - ORIENTATION
ORIENTATION: RIGHT;LEFT;LOWER
ORIENTATION: POSTERIOR;LEFT;RIGHT

## 2023-11-10 ASSESSMENT — PAIN DESCRIPTION - LOCATION
LOCATION: BACK
LOCATION: HIP;BUTTOCKS
LOCATION: LEG

## 2023-11-10 ASSESSMENT — PAIN SCALES - GENERAL
PAINLEVEL_OUTOF10: 5
PAINLEVEL_OUTOF10: 0
PAINLEVEL_OUTOF10: 8
PAINLEVEL_OUTOF10: 0
PAINLEVEL_OUTOF10: 5

## 2023-11-10 ASSESSMENT — PAIN DESCRIPTION - DESCRIPTORS: DESCRIPTORS: ACHING;SHARP

## 2023-11-10 NOTE — PROGRESS NOTES
Shift report at 7 am night RN did not pull the hebert. Although orders stated to.  This RN will pull hebert go to  dialysis and pull hebert

## 2023-11-10 NOTE — PROGRESS NOTES
Patient hebert not removed. Orders given by urology to pull hebert at 11/10 at 6am and do 6 hour voiding trial. Orders changed to pull hebert at [address] RN notified at shift change. Family notified.

## 2023-11-10 NOTE — PROGRESS NOTES
Hospitalist Progress Note    Patient: Joel Lawrence Age: 80 y.o. : 1935 MR#: [id number]: [id number]  Date/Time: 11/10/2023 4:50 PM    DOA: 10/21/2023  PCP: Jacquelin Lugo MD    Subjective:     Patient seen and examined while on dialysis, he opens his eyes briefly and shakes his head no when asked about chest pain or abdominal pain. Nursing reports no issues with dialysis treatment. He is awaiting IVC filter. Voiding trial started this morning. Assessment/Plan:     Severe sepsis POA, due to C. difficile colitis, also treated for Pseudomonas cystitis secondary to indwelling Hebert catheter, resolved. Severe C. difficile colitis, with concern for [place]'s vs fulminant C.diff with pancolitis and infarct spleen. Improving s/p dificid. prolonged po vancomycin taper for severe c.diff colitis (125 mg po q 6 hour till 11/15 followed by 125 mg po q 12 hour till  followed by 125 mg po every other day till 23) per ID recommendations. New splenic wedge-shaped hypodensity concerning for acute splenic infarct in recent CT abdomen/pelvic  Small volume ascites noted, likely reactive to colitis. ESRD on HD  Acute respiratory failure with hypoxia, currently requiring 5 L/min NC oxygen supplementation. Chronic HFmrEF, acute exacerbation with volume overload in the settings of ESRD, sepsis, require HD for volume management  Paroxysmal atrial fibrillation, elevated [id number], on chronic DOAC outpatient  CAD, s/p RCA stent in 2018, currently stable  10. dyslipidemia  Hypertension. History of bladder cancer, require indwelling Hebert catheter, Currently with Gross hematuria since his hebert was exchanged 23. Voiding trial underway. Urology input appreciated. Treated for Pseudomonas cystitis secondary to indwelling catheter on admission, resolved. Repeat UA 23 was abnormal, expected from ESRD patient however, could be partially treated UTI.  Urine culture is pending   Anemia of Culture, Tissue [[id number]]     Order Status: Canceled Specimen: Tissue                 Images:     Vascular duplex mesenteric arteries/abdominal complete    Result Date: 11/4/2023    Study was technically difficult due to: body habitus and bowel gas. Mesenteric arteries are without evidence of significant stenosis. XR ABDOMEN (KUB) (SINGLE AP VIEW)    Result Date: 11/3/2023  Abdomen HISTORY: Abdominal pain. COMPARISON: 10/30/2023 Normalizing bowel gas pattern with no distended loops. Rectal tube noted. Mild fecal retention in the colon. No obstruction or ileus. CT Result (most recent):  CT CHEST ABDOMEN PELVIS W CONTRAST 10/31/2023    Narrative  EXAM: CT CHEST, ABDOMEN AND PELVIS WITH CONTRAST    CLINICAL INDICATION/HISTORY: Evaluate for aspiration pneumonia, severe colitis;  Persistent leukocytosis. .. COMPARISON: CTA chest, CT abdomen and pelvis 10/21/2023. TECHNIQUE: Helical CT imaging of the chest, abdomen, and pelvis was performed  with intravenous contrast. Multiplanar reformats were generated. One or more  dose reduction techniques were used on this CT: automated exposure control,  adjustment of the mAs and/or kVp according to patient size, and iterative  reconstruction techniques. LIMITATIONS: None. FINDINGS:    CHEST:    Lines and tubes: Right neck central line with distal catheter tip in the right  atrium up to the inferior cavoatrial junction. Base of neck: Unremarkable. Lymph nodes:  No enlarged or abnormal appearing lymph nodes. Cardiovascular: Extensive coronary calcification. Mild cardiomegaly. Moderate  aortic valve calcification. Mediastinum: Unremarkable. Chest wall: Unremarkable. Pleura: Trace bilateral pleural effusions with basilar atelectasis (right  greater than left), similar. Lung and Airway:  Patent airways. Bilateral posterior lung bases atelectasis,  slightly increased. ABDOMEN & PELVIS    Liver: Unremarkable.     Biliary/gallbladder:

## 2023-11-10 NOTE — PROGRESS NOTES
Attempted pt for OT tx x 2. Pt unable to be seen 2/2 [facility] for HD. Will continue to follow.  Thank you  MOE Goldsmith/PRIYANKA

## 2023-11-10 NOTE — PLAN OF CARE
Problem: Discharge Planning  Goal: Discharge to home or other facility with appropriate resources  11/10/2023 1142 by Puja Costello RN  Outcome: Progressing  11/10/2023 0702 by Vivi Tang RN  Outcome: Progressing  11/10/2023 0700 by Vivi Tang [address] [place] [address] (Taken 11/9/2023 2032)  Discharge to home or other facility with appropriate resources:   Identify barriers to discharge with patient and caregiver   Arrange for needed discharge resources and transportation as appropriate   Identify discharge learning needs (meds, wound care, etc)   Refer to discharge planning if patient needs post-hospital services based on physician order or complex needs related to functional status, cognitive ability or social support system     Problem: Safety - Adult  Goal: Free from fall injury  11/10/2023 1142 by Puja Costello RN  Outcome: Progressing  11/10/2023 0702 by Vivi Tang RN  Outcome: Progressing  11/10/2023 0700 by Vivi Tang RN  Flowsheets (Taken 11/1/2023 2350)  Free From Fall Injury:   Instruct family/caregiver on patient safety   Based on caregiver fall risk screen, instruct family/caregiver to ask for assistance with transferring infant if caregiver noted to have fall risk factors     Problem: Skin/Tissue Integrity  Goal: Absence of new skin breakdown  Description: 1. Monitor for areas of redness and/or skin breakdown  2. Assess vascular access sites hourly  3. Every 4-6 hours minimum:  Change oxygen saturation probe site  4. Every 4-6 hours:  If on nasal continuous positive airway pressure, respiratory therapy assess nares and determine need for appliance change or resting period.   11/10/2023 1142 by Puja Costello RN  Outcome: Progressing  11/10/2023 0702 by Vivi Tang RN  Outcome: Progressing     Problem: Chronic Conditions and Co-morbidities  Goal: Patient's chronic conditions and co-morbidity symptoms are monitored and maintained or improved  11/10/2023 1142 by Puja Costello

## 2023-11-10 NOTE — PROGRESS NOTES
Comprehensive Nutrition Assessment    Type and Reason for Visit:  Reassess    Nutrition Recommendations/Plan:   Continue Current Diet. Modify Oral Nutrition Supplement- Plan to add Abdiel (each provides 95 kcal, 2.5g protein) BID, Continue Nepro decrease frequency to BID. Continue Virt-Caps d/t Malnutrition. Continue to monitor tolerance of PO, compliance of oral supplements, weight, labs, and plan of care during admission. Malnutrition Assessment:  Malnutrition Status:  Mild malnutrition (10/30/23 1133)    Context:  Acute Illness     Findings of the 6 clinical characteristics of malnutrition:  Energy Intake:  50% or less of estimated energy requirements for 5 or more days  Weight Loss:  No significant weight loss     Body Fat Loss:  No significant body fat loss Orbital   Muscle Mass Loss:  No significant muscle mass loss Temples (temporalis)  Fluid Accumulation:  Unable to assess     Strength:  Not Performed    Nutrition Assessment:    Pt admitted w/ hematuria, unable tolerate food w/ nausea for a few days PTA; increased watery bowel movements from ostomy. Work up showed severe sepsis. Follow-up. Attempted visit x 2, pt off unit, seen at dialysis sleeping did not wake to verbal stimuli. Majority of meals documented at 1-25%. Per Flowsheets intake improving, pt currently consuming 26-50%, 51-75% of meals. Last meal documented 11/6/23. Will modify frequency of current supplement d/t fluid restriction and add oral supplements to assist with wound healing. Will continue to monitor intake. Nutrition Related Findings:    Last BM (including prior to admit): 11/09/23. Edema: No data recorded. Pertinent Meds: Lipitor, lantus, humalog, synthroid, Virt-Caps, marinol, zofran (prn). Pertinent Labs: Na 134 (L), BUN 28 (H), Cr 5.79 (H), GFR 9 (L), calcium 8.3 (L) no albumin to correct, POC Glucose 76-96 mg/dl x 24 hrs.  Wound Type: Pressure Injury, Stage II       Current Nutrition Intake & Therapies:    Average Meal Intake: 26-50%, 51-75%, 1-25%  Average Supplements Intake: Unable to assess  ADULT DIET; Easy to Chew; 3 carb choices (45 gm/meal); 1500 ml  ADULT ORAL NUTRITION SUPPLEMENT; Breakfast, Lunch, Dinner; Renal Oral Supplement    Anthropometric Measures:  Height: 170.2 cm (5' 7.01\")  Ideal Body Weight (IBW): 148 lbs (67 kg)    Admission Body Weight: 99.8 kg (220 lb 0.3 oz)  Current Body Weight: 103 kg (227 lb 1.2 oz), 149.7 % IBW. Weight Source: Bed Scale  Current BMI (kg/m2): 35.6  Usual Body Weight: 93.2 kg (205 lb 7.5 oz)  % Weight Change (Calculated): 7.8  Weight Adjustment For: No Adjustment  BMI Categories: Obese Class 1 (BMI 30.0-34. 9)    Estimated Daily Nutrient Needs:  Energy Requirements Based On: Formula  Weight Used for Energy Requirements: Current  Energy (kcal/day): 1265-8832 (1-1.2)  Weight Used for Protein Requirements: Ideal  Protein (g/day): 101-134 (1.5-2)  Method Used for Fluid Requirements:  (Per MD)  Fluid (ml/day): 1500 ml    Nutrition Diagnosis:    (Mild malnutrition in context of acute illness or injury) related to inadequate protein-energy intake as evidenced by Criteria as identified in malnutrition assessment  Increased nutrient needs related to renal dysfunction, acute injury/trauma as evidenced by wounds, dialysis    Nutrition Interventions:   Food and/or Nutrient Delivery: Continue Current Diet, Vitamin Supplement, Modify Oral Nutrition Supplement  Nutrition Education/Counseling: Education not indicated  Coordination of Nutrition Care: Continue to monitor while inpatient  Plan of Care discussed with: Pt    Goals:  Previous Goal Met: Progressing toward Goal(s)  Goals: Meet at least 75% of estimated needs, by next RD assessment       Nutrition Monitoring and Evaluation:   Behavioral-Environmental Outcomes: None Identified  Food/Nutrient Intake Outcomes: Food and Nutrient Intake, Supplement Intake, Vitamin/Mineral Intake  Physical Signs/Symptoms Outcomes: Biochemical Data, Chewing or

## 2023-11-10 NOTE — PROGRESS NOTES
Infectious Disease progress Note        Reason: Severe sepsis    Current abx Prior abx       Fluconazole since 11/6/23  Po vancomycin since 11/8   Piperacillin/tazobactam, vancomycin since 10/21-10/23  Po vancomycin 10/24-10/28  Gentamicin since 10/23-10/27  Metronidazole iv since 10/25-11/6  Fidaxomicin since 10/28-11/7     Lines:       Assessment :   80 y.o.  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 Clinical presentation consistent with severe sepsis-present on admission due to Hebert catheter associated cystitis    Possible ileus versus pseudoobstruction:  GI follow-up appreciated. Significant worsening leukocytosis on 10/25/23 -despite current broad-spectrum antibiotics, treatment for infection/UTI - likely due to severe c.diff colitis (refractory to treatment)    Stool c.diff 10/23- positive likely suggest evolving c.diff as the cause of persistent leukocytosis. Patient's recent diarrhea as outpt could have been due to evolving c.diff with subsequent ileus due to immodium in setting of c.diff infection. Urine culture 10/21 positive for 70,000 colonies of pseudomonas (gentamicin NIKA 2, levofloxacin NIKA:1, cefepime NIKA:4, pip/tazo NIKA 8), Klebsiella (R to levofloxacin, gentamicin NIKA:<1)    S/p hebert exchange 10/27. >100,000 colonies of pseudomonas in Urine cx 10/29- likely colonizer. No dysuria.      persistent leukocytosis. Leukocytosis out of proportion to physical findings    nausea, increased abdominal distension on 10/28 concerning for partially treated c.diff- hence, switched to fidaxomicin on 10/28. No blasts noted on peripheral smear.   Toxic granulation noted GI symptoms/worsening noted on today's exam. Radiological improvement will likely lag behind clinical improvement    + Left lower extremity DVT-plan for IVC filter noted    Antibiotic management further complicated by prolonged QTc noted on recent EKG precluding use of quinolones    Clinically better. No worsening hematuria. Carlos removed today  Continued improvement in leukocytosis    Recommendations:     Cont. Diflucan for Candida cystitis, probable fungemia till 11/19/23. Recommend prolonged po vancomycin taper for severe c.diff colitis (125 mg po q 6 hour till 11/15 followed by 125 mg po q 12 hour till 11/20 followed by 125 mg po every other day till 11/30/23)  Follow up  urology recommendations for hematuria, voiding trial  Management of DVT per primary team  Discharge planning per primary team            Above plan was discussed in details with  patient, RN, patient's wife/sister at bedside. Please call me if any further questions or concerns. Will continue to participate in the care of this patient. HPI:  Feels better. Denies worsening abdominal pain, nausea, vomiting  Denies dysuria.  No nausea, vomiting, increased abdominal pain     Past Medical History:   Diagnosis Date    Anemia     Arthritis     Atrial fibrillation ([address])     Broken leg 09/2022    CHF exacerbation ([address]) 01/08/2021    CKD (chronic kidney disease), stage III ([address])     Essential hypertension 01/18/2016    Exercise tolerance finding 10/2020    WORKS 8HRS/DAY NO CP OR SOB ON EXERTION    Exercise tolerance finding 05/2019    sob when up 2 flights of stairs no cp    Gout     Hay fever     Hearing impaired     History of pneumonia     Hyperkalemia 10/2020    Hypothyroidism     Leukocytosis     Renal insufficiency     Varicella     Vertigo     Vitamin D deficiency        Past Surgical History:   Procedure Laterality Date    BLADDER SURGERY  04/12/2023    CARPAL TUNNEL RELEASE Left     COLOSTOMY  04/11/2023    CORONARY ANGIOPLASTY WITH

## 2023-11-10 NOTE — PROGRESS NOTES
TRANSFER - OUT REPORT:    Verbal report given to Blanche RN on Moris Kan  being transferred to [facility] for routine post-op       Report consisted of patient's Situation, Background, Assessment and   Recommendations(SBAR). Information from the following report(s) Nurse Handoff Report was reviewed with the receiving nurse. Lines:   Peripheral IV 10/21/23 Right Wrist (Active)   Site Assessment Clean, dry & intact 11/10/23 0800   Line Status Capped;Flushed 11/10/23 0800   Line Care Connections checked and tightened 11/10/23 0800   Phlebitis Assessment No symptoms 11/10/23 0800   Infiltration Assessment 0 11/10/23 0800   Alcohol Cap Used Yes 11/10/23 0800   Dressing Status Clean, dry & intact 11/10/23 0800   Dressing Type Transparent 11/10/23 0800       Peripheral IV 10/29/23 Left Antecubital (Active)   Site Assessment Clean, dry & intact 11/10/23 0800   Line Status Flushed;Capped 11/10/23 0800   Line Care Connections checked and tightened 11/10/23 0800   Phlebitis Assessment No symptoms 11/10/23 0800   Infiltration Assessment 0 11/10/23 0800   Alcohol Cap Used Yes 11/10/23 0800   Dressing Status Clean, dry & intact 11/10/23 0800   Dressing Type Transparent 11/10/23 0800   Dressing Intervention New 11/04/23 0340       Tunneled Hemodialysis Catheter Right Subclavian (Active)   $Tunneled Hemodialysis Catheter $Yes 11/04/23 0340   Access Status  Accessed 11/10/23 1149   Continued need for line? Yes 11/10/23 1149   Site Assessment Clean, dry & intact 11/10/23 1149   CVC Lumen Status Heparin locked; Capped 11/10/23 1149   Venous Lumen Status Heparin locked; Capped 11/10/23 1149   Arterial Lumen Status Heparin locked; Capped 11/10/23 1149   Alcohol Cap Used Yes 11/10/23 1149   Line Care Connections checked and tightened 11/10/23 1149   Dressing Type Sterile dressing, transparent 11/10/23 1149   Date of Last Dressing Change 11/06/23 11/10/23 1149   Dressing Status Clean, dry & intact 11/10/23 1149   Dressing Intervention New 11/10/23 1149   Verification by x-ray No 11/10/23 1149   Dressing Change Due 11/13/23 11/10/23 1149        Opportunity for questions and clarification was provided.       Patient transported with:  [employer]

## 2023-11-10 NOTE — PLAN OF CARE
INTERVENTION:  HEMODYNAMIC STABILIZATION  MAINTAIN BP WNL WHILE ON HD. INTERVENTION:  FLUID MANAGEMENT  WILL ATTEMPT 2000 ML TOTAL FLUID REMOVAL AS TOLERATED. INTERVENTION:  METABOLIC/ELECTROLYTE MANAGEMENT  2.0 POTASSIUM 2.5 CALCIUM DIALYSATE USED WITH HD TODAY. INTERVENTION:  HEMODIALYSIS ACCESS SITE MANAGEMENT  RIGHT CVC ACCESSED USING ASEPTIC TECHNIQUE. GOAL:  SIGNS AND SYMPTOMS OF LISTED POTENTIAL PROBLEMS WILL BE ABSENT OR MANAGEABLE. OUTCOME:  PROGRESSING. HD PLANNED FOR 3 HOURS TODAY.        Problem: Chronic Conditions and Co-morbidities  Goal: Patient's chronic conditions and co-morbidity symptoms are monitored and maintained or improved  11/10/2023 0924 by Tahmina Montenegro RN  Outcome: Progressing

## 2023-11-10 NOTE — PROGRESS NOTES
Patient still NPO for IVC filter. Patient has not voided yet.  Primofit in place per family & MD Wilbert Johnson request.

## 2023-11-10 NOTE — PROGRESS NOTES
Physical Therapy    Pt not seen for skilled PT due to:    []  Nausea/vomiting  []  Eating  []  Pain  []  Pt lethargic  [x]  Off Unit (at dialysis 1016)  Other:     Will f/u later as schedule allows. Thank you.   Estrella Vera, PT, DPT

## 2023-11-10 NOTE — PROCEDURES
RADIOLOGY POST PROCEDURE NOTE     November 10, 2023       2:48 PM     Preoperative Diagnosis:   Gastrointestinal bleeding. Urothelial carcinoma. Contraindication to anticoagulation. Postoperative Diagnosis:  Same. :  Dr. Mary Alice Tijerina    Assistant:  None. Type of Anesthesia: 1% local lidocaine and IV moderate sedation with Versed and Fentanyl. Procedure/Description: Image guided IVC filter placement. Findings:   No bleeding. Estimated blood Loss: Minimal    Specimen Removed: No    Blood transfusions:  None. Implants: IVC infrarenal retrievable MRI and CT compatible argon option Elite filter. Complications: None    Condition: Stable    Discharge Plan: Continue present therapy.     Senait Woods MD

## 2023-11-10 NOTE — PROGRESS NOTES
Preprocedure Assessment      Today 11/10/2023     Indication/Symptoms:  Bhavani Miller is a 80 y. o.with peronoeal DVT, urothelial carcinoma, and anticoagulation intolerance here for an IVC filter placement with moderate sedation    The H & P and/or progress notes and any available imaging were reviewed. The risks, indications and possible alternatives to the procedure, including doing nothing, were discussed and informed consent was obtained. Physical Exam:    Mallampati 2 ASA 3   General: Awake and alert, NAD   Heart:   RRR  Lungs:    Normal work of breathing    The patient is an appropriate candidate to undergo the planned procedure and sedation.     YOSELIN Dickson

## 2023-11-10 NOTE — PROGRESS NOTES
RENAL DAILY PROGRESS NOTE    Subjective:       Complaint:     Overnight events noted  No abdominal pain reported      IMPRESSION:   ESRD on MWF dialysis  Access: TDC  Sob,fluid overload  UTI, C diff, Leucocytosis  Anemia, refused epogen  Chronic indwelling hebert due to hx of bladder cancer   PLAN:   Continue dialysis mon wed Friday. no albumin .give po midodrine 15 mg po tid           Current Facility-Administered Medications   Medication Dose Route Frequency    vancomycin (VANCOCIN) 50 mg/mL oral solution 125 mg  125 mg Oral 4 times per day    dronabinol (MARINOL) capsule 5 mg  5 mg Oral TID AC    0.9 % sodium chloride infusion   IntraVENous PRN    diatrizoate meglumine-sodium (GASTROGRAFIN) 66-10 % solution 30 mL  30 mL Oral ONCE PRN    fluconazole (DIFLUCAN) tablet 200 mg  200 mg Oral Daily    midodrine (PROAMATINE) tablet 15 mg  15 mg Oral TID WC    [Held by provider] carvedilol (COREG) tablet 3.125 mg  3.125 mg Oral BID    acetaminophen (TYLENOL) tablet 650 mg  650 mg Oral Q6H PRN    acetaminophen (TYLENOL) tablet 1,000 mg  1,000 mg Oral q8h    traMADol (ULTRAM) tablet 25 mg  25 mg Oral Q12H PRN    insulin lispro (HUMALOG) injection vial 0-4 Units  0-4 Units SubCUTAneous TID WC    insulin lispro (HUMALOG) injection vial 0-4 Units  0-4 Units SubCUTAneous Nightly    glucose chewable tablet 16 g  4 tablet Oral PRN    dextrose bolus 10% 125 mL  125 mL IntraVENous PRN    Or    dextrose bolus 10% 250 mL  250 mL IntraVENous PRN    glucagon (rDNA) injection 1 mg  1 mg SubCUTAneous PRN    dextrose 10 % infusion   IntraVENous Continuous PRN    insulin glargine (LANTUS) injection vial 5 Units  5 Units SubCUTAneous Nightly    simethicone (MYLICON) chewable tablet 80 mg  80 mg Oral Q6H PRN    atorvastatin (LIPITOR) tablet 80 mg  80 mg Oral Nightly    albumin human 25% IV solution 25 g  25 g IntraVENous PRN    allopurinol (ZYLOPRIM) tablet 100 mg  100 mg Oral Q MWF    diphenhydrAMINE (BENYLIN) 12.5 MG/5ML liquid 12.5 mg

## 2023-11-10 NOTE — PLAN OF CARE
Problem: Discharge Planning  Goal: Discharge to home or other facility with appropriate resources  11/10/2023 0702 by Alma Lugo RN  Outcome: Progressing  11/10/2023 0700 by Alma Lugo [facility]  Newton Solo (Taken 11/9/2023 2032)  Discharge to home or other facility with appropriate resources:   Identify barriers to discharge with patient and caregiver   Arrange for needed discharge resources and transportation as appropriate   Identify discharge learning needs (meds, wound care, etc)   Refer to discharge planning if patient needs post-hospital services based on physician order or complex needs related to functional status, cognitive ability or social support system     Problem: Safety - Adult  Goal: Free from fall injury  11/10/2023 0702 by Alma Lugo RN  Outcome: Progressing  11/10/2023 0700 by Alma Lugo RN  Flowsheets (Taken 11/1/2023 2350)  Free From Fall Injury:   Instruct family/caregiver on patient safety   Based on caregiver fall risk screen, instruct family/caregiver to ask for assistance with transferring infant if caregiver noted to have fall risk factors     Problem: Skin/Tissue Integrity  Goal: Absence of new skin breakdown  Description: 1. Monitor for areas of redness and/or skin breakdown  2. Assess vascular access sites hourly  3. Every 4-6 hours minimum:  Change oxygen saturation probe site  4. Every 4-6 hours:  If on nasal continuous positive airway pressure, respiratory therapy assess nares and determine need for appliance change or resting period.   Outcome: Progressing     Problem: Chronic Conditions and Co-morbidities  Goal: Patient's chronic conditions and co-morbidity symptoms are monitored and maintained or improved  11/10/2023 0702 by Alma Lugo RN  Outcome: Progressing  11/10/2023 0700 by Alma Lugo RN  Flowsheets (Taken 11/9/2023 2032)  Care Plan - Patient's Chronic Conditions and Co-Morbidity Symptoms are Monitored and Maintained or Improved:   Monitor and assess ordered medications to maintain glucose within target range   Assess barriers to adequate nutritional intake and initiate nutrition consult as needed   Instruct patient on self management of diabetes and initiate consult as needed     Problem: Cardiovascular - Adult  Goal: Maintains optimal cardiac output and hemodynamic stability  11/10/2023 0702 by Mel Kern RN  Outcome: Progressing  11/10/2023 0700 by Mel Kern RN  Flowsheets (Taken 11/9/2023 2032)  Maintains optimal cardiac output and hemodynamic stability:   Monitor blood pressure and heart rate   Monitor urine output and notify Licensed Independent Practitioner for values outside of normal range   Assess for signs of decreased cardiac output   Administer fluid and/or volume expanders as ordered   Administer vasoactive medications as ordered  Goal: Absence of cardiac dysrhythmias or at baseline  11/10/2023 0702 by Mel Kern RN  Outcome: Progressing  11/10/2023 0700 by Mel Kern RN  Flowsheets (Taken 11/9/2023 2032)  Absence of cardiac dysrhythmias or at baseline:   Monitor cardiac rate and rhythm   Assess for signs of decreased cardiac output   Administer antiarrhythmia medication and electrolyte replacement as ordered

## 2023-11-10 NOTE — CARE COORDINATION
Updated dialysis sheets from today and progress note from Dr. Brenda Ritter regarding albumin and dialysis [facility]. Left message for All Pickard at the facility to please start auth.     [facility] RN CDCES  Case Management

## 2023-11-10 NOTE — PROGRESS NOTES
Received pre HD report from  Chiki Ramirez RN. Pt in bed, A+O 3, no s/s of acute distress noted. Accessed right CVC w/o complications. Tx initiated at [address] [facility] [place] . CVC flowing with ease. UF goal 2000 ml. Offered assistance with repositioning every 2 hours. Vascular access visible at all times during treatment, line connections intact at all times. Tx completed at 1149, Albumin administered x 2 d/t hypotension. 2L removed. De-accessed per protocol. Heparin indwell 1.8ml in arterial, and 1.8ml in venous catheter. Unit nurse SSebastian Kuo, DEAN given report.

## 2023-11-10 NOTE — PROGRESS NOTES
Patient has not voided since hebert removal. Bladder scan 0ml. Patient has been NPO since midnight. Patient back from IVC filter placement at this time. Meal provided. Patients family also states patient does not usually void after dialysis. Primofit reconnected will monitor urine output.

## 2023-11-11 LAB
ANION GAP SERPL CALC-SCNC: 9 MMOL/L (ref 3–18)
BASOPHILS # BLD: 0.1 K/UL (ref 0–0.1)
BASOPHILS NFR BLD: 1 % (ref 0–2)
BUN SERPL-MCNC: 18 MG/DL (ref 7–18)
BUN/CREAT SERPL: 4 (ref 12–20)
CALCIUM SERPL-MCNC: 8.6 MG/DL (ref 8.5–10.1)
CHLORIDE SERPL-SCNC: 102 MMOL/L (ref 100–111)
CO2 SERPL-SCNC: 24 MMOL/L (ref 21–32)
CREAT SERPL-MCNC: 4.09 MG/DL (ref 0.6–1.3)
DIFFERENTIAL METHOD BLD: ABNORMAL
EOSINOPHIL # BLD: 0.2 K/UL (ref 0–0.4)
EOSINOPHIL NFR BLD: 1 % (ref 0–5)
ERYTHROCYTE [DISTWIDTH] IN BLOOD BY AUTOMATED COUNT: 22.5 % (ref 11.6–14.5)
GLUCOSE BLD STRIP.AUTO-MCNC: 142 MG/DL (ref 70–110)
GLUCOSE BLD STRIP.AUTO-MCNC: 82 MG/DL (ref 70–110)
GLUCOSE BLD STRIP.AUTO-MCNC: 91 MG/DL (ref 70–110)
GLUCOSE BLD STRIP.AUTO-MCNC: 98 MG/DL (ref 70–110)
GLUCOSE SERPL-MCNC: 86 MG/DL (ref 74–99)
HCT VFR BLD AUTO: 26.8 % (ref 36–48)
HGB BLD-MCNC: 8.5 G/DL (ref 13–16)
IMM GRANULOCYTES # BLD AUTO: 0.3 K/UL (ref 0–0.04)
IMM GRANULOCYTES NFR BLD AUTO: 2 % (ref 0–0.5)
LYMPHOCYTES # BLD: 2 K/UL (ref 0.9–3.6)
LYMPHOCYTES NFR BLD: 17 % (ref 21–52)
MCH RBC QN AUTO: 33.9 PG (ref 24–34)
MCHC RBC AUTO-ENTMCNC: 31.7 G/DL (ref 31–37)
MCV RBC AUTO: 106.8 FL (ref 78–100)
MONOCYTES # BLD: 0.6 K/UL (ref 0.05–1.2)
MONOCYTES NFR BLD: 5 % (ref 3–10)
NEUTS SEG # BLD: 9.2 K/UL (ref 1.8–8)
NEUTS SEG NFR BLD: 75 % (ref 40–73)
NRBC # BLD: 0 K/UL (ref 0–0.01)
NRBC BLD-RTO: 0 PER 100 WBC
PLATELET # BLD AUTO: 311 K/UL (ref 135–420)
PMV BLD AUTO: 11.6 FL (ref 9.2–11.8)
POTASSIUM SERPL-SCNC: 3.8 MMOL/L (ref 3.5–5.5)
RBC # BLD AUTO: 2.51 M/UL (ref 4.35–5.65)
SODIUM SERPL-SCNC: 135 MMOL/L (ref 136–145)
WBC # BLD AUTO: 12.4 K/UL (ref 4.6–13.2)

## 2023-11-11 PROCEDURE — 94761 N-INVAS EAR/PLS OXIMETRY MLT: CPT

## 2023-11-11 PROCEDURE — 6370000000 HC RX 637 (ALT 250 FOR IP): Performed by: INTERNAL MEDICINE

## 2023-11-11 PROCEDURE — 6370000000 HC RX 637 (ALT 250 FOR IP): Performed by: STUDENT IN AN ORGANIZED HEALTH CARE EDUCATION/TRAINING PROGRAM

## 2023-11-11 PROCEDURE — 82962 GLUCOSE BLOOD TEST: CPT

## 2023-11-11 PROCEDURE — 6360000002 HC RX W HCPCS: Performed by: INTERNAL MEDICINE

## 2023-11-11 PROCEDURE — 99232 SBSQ HOSP IP/OBS MODERATE 35: CPT | Performed by: HOSPITALIST

## 2023-11-11 PROCEDURE — 97112 NEUROMUSCULAR REEDUCATION: CPT

## 2023-11-11 PROCEDURE — 85025 COMPLETE CBC W/AUTO DIFF WBC: CPT

## 2023-11-11 PROCEDURE — 2580000003 HC RX 258: Performed by: INTERNAL MEDICINE

## 2023-11-11 PROCEDURE — 80048 BASIC METABOLIC PNL TOTAL CA: CPT

## 2023-11-11 PROCEDURE — 2580000003 HC RX 258: Performed by: STUDENT IN AN ORGANIZED HEALTH CARE EDUCATION/TRAINING PROGRAM

## 2023-11-11 PROCEDURE — 97535 SELF CARE MNGMENT TRAINING: CPT

## 2023-11-11 PROCEDURE — 36415 COLL VENOUS BLD VENIPUNCTURE: CPT

## 2023-11-11 PROCEDURE — 2140000001 HC CVICU INTERMEDIATE R&B

## 2023-11-11 PROCEDURE — 6370000000 HC RX 637 (ALT 250 FOR IP): Performed by: HOSPITALIST

## 2023-11-11 RX ORDER — ACETAMINOPHEN 325 MG/1
650 TABLET ORAL EVERY 6 HOURS PRN
Status: DISCONTINUED | OUTPATIENT
Start: 2023-11-11 | End: 2023-11-25 | Stop reason: HOSPADM

## 2023-11-11 RX ADMIN — AMIODARONE HYDROCHLORIDE 200 MG: 200 TABLET ORAL at 09:12

## 2023-11-11 RX ADMIN — ACETAMINOPHEN 1000 MG: 500 TABLET ORAL at 06:08

## 2023-11-11 RX ADMIN — DRONABINOL 5 MG: 2.5 CAPSULE ORAL at 11:50

## 2023-11-11 RX ADMIN — ACETAMINOPHEN 1000 MG: 500 TABLET ORAL at 12:58

## 2023-11-11 RX ADMIN — DRONABINOL 5 MG: 2.5 CAPSULE ORAL at 09:12

## 2023-11-11 RX ADMIN — VANCOMYCIN HYDROCHLORIDE 125 MG: 5 INJECTION, POWDER, LYOPHILIZED, FOR SOLUTION INTRAVENOUS at 17:07

## 2023-11-11 RX ADMIN — ATORVASTATIN CALCIUM 80 MG: 40 TABLET, FILM COATED ORAL at 20:52

## 2023-11-11 RX ADMIN — LEVOTHYROXINE SODIUM 125 MCG: 125 TABLET ORAL at 06:08

## 2023-11-11 RX ADMIN — TRAMADOL HYDROCHLORIDE 25 MG: 50 TABLET ORAL at 17:34

## 2023-11-11 RX ADMIN — MIDODRINE HYDROCHLORIDE 15 MG: 10 TABLET ORAL at 17:06

## 2023-11-11 RX ADMIN — TRAMADOL HYDROCHLORIDE 25 MG: 50 TABLET ORAL at 00:46

## 2023-11-11 RX ADMIN — FLUCONAZOLE 200 MG: 200 TABLET ORAL at 09:13

## 2023-11-11 RX ADMIN — VANCOMYCIN HYDROCHLORIDE 125 MG: 5 INJECTION, POWDER, LYOPHILIZED, FOR SOLUTION INTRAVENOUS at 00:45

## 2023-11-11 RX ADMIN — SODIUM CHLORIDE, PRESERVATIVE FREE 10 ML: 5 INJECTION INTRAVENOUS at 20:53

## 2023-11-11 RX ADMIN — NEPHROCAP 1 MG: 1 CAP ORAL at 09:13

## 2023-11-11 RX ADMIN — DRONABINOL 5 MG: 2.5 CAPSULE ORAL at 17:06

## 2023-11-11 RX ADMIN — SODIUM CHLORIDE, PRESERVATIVE FREE 10 ML: 5 INJECTION INTRAVENOUS at 09:18

## 2023-11-11 RX ADMIN — VANCOMYCIN HYDROCHLORIDE 125 MG: 5 INJECTION, POWDER, LYOPHILIZED, FOR SOLUTION INTRAVENOUS at 12:59

## 2023-11-11 RX ADMIN — ACETAMINOPHEN 325MG 650 MG: 325 TABLET ORAL at 23:34

## 2023-11-11 RX ADMIN — VANCOMYCIN HYDROCHLORIDE 125 MG: 5 INJECTION, POWDER, LYOPHILIZED, FOR SOLUTION INTRAVENOUS at 23:34

## 2023-11-11 RX ADMIN — MIDODRINE HYDROCHLORIDE 15 MG: 10 TABLET ORAL at 11:50

## 2023-11-11 RX ADMIN — VANCOMYCIN HYDROCHLORIDE 125 MG: 5 INJECTION, POWDER, LYOPHILIZED, FOR SOLUTION INTRAVENOUS at 06:06

## 2023-11-11 RX ADMIN — MIDODRINE HYDROCHLORIDE 15 MG: 10 TABLET ORAL at 09:12

## 2023-11-11 ASSESSMENT — PAIN - FUNCTIONAL ASSESSMENT
PAIN_FUNCTIONAL_ASSESSMENT: PREVENTS OR INTERFERES SOME ACTIVE ACTIVITIES AND ADLS

## 2023-11-11 ASSESSMENT — PAIN DESCRIPTION - LOCATION
LOCATION: FOOT
LOCATION: LEG
LOCATION: FOOT
LOCATION: FOOT

## 2023-11-11 ASSESSMENT — PAIN SCALES - GENERAL
PAINLEVEL_OUTOF10: 0
PAINLEVEL_OUTOF10: 3
PAINLEVEL_OUTOF10: 6
PAINLEVEL_OUTOF10: 0
PAINLEVEL_OUTOF10: 5
PAINLEVEL_OUTOF10: 0
PAINLEVEL_OUTOF10: 5

## 2023-11-11 ASSESSMENT — PAIN DESCRIPTION - ORIENTATION
ORIENTATION: LEFT;RIGHT
ORIENTATION: LEFT

## 2023-11-11 ASSESSMENT — PAIN DESCRIPTION - DESCRIPTORS
DESCRIPTORS: ACHING;SHARP;SHOOTING
DESCRIPTORS: ACHING
DESCRIPTORS: ACHING
DESCRIPTORS: ACHING;SHARP;SHOOTING

## 2023-11-11 NOTE — PLAN OF CARE
Problem: Occupational Therapy - Adult  Goal: By Discharge: Performs self-care activities at highest level of function for planned discharge setting. See evaluation for individualized goals. Description: Occupational Therapy Goals:  Initiated 10/23/2023 to be met within 7-10 days, re-evaluation 11/7/2023 pt is making slow but steady progress towards goals and is motivated to participate in skilled OT services in order to reach maximal functional potential towards goals    1. Patient will perform upper body dressing with minimal assistance/contact guard assist.   2.  Patient will perform lower body dressing with minimal assistance/contact guard assist.  3.  Patient will perform functional task in standing for 3 minutes with min assist for balance. 4.  Patient will perform toilet transfers with minimal assistance/contact guard assist.  5.  Patient will participate in upper extremity therapeutic exercise/activities with supervision/set-up for 8-10 minutes to increase strength/endurance for ADLs. PLOF: Patient required min to mod assist with basic self care tasks and used a RW for functional mobility PTA. Outcome: Progressing   OCCUPATIONAL THERAPY TREATMENT    Patient: Gabriela Trevizo (40 y.o. male)  Date: 11/11/2023  Diagnosis: UTI (urinary tract infection) [N39.0]  Cystitis [N30.90]  Hypoxia [R09.02]  Leukocytosis, unspecified type [D72.829]  Urinary tract infection associated with indwelling urethral catheter, initial encounter ([address]) [T83.511A, N39.0] Severe sepsis ([address])  Procedure(s) (LRB):  EGD ESOPHAGOGASTRODUODENOSCOPY (N/A)    Precautions: Fall Risk, Contact Precautions, General Precautions, Bed Alarm, Isolation    Chart, occupational therapy assessment, plan of care, and goals were reviewed. ASSESSMENT:  Pt requires max encouragement for EOB activity 2/2 c/o generalized pain w/minimal touch/movement.  Pt requires MAX/TOTAL A w/bed mobility and tolerates ~ 10 minutes at EOB w/constant support 2/2 R Pt presents to infusion today for IV iron dextran. Pt was getting out of his truck this morning in the parking lot and felt dizzy, he was put into a wheelchair. Pt states he has diabetes and did not eat this morning. Orange juice provided to pt.   He is lateral lean. Pt returned to supine for simple ADL grooming tasks and self-feeding ADL. Decrease BUE ROM and strength require assist w/simple ADLs at bed level. Educated on importance UE Therex and encouraged to perform throughout the day. Progression toward goals:  []          Improving appropriately and progressing toward goals  [x]          Improving slowly and progressing toward goals  []          Not making progress toward goals and plan of care will be adjusted     PLAN:  Patient continues to benefit from skilled intervention to address the above impairments. Continue treatment per established plan of care. Further Equipment Recommendations for Discharge:  TBD by next level of care    AMPA: AM-PAC Inpatient Daily Activity Raw Score: 12    Current research shows that an AM-PAC score of 17 or less is not associated with a discharge to the patient's home setting. Based on an AM-PAC score and their current ADL deficits; it is recommended that the patient have 3-5 sessions per week of Occupational Therapy at d/c to increase the patient's independence. This AMPA score should be considered in conjunction with interdisciplinary team recommendations to determine the most appropriate discharge setting. Patient's social support, diagnosis, medical stability, and prior level of function should also be taken into consideration. SUBJECTIVE:   Patient stated, \"Honey, . \"    OBJECTIVE DATA SUMMARY:   Cognitive/Behavioral Status:  Orientation  Orientation Level: Oriented to place;Oriented to person    Functional Mobility and Transfers for ADLs:   Bed Mobility:  Bed Mobility Training  Bed Mobility Training: Yes  Rolling: Maximum assistance  Supine to Sit: Maximum assistance  Sit to Supine: Maximum assistance  Scooting: Maximum assistance     Transfers:  Transfer Training  Transfer Training: No    Balance:  Balance  Sitting: Impaired  Sitting - Static: Fair (occasional)  Sitting - Dynamic: Poor (constant

## 2023-11-11 NOTE — PROGRESS NOTES
RENAL DAILY PROGRESS NOTE    Subjective:       Complaint:     Overnight events noted  No abdominal pain reported      IMPRESSION:   ESRD on MWF dialysis  Access: TDC  Sob,fluid overload  UTI, C diff, Leucocytosis  Anemia, refused epogen  Chronic indwelling hebert due to hx of bladder cancer   PLAN:   Continue dialysis mon wed Friday. told dialysis nurses to stop albumin during dialysis . give po midodrine 15 mg po tid           Current Facility-Administered Medications   Medication Dose Route Frequency    vancomycin (VANCOCIN) 50 mg/mL oral solution 125 mg  125 mg Oral 4 times per day    dronabinol (MARINOL) capsule 5 mg  5 mg Oral TID AC    0.9 % sodium chloride infusion   IntraVENous PRN    diatrizoate meglumine-sodium (GASTROGRAFIN) 66-10 % solution 30 mL  30 mL Oral ONCE PRN    fluconazole (DIFLUCAN) tablet 200 mg  200 mg Oral Daily    midodrine (PROAMATINE) tablet 15 mg  15 mg Oral TID WC    [Held by provider] carvedilol (COREG) tablet 3.125 mg  3.125 mg Oral BID    acetaminophen (TYLENOL) tablet 650 mg  650 mg Oral Q6H PRN    acetaminophen (TYLENOL) tablet 1,000 mg  1,000 mg Oral q8h    traMADol (ULTRAM) tablet 25 mg  25 mg Oral Q12H PRN    insulin lispro (HUMALOG) injection vial 0-4 Units  0-4 Units SubCUTAneous TID WC    insulin lispro (HUMALOG) injection vial 0-4 Units  0-4 Units SubCUTAneous Nightly    glucose chewable tablet 16 g  4 tablet Oral PRN    dextrose bolus 10% 125 mL  125 mL IntraVENous PRN    Or    dextrose bolus 10% 250 mL  250 mL IntraVENous PRN    glucagon (rDNA) injection 1 mg  1 mg SubCUTAneous PRN    dextrose 10 % infusion   IntraVENous Continuous PRN    [Held by provider] insulin glargine (LANTUS) injection vial 5 Units  5 Units SubCUTAneous Nightly    simethicone (MYLICON) chewable tablet 80 mg  80 mg Oral Q6H PRN    atorvastatin (LIPITOR) tablet 80 mg  80 mg Oral Nightly    albumin human 25% IV solution 25 g  25 g IntraVENous PRN    allopurinol (ZYLOPRIM) tablet 100 mg  100 mg Oral Review:     LABS:   Hematology:   Recent Labs     11/10/23  0521 11/11/23  0136   WBC 16.1* 12.4   HGB 9.1* 8.5*   HCT 28.8* 26.8*       Chemistry:   Recent Labs     11/10/23  0521 11/11/23  0136   BUN 28* 18   K 4.1 3.8   * 135*    102   CO2 26 24              Procedures/imaging: see electronic medical records for all procedures, Xrays and details which were not copied into this note but were reviewed prior to creation of Plan          Assessment & Plan:     See above        Darin Beach MD  11/11/2023  3:04 PM

## 2023-11-11 NOTE — PLAN OF CARE
Problem: Discharge Planning  Goal: Discharge to home or other facility with appropriate resources  Outcome: Progressing     Problem: Safety - Adult  Goal: Free from fall injury  Outcome: Progressing     Problem: Skin/Tissue Integrity  Goal: Absence of new skin breakdown  Description: 1. Monitor for areas of redness and/or skin breakdown  2. Assess vascular access sites hourly  3. Every 4-6 hours minimum:  Change oxygen saturation probe site  4. Every 4-6 hours:  If on nasal continuous positive airway pressure, respiratory therapy assess nares and determine need for appliance change or resting period.   Outcome: Progressing     Problem: Chronic Conditions and Co-morbidities  Goal: Patient's chronic conditions and co-morbidity symptoms are monitored and maintained or improved  Outcome: Progressing     Problem: ABCDS Injury Assessment  Goal: Absence of physical injury  Outcome: Progressing     Problem: Pain  Goal: Verbalizes/displays adequate comfort level or baseline comfort level  Outcome: Progressing     Problem: Nutrition Deficit:  Goal: Optimize nutritional status  Outcome: Progressing     Problem: Neurosensory - Adult  Goal: Achieves stable or improved neurological status  Outcome: Progressing     Problem: Respiratory - Adult  Goal: Achieves optimal ventilation and oxygenation  Outcome: Progressing     Problem: Skin/Tissue Integrity - Adult  Goal: Skin integrity remains intact  Outcome: Progressing  Flowsheets (Taken 11/11/2023 5738)  Skin Integrity Remains Intact: Monitor for areas of redness and/or skin breakdown  Goal: Incisions, wounds, or drain sites healing without S/S of infection  Outcome: Progressing     Problem: Musculoskeletal - Adult  Goal: Return mobility to safest level of function  Outcome: Progressing  Goal: Return ADL status to a safe level of function  Outcome: Progressing     Problem: Gastrointestinal - Adult  Goal: Minimal or absence of nausea and vomiting  Outcome: Progressing  Goal:

## 2023-11-11 NOTE — PROGRESS NOTES
Hospitalist Progress Note    Patient: Xu Ndiaye Age: 80 y.o. : 1935 MR#: [id number] SSN: [id number]  Date/Time: 2023 11:02 AM    DOA: 10/21/2023  PCP: Guzman Collier MD    Subjective:     POD1 Successful, uncomplicated infrarenal, retrievable, MRI compatible, inferior vena cava filter placement. Overnight voided, however amount not documented. Hb has fallen to 8.5 from 9.1 yesterday. Per nephrology, stopping albumin on dialysis,     States full name. In [facility]. \" Year [id number]. No family at bedside     Bladder scan 106 mL 11:20 am, 205 mL 4:05 pm.       Assessment/Plan:     Severe sepsis POA, due to C. difficile colitis, also treated for Pseudomonas cystitis secondary to indwelling Hebert catheter, resolved. Severe C. difficile colitis, with concern for [place]'s vs fulminant C.diff with pancolitis and infarct spleen. Improving s/p dificid. prolonged po vancomycin taper for severe c.diff colitis (125 mg po q 6 hour till 11/15 followed by 125 mg po q 12 hour till  followed by 125 mg po every other day till 23) per ID recommendations. New splenic wedge-shaped hypodensity concerning for acute splenic infarct in recent CT abdomen/pelvic  Small volume ascites noted, likely reactive to colitis. ESRD on HD, per nephrology. Acute respiratory failure with hypoxia, currently requiring 5 L/min NC oxygen supplementation. Chronic HFmrEF, acute exacerbation with volume overload in the settings of ESRD, sepsis, require HD for volume management  Paroxysmal atrial fibrillation, elevated [id number], on chronic DOAC outpatient  CAD, s/p RCA stent in 2018, currently stable  10. dyslipidemia  Hypertension, with relative hypotension. Coreg held. Midodrine per nephro services. History of bladder cancer, require indwelling Hebert catheter, Currently with Gross hematuria since his hebert was exchanged 23. Voiding trial underway, so far no PVR > 350 mL.   Urology input 2 %    Immature Granulocytes 2 (H) 0.0 - 0.5 %    Neutrophils Absolute 9.2 (H) 1.8 - 8.0 K/UL    Lymphocytes Absolute 2.0 0.9 - 3.6 K/UL    Monocytes Absolute 0.6 0.05 - 1.2 K/UL    Eosinophils Absolute 0.2 0.0 - 0.4 K/UL    Basophils Absolute 0.1 0.0 - 0.1 K/UL    Absolute Immature Granulocyte 0.3 (H) 0.00 - 0.04 K/UL    Differential Type AUTOMATED     Basic Metabolic Panel    Collection Time: 11/11/23  1:36 AM   Result Value Ref Range    Sodium 135 (L) 136 - 145 mmol/L    Potassium 3.8 3.5 - 5.5 mmol/L    Chloride 102 100 - 111 mmol/L    CO2 24 21 - 32 mmol/L    Anion Gap 9 3.0 - 18 mmol/L    Glucose 86 74 - 99 mg/dL    BUN 18 7.0 - 18 MG/DL    Creatinine 4.09 (H) 0.6 - 1.3 MG/DL    Bun/Cre Ratio 4 (L) 12 - 20      Est, Glom Filt Rate 13 (L) >60 ml/min/1.73m2    Calcium 8.6 8.5 - 10.1 MG/DL   POCT Glucose    Collection Time: 11/11/23  9:22 AM   Result Value Ref Range    POC Glucose 82 70 - 110 mg/dL       Labs: Results:       Chemistry Recent Labs     11/10/23  0521 11/11/23  0136   * 135*   K 4.1 3.8    102   CO2 26 24   BUN 28* 18     No results for input(s): \"ALT\", \"ALKP\", \"TP\", \"ALB\", \"GLOB\" in the last 72 hours.     Invalid input(s): \"TBIL\", \"SGOT\", \"AGRAT\"   CBC w/Diff Recent Labs     11/10/23  0521 11/11/23  0136   WBC 16.1* 12.4   RBC 2.67* 2.51*   HGB 9.1* 8.5*   HCT 28.8* 26.8*   .9* 106.8*   MCH 34.1* 33.9   MCHC 31.6 31.7   RDW 22.9* 22.5*    311      Coagulation Recent Labs     11/08/23  1157   INR 1.3*   APTT 36.7*       Iron/Ferritin    BNP No results found for: \"BNP\"     Cardiac Enzymes HS-troponin:      Lactic Acid    Thyroid Studies No results found for: \"TSH\", \"TSHREFLEX\", \"TSHFT4\", \"TSHELE\", \"INU9ZUJ\", \"TSHHS\"           Results       Procedure Component Value Units Date/Time    Culture, Urine [[id number]]  (Abnormal)  (Susceptibility) Collected: 11/05/23 5536    Order Status: Completed Specimen: Urine, clean catch Updated: 11/08/23 1249     Special Requests NO SPECIAL

## 2023-11-12 LAB
ANION GAP SERPL CALC-SCNC: 8 MMOL/L (ref 3–18)
APPEARANCE UR: ABNORMAL
BACTERIA URNS QL MICRO: ABNORMAL /HPF
BASOPHILS # BLD: 0.1 K/UL (ref 0–0.1)
BASOPHILS NFR BLD: 1 % (ref 0–2)
BILIRUB UR QL: ABNORMAL
BUN SERPL-MCNC: 25 MG/DL (ref 7–18)
BUN/CREAT SERPL: 5 (ref 12–20)
CALCIUM SERPL-MCNC: 8.5 MG/DL (ref 8.5–10.1)
CHLORIDE SERPL-SCNC: 102 MMOL/L (ref 100–111)
CO2 SERPL-SCNC: 23 MMOL/L (ref 21–32)
COLOR UR: ABNORMAL
CREAT SERPL-MCNC: 5.22 MG/DL (ref 0.6–1.3)
DIFFERENTIAL METHOD BLD: ABNORMAL
EOSINOPHIL # BLD: 0.1 K/UL (ref 0–0.4)
EOSINOPHIL NFR BLD: 1 % (ref 0–5)
EPITH CASTS URNS QL MICRO: ABNORMAL /LPF (ref 0–5)
ERYTHROCYTE [DISTWIDTH] IN BLOOD BY AUTOMATED COUNT: 22.2 % (ref 11.6–14.5)
GLUCOSE BLD STRIP.AUTO-MCNC: 111 MG/DL (ref 70–110)
GLUCOSE BLD STRIP.AUTO-MCNC: 111 MG/DL (ref 70–110)
GLUCOSE BLD STRIP.AUTO-MCNC: 115 MG/DL (ref 70–110)
GLUCOSE BLD STRIP.AUTO-MCNC: 123 MG/DL (ref 70–110)
GLUCOSE SERPL-MCNC: 108 MG/DL (ref 74–99)
GLUCOSE UR STRIP.AUTO-MCNC: NEGATIVE MG/DL
HCT VFR BLD AUTO: 26.3 % (ref 36–48)
HGB BLD-MCNC: 8.5 G/DL (ref 13–16)
HGB UR QL STRIP: ABNORMAL
IMM GRANULOCYTES # BLD AUTO: 0.3 K/UL (ref 0–0.04)
IMM GRANULOCYTES NFR BLD AUTO: 2 % (ref 0–0.5)
KETONES UR QL STRIP.AUTO: ABNORMAL MG/DL
LEUKOCYTE ESTERASE UR QL STRIP.AUTO: ABNORMAL
LYMPHOCYTES # BLD: 2.2 K/UL (ref 0.9–3.6)
LYMPHOCYTES NFR BLD: 17 % (ref 21–52)
MCH RBC QN AUTO: 34.1 PG (ref 24–34)
MCHC RBC AUTO-ENTMCNC: 32.3 G/DL (ref 31–37)
MCV RBC AUTO: 105.6 FL (ref 78–100)
MONOCYTES # BLD: 0.6 K/UL (ref 0.05–1.2)
MONOCYTES NFR BLD: 5 % (ref 3–10)
NEUTS SEG # BLD: 9.7 K/UL (ref 1.8–8)
NEUTS SEG NFR BLD: 75 % (ref 40–73)
NITRITE UR QL STRIP.AUTO: NEGATIVE
NRBC # BLD: 0 K/UL (ref 0–0.01)
NRBC BLD-RTO: 0 PER 100 WBC
PH UR STRIP: 5.5 (ref 5–8)
PLATELET # BLD AUTO: 336 K/UL (ref 135–420)
PMV BLD AUTO: 10.8 FL (ref 9.2–11.8)
POTASSIUM SERPL-SCNC: 4.2 MMOL/L (ref 3.5–5.5)
PROT UR STRIP-MCNC: >300 MG/DL
RBC # BLD AUTO: 2.49 M/UL (ref 4.35–5.65)
RBC #/AREA URNS HPF: ABNORMAL /HPF (ref 0–5)
SODIUM SERPL-SCNC: 133 MMOL/L (ref 136–145)
SP GR UR REFRACTOMETRY: 1.01 (ref 1–1.03)
UROBILINOGEN UR QL STRIP.AUTO: 0.2 EU/DL (ref 0.2–1)
WBC # BLD AUTO: 13 K/UL (ref 4.6–13.2)
WBC URNS QL MICRO: ABNORMAL /HPF (ref 0–4)

## 2023-11-12 PROCEDURE — 6370000000 HC RX 637 (ALT 250 FOR IP): Performed by: INTERNAL MEDICINE

## 2023-11-12 PROCEDURE — 2580000003 HC RX 258: Performed by: STUDENT IN AN ORGANIZED HEALTH CARE EDUCATION/TRAINING PROGRAM

## 2023-11-12 PROCEDURE — 6370000000 HC RX 637 (ALT 250 FOR IP): Performed by: HOSPITALIST

## 2023-11-12 PROCEDURE — 85025 COMPLETE CBC W/AUTO DIFF WBC: CPT

## 2023-11-12 PROCEDURE — 51798 US URINE CAPACITY MEASURE: CPT

## 2023-11-12 PROCEDURE — 87086 URINE CULTURE/COLONY COUNT: CPT

## 2023-11-12 PROCEDURE — 2580000003 HC RX 258: Performed by: INTERNAL MEDICINE

## 2023-11-12 PROCEDURE — 80048 BASIC METABOLIC PNL TOTAL CA: CPT

## 2023-11-12 PROCEDURE — 81001 URINALYSIS AUTO W/SCOPE: CPT

## 2023-11-12 PROCEDURE — 6360000002 HC RX W HCPCS: Performed by: INTERNAL MEDICINE

## 2023-11-12 PROCEDURE — 82962 GLUCOSE BLOOD TEST: CPT

## 2023-11-12 PROCEDURE — 36415 COLL VENOUS BLD VENIPUNCTURE: CPT

## 2023-11-12 PROCEDURE — 6370000000 HC RX 637 (ALT 250 FOR IP): Performed by: STUDENT IN AN ORGANIZED HEALTH CARE EDUCATION/TRAINING PROGRAM

## 2023-11-12 PROCEDURE — 99233 SBSQ HOSP IP/OBS HIGH 50: CPT | Performed by: HOSPITALIST

## 2023-11-12 PROCEDURE — 2140000001 HC CVICU INTERMEDIATE R&B

## 2023-11-12 PROCEDURE — 94761 N-INVAS EAR/PLS OXIMETRY MLT: CPT

## 2023-11-12 RX ORDER — TAMSULOSIN HYDROCHLORIDE 0.4 MG/1
0.4 CAPSULE ORAL DAILY
Status: DISCONTINUED | OUTPATIENT
Start: 2023-11-12 | End: 2023-11-25 | Stop reason: HOSPADM

## 2023-11-12 RX ORDER — TROSPIUM CHLORIDE 20 MG/1
20 TABLET, FILM COATED ORAL
Status: DISCONTINUED | OUTPATIENT
Start: 2023-11-12 | End: 2023-11-25 | Stop reason: HOSPADM

## 2023-11-12 RX ORDER — LIDOCAINE HYDROCHLORIDE 20 MG/ML
JELLY TOPICAL PRN
Status: DISCONTINUED | OUTPATIENT
Start: 2023-11-12 | End: 2023-11-25 | Stop reason: HOSPADM

## 2023-11-12 RX ORDER — DRONABINOL 5 MG/1
5 CAPSULE ORAL
Status: DISCONTINUED | OUTPATIENT
Start: 2023-11-13 | End: 2023-11-25 | Stop reason: HOSPADM

## 2023-11-12 RX ADMIN — NEPHROCAP 1 MG: 1 CAP ORAL at 09:28

## 2023-11-12 RX ADMIN — MIDODRINE HYDROCHLORIDE 15 MG: 10 TABLET ORAL at 11:58

## 2023-11-12 RX ADMIN — VANCOMYCIN HYDROCHLORIDE 125 MG: 5 INJECTION, POWDER, LYOPHILIZED, FOR SOLUTION INTRAVENOUS at 12:47

## 2023-11-12 RX ADMIN — TROSPIUM CHLORIDE 20 MG: 20 TABLET, FILM COATED ORAL at 16:39

## 2023-11-12 RX ADMIN — MIDODRINE HYDROCHLORIDE 15 MG: 10 TABLET ORAL at 16:38

## 2023-11-12 RX ADMIN — FLUCONAZOLE 200 MG: 200 TABLET ORAL at 09:29

## 2023-11-12 RX ADMIN — SODIUM CHLORIDE, PRESERVATIVE FREE 10 ML: 5 INJECTION INTRAVENOUS at 09:31

## 2023-11-12 RX ADMIN — ACETAMINOPHEN 325MG 650 MG: 325 TABLET ORAL at 17:28

## 2023-11-12 RX ADMIN — DRONABINOL 5 MG: 2.5 CAPSULE ORAL at 06:34

## 2023-11-12 RX ADMIN — VANCOMYCIN HYDROCHLORIDE 125 MG: 5 INJECTION, POWDER, LYOPHILIZED, FOR SOLUTION INTRAVENOUS at 17:41

## 2023-11-12 RX ADMIN — AMIODARONE HYDROCHLORIDE 200 MG: 200 TABLET ORAL at 09:29

## 2023-11-12 RX ADMIN — SODIUM CHLORIDE, PRESERVATIVE FREE 10 ML: 5 INJECTION INTRAVENOUS at 21:16

## 2023-11-12 RX ADMIN — DRONABINOL 5 MG: 2.5 CAPSULE ORAL at 11:58

## 2023-11-12 RX ADMIN — ATORVASTATIN CALCIUM 80 MG: 40 TABLET, FILM COATED ORAL at 21:15

## 2023-11-12 RX ADMIN — Medication 6 MG: at 21:17

## 2023-11-12 RX ADMIN — DRONABINOL 5 MG: 2.5 CAPSULE ORAL at 16:38

## 2023-11-12 RX ADMIN — LEVOTHYROXINE SODIUM 125 MCG: 125 TABLET ORAL at 06:34

## 2023-11-12 RX ADMIN — VANCOMYCIN HYDROCHLORIDE 125 MG: 5 INJECTION, POWDER, LYOPHILIZED, FOR SOLUTION INTRAVENOUS at 06:41

## 2023-11-12 RX ADMIN — TRAMADOL HYDROCHLORIDE 25 MG: 50 TABLET ORAL at 16:39

## 2023-11-12 RX ADMIN — TAMSULOSIN HYDROCHLORIDE 0.4 MG: 0.4 CAPSULE ORAL at 14:30

## 2023-11-12 RX ADMIN — MIDODRINE HYDROCHLORIDE 15 MG: 10 TABLET ORAL at 09:29

## 2023-11-12 ASSESSMENT — PAIN SCALES - GENERAL
PAINLEVEL_OUTOF10: 5
PAINLEVEL_OUTOF10: 5
PAINLEVEL_OUTOF10: 8

## 2023-11-12 ASSESSMENT — PAIN DESCRIPTION - LOCATION
LOCATION: ABDOMEN
LOCATION: ABDOMEN

## 2023-11-12 NOTE — PROGRESS NOTES
Collected: 11/05/23 1745    Order Status: Completed Specimen: Urine, clean catch Updated: 11/08/23 1249     Special Requests NO SPECIAL REQUESTS        [place] count --        [id number]  COLONIES/mL       Culture PSEUDOMONAS AERUGINOSA               Candida glabrata (PREDOMINATING)          Susceptibility        Pseudomonas aeruginosa      BACTERIAL SUSCEPTIBILITY PANEL NIKA      amikacin 4 ug/mL Sensitive      cefepime 2 ug/mL Sensitive      cefTAZidime 4 ug/mL Sensitive      ciprofloxacin <=0.25 ug/mL Sensitive      gentamicin 4 ug/mL Sensitive      levofloxacin 1 ug/mL Sensitive      meropenem <=0.25 ug/mL Sensitive      piperacillin-tazobactam 16 ug/mL Sensitive      tobramycin <=1 ug/mL Sensitive                           Enteric Bact Panel, DNA [[id number]] Collected: 10/31/23 1715    Order Status: Completed Specimen: Stool Updated: 11/02/23 2140     SHIGELLA SPECIES, DNA Negative        CAMPYLOBACTER SPECIES, DNA Negative        VIBRIO SPECIES, DNA Negative        ENTEROTOXIGEN E COLI, DNA Negative        SHIGA TOXIN PRODUCING, DNA Negative        SALMONELLA SPECIES, DNA Negative        P. SHIGELLOIDES, DNA Negative        Y. ENTEROCOLITICA, DNA Negative       Culture, Blood 1 [[id number]] Collected: 10/29/23 2159    Order Status: Completed Specimen: Blood Updated: 11/04/23 0620     Special Requests NO SPECIAL REQUESTS        Culture NO GROWTH 6 DAYS       Culture, Blood 2 [[id number]] Collected: 10/29/23 2153    Order Status: Completed Specimen: Blood Updated: 11/04/23 0620     Special Requests NO SPECIAL REQUESTS        Culture NO GROWTH 6 DAYS                   Images:     Vascular duplex mesenteric arteries/abdominal complete    Result Date: 11/4/2023    Study was technically difficult due to: body habitus and bowel gas. Mesenteric arteries are without evidence of significant stenosis. XR ABDOMEN (KUB) (SINGLE AP VIEW)    Result Date: 11/3/2023  Abdomen HISTORY: Abdominal pain.  COMPARISON: 10/30/2023 mistakes, some may have been missed, and remained in the body of the document. If questions arise, please contact our department.

## 2023-11-12 NOTE — PLAN OF CARE
intact  Outcome: Progressing  Flowsheets (Taken 11/11/2023 2000)  Skin Integrity Remains Intact:   Monitor for areas of redness and/or skin breakdown   Assess vascular access sites hourly  Goal: Incisions, wounds, or drain sites healing without S/S of infection  Outcome: Progressing     Problem: Musculoskeletal - Adult  Goal: Return mobility to safest level of function  Outcome: Progressing  Flowsheets (Taken 11/11/2023 2000)  Return Mobility to Safest Level of Function: Assess patient stability and activity tolerance for standing, transferring and ambulating with or without assistive devices  Goal: Return ADL status to a safe level of function  Outcome: Progressing  Flowsheets (Taken 11/11/2023 2000)  Return ADL Status to a Safe Level of Function: Administer medication as ordered     Problem: Gastrointestinal - Adult  Goal: Minimal or absence of nausea and vomiting  Outcome: Progressing  Goal: Maintains or returns to baseline bowel function  Outcome: Progressing  Goal: Maintains adequate nutritional intake  Outcome: Progressing  Goal: Establish and maintain optimal ostomy function  Outcome: Progressing     Problem: Genitourinary - Adult  Goal: Absence of urinary retention  Outcome: Progressing     Problem: Infection - Adult  Goal: Absence of infection at discharge  Outcome: Progressing  Flowsheets (Taken 11/11/2023 2000)  Absence of infection at discharge:   Assess and monitor for signs and symptoms of infection   Monitor lab/diagnostic results   Monitor all insertion sites i.e., indwelling lines, tubes and drains     Problem: Metabolic/Fluid and Electrolytes - Adult  Goal: Electrolytes maintained within normal limits  Outcome: Progressing  Flowsheets (Taken 11/11/2023 2000)  Electrolytes maintained within normal limits: Monitor labs and assess patient for signs and symptoms of electrolyte imbalances  Goal: Glucose maintained within prescribed range  Outcome: Progressing  Flowsheets (Taken 11/11/2023 2000)  Glucose maintained within prescribed range: Monitor blood glucose as ordered     Problem: Cardiovascular - Adult  Goal: Maintains optimal cardiac output and hemodynamic stability  Outcome: Progressing  Flowsheets (Taken 11/11/2023 2000)  Maintains optimal cardiac output and hemodynamic stability:   Monitor blood pressure and heart rate   Monitor urine output and notify Licensed Independent Practitioner for values outside of normal range  Goal: Absence of cardiac dysrhythmias or at baseline  Outcome: Progressing

## 2023-11-12 NOTE — PROGRESS NOTES
The patient above complained of pain when trying to urinate. Pt also c/o  fullness and refused breakfast and lunch. Adrianne Tiwari MD notified. Urinalysis and straight cath ordered. Mati Number

## 2023-11-12 NOTE — PLAN OF CARE
Problem: Discharge Planning  Goal: Discharge to home or other facility with appropriate resources  11/12/2023 1025 by Eliel Muro RN  Outcome: Progressing  11/12/2023 0258 by Zac Bass RN  Outcome: Progressing  Flowsheets (Taken 11/11/2023 2000)  Discharge to home or other facility with appropriate resources: Identify barriers to discharge with patient and caregiver     Problem: Safety - Adult  Goal: Free from fall injury  11/12/2023 1025 by Eliel Muro RN  Outcome: Progressing  11/12/2023 0258 by Zac Bass RN  Outcome: Progressing     Problem: Skin/Tissue Integrity  Goal: Absence of new skin breakdown  Description: 1. Monitor for areas of redness and/or skin breakdown  2. Assess vascular access sites hourly  3. Every 4-6 hours minimum:  Change oxygen saturation probe site  4. Every 4-6 hours:  If on nasal continuous positive airway pressure, respiratory therapy assess nares and determine need for appliance change or resting period.   11/12/2023 1025 by Eliel Muro RN  Outcome: Progressing  11/12/2023 0258 by Zac Bass RN  Outcome: Progressing     Problem: Chronic Conditions and Co-morbidities  Goal: Patient's chronic conditions and co-morbidity symptoms are monitored and maintained or improved  11/12/2023 1025 by Eliel Muro RN  Outcome: Progressing  11/12/2023 0258 by Zac Bass RN  Outcome: Progressing  Flowsheets (Taken 11/11/2023 2000)  Care Plan - Patient's Chronic Conditions and Co-Morbidity Symptoms are Monitored and Maintained or Improved: Monitor and assess patient's chronic conditions and comorbid symptoms for stability, deterioration, or improvement     Problem: ABCDS Injury Assessment  Goal: Absence of physical injury  11/12/2023 1025 by Eliel Muro RN  Outcome: Progressing  11/12/2023 0258 by Zac Bass RN  Outcome: Progressing     Problem: Pain  Goal: Verbalizes/displays adequate comfort level or baseline comfort level  11/12/2023 1025 by

## 2023-11-12 NOTE — PROGRESS NOTES
MD paged regarding voiding trial.  Per orders bladder scan every 4 hours and recommend hebert >350. Patient still has not voided, last bladder scan showed 253.     Awaiting new orders from MD.

## 2023-11-12 NOTE — PROGRESS NOTES
Hebert catheter placed by ICU after  unsuccessful attempts of straight cath by staff nurses. Pt did not tolerate well as he complained of pain. Tramadol PRN administered before placement and tylenol PRN given after hebert was placed. Urine sample collected for urinalysis.

## 2023-11-13 ENCOUNTER — APPOINTMENT (OUTPATIENT)
Facility: HOSPITAL | Age: 88
End: 2023-11-13
Payer: MEDICARE

## 2023-11-13 PROBLEM — Z51.5 PALLIATIVE CARE ENCOUNTER: Status: ACTIVE | Noted: 2023-11-01

## 2023-11-13 PROBLEM — R33.9 URINARY RETENTION: Status: ACTIVE | Noted: 2023-11-13

## 2023-11-13 LAB
ANION GAP SERPL CALC-SCNC: 11 MMOL/L (ref 3–18)
ANION GAP SERPL CALC-SCNC: 9 MMOL/L (ref 3–18)
ARTERIAL PATENCY WRIST A: POSITIVE
BASE DEFICIT BLD-SCNC: 2.9 MMOL/L
BASOPHILS # BLD: 0.1 K/UL (ref 0–0.1)
BASOPHILS NFR BLD: 0 % (ref 0–2)
BDY SITE: ABNORMAL
BUN SERPL-MCNC: 33 MG/DL (ref 7–18)
BUN SERPL-MCNC: 39 MG/DL (ref 7–18)
BUN/CREAT SERPL: 5 (ref 12–20)
BUN/CREAT SERPL: 6 (ref 12–20)
CALCIUM SERPL-MCNC: 8.9 MG/DL (ref 8.5–10.1)
CALCIUM SERPL-MCNC: 9.1 MG/DL (ref 8.5–10.1)
CHLORIDE SERPL-SCNC: 100 MMOL/L (ref 100–111)
CHLORIDE SERPL-SCNC: 100 MMOL/L (ref 100–111)
CO2 SERPL-SCNC: 21 MMOL/L (ref 21–32)
CO2 SERPL-SCNC: 23 MMOL/L (ref 21–32)
CREAT SERPL-MCNC: 6.15 MG/DL (ref 0.6–1.3)
CREAT SERPL-MCNC: 6.66 MG/DL (ref 0.6–1.3)
DIFFERENTIAL METHOD BLD: ABNORMAL
EKG ATRIAL RATE: 80 BPM
EKG DIAGNOSIS: NORMAL
EKG Q-T INTERVAL: 290 MS
EKG QRS DURATION: 162 MS
EKG QTC CALCULATION (BAZETT): 486 MS
EKG R AXIS: 214 DEGREES
EKG T AXIS: 36 DEGREES
EKG VENTRICULAR RATE: 169 BPM
EOSINOPHIL # BLD: 0 K/UL (ref 0–0.4)
EOSINOPHIL NFR BLD: 0 % (ref 0–5)
ERYTHROCYTE [DISTWIDTH] IN BLOOD BY AUTOMATED COUNT: 22.2 % (ref 11.6–14.5)
GAS FLOW.O2 O2 DELIVERY SYS: ABNORMAL
GLUCOSE BLD STRIP.AUTO-MCNC: 116 MG/DL (ref 70–110)
GLUCOSE BLD STRIP.AUTO-MCNC: 120 MG/DL (ref 70–110)
GLUCOSE BLD STRIP.AUTO-MCNC: 133 MG/DL (ref 70–110)
GLUCOSE BLD STRIP.AUTO-MCNC: 137 MG/DL (ref 70–110)
GLUCOSE BLD STRIP.AUTO-MCNC: 139 MG/DL (ref 70–110)
GLUCOSE BLD STRIP.AUTO-MCNC: 149 MG/DL (ref 70–110)
GLUCOSE SERPL-MCNC: 113 MG/DL (ref 74–99)
GLUCOSE SERPL-MCNC: 119 MG/DL (ref 74–99)
HCO3 BLD-SCNC: 20.8 MMOL/L (ref 22–26)
HCT VFR BLD AUTO: 30.9 % (ref 36–48)
HGB BLD-MCNC: 9.8 G/DL (ref 13–16)
IMM GRANULOCYTES # BLD AUTO: 0.2 K/UL (ref 0–0.04)
IMM GRANULOCYTES NFR BLD AUTO: 1 % (ref 0–0.5)
LACTATE SERPL-SCNC: 0.9 MMOL/L (ref 0.4–2)
LACTATE SERPL-SCNC: 1.6 MMOL/L (ref 0.4–2)
LACTATE SERPL-SCNC: 2.4 MMOL/L (ref 0.4–2)
LYMPHOCYTES # BLD: 3.1 K/UL (ref 0.9–3.6)
LYMPHOCYTES NFR BLD: 17 % (ref 21–52)
MCH RBC QN AUTO: 34.1 PG (ref 24–34)
MCHC RBC AUTO-ENTMCNC: 31.7 G/DL (ref 31–37)
MCV RBC AUTO: 107.7 FL (ref 78–100)
MONOCYTES # BLD: 0.6 K/UL (ref 0.05–1.2)
MONOCYTES NFR BLD: 3 % (ref 3–10)
NEUTS SEG # BLD: 14.2 K/UL (ref 1.8–8)
NEUTS SEG NFR BLD: 78 % (ref 40–73)
NRBC # BLD: 0 K/UL (ref 0–0.01)
NRBC BLD-RTO: 0 PER 100 WBC
O2/TOTAL GAS SETTING VFR VENT: 3 %
PCO2 BLD: 30.8 MMHG (ref 35–45)
PH BLD: 7.44 (ref 7.35–7.45)
PLATELET # BLD AUTO: 354 K/UL (ref 135–420)
PMV BLD AUTO: 11.2 FL (ref 9.2–11.8)
PO2 BLD: 70 MMHG (ref 80–100)
POTASSIUM SERPL-SCNC: 4.7 MMOL/L (ref 3.5–5.5)
POTASSIUM SERPL-SCNC: 4.8 MMOL/L (ref 3.5–5.5)
PROCALCITONIN SERPL-MCNC: 1.17 NG/ML
RBC # BLD AUTO: 2.87 M/UL (ref 4.35–5.65)
SAO2 % BLD: 94.7 % (ref 92–97)
SERVICE CMNT-IMP: ABNORMAL
SODIUM SERPL-SCNC: 132 MMOL/L (ref 136–145)
SODIUM SERPL-SCNC: 132 MMOL/L (ref 136–145)
SPECIMEN TYPE: ABNORMAL
TROPONIN I SERPL HS-MCNC: 52 NG/L (ref 0–78)
WBC # BLD AUTO: 18.1 K/UL (ref 4.6–13.2)

## 2023-11-13 PROCEDURE — 6370000000 HC RX 637 (ALT 250 FOR IP): Performed by: INTERNAL MEDICINE

## 2023-11-13 PROCEDURE — 84484 ASSAY OF TROPONIN QUANT: CPT

## 2023-11-13 PROCEDURE — C9113 INJ PANTOPRAZOLE SODIUM, VIA: HCPCS | Performed by: HOSPITALIST

## 2023-11-13 PROCEDURE — 99232 SBSQ HOSP IP/OBS MODERATE 35: CPT | Performed by: INTERNAL MEDICINE

## 2023-11-13 PROCEDURE — 6360000002 HC RX W HCPCS: Performed by: STUDENT IN AN ORGANIZED HEALTH CARE EDUCATION/TRAINING PROGRAM

## 2023-11-13 PROCEDURE — 99232 SBSQ HOSP IP/OBS MODERATE 35: CPT | Performed by: HOSPITALIST

## 2023-11-13 PROCEDURE — 2140000001 HC CVICU INTERMEDIATE R&B

## 2023-11-13 PROCEDURE — 6360000002 HC RX W HCPCS: Performed by: HOSPITALIST

## 2023-11-13 PROCEDURE — 2580000003 HC RX 258: Performed by: STUDENT IN AN ORGANIZED HEALTH CARE EDUCATION/TRAINING PROGRAM

## 2023-11-13 PROCEDURE — 2580000003 HC RX 258: Performed by: INTERNAL MEDICINE

## 2023-11-13 PROCEDURE — 99233 SBSQ HOSP IP/OBS HIGH 50: CPT | Performed by: INTERNAL MEDICINE

## 2023-11-13 PROCEDURE — 6370000000 HC RX 637 (ALT 250 FOR IP): Performed by: STUDENT IN AN ORGANIZED HEALTH CARE EDUCATION/TRAINING PROGRAM

## 2023-11-13 PROCEDURE — 87077 CULTURE AEROBIC IDENTIFY: CPT

## 2023-11-13 PROCEDURE — 36600 WITHDRAWAL OF ARTERIAL BLOOD: CPT

## 2023-11-13 PROCEDURE — 93005 ELECTROCARDIOGRAM TRACING: CPT

## 2023-11-13 PROCEDURE — 93005 ELECTROCARDIOGRAM TRACING: CPT | Performed by: HOSPITALIST

## 2023-11-13 PROCEDURE — 6360000002 HC RX W HCPCS: Performed by: INTERNAL MEDICINE

## 2023-11-13 PROCEDURE — 87154 CUL TYP ID BLD PTHGN 6+ TRGT: CPT

## 2023-11-13 PROCEDURE — 85025 COMPLETE CBC W/AUTO DIFF WBC: CPT

## 2023-11-13 PROCEDURE — 2580000003 HC RX 258: Performed by: HOSPITALIST

## 2023-11-13 PROCEDURE — 87186 SC STD MICRODIL/AGAR DIL: CPT

## 2023-11-13 PROCEDURE — 93010 ELECTROCARDIOGRAM REPORT: CPT | Performed by: INTERNAL MEDICINE

## 2023-11-13 PROCEDURE — 80048 BASIC METABOLIC PNL TOTAL CA: CPT

## 2023-11-13 PROCEDURE — 2500000003 HC RX 250 WO HCPCS

## 2023-11-13 PROCEDURE — 74176 CT ABD & PELVIS W/O CONTRAST: CPT

## 2023-11-13 PROCEDURE — 82803 BLOOD GASES ANY COMBINATION: CPT

## 2023-11-13 PROCEDURE — A4216 STERILE WATER/SALINE, 10 ML: HCPCS | Performed by: HOSPITALIST

## 2023-11-13 PROCEDURE — 83605 ASSAY OF LACTIC ACID: CPT

## 2023-11-13 PROCEDURE — 84145 PROCALCITONIN (PCT): CPT

## 2023-11-13 PROCEDURE — 94761 N-INVAS EAR/PLS OXIMETRY MLT: CPT

## 2023-11-13 PROCEDURE — 6370000000 HC RX 637 (ALT 250 FOR IP): Performed by: HOSPITALIST

## 2023-11-13 PROCEDURE — 82962 GLUCOSE BLOOD TEST: CPT

## 2023-11-13 PROCEDURE — 36415 COLL VENOUS BLD VENIPUNCTURE: CPT

## 2023-11-13 PROCEDURE — 87040 BLOOD CULTURE FOR BACTERIA: CPT

## 2023-11-13 PROCEDURE — 71045 X-RAY EXAM CHEST 1 VIEW: CPT

## 2023-11-13 PROCEDURE — 82040 ASSAY OF SERUM ALBUMIN: CPT

## 2023-11-13 RX ORDER — METOPROLOL TARTRATE 1 MG/ML
5 INJECTION, SOLUTION INTRAVENOUS
Status: COMPLETED | OUTPATIENT
Start: 2023-11-13 | End: 2023-11-13

## 2023-11-13 RX ORDER — ALBUMIN (HUMAN) 12.5 G/50ML
25 SOLUTION INTRAVENOUS ONCE
Status: COMPLETED | OUTPATIENT
Start: 2023-11-13 | End: 2023-11-14

## 2023-11-13 RX ORDER — CIPROFLOXACIN 2 MG/ML
400 INJECTION, SOLUTION INTRAVENOUS EVERY 24 HOURS
Status: DISCONTINUED | OUTPATIENT
Start: 2023-11-13 | End: 2023-11-15

## 2023-11-13 RX ADMIN — TAMSULOSIN HYDROCHLORIDE 0.4 MG: 0.4 CAPSULE ORAL at 09:03

## 2023-11-13 RX ADMIN — AMIODARONE HYDROCHLORIDE 200 MG: 200 TABLET ORAL at 09:03

## 2023-11-13 RX ADMIN — TRAMADOL HYDROCHLORIDE 25 MG: 50 TABLET ORAL at 02:43

## 2023-11-13 RX ADMIN — DRONABINOL 5 MG: 5 CAPSULE ORAL at 16:17

## 2023-11-13 RX ADMIN — ALLOPURINOL 100 MG: 100 TABLET ORAL at 09:08

## 2023-11-13 RX ADMIN — MIDODRINE HYDROCHLORIDE 15 MG: 10 TABLET ORAL at 16:17

## 2023-11-13 RX ADMIN — ONDANSETRON 4 MG: 2 INJECTION INTRAMUSCULAR; INTRAVENOUS at 02:46

## 2023-11-13 RX ADMIN — ATORVASTATIN CALCIUM 80 MG: 40 TABLET, FILM COATED ORAL at 21:33

## 2023-11-13 RX ADMIN — VANCOMYCIN 1250 MG: 1.75 INJECTION, SOLUTION INTRAVENOUS at 12:45

## 2023-11-13 RX ADMIN — SODIUM CHLORIDE, PRESERVATIVE FREE 10 ML: 5 INJECTION INTRAVENOUS at 09:04

## 2023-11-13 RX ADMIN — MIDODRINE HYDROCHLORIDE 15 MG: 10 TABLET ORAL at 09:03

## 2023-11-13 RX ADMIN — VANCOMYCIN HYDROCHLORIDE 125 MG: 5 INJECTION, POWDER, LYOPHILIZED, FOR SOLUTION INTRAVENOUS at 16:18

## 2023-11-13 RX ADMIN — ACETAMINOPHEN 325MG 650 MG: 325 TABLET ORAL at 03:31

## 2023-11-13 RX ADMIN — SODIUM CHLORIDE: 9 INJECTION, SOLUTION INTRAVENOUS at 05:38

## 2023-11-13 RX ADMIN — PANTOPRAZOLE SODIUM 40 MG: 40 INJECTION, POWDER, FOR SOLUTION INTRAVENOUS at 17:53

## 2023-11-13 RX ADMIN — CIPROFLOXACIN 400 MG: 2 INJECTION, SOLUTION INTRAVENOUS at 09:08

## 2023-11-13 RX ADMIN — SODIUM CHLORIDE, PRESERVATIVE FREE 10 ML: 5 INJECTION INTRAVENOUS at 21:34

## 2023-11-13 RX ADMIN — ONDANSETRON 4 MG: 2 INJECTION INTRAMUSCULAR; INTRAVENOUS at 17:14

## 2023-11-13 RX ADMIN — LIDOCAINE HYDROCHLORIDE: 20 JELLY TOPICAL at 00:18

## 2023-11-13 RX ADMIN — PIPERACILLIN AND TAZOBACTAM 4500 MG: 4; .5 INJECTION, POWDER, FOR SOLUTION INTRAVENOUS at 05:38

## 2023-11-13 RX ADMIN — FLUCONAZOLE 200 MG: 200 TABLET ORAL at 09:03

## 2023-11-13 RX ADMIN — NEPHROCAP 1 MG: 1 CAP ORAL at 09:03

## 2023-11-13 RX ADMIN — ACETAMINOPHEN 325MG 650 MG: 325 TABLET ORAL at 00:14

## 2023-11-13 RX ADMIN — MIDODRINE HYDROCHLORIDE 15 MG: 10 TABLET ORAL at 11:17

## 2023-11-13 RX ADMIN — VANCOMYCIN 1250 MG: 1.75 INJECTION, SOLUTION INTRAVENOUS at 11:18

## 2023-11-13 RX ADMIN — VANCOMYCIN HYDROCHLORIDE 125 MG: 5 INJECTION, POWDER, LYOPHILIZED, FOR SOLUTION INTRAVENOUS at 00:12

## 2023-11-13 RX ADMIN — METOPROLOL TARTRATE 5 MG: 5 INJECTION INTRAVENOUS at 04:35

## 2023-11-13 ASSESSMENT — PAIN SCALES - GENERAL
PAINLEVEL_OUTOF10: 10
PAINLEVEL_OUTOF10: 0
PAINLEVEL_OUTOF10: 10
PAINLEVEL_OUTOF10: 0
PAINLEVEL_OUTOF10: 10
PAINLEVEL_OUTOF10: 0
PAINLEVEL_OUTOF10: 6
PAINLEVEL_OUTOF10: 0

## 2023-11-13 ASSESSMENT — PAIN DESCRIPTION - LOCATION
LOCATION: ABDOMEN;BACK;GROIN
LOCATION: BACK

## 2023-11-13 ASSESSMENT — PAIN DESCRIPTION - DESCRIPTORS
DESCRIPTORS: ACHING
DESCRIPTORS: ACHING;SHARP

## 2023-11-13 NOTE — PROGRESS NOTES
attempted to visit  with Leda Park, who is a 80 y.o., male.    The reason patientcame to hospital is:   Patient Active Problem List    Diagnosis Date Noted    Leukocytosis 09/23/2014    Anemia of chronic renal failure 09/12/2014    Goals of care, counseling/discussion 11/01/2023    Debility 11/01/2023    End stage renal disease on dialysis ([address]) 11/01/2023    Hypoxia 10/27/2023    C. difficile colitis 10/27/2023    C. difficile diarrhea 10/27/2023    Macrocytic anemia 10/27/2023    Occult blood positive stool 10/27/2023    UTI (urinary tract infection) 10/21/2023    Cystitis 10/21/2023    Acute respiratory failure with hypoxia ([address]) 10/21/2023    Hypokalemia 10/21/2023    Hearing impaired 09/21/2023    Hypercoagulable state due to paroxysmal atrial fibrillation ([address]) 09/21/2023    Colostomy present ([address]) 09/20/2023    Indwelling Carlos catheter present 09/20/2023    Cerebral infarction, unspecified ([address]) 08/23/2023    Gastro-esophageal reflux disease without esophagitis 08/23/2023    Hyperlipidemia, unspecified 62/03/4224    Metabolic encephalopathy 84/44/7940    Muscle weakness (generalized) 08/23/2023    Obstructive and reflux uropathy, unspecified 08/23/2023    Severe sepsis ([address]) 08/23/2023    Protein-calorie malnutrition ([address]) 08/23/2023    Unspecified abnormalities of gait and mobility 08/23/2023    ESRD (end stage renal disease) on dialysis ([address]) 08/12/2023    Ischemic cardiomyopathy 08/12/2023    Steroid-induced hyperglycemia 07/31/2023    Secondary hyperparathyroidism of renal origin ([address]) 05/24/2023    Thoracic aortic ectasia ([address]) 05/24/2023    Pulmonary hypertension, unspecified ([address]) 02/01/2023    Periprosthetic fracture around internal prosthetic right knee joint 09/07/2022    Bronchiectasis without complication ([address]) 73/03/6809    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

## 2023-11-13 NOTE — PROGRESS NOTES
RENAL DAILY PROGRESS NOTE    Subjective:       Complaint:     Overnight events noted  No abdominal pain reported  Had uncontrolled a fib last night with some fevers. IMPRESSION:   ESRD on MWF dialysis  Access: TDC  Sob,fluid overload  UTI, C diff, Leucocytosis  Anemia, refused epogen  Chronic indwelling hebert due to hx of bladder cancer   PLAN:   Overnight events noted with uncontrolled a fib, fevers etc. BP now very borderline. Will hold dialysis today as I don't see any acute indication as of now. CT abdomen reviewed.            Current Facility-Administered Medications   Medication Dose Route Frequency    vancomycin (VANCOCIN) 1250 mg in sodium chloride 0.9% 250 mL IVPB  1,250 mg IntraVENous Q1H    ciprofloxacin (CIPRO) IVPB 400 mg  400 mg IntraVENous Q24H    tamsulosin (FLOMAX) capsule 0.4 mg  0.4 mg Oral Daily    lidocaine (XYLOCAINE) 2 % uro-jet   Topical PRN    trospium (SANCTURA) tablet 20 mg  20 mg Oral QAM AC    dronabinol (MARINOL) capsule 5 mg  5 mg Oral BID AC    acetaminophen (TYLENOL) tablet 650 mg  650 mg Oral Q6H PRN    vancomycin (VANCOCIN) 50 mg/mL oral solution 125 mg  125 mg Oral 4 times per day    0.9 % sodium chloride infusion   IntraVENous PRN    diatrizoate meglumine-sodium (GASTROGRAFIN) 66-10 % solution 30 mL  30 mL Oral ONCE PRN    fluconazole (DIFLUCAN) tablet 200 mg  200 mg Oral Daily    midodrine (PROAMATINE) tablet 15 mg  15 mg Oral TID WC    [Held by provider] carvedilol (COREG) tablet 3.125 mg  3.125 mg Oral BID    acetaminophen (TYLENOL) tablet 650 mg  650 mg Oral Q6H PRN    traMADol (ULTRAM) tablet 25 mg  25 mg Oral Q12H PRN    insulin lispro (HUMALOG) injection vial 0-4 Units  0-4 Units SubCUTAneous TID WC    insulin lispro (HUMALOG) injection vial 0-4 Units  0-4 Units SubCUTAneous Nightly    glucose chewable tablet 16 g  4 tablet Oral PRN    dextrose bolus 10% 125 mL  125 mL IntraVENous PRN    Or    dextrose bolus 10% 250 mL  250 mL IntraVENous PRN    glucagon (rDNA)

## 2023-11-13 NOTE — PROGRESS NOTES
Nursing Shift Note        Start of Shift Handoff Report      Bedside and Verbal change report received from Jeb Saint[address]     Report included the following information Nurse Handoff Report, Intake/Output, MAR, Recent Results, Cardiac Rhythm NSR, and Neuro Assessment. Peripherally Inserted Central Catheter: Venous Access Device:  , Peripheral Intravenous Line:   Peripheral IV 10/21/23 Right Wrist (Active)   Site Assessment Clean, dry & intact 11/12/23 0258   Line Status Capped 11/12/23 0258   Line Care Connections checked and tightened 11/12/23 0258   Phlebitis Assessment No symptoms 11/12/23 0258   Infiltration Assessment 0 11/12/23 0258   Alcohol Cap Used Yes 11/12/23 0258   Dressing Status Clean, dry & intact 11/12/23 0258   Dressing Type Transparent 11/12/23 0258       Peripheral IV 10/29/23 Left Antecubital (Active)   Site Assessment Clean, dry & intact 11/12/23 0258   Line Status Capped 11/12/23 0258   Line Care Connections checked and tightened 11/12/23 0258   Phlebitis Assessment No symptoms 11/12/23 0258   Infiltration Assessment 0 11/12/23 0258   Alcohol Cap Used Yes 11/12/23 0258   Dressing Status Clean, dry & intact 11/12/23 0258   Dressing Type Transparent 11/12/23 0258   Dressing Intervention New 11/04/23 0340   , Hemodialysis Catheter:  , and Drain(s):   Colostomy LUQ (Active)   Stomal Appliance 2 piece 11/11/23 2000   Stoma  Assessment Red;Bleeding 11/11/23 2000   Peristomal Assessment Intact 11/11/23 2000   Mucocutaneous Junction Intact 11/11/23 2000   Treatment Site care 11/09/23 2032   Stool Appearance Watery 11/11/23 2000   Stool Color Red 11/10/23 0800   Stool Amount Small 11/09/23 2032   Output (mL) 250 ml 11/10/23 1539       Urinary Catheter 11/12/23 Carlos (Active)        20:00 -  Patient assessment completed, Vital Signs Stable, 5 P's of patient care addressed. No complaints voiced at this time. Will continue to monitor. Standard Safety measures in place.     21:17 - Carlos noted to have information Nurse Handoff Report, Intake/Output, MAR, Recent Results, Med Rec Status, Cardiac Rhythm Sinus Tach, and Neuro Assessment.

## 2023-11-13 NOTE — PROGRESS NOTES
Hospitalist Progress Note    Patient: Kendra Gaviria Age: 80 y.o. : 1935 MR#: [id number] SSN: [id number]  Date/Time: 2023 7:49 AM    DOA: 10/21/2023  PCP: Darryl Lim MD    Subjective:     POD3 Successful, uncomplicated infrarenal, retrievable, MRI compatible, inferior vena cava filter placement. Carlos reinserted yesterday late afternoon, yielding dark urine. RR overnight for Tachycardia to 180s-190s. Patient had a fever earlier in the the .8 F. Patient was in atrial fibrillation. Got Metoprolol 5 mg IV, converted to sinus tachycardia. Given elevated lactic acid, recurrent fevers, night doc gave 1 dose of IV antibiotics with vancomycin and Zosyn. Patient seen and examined at bedside, he denies chest pain, abdominal pain, shortness of breath. Oriented x2, states he is in the \"hospital,\" year is \". \"  Wife updated at bedside. Rapid response called early evening for chest pain, felt to be related to reflux symptoms. Patient noted with vomiting very early this morning, tolerated morning pills and some broth, but then vomited again this afternoon. No blood in vomitus noted. Assessment/Plan:     Severe sepsis POA, due to C. difficile colitis, also treated for Pseudomonas cystitis secondary to indwelling Carlos catheter, resolved. Severe C. difficile colitis, with concern for [place]'s vs fulminant C.diff with pancolitis and infarct spleen. Improving s/p dificid. prolonged po vancomycin taper for severe c.diff colitis (125 mg po q 6 hour till 11/15 followed by 125 mg po q 12 hour till  followed by 125 mg po every other day till 23) per ID recommendations. New splenic wedge-shaped hypodensity concerning for acute splenic infarct in recent CT abdomen/pelvic  Small volume ascites noted, likely reactive to colitis. ESRD on HD, per nephrology. Acute respiratory failure with hypoxia, currently requiring 5 L/min NC oxygen supplementation.   Chronic

## 2023-11-13 NOTE — PROGRESS NOTES
Patient converted back from SR to Afib . Respiratory Rate increased 30's Patient vomited after received pills with water. . MD Gongora paged awaiting call back at this time. MD made aware at 1640. Orders to come.

## 2023-11-13 NOTE — PROGRESS NOTES
[facility] Medicine  Rapid/Medical Response Team Note    Patient:    Nica Cole 80 y.o. male, : 1935  Patient MRN: [id number]    Admission Date: 10/21/2023   Admission Diagnosis: UTI (urinary tract infection) [N39.0]  Cystitis [N30.90]  Hypoxia [R09.02]  Leukocytosis, unspecified type [D72.829]  Urinary tract infection associated with indwelling urethral catheter, initial encounter ([address]) [T83.511A, N39.0]    RAPID RESPONSE  Rapid response called for: Tachycardia to 180s-190s. Patient had a fever earlier in the the .8 F primary team had assessed him at that point and order a fever work up. Patient's heart rate at that time was in the 110s. OBJECTIVE    RRT/MRT start time:               RRT/MRT end time:  Vitals:    23 0445   BP: (!) 107/50   Pulse: 95   Resp: 28   Temp:    SpO2:       Physical Exam:  Gen: Ill appearing elderly male in no acute distress.   HEENT: normocephalic, atraumatic  CV: Tachycardia irregular rhythm,+2 radial pulses, well-perfused  Pulm: CTAB, no wheezes, no crackles  MSK: no clubbing, no edema  Skin: warm, dry, intact, no rash  Neuro: CN II-XII grossly intact, no focal deficits appreciated   Psych: appropriate, alert    ASSESSMENT/PLAN AND DISPOSITION  Nica Cole is a 80y.o. year old male admitted for UTI (urinary tract infection) [N39.0]  Cystitis [N30.90]  Hypoxia [R09.02]  Leukocytosis, unspecified type [D72.829]  Urinary tract infection associated with indwelling urethral catheter, initial encounter ([address]) [P12.985H, N39.0]  Rapid Response Team/ Medical Response Team Called For: Tachycardia    In setting of tachycardia with EKG showing Afib with RVR completed the below:    Medications Administered During Rapid: Metoprolol 5 mg IV    EKG:Afib with RVR    Labs: Labs ordered previously for fever work up CBC, BMP, lactic acid, procalcitonin, blood culture x2    Imaging: CXR and CT A/P ordered as part of previous work up     Other interventions: None    Medical

## 2023-11-13 NOTE — PROGRESS NOTES
MD paged again due to no orders being given. MD made aware patients family would like to speak with her. MD notified nurse she spoke with family this morning an would not be coming back to the bedside. Asked MD if we could get an EKG due to patient stating he has chest pain. However when asking the patient where the pain is he says he does not have pain. MD notified RN to call a rapid if I feel like patient is unstable. After getting off the phone. Orders received for chest x-ray, ekg, lab work and abg. During obtaining an EKG. MD called back & told CCL she would like a rapid called. No interventions done during rapid due to orders already placed by provider prior to rapid.

## 2023-11-13 NOTE — PROGRESS NOTES
-Admitted with UTI; indwelling Carlos catheter. He has history of bladder CA, per family.  -Severe sepsis, worsening leukocytosis. -Diarrhea, C. difficile toxin colitis  -ESRD on HD  -PAF, [id number] score of 4  -History of CAD status post RCA stent back in 2018.  -Echocardiogram February 2023 showing EF 46%  -Anemia of chronic disease  -History of hypothyroidism  -History of hyperlipidemia  -History of hypertension  -History of COVID infection  -Previous history of encephalopathy, possibly secondary to metabolic versus toxic     Primary cardiology [facility]    Palliative note reviewed and appears comprehensive. Texted by Dr. Divina Otto at 4:38 pm for rapid atrial fibrillation in setting of c diff colitis and severe Uti. Dr. Divina Otto called at 5:08 PM after being told to get in touch with me. We had signed off of the patient on 10/30/23 as no further cardiology input needed and no invasive intervention required or indicated. Patient does not need invasive coronary evaluation or intervention at this time. His main issue is bladder CA, severe UTI and c diff colitis from Abx rx. He is not a candidate for acute invasive coronary evaluation as his main issues are bladder ca and infection. He is in rapid A fib with severe infection. Can try to rate control but suspect cardioversion will not maintain SR. Would not cath as that is not the problem. If unable to take oral medication, can give IV amiodarone. Because of hypotension from various causes, cannot aggressively rate control. He needs his infection treated but in setting of bladder CA and indwelling catheter, low likelihood of eradicating UTI. Unfortunate situation but not likely from coronary or controllable cardiac issues. Rapid atrial fibrillation is response to acute infection and being severely ill.     CSI doctor will see again in AM.

## 2023-11-13 NOTE — PALLIATIVE CARE
Palliative Medicine    Follow up visit made to patient along with Palliative team member Dr. Dilia Sweeney. Patient resting quietly in bed, eyes closed. Alerted to name, able to answer simple questions, confusion noted. Overnight events noted. Respirations are easy and non labored, oxygen in use at 2L N/C. Patient is not able to participate in health care conversations or make complex medical decisions at this time. Spoke with patient's wife Silvina Barrios and sister in law Giovanni Kaur at bedside. They remain in agreement to continue the current treatment plan and are not ready to discuss hospice just yet. Case management has been working on facility placement with dialysis chair, patient has been accepted to [facility]. Palliative team remains available to provide support to Mr Jorden Preciado and his family during this hospital stay. CODE STATUS: DNR/DNI, SELECTIVE TREATMENTS, NO FEEDING TUBE.     Dileep Mccabe RN  Palliative Medicine Inpatient RN  Palliative [facility] Line: [phone]

## 2023-11-13 NOTE — PROGRESS NOTES
Palliative Medicine follow-up note  [facility] [phone]  Formerly [facility]: [address]: [phone]    Patient Name: Deshaun Beverly  YOB: 1935    Date of Initial Consult: 11/1/2023   Follow up 11/13/2023   Reason for Consult: goals of care  Requesting Provider: Dr Migue Lindsey    Primary Care Physician: Karina Lu MD      SUMMARY:   Deshaun Beverly is a 80y.o. year old with a past history of urothelial carcinoma of bladder ( 2019), chronic indwelling hebert, lap sigmoid colectomy and colostomy ( 4/2023), Afib no on [facility], ESRD on HD, who was admitted on 10/21/2023 from home with a diagnosis of severe sepsis due to diverticulitis and cystitis . Current medical issues leading to Palliative Medicine involvement include: 80year old gentleman with a long and complicated hospital stay. Patient with nausea dry heaves on admission he was noted to have a distended colon however no signs of bowel obstruction. During hospitalization he started having increased stool output in his colostomy bag with liquid stool. Stool was tested for C. difficile which came back positive. He also developed significant worsening of leukocytosis despite IV antibiotics ID was consulted and is currently following patient. GI was also consulted and involved. Patient continues to have persistent leukocytosis he is no longer nauseous CT of the abdomen pelvis on 10/31 showed entire colon wall thickening with extensive pericolic fat stranding dilated transverse colon was also noted to have a possible acute splenic infarct. He currently has a poor p.o. intake vascular surgery is also awaiting they felt it was low probability for ischemic colitis. Currently ID is suggesting atypical C. difficile colitis he continues with IV antibiotics GI continues to follow. Palliative medicine is consulted for goals of care discussions and support.     11/13/23: Was seen in presence of

## 2023-11-14 LAB
ACCESSION NUMBER, LLC1M: ABNORMAL
ACINETOBACTER CALCOAC BAUMANNII COMPLEX BY PCR: NOT DETECTED
ALBUMIN SERPL-MCNC: 2.7 G/DL (ref 3.4–5)
ALBUMIN SERPL-MCNC: 2.7 G/DL (ref 3.4–5)
ALBUMIN/GLOB SERPL: 0.9 (ref 0.8–1.7)
ALP SERPL-CCNC: 135 U/L (ref 45–117)
ALT SERPL-CCNC: 38 U/L (ref 16–61)
ANION GAP SERPL CALC-SCNC: 8 MMOL/L (ref 3–18)
AST SERPL-CCNC: 46 U/L (ref 10–38)
B FRAGILIS DNA BLD POS QL NAA+NON-PROBE: NOT DETECTED
BASOPHILS # BLD: 0 K/UL (ref 0–0.1)
BASOPHILS NFR BLD: 0 % (ref 0–2)
BILIRUB SERPL-MCNC: 0.8 MG/DL (ref 0.2–1)
BIOFIRE TEST COMMENT: ABNORMAL
BLACTX-M ISLT/SPM QL: NOT DETECTED
BLAIMP ISLT/SPM QL: NOT DETECTED
BLAKPC BLD POS QL NAA+NON-PROBE: NOT DETECTED
BLAVIM ISLT/SPM QL: NOT DETECTED
BUN SERPL-MCNC: 43 MG/DL (ref 7–18)
BUN/CREAT SERPL: 6 (ref 12–20)
C ALBICANS DNA BLD POS QL NAA+NON-PROBE: NOT DETECTED
C AURIS DNA BLD POS QL NAA+NON-PROBE: NOT DETECTED
C GATTII+NEOFOR DNA BLD POS QL NAA+N-PRB: NOT DETECTED
C GLABRATA DNA BLD POS QL NAA+NON-PROBE: NOT DETECTED
C KRUSEI DNA BLD POS QL NAA+NON-PROBE: NOT DETECTED
C PARAP DNA BLD POS QL NAA+NON-PROBE: NOT DETECTED
C TROPICLS DNA BLD POS QL NAA+NON-PROBE: NOT DETECTED
CALCIUM SERPL-MCNC: 8.8 MG/DL (ref 8.5–10.1)
CHLORIDE SERPL-SCNC: 102 MMOL/L (ref 100–111)
CO2 SERPL-SCNC: 23 MMOL/L (ref 21–32)
CREAT SERPL-MCNC: 7.24 MG/DL (ref 0.6–1.3)
DIFFERENTIAL METHOD BLD: ABNORMAL
E CLOAC COMP DNA BLD POS NAA+NON-PROBE: NOT DETECTED
E COLI DNA BLD POS QL NAA+NON-PROBE: NOT DETECTED
E FAECALIS DNA BLD POS QL NAA+NON-PROBE: NOT DETECTED
E FAECIUM DNA BLD POS QL NAA+NON-PROBE: NOT DETECTED
EKG ATRIAL RATE: 104 BPM
EKG DIAGNOSIS: NORMAL
EKG P AXIS: 44 DEGREES
EKG P-R INTERVAL: 202 MS
EKG Q-T INTERVAL: 348 MS
EKG QRS DURATION: 146 MS
EKG QTC CALCULATION (BAZETT): 457 MS
EKG R AXIS: 14 DEGREES
EKG T AXIS: 124 DEGREES
EKG VENTRICULAR RATE: 104 BPM
ENTEROBACTERALES DNA BLD POS NAA+N-PRB: NOT DETECTED
EOSINOPHIL # BLD: 0.1 K/UL (ref 0–0.4)
EOSINOPHIL NFR BLD: 1 % (ref 0–5)
ERYTHROCYTE [DISTWIDTH] IN BLOOD BY AUTOMATED COUNT: 21.6 % (ref 11.6–14.5)
GLOBULIN SER CALC-MCNC: 2.9 G/DL (ref 2–4)
GLUCOSE BLD STRIP.AUTO-MCNC: 103 MG/DL (ref 70–110)
GLUCOSE BLD STRIP.AUTO-MCNC: 119 MG/DL (ref 70–110)
GLUCOSE BLD STRIP.AUTO-MCNC: 142 MG/DL (ref 70–110)
GLUCOSE SERPL-MCNC: 104 MG/DL (ref 74–99)
GP B STREP DNA BLD POS QL NAA+NON-PROBE: NOT DETECTED
HAEM INFLU DNA BLD POS QL NAA+NON-PROBE: NOT DETECTED
HCT VFR BLD AUTO: 22.5 % (ref 36–48)
HGB BLD-MCNC: 7 G/DL (ref 13–16)
IMM GRANULOCYTES # BLD AUTO: 0.2 K/UL (ref 0–0.04)
IMM GRANULOCYTES NFR BLD AUTO: 1 % (ref 0–0.5)
K OXYTOCA DNA BLD POS QL NAA+NON-PROBE: NOT DETECTED
KLEBSIELLA SP DNA BLD POS QL NAA+NON-PRB: NOT DETECTED
KLEBSIELLA SP DNA BLD POS QL NAA+NON-PRB: NOT DETECTED
L MONOCYTOG DNA BLD POS QL NAA+NON-PROBE: NOT DETECTED
LACTATE SERPL-SCNC: 0.8 MMOL/L (ref 0.4–2)
LYMPHOCYTES # BLD: 1.6 K/UL (ref 0.9–3.6)
LYMPHOCYTES NFR BLD: 12 % (ref 21–52)
MCH RBC QN AUTO: 34.1 PG (ref 24–34)
MCHC RBC AUTO-ENTMCNC: 31.1 G/DL (ref 31–37)
MCV RBC AUTO: 109.8 FL (ref 78–100)
MONOCYTES # BLD: 0.6 K/UL (ref 0.05–1.2)
MONOCYTES NFR BLD: 4 % (ref 3–10)
N MEN DNA BLD POS QL NAA+NON-PROBE: NOT DETECTED
NEUTS SEG # BLD: 10.8 K/UL (ref 1.8–8)
NEUTS SEG NFR BLD: 82 % (ref 40–73)
NRBC # BLD: 0 K/UL (ref 0–0.01)
NRBC BLD-RTO: 0 PER 100 WBC
P AERUGINOSA DNA BLD POS NAA+NON-PROBE: DETECTED
PLATELET # BLD AUTO: 258 K/UL (ref 135–420)
PMV BLD AUTO: 11.1 FL (ref 9.2–11.8)
POTASSIUM SERPL-SCNC: 4.3 MMOL/L (ref 3.5–5.5)
PROT SERPL-MCNC: 5.6 G/DL (ref 6.4–8.2)
PROTEUS SP DNA BLD POS QL NAA+NON-PROBE: NOT DETECTED
RBC # BLD AUTO: 2.05 M/UL (ref 4.35–5.65)
RESISTANT GENE NDM BY PCR: NOT DETECTED
RESISTANT GENE TARGETS: ABNORMAL
S AUREUS DNA BLD POS QL NAA+NON-PROBE: NOT DETECTED
S AUREUS+CONS DNA BLD POS NAA+NON-PROBE: NOT DETECTED
S EPIDERMIDIS DNA BLD POS QL NAA+NON-PRB: NOT DETECTED
S LUGDUNENSIS DNA BLD POS QL NAA+NON-PRB: NOT DETECTED
S MALTOPHILIA DNA BLD POS QL NAA+NON-PRB: NOT DETECTED
S MARCESCENS DNA BLD POS NAA+NON-PROBE: NOT DETECTED
S PNEUM DNA BLD POS QL NAA+NON-PROBE: NOT DETECTED
S PYO DNA BLD POS QL NAA+NON-PROBE: NOT DETECTED
SALMONELLA DNA BLD POS QL NAA+NON-PROBE: NOT DETECTED
SODIUM SERPL-SCNC: 133 MMOL/L (ref 136–145)
STREPTOCOCCUS DNA BLD POS NAA+NON-PROBE: NOT DETECTED
WBC # BLD AUTO: 13.2 K/UL (ref 4.6–13.2)

## 2023-11-14 PROCEDURE — 6370000000 HC RX 637 (ALT 250 FOR IP): Performed by: INTERNAL MEDICINE

## 2023-11-14 PROCEDURE — C9113 INJ PANTOPRAZOLE SODIUM, VIA: HCPCS | Performed by: HOSPITALIST

## 2023-11-14 PROCEDURE — 6360000002 HC RX W HCPCS: Performed by: INTERNAL MEDICINE

## 2023-11-14 PROCEDURE — 6370000000 HC RX 637 (ALT 250 FOR IP): Performed by: HOSPITALIST

## 2023-11-14 PROCEDURE — 2580000003 HC RX 258: Performed by: HOSPITALIST

## 2023-11-14 PROCEDURE — A4216 STERILE WATER/SALINE, 10 ML: HCPCS | Performed by: HOSPITALIST

## 2023-11-14 PROCEDURE — 94761 N-INVAS EAR/PLS OXIMETRY MLT: CPT

## 2023-11-14 PROCEDURE — 6370000000 HC RX 637 (ALT 250 FOR IP): Performed by: STUDENT IN AN ORGANIZED HEALTH CARE EDUCATION/TRAINING PROGRAM

## 2023-11-14 PROCEDURE — 2140000001 HC CVICU INTERMEDIATE R&B

## 2023-11-14 PROCEDURE — 6360000002 HC RX W HCPCS: Performed by: HOSPITALIST

## 2023-11-14 PROCEDURE — 93010 ELECTROCARDIOGRAM REPORT: CPT | Performed by: INTERNAL MEDICINE

## 2023-11-14 PROCEDURE — P9047 ALBUMIN (HUMAN), 25%, 50ML: HCPCS | Performed by: INTERNAL MEDICINE

## 2023-11-14 PROCEDURE — 90935 HEMODIALYSIS ONE EVALUATION: CPT

## 2023-11-14 PROCEDURE — 82962 GLUCOSE BLOOD TEST: CPT

## 2023-11-14 PROCEDURE — 99232 SBSQ HOSP IP/OBS MODERATE 35: CPT | Performed by: HOSPITALIST

## 2023-11-14 PROCEDURE — 82533 TOTAL CORTISOL: CPT

## 2023-11-14 PROCEDURE — 85025 COMPLETE CBC W/AUTO DIFF WBC: CPT

## 2023-11-14 PROCEDURE — 80053 COMPREHEN METABOLIC PANEL: CPT

## 2023-11-14 PROCEDURE — 2580000003 HC RX 258: Performed by: STUDENT IN AN ORGANIZED HEALTH CARE EDUCATION/TRAINING PROGRAM

## 2023-11-14 PROCEDURE — 2580000003 HC RX 258: Performed by: INTERNAL MEDICINE

## 2023-11-14 PROCEDURE — 99233 SBSQ HOSP IP/OBS HIGH 50: CPT | Performed by: INTERNAL MEDICINE

## 2023-11-14 PROCEDURE — 51702 INSERT TEMP BLADDER CATH: CPT

## 2023-11-14 PROCEDURE — 36415 COLL VENOUS BLD VENIPUNCTURE: CPT

## 2023-11-14 RX ORDER — ALBUMIN (HUMAN) 12.5 G/50ML
25 SOLUTION INTRAVENOUS AS NEEDED
Status: DISCONTINUED | OUTPATIENT
Start: 2023-11-14 | End: 2023-11-25 | Stop reason: HOSPADM

## 2023-11-14 RX ADMIN — ALBUMIN (HUMAN) 25 G: 0.25 INJECTION, SOLUTION INTRAVENOUS at 13:46

## 2023-11-14 RX ADMIN — TAMSULOSIN HYDROCHLORIDE 0.4 MG: 0.4 CAPSULE ORAL at 09:08

## 2023-11-14 RX ADMIN — PANTOPRAZOLE SODIUM 40 MG: 40 INJECTION, POWDER, FOR SOLUTION INTRAVENOUS at 17:09

## 2023-11-14 RX ADMIN — LEVOTHYROXINE SODIUM 125 MCG: 125 TABLET ORAL at 05:31

## 2023-11-14 RX ADMIN — TRAMADOL HYDROCHLORIDE 25 MG: 50 TABLET ORAL at 18:00

## 2023-11-14 RX ADMIN — MIDODRINE HYDROCHLORIDE 15 MG: 10 TABLET ORAL at 09:08

## 2023-11-14 RX ADMIN — VANCOMYCIN HYDROCHLORIDE 125 MG: 5 INJECTION, POWDER, LYOPHILIZED, FOR SOLUTION INTRAVENOUS at 17:30

## 2023-11-14 RX ADMIN — SODIUM CHLORIDE, PRESERVATIVE FREE 10 ML: 5 INJECTION INTRAVENOUS at 09:09

## 2023-11-14 RX ADMIN — MEROPENEM 500 MG: 500 INJECTION INTRAVENOUS at 17:09

## 2023-11-14 RX ADMIN — ACETAMINOPHEN 325MG 650 MG: 325 TABLET ORAL at 21:46

## 2023-11-14 RX ADMIN — NEPHROCAP 1 MG: 1 CAP ORAL at 09:08

## 2023-11-14 RX ADMIN — VANCOMYCIN HYDROCHLORIDE 125 MG: 5 INJECTION, POWDER, LYOPHILIZED, FOR SOLUTION INTRAVENOUS at 01:10

## 2023-11-14 RX ADMIN — TROSPIUM CHLORIDE 20 MG: 20 TABLET, FILM COATED ORAL at 05:31

## 2023-11-14 RX ADMIN — DRONABINOL 5 MG: 5 CAPSULE ORAL at 17:09

## 2023-11-14 RX ADMIN — PANTOPRAZOLE SODIUM 40 MG: 40 INJECTION, POWDER, FOR SOLUTION INTRAVENOUS at 05:31

## 2023-11-14 RX ADMIN — VANCOMYCIN HYDROCHLORIDE 125 MG: 5 INJECTION, POWDER, LYOPHILIZED, FOR SOLUTION INTRAVENOUS at 12:50

## 2023-11-14 RX ADMIN — ALBUMIN (HUMAN) 25 G: 0.25 INJECTION, SOLUTION INTRAVENOUS at 00:20

## 2023-11-14 RX ADMIN — MIDODRINE HYDROCHLORIDE 15 MG: 10 TABLET ORAL at 17:09

## 2023-11-14 RX ADMIN — SODIUM CHLORIDE, PRESERVATIVE FREE 10 ML: 5 INJECTION INTRAVENOUS at 21:47

## 2023-11-14 RX ADMIN — DRONABINOL 5 MG: 5 CAPSULE ORAL at 05:31

## 2023-11-14 RX ADMIN — ATORVASTATIN CALCIUM 80 MG: 40 TABLET, FILM COATED ORAL at 21:46

## 2023-11-14 RX ADMIN — MIDODRINE HYDROCHLORIDE 15 MG: 10 TABLET ORAL at 12:50

## 2023-11-14 RX ADMIN — AMIODARONE HYDROCHLORIDE 200 MG: 200 TABLET ORAL at 09:18

## 2023-11-14 RX ADMIN — FLUCONAZOLE 200 MG: 200 TABLET ORAL at 09:09

## 2023-11-14 RX ADMIN — CIPROFLOXACIN 400 MG: 2 INJECTION, SOLUTION INTRAVENOUS at 09:07

## 2023-11-14 ASSESSMENT — PAIN SCALES - GENERAL
PAINLEVEL_OUTOF10: 0
PAINLEVEL_OUTOF10: 6
PAINLEVEL_OUTOF10: 3
PAINLEVEL_OUTOF10: 0

## 2023-11-14 ASSESSMENT — PAIN DESCRIPTION - DESCRIPTORS
DESCRIPTORS: ACHING
DESCRIPTORS: ACHING

## 2023-11-14 ASSESSMENT — PAIN DESCRIPTION - LOCATION
LOCATION: FOOT
LOCATION: GENERALIZED

## 2023-11-14 NOTE — PLAN OF CARE
INTERVENTION:  HEMODYNAMIC STABILIZATION  MAINTAIN BP WNL WHILE ON HD. INTERVENTION:  FLUID MANAGEMENT  WILL ATTEMPT 1000 ML TOTAL FLUID REMOVAL AS TOLERATED. INTERVENTION:  METABOLIC/ELECTROLYTE MANAGEMENT  3.0 POTASSIUM 2.5 CALCIUM DIALYSATE USED WITH HD TODAY. INTERVENTION:  HEMODIALYSIS ACCESS SITE MANAGEMENT  RIGHT CVC ACCESSED USING ASEPTIC TECHNIQUE. GOAL:  SIGNS AND SYMPTOMS OF LISTED POTENTIAL PROBLEMS WILL BE ABSENT OR MANAGEABLE. OUTCOME:  PROGRESSING. HD PLANNED FOR 3 HOURS TODAY.        Problem: Chronic Conditions and Co-morbidities  Goal: Patient's chronic conditions and co-morbidity symptoms are monitored and maintained or improved  11/14/2023 1607 by Patricia Curiel RN  Outcome: Progressing

## 2023-11-14 NOTE — PLAN OF CARE
Problem: Discharge Planning  Goal: Discharge to home or other facility with appropriate resources  Outcome: Progressing     Problem: Safety - Adult  Goal: Free from fall injury  Outcome: Progressing     Problem: Skin/Tissue Integrity  Goal: Absence of new skin breakdown  Description: 1. Monitor for areas of redness and/or skin breakdown  2. Assess vascular access sites hourly  3. Every 4-6 hours minimum:  Change oxygen saturation probe site  4. Every 4-6 hours:  If on nasal continuous positive airway pressure, respiratory therapy assess nares and determine need for appliance change or resting period.   Outcome: Progressing     Problem: Chronic Conditions and Co-morbidities  Goal: Patient's chronic conditions and co-morbidity symptoms are monitored and maintained or improved  Outcome: Progressing  Flowsheets (Taken 11/13/2023 2019)  Care Plan - Patient's Chronic Conditions and Co-Morbidity Symptoms are Monitored and Maintained or Improved:   Monitor and assess patient's chronic conditions and comorbid symptoms for stability, deterioration, or improvement   Collaborate with multidisciplinary team to address chronic and comorbid conditions and prevent exacerbation or deterioration   Update acute care plan with appropriate goals if chronic or comorbid symptoms are exacerbated and prevent overall improvement and discharge     Problem: ABCDS Injury Assessment  Goal: Absence of physical injury  Outcome: Progressing     Problem: Pain  Goal: Verbalizes/displays adequate comfort level or baseline comfort level  Outcome: Progressing     Problem: Nutrition Deficit:  Goal: Optimize nutritional status  Outcome: Progressing     Problem: Neurosensory - Adult  Goal: Achieves stable or improved neurological status  Outcome: Progressing  Flowsheets (Taken 11/13/2023 2019)  Achieves stable or improved neurological status:   Assess for and report changes in neurological status   Maintain blood pressure and fluid volume within

## 2023-11-14 NOTE — PROGRESS NOTES
Infectious Disease progress Note        Reason: Severe sepsis    Current abx Prior abx       Fluconazole since 11/6/23  Po vancomycin since 11/8   Piperacillin/tazobactam, vancomycin since 10/21-10/23  Po vancomycin 10/24-10/28  Gentamicin since 10/23-10/27  Metronidazole iv since 10/25-11/6  Fidaxomicin since 10/28-11/7     Lines:       Assessment :   80 y.o.  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 Clinical presentation consistent with severe sepsis-present on admission due to Hebert catheter associated cystitis    Possible ileus versus pseudoobstruction:  GI follow-up appreciated. Significant worsening leukocytosis on 10/25/23 -despite current broad-spectrum antibiotics, treatment for infection/UTI - likely due to severe c.diff colitis (refractory to treatment)    Stool c.diff 10/23- positive likely suggest evolving c.diff as the cause of persistent leukocytosis. Patient's recent diarrhea as outpt could have been due to evolving c.diff with subsequent ileus due to immodium in setting of c.diff infection. Urine culture 10/21 positive for 70,000 colonies of pseudomonas (gentamicin NIKA 2, levofloxacin NIKA:1, cefepime NIKA:4, pip/tazo NIKA 8), Klebsiella (R to levofloxacin, gentamicin NIKA:<1)    S/p hebert exchange 10/27. >100,000 colonies of pseudomonas in Urine cx 10/29- likely colonizer. No dysuria.      persistent leukocytosis. Leukocytosis out of proportion to physical findings    nausea, increased abdominal distension on 10/28 concerning for partially treated c.diff- hence, switched to fidaxomicin on 10/28. No blasts noted on peripheral smear.   Toxic granulation noted performed during the evaluation of this patient at high risk for decompensation with multiple organ involvement     Above mentioned total time spent on reviewing the case/medical record/data/notes/EMR/patient examination/documentation/coordinating care with nurse/consultants, exclusive of procedures with complex decision making performed and > 50% time spent in face to face evaluation. Disclaimer: Sections of this note are dictated utilizing voice recognition software, which may have resulted in some phonetic based errors in grammar and contents. Even though attempts were made to correct all the mistakes, some may have been missed, and remained in the body of the document. If questions arise, please contact our department.     Dr. Kodak Nixon, Infectious Disease Specialist  [phone]  November 14, 2023  8:32 AM

## 2023-11-14 NOTE — PROGRESS NOTES
Received pre HD report from  B. Jackelyn Goldberg, RN. Pt in bed, A+O 3, no s/s of acute distress noted. Accessed right CVC w/o complications. Tx initiated at 1345. CVC flowing with ease. UF goal 1000 ml. Offered assistance with repositioning every 2 hours. Vascular access visible at all times during treatment, line connections intact at all times. Tx completed at 1640, Albumin administered x 1 d/t hypotension. 500ml  removed. De-accessed per protocol. Heparin indwell 1.8ml in arterial, and 1.8ml in venous catheter. Unit nurse JOSÉ Leon, DEAN given report.

## 2023-11-14 NOTE — SIGNIFICANT EVENT
INTERIM UPDATE - 5232 EST on 11/13/2023    Nursing Staff calls to report that Patient's Blood Pressures have been measured at 83/39 (MAP 54), 73/51 (MAP 58), 94/42 (MAP 59) and 96/46 (MAP 63). Patient has ESRD on Hemodialysis, which is probably why Patient has not received IV Fluid Boluses. Patient is already on PO Midodrine 15 mg TID. Last Serum Albumin was measured on 11/08/2023 and was 2.8 g/dL. Plan:  Ordered IV Albumin 25% 25 grams x1 dose. Ordered STAT Lactic Acid and Serum Albumin. Asked that Nurse HOLD IV Albumin until blood draw was performed, unless Blood Pressure becomes MAP <=55 and then just to give the IV Albumin.

## 2023-11-14 NOTE — PROGRESS NOTES
RENAL DAILY PROGRESS NOTE    Subjective:       Complaint:     Overnight events noted  No abdominal pain reported  No chest pain or sob      IMPRESSION:   ESRD on MWF dialysis  Access: TDC  Sob,fluid overload  UTI, C diff, Leucocytosis  Anemia, refused epogen  Chronic indwelling hebert due to hx of bladder cancer   PLAN:   HD today  Goal UF is 1 L due to some borderline BP.            Current Facility-Administered Medications   Medication Dose Route Frequency    ciprofloxacin (CIPRO) IVPB 400 mg  400 mg IntraVENous Q24H    pantoprazole (PROTONIX) 40 mg in sodium chloride (PF) 0.9 % 10 mL injection  40 mg IntraVENous Q12H    tamsulosin (FLOMAX) capsule 0.4 mg  0.4 mg Oral Daily    lidocaine (XYLOCAINE) 2 % uro-jet   Topical PRN    trospium (SANCTURA) tablet 20 mg  20 mg Oral QAM AC    dronabinol (MARINOL) capsule 5 mg  5 mg Oral BID AC    acetaminophen (TYLENOL) tablet 650 mg  650 mg Oral Q6H PRN    vancomycin (VANCOCIN) 50 mg/mL oral solution 125 mg  125 mg Oral 4 times per day    0.9 % sodium chloride infusion   IntraVENous PRN    diatrizoate meglumine-sodium (GASTROGRAFIN) 66-10 % solution 30 mL  30 mL Oral ONCE PRN    fluconazole (DIFLUCAN) tablet 200 mg  200 mg Oral Daily    midodrine (PROAMATINE) tablet 15 mg  15 mg Oral TID WC    [Held by provider] carvedilol (COREG) tablet 3.125 mg  3.125 mg Oral BID    traMADol (ULTRAM) tablet 25 mg  25 mg Oral Q12H PRN    insulin lispro (HUMALOG) injection vial 0-4 Units  0-4 Units SubCUTAneous TID WC    insulin lispro (HUMALOG) injection vial 0-4 Units  0-4 Units SubCUTAneous Nightly    glucose chewable tablet 16 g  4 tablet Oral PRN    dextrose bolus 10% 125 mL  125 mL IntraVENous PRN    Or    dextrose bolus 10% 250 mL  250 mL IntraVENous PRN    glucagon (rDNA) injection 1 mg  1 mg SubCUTAneous PRN    dextrose 10 % infusion   IntraVENous Continuous PRN    [Held by provider] insulin glargine (LANTUS) injection vial 5 Units  5 Units SubCUTAneous Nightly    simethicone

## 2023-11-14 NOTE — PROGRESS NOTES
Called and updated Mateus Cordero that patient has arrived back to the unit from Dialysis. She was very thankful.

## 2023-11-14 NOTE — PROGRESS NOTES
Hospitalist Progress Note    Patient: Monique Matthews Age: 80 y.o. : 1935 MR#: [id number] SSN: [id number]  Date/Time: 2023 4:41 PM    DOA: 10/21/2023  PCP: Ya Silvestre MD    Subjective:     POD4 infrarenal, retrievable, MRI compatible, inferior vena cava filter placement. Patient received albumin on dialysis today, goal ultrafiltrate changed to 0 given low blood pressures. Patient's hemoglobin 7.0 this morning. No reports of any bleeding. Appetite has been poor, last ate Saturday    Patient seen and examined on dialysis, he is oriented x2. States he is not eating because he does not want to. Denies pain anywhere. Extensive discussion held with wife and daughter in patient's room while he was on dialysis, they report that patient has spent 90% of this year so far in the hospital.  Quality of life was discussed. They inquire as to whether patient will \"pull through\" this most recent infection. They feel that he may be giving up, that they noticed his mood has been agitated. Also he has not eaten since Saturday. Discussed that palliative care will be reconsulted for family support and ongoing discussions. Poor prognosis discussed at length. Assessment/Plan:     Severe sepsis POA, due to C. difficile colitis, also treated for Pseudomonas cystitis secondary to indwelling Carlos catheter, resolved. Severe C. difficile colitis, with concern for [place]'s vs fulminant C.diff with pancolitis and infarct spleen. Improving s/p dificid. prolonged po vancomycin taper for severe c.diff colitis (125 mg po q 6 hour till 11/15 followed by 125 mg po q 12 hour till  followed by 125 mg po every other day till 23) per ID recommendations. New splenic wedge-shaped hypodensity concerning for acute splenic infarct in recent CT abdomen/pelvic  Small volume ascites noted, likely reactive to colitis. ESRD on HD, per nephrology.    Acute respiratory failure with hypoxia, currently requiring involving the distal descending colon, sigmoid colon and cecum-raising  concern for moderate to severe infectious/inflammatory colitis. The rectosigmoid  stump and distal colonic segment is unremarkable. The intervening transverse  colon appears to be dilated measuring up to 10.0 cm (604:14). Great vessels/Retroperitoneum:  Unremarkable for age. Lymph nodes: Unremarkable. Peritoneal Spaces: New small ascites, predominantly along bilateral paracolic  gutters and liver. Atherosclerosis. Body wall/MSK: Bilateral hip prostheses with significant streak artifact  limiting evaluation of the adjacent structures. Mild generalized anasarca. Impression  1. Near entire length colonic wall thickening, extensive pericolic fat  stranding and dilated transverse colon up to 10 cm, highly concerning for  moderate to severe infectious/inflammatory colitis. 2.  New small volume ascites, likely reactive in the setting of colitis. 3.  New splenic wedge shaped hypodensity, concerning for acute splenic infarct. 4.  Trace bilateral pleural effusions (right greater than left) and slightly  increased bibasilar atelectasis. 5.  Previously described 0.8 cm right lung lower lobe nodule is not confidently  visualized on current study, possibly due to significant atelectasis. Attention  on follow-up. 6.  Extensive coronary calcification. Findings were discussed over the telephone at 4:39 PM 10/31/2023 with Isabelle Esposito MD  who verbalized understanding of the findings. Disclaimer: Sections of this note are dictated utilizing voice recognition software, which may have resulted in some phonetic based errors in grammar and contents. Even though attempts were made to correct all the mistakes, some may have been missed, and remained in the body of the document. If questions arise, please contact our department.

## 2023-11-14 NOTE — PLAN OF CARE
Problem: Discharge Planning  Goal: Discharge to home or other facility with appropriate resources  11/14/2023 1043 by Ingrid Marshall RN  Outcome: Progressing  11/14/2023 0926 by Cara Duverney, RN  Outcome: Progressing     Problem: Safety - Adult  Goal: Free from fall injury  11/14/2023 1043 by Ingrid Marshall RN  Outcome: Progressing  11/14/2023 0926 by Cara Duverney, RN  Outcome: Progressing     Problem: Skin/Tissue Integrity  Goal: Absence of new skin breakdown  Description: 1. Monitor for areas of redness and/or skin breakdown  2. Assess vascular access sites hourly  3. Every 4-6 hours minimum:  Change oxygen saturation probe site  4. Every 4-6 hours:  If on nasal continuous positive airway pressure, respiratory therapy assess nares and determine need for appliance change or resting period.   11/14/2023 1043 by Ingrid Marshall RN  Outcome: Progressing  11/14/2023 0926 by Cara Duverney, RN  Outcome: Progressing     Problem: Chronic Conditions and Co-morbidities  Goal: Patient's chronic conditions and co-morbidity symptoms are monitored and maintained or improved  11/14/2023 1043 by Ingrid Marshall RN  Outcome: Progressing  11/14/2023 0926 by Cara Duverney, RN  Outcome: Progressing  Flowsheets (Taken 11/13/2023 2019)  Care Plan - Patient's Chronic Conditions and Co-Morbidity Symptoms are Monitored and Maintained or Improved:   Monitor and assess patient's chronic conditions and comorbid symptoms for stability, deterioration, or improvement   Collaborate with multidisciplinary team to address chronic and comorbid conditions and prevent exacerbation or deterioration   Update acute care plan with appropriate goals if chronic or comorbid symptoms are exacerbated and prevent overall improvement and discharge     Problem: ABCDS Injury Assessment  Goal: Absence of physical injury  11/14/2023 1043 by Ingrid Marshall RN  Outcome: Progressing  11/14/2023 0926 by Cara Duverney, RN  Outcome: limits:   Monitor labs and assess patient for signs and symptoms of electrolyte imbalances   Monitor response to electrolyte replacements, including repeat lab results as appropriate   Fluid restriction as ordered  Goal: Glucose maintained within prescribed range  11/14/2023 1043 by Alberto Reed RN  Outcome: Progressing  11/14/2023 0926 by Vivien Clark RN  Outcome: Progressing  Flowsheets (Taken 11/13/2023 2019)  Glucose maintained within prescribed range: Monitor blood glucose as ordered     Problem: Cardiovascular - Adult  Goal: Maintains optimal cardiac output and hemodynamic stability  11/14/2023 1043 by Alberto Reed RN  Outcome: Progressing  11/14/2023 0926 by Vivien Clark RN  Outcome: Progressing  Flowsheets (Taken 11/13/2023 2019)  Maintains optimal cardiac output and hemodynamic stability: Monitor blood pressure and heart rate  Goal: Absence of cardiac dysrhythmias or at baseline  11/14/2023 1043 by Alberto Reed RN  Outcome: Progressing  11/14/2023 0926 by Vivien Clark RN  Outcome: Progressing  Flowsheets (Taken 11/13/2023 2019)  Absence of cardiac dysrhythmias or at baseline: Monitor cardiac rate and rhythm     Problem: Coping  Goal: Pt/Family able to verbalize concerns and demonstrate effective coping strategies  Description: INTERVENTIONS:  1. Assist patient/family to identify coping skills, available support systems and cultural and spiritual values  2. Provide emotional support, including active listening and acknowledgement of concerns of patient and caregivers  3. Reduce environmental stimuli, as able  4. Instruct patient/family in relaxation techniques, as appropriate  5.  Assess for spiritual pain/suffering and initiate Spiritual Care, Psychosocial Clinical Specialist consults as needed  11/14/2023 1043 by Alberto Reed RN  Outcome: Progressing  11/14/2023 0926 by Vivien Clark RN  Outcome: Progressing

## 2023-11-14 NOTE — PROGRESS NOTES
1920: Bedside and Verbal shift change report given to DEAN Delong (oncoming nurse) by Tiki Odom RN (offgoing nurse). Report included the following information Nurse Handoff Report, Index, Intake/Output, MAR, Cardiac Rhythm A FIB, and Quality Measures. Pt's sbp is low, and is on going, no PRN med onboard for BP.    83/39, 73/51, (, 96/46, at trendelenburg position)    2254:  complaining not tolerating trendelenburg position, current BP 87/40    2255: Paged and notified Dr. Noemy Sanchez concerning pt's consistent low blood pressure. New orders      Dr. Noemy Sanchez ordered start , lactic acid  Albumin  And albumin human 25% IV solution    Bedside and Verbal shift change report given to Mila Rogers (oncoming nurse) by DEAN Delong (offgoing nurse). Report included the following information Nurse Handoff Report, Index, Intake/Output, MAR, Cardiac Rhythm A FIB, and Quality Measures. [facility]

## 2023-11-14 NOTE — PROGRESS NOTES
PT attempt x2. Unable to see patient as he is FLACO for HD. Will continue to follow.    Pilar Bound PT, DPT

## 2023-11-15 LAB
ANION GAP SERPL CALC-SCNC: 8 MMOL/L (ref 3–18)
BASOPHILS # BLD: 0 K/UL (ref 0–0.1)
BASOPHILS NFR BLD: 0 % (ref 0–2)
BUN SERPL-MCNC: 22 MG/DL (ref 7–18)
BUN/CREAT SERPL: 5 (ref 12–20)
CALCIUM SERPL-MCNC: 8.7 MG/DL (ref 8.5–10.1)
CHLORIDE SERPL-SCNC: 103 MMOL/L (ref 100–111)
CO2 SERPL-SCNC: 26 MMOL/L (ref 21–32)
CORTIS SERPL-MCNC: 17.2 UG/DL
CREAT SERPL-MCNC: 4.67 MG/DL (ref 0.6–1.3)
DIFFERENTIAL METHOD BLD: ABNORMAL
EOSINOPHIL # BLD: 0.1 K/UL (ref 0–0.4)
EOSINOPHIL NFR BLD: 1 % (ref 0–5)
ERYTHROCYTE [DISTWIDTH] IN BLOOD BY AUTOMATED COUNT: 22.1 % (ref 11.6–14.5)
GLUCOSE BLD STRIP.AUTO-MCNC: 100 MG/DL (ref 70–110)
GLUCOSE BLD STRIP.AUTO-MCNC: 101 MG/DL (ref 70–110)
GLUCOSE BLD STRIP.AUTO-MCNC: 111 MG/DL (ref 70–110)
GLUCOSE BLD STRIP.AUTO-MCNC: 114 MG/DL (ref 70–110)
GLUCOSE BLD STRIP.AUTO-MCNC: 124 MG/DL (ref 70–110)
GLUCOSE SERPL-MCNC: 89 MG/DL (ref 74–99)
HCT VFR BLD AUTO: 21.1 % (ref 36–48)
HGB BLD-MCNC: 6.8 G/DL (ref 13–16)
HISTORY CHECK: NORMAL
IMM GRANULOCYTES # BLD AUTO: 0.1 K/UL (ref 0–0.04)
IMM GRANULOCYTES NFR BLD AUTO: 1 % (ref 0–0.5)
LYMPHOCYTES # BLD: 1.3 K/UL (ref 0.9–3.6)
LYMPHOCYTES NFR BLD: 14 % (ref 21–52)
MCH RBC QN AUTO: 34.5 PG (ref 24–34)
MCHC RBC AUTO-ENTMCNC: 32.2 G/DL (ref 31–37)
MCV RBC AUTO: 107.1 FL (ref 78–100)
MONOCYTES # BLD: 0.3 K/UL (ref 0.05–1.2)
MONOCYTES NFR BLD: 4 % (ref 3–10)
NEUTS SEG # BLD: 7 K/UL (ref 1.8–8)
NEUTS SEG NFR BLD: 79 % (ref 40–73)
NRBC # BLD: 0 K/UL (ref 0–0.01)
NRBC BLD-RTO: 0 PER 100 WBC
PLATELET # BLD AUTO: 246 K/UL (ref 135–420)
PMV BLD AUTO: 11.3 FL (ref 9.2–11.8)
POTASSIUM SERPL-SCNC: 3.6 MMOL/L (ref 3.5–5.5)
RBC # BLD AUTO: 1.97 M/UL (ref 4.35–5.65)
SODIUM SERPL-SCNC: 137 MMOL/L (ref 136–145)
WBC # BLD AUTO: 8.8 K/UL (ref 4.6–13.2)

## 2023-11-15 PROCEDURE — 6370000000 HC RX 637 (ALT 250 FOR IP): Performed by: HOSPITALIST

## 2023-11-15 PROCEDURE — P9016 RBC LEUKOCYTES REDUCED: HCPCS

## 2023-11-15 PROCEDURE — 86850 RBC ANTIBODY SCREEN: CPT

## 2023-11-15 PROCEDURE — 6370000000 HC RX 637 (ALT 250 FOR IP): Performed by: INTERNAL MEDICINE

## 2023-11-15 PROCEDURE — 97530 THERAPEUTIC ACTIVITIES: CPT

## 2023-11-15 PROCEDURE — 36415 COLL VENOUS BLD VENIPUNCTURE: CPT

## 2023-11-15 PROCEDURE — P9047 ALBUMIN (HUMAN), 25%, 50ML: HCPCS | Performed by: INTERNAL MEDICINE

## 2023-11-15 PROCEDURE — 2580000003 HC RX 258: Performed by: HOSPITALIST

## 2023-11-15 PROCEDURE — 80048 BASIC METABOLIC PNL TOTAL CA: CPT

## 2023-11-15 PROCEDURE — 86923 COMPATIBILITY TEST ELECTRIC: CPT

## 2023-11-15 PROCEDURE — 85025 COMPLETE CBC W/AUTO DIFF WBC: CPT

## 2023-11-15 PROCEDURE — 97535 SELF CARE MNGMENT TRAINING: CPT

## 2023-11-15 PROCEDURE — 97164 PT RE-EVAL EST PLAN CARE: CPT

## 2023-11-15 PROCEDURE — 2580000003 HC RX 258: Performed by: INTERNAL MEDICINE

## 2023-11-15 PROCEDURE — 94761 N-INVAS EAR/PLS OXIMETRY MLT: CPT

## 2023-11-15 PROCEDURE — 97168 OT RE-EVAL EST PLAN CARE: CPT

## 2023-11-15 PROCEDURE — 2580000003 HC RX 258: Performed by: STUDENT IN AN ORGANIZED HEALTH CARE EDUCATION/TRAINING PROGRAM

## 2023-11-15 PROCEDURE — C9113 INJ PANTOPRAZOLE SODIUM, VIA: HCPCS | Performed by: HOSPITALIST

## 2023-11-15 PROCEDURE — 86900 BLOOD TYPING SEROLOGIC ABO: CPT

## 2023-11-15 PROCEDURE — 99232 SBSQ HOSP IP/OBS MODERATE 35: CPT | Performed by: HOSPITALIST

## 2023-11-15 PROCEDURE — 36430 TRANSFUSION BLD/BLD COMPNT: CPT

## 2023-11-15 PROCEDURE — 2140000001 HC CVICU INTERMEDIATE R&B

## 2023-11-15 PROCEDURE — 99232 SBSQ HOSP IP/OBS MODERATE 35: CPT | Performed by: INTERNAL MEDICINE

## 2023-11-15 PROCEDURE — 2700000000 HC OXYGEN THERAPY PER DAY

## 2023-11-15 PROCEDURE — 6370000000 HC RX 637 (ALT 250 FOR IP): Performed by: STUDENT IN AN ORGANIZED HEALTH CARE EDUCATION/TRAINING PROGRAM

## 2023-11-15 PROCEDURE — 86901 BLOOD TYPING SEROLOGIC RH(D): CPT

## 2023-11-15 PROCEDURE — 6360000002 HC RX W HCPCS: Performed by: INTERNAL MEDICINE

## 2023-11-15 PROCEDURE — 6360000002 HC RX W HCPCS: Performed by: HOSPITALIST

## 2023-11-15 PROCEDURE — A4216 STERILE WATER/SALINE, 10 ML: HCPCS | Performed by: HOSPITALIST

## 2023-11-15 RX ORDER — SODIUM CHLORIDE 9 MG/ML
INJECTION, SOLUTION INTRAVENOUS PRN
Status: DISCONTINUED | OUTPATIENT
Start: 2023-11-15 | End: 2023-11-25 | Stop reason: HOSPADM

## 2023-11-15 RX ADMIN — VANCOMYCIN HYDROCHLORIDE 125 MG: 5 INJECTION, POWDER, LYOPHILIZED, FOR SOLUTION INTRAVENOUS at 11:59

## 2023-11-15 RX ADMIN — TRAMADOL HYDROCHLORIDE 25 MG: 50 TABLET ORAL at 23:21

## 2023-11-15 RX ADMIN — PANTOPRAZOLE SODIUM 40 MG: 40 INJECTION, POWDER, FOR SOLUTION INTRAVENOUS at 05:40

## 2023-11-15 RX ADMIN — ALBUMIN (HUMAN) 25 G: 0.25 INJECTION, SOLUTION INTRAVENOUS at 13:49

## 2023-11-15 RX ADMIN — LEVOTHYROXINE SODIUM 125 MCG: 125 TABLET ORAL at 05:40

## 2023-11-15 RX ADMIN — NEPHROCAP 1 MG: 1 CAP ORAL at 07:36

## 2023-11-15 RX ADMIN — TAMSULOSIN HYDROCHLORIDE 0.4 MG: 0.4 CAPSULE ORAL at 07:36

## 2023-11-15 RX ADMIN — MIDODRINE HYDROCHLORIDE 15 MG: 10 TABLET ORAL at 17:15

## 2023-11-15 RX ADMIN — AMIODARONE HYDROCHLORIDE 200 MG: 200 TABLET ORAL at 07:36

## 2023-11-15 RX ADMIN — MIDODRINE HYDROCHLORIDE 15 MG: 10 TABLET ORAL at 07:36

## 2023-11-15 RX ADMIN — SODIUM CHLORIDE, PRESERVATIVE FREE 10 ML: 5 INJECTION INTRAVENOUS at 20:51

## 2023-11-15 RX ADMIN — FLUCONAZOLE 200 MG: 200 TABLET ORAL at 07:36

## 2023-11-15 RX ADMIN — MIDODRINE HYDROCHLORIDE 15 MG: 10 TABLET ORAL at 11:59

## 2023-11-15 RX ADMIN — VANCOMYCIN HYDROCHLORIDE 125 MG: 5 INJECTION, POWDER, LYOPHILIZED, FOR SOLUTION INTRAVENOUS at 01:59

## 2023-11-15 RX ADMIN — CIPROFLOXACIN 400 MG: 2 INJECTION, SOLUTION INTRAVENOUS at 07:36

## 2023-11-15 RX ADMIN — DRONABINOL 5 MG: 5 CAPSULE ORAL at 06:04

## 2023-11-15 RX ADMIN — VANCOMYCIN HYDROCHLORIDE 125 MG: 5 INJECTION, POWDER, LYOPHILIZED, FOR SOLUTION INTRAVENOUS at 05:40

## 2023-11-15 RX ADMIN — VANCOMYCIN HYDROCHLORIDE 125 MG: 5 INJECTION, POWDER, LYOPHILIZED, FOR SOLUTION INTRAVENOUS at 17:32

## 2023-11-15 RX ADMIN — MEROPENEM 500 MG: 500 INJECTION INTRAVENOUS at 17:23

## 2023-11-15 RX ADMIN — PANTOPRAZOLE SODIUM 40 MG: 40 INJECTION, POWDER, FOR SOLUTION INTRAVENOUS at 17:16

## 2023-11-15 RX ADMIN — SODIUM CHLORIDE, PRESERVATIVE FREE 10 ML: 5 INJECTION INTRAVENOUS at 07:37

## 2023-11-15 RX ADMIN — TROSPIUM CHLORIDE 20 MG: 20 TABLET, FILM COATED ORAL at 06:04

## 2023-11-15 RX ADMIN — ALLOPURINOL 100 MG: 100 TABLET ORAL at 07:48

## 2023-11-15 RX ADMIN — DRONABINOL 5 MG: 5 CAPSULE ORAL at 17:15

## 2023-11-15 RX ADMIN — ATORVASTATIN CALCIUM 80 MG: 40 TABLET, FILM COATED ORAL at 20:39

## 2023-11-15 ASSESSMENT — PAIN SCALES - GENERAL
PAINLEVEL_OUTOF10: 0
PAINLEVEL_OUTOF10: 0
PAINLEVEL_OUTOF10: 5

## 2023-11-15 NOTE — PROGRESS NOTES
Received pre HD report from  Alfonso Moncada RN. Pt in bed, A+O 3, no s/s of acute distress noted. Accessed right CVC w/o complications. Tx initiated at 1345. CVC flowing with ease. UF goal 1000 ml. Offered assistance with repositioning every 2 hours. Vascular access visible at all times during treatment, line connections intact at all times. Tx completed at 1645, Albumin administered x 1 d/t hypotension. 500ml removed. 1 unit RBC administered during dialysis, patient tolerated well and w/o incident. De-accessed per protocol. Heparin indwell 1.8ml in arterial, and 1.8ml in venous catheter.        Unit nurse Alfonso Moncada RN given report

## 2023-11-15 NOTE — PROGRESS NOTES
Per signout patient is sick and do not anticipate discharge soon. Continue current treatment plan, will follow.

## 2023-11-15 NOTE — PROGRESS NOTES
Hospitalist Progress Note    Patient: Sesar Stuart Age: 80 y.o. : 1935 MR#: [id number]: [id number]  Date/Time: 11/15/2023 4:09 PM    DOA: 10/21/2023  PCP: Kenneth Shields MD    Subjective:     POD4 infrarenal, retrievable, MRI compatible, inferior vena cava filter placement. Patient received albumin on dialysis today, goal ultrafiltrate changed to 0 given low blood pressures. Patient's hemoglobin 7.0 this morning. No reports of any bleeding. Appetite has been poor, last ate Saturday    Patient seen and examined on dialysis, he is oriented x2. States he is not eating because he does not want to. Denies pain anywhere. Extensive discussion held with wife and daughter in patient's room while he was on dialysis, they report that patient has spent 90% of this year so far in the hospital.  Quality of life was discussed. They inquire as to whether patient will \"pull through\" this most recent infection. They feel that he may be giving up, that they noticed his mood has been agitated. Also he has not eaten since Saturday. Discussed that palliative care will be reconsulted for family support and ongoing discussions. Poor prognosis discussed at length. 11/15-continue current treatment plan. Per ID continue Diflucan for Candida cystitis, probable fungemia till 2023. Prolonged p.o. vancomycin taper for severe C. difficile colitis. Continue meropenem, DC ciprofloxacin. Repeat blood culture tomorrow. Assessment/Plan:     Severe sepsis POA, due to C. difficile colitis, also treated for Pseudomonas cystitis secondary to indwelling Carlos catheter, resolved. Severe C. difficile colitis, with concern for Tonya's vs fulminant C.diff with pancolitis and infarct spleen. Improving s/p dificid.  prolonged po vancomycin taper for severe c.diff colitis (125 mg po q 6 hour till 11/15 followed by 125 mg po q 12 hour till  followed by 125 mg po every other day till 23) per ID

## 2023-11-15 NOTE — PROGRESS NOTES
[facility] - Progress Note    Admit Date: 10/21/2023  Attending Cardiologist: Dr. Latonia Land    Assessment:     Patient Active Problem List    Diagnosis Date Noted    Leukocytosis 09/23/2014    Anemia of chronic renal failure 09/12/2014    Urinary retention 11/13/2023    Goals of care, counseling/discussion 11/01/2023    Debility 11/01/2023    End stage renal disease on dialysis ([address]) 11/01/2023    Hypoxia 10/27/2023    C. difficile colitis 10/27/2023    C. difficile diarrhea 10/27/2023    Macrocytic anemia 10/27/2023    Occult blood positive stool 10/27/2023    UTI (urinary tract infection) 10/21/2023    Cystitis 10/21/2023    Acute respiratory failure with hypoxia ([address]) 10/21/2023    Hypokalemia 10/21/2023    Hearing impaired 09/21/2023    Hypercoagulable state due to paroxysmal atrial fibrillation ([address]) 09/21/2023    Colostomy present ([address]) 09/20/2023    Indwelling Carlos catheter present 09/20/2023    Cerebral infarction, unspecified ([address]) 08/23/2023    Gastro-esophageal reflux disease without esophagitis 08/23/2023    Hyperlipidemia, unspecified 16/80/6127    Metabolic encephalopathy 02/68/9369    Muscle weakness (generalized) 08/23/2023    Obstructive and reflux uropathy, unspecified 08/23/2023    Severe sepsis ([address]) 08/23/2023    Protein-calorie malnutrition ([address]) 08/23/2023    Unspecified abnormalities of gait and mobility 08/23/2023    ESRD (end stage renal disease) on dialysis ([address]) 08/12/2023    Ischemic cardiomyopathy 08/12/2023    Steroid-induced hyperglycemia 07/31/2023    Secondary hyperparathyroidism of renal origin ([address]) 05/24/2023    Thoracic aortic ectasia ([address]) 05/24/2023    Pulmonary hypertension, unspecified ([address]) 02/01/2023    Periprosthetic fracture around internal prosthetic right knee joint 09/07/2022    Bronchiectasis without complication ([address]) 26/62/7614    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 without tophus 10/28/2021    Primary malignant neoplasm ([address])

## 2023-11-15 NOTE — PROGRESS NOTES
Patient going to dialysis report given to dialysis nurse. Patient will get 1 unit of PRBC in dialysis. Will obtain blood and take to dialysis. Family at bedside an made aware.

## 2023-11-15 NOTE — PLAN OF CARE
Problem: Safety - Adult  Goal: Free from fall injury  Outcome: Progressing     Problem: Chronic Conditions and Co-morbidities  Goal: Patient's chronic conditions and co-morbidity symptoms are monitored and maintained or improved  Outcome: Progressing  Flowsheets (Taken 11/14/2023 1956)  Care Plan - Patient's Chronic Conditions and Co-Morbidity Symptoms are Monitored and Maintained or Improved:   Monitor and assess patient's chronic conditions and comorbid symptoms for stability, deterioration, or improvement   Collaborate with multidisciplinary team to address chronic and comorbid conditions and prevent exacerbation or deterioration   Update acute care plan with appropriate goals if chronic or comorbid symptoms are exacerbated and prevent overall improvement and discharge     Problem: ABCDS Injury Assessment  Goal: Absence of physical injury  Outcome: Progressing     Problem: Pain  Goal: Verbalizes/displays adequate comfort level or baseline comfort level  Outcome: Progressing  Flowsheets (Taken 11/14/2023 1945)  Verbalizes/displays adequate comfort level or baseline comfort level:   Encourage patient to monitor pain and request assistance   Assess pain using appropriate pain scale   Administer analgesics based on type and severity of pain and evaluate response     Problem: Respiratory - Adult  Goal: Achieves optimal ventilation and oxygenation  Outcome: Progressing  Flowsheets (Taken 11/14/2023 1956)  Achieves optimal ventilation and oxygenation:   Assess for changes in respiratory status   Assess for changes in mentation and behavior   Position to facilitate oxygenation and minimize respiratory effort     Problem: Gastrointestinal - Adult  Goal: Minimal or absence of nausea and vomiting  Outcome: Progressing  Flowsheets (Taken 11/14/2023 1956)  Minimal or absence of nausea and vomiting:   Administer IV fluids as ordered to ensure adequate hydration   Administer ordered antiemetic medications as needed   Provide

## 2023-11-15 NOTE — PLAN OF CARE
INTERVENTION:  HEMODYNAMIC STABILIZATION  MAINTAIN BP WNL WHILE ON HD. INTERVENTION:  FLUID MANAGEMENT  WILL ATTEMPT 500 ML TOTAL FLUID REMOVAL AS TOLERATED. INTERVENTION:  METABOLIC/ELECTROLYTE MANAGEMENT  3.0 POTASSIUM 2.5 CALCIUM DIALYSATE USED WITH HD TODAY. INTERVENTION:  HEMODIALYSIS ACCESS SITE MANAGEMENT  RIGHT CVC ACCESSED USING ASEPTIC TECHNIQUE. GOAL:  SIGNS AND SYMPTOMS OF LISTED POTENTIAL PROBLEMS WILL BE ABSENT OR MANAGEABLE. OUTCOME:  PROGRESSING. HD PLANNED FOR 3 HOURS TODAY.        Problem: Chronic Conditions and Co-morbidities  Goal: Patient's chronic conditions and co-morbidity symptoms are monitored and maintained or improved  11/15/2023 1406 by Emerald Phan RN  Outcome: Progressing

## 2023-11-15 NOTE — PLAN OF CARE
Problem: Occupational Therapy - Adult  Goal: By Discharge: Performs self-care activities at highest level of function for planned discharge setting. See evaluation for individualized goals. Description: Occupational Therapy Goals:  Initiated 10/23/2023 to be met within 7-10 days, re-evaluation 11/15/2023, goals downgraded in order for increased success towards goals, Continue OT POC x 7 days    1. Patient will perform upper body dressing with minimal assistance/contact guard assist.   2.  Patient will perform lower body dressing with minimal assistance/contact guard assist.  3.  Patient will perform bed mobility with moderate assistance in prep for ADL routine. 4.  Patient will perform functional task seated edge of bed with fair balance x 10 minutes. 5.  Patient will participate in upper extremity therapeutic exercise/activities with supervision/set-up for 8-10 minutes to increase strength/endurance for ADLs. PLOF: Patient required min to mod assist with basic self care tasks and used a RW for functional mobility PTA. Outcome: Progressing   OCCUPATIONAL THERAPY RE-EVALUATION    Patient: Emerson Saenz (43 y.o. male)  Date: 11/15/2023  Primary Diagnosis: UTI (urinary tract infection) [N39.0]  Cystitis [N30.90]  Hypoxia [R09.02]  Leukocytosis, unspecified type [D72.829]  Urinary tract infection associated with indwelling urethral catheter, initial encounter ([address]) [T83.511A, N39.0]  Procedure(s) (LRB):  EGD ESOPHAGOGASTRODUODENOSCOPY (N/A)     Precautions: Fall Risk, Contact Precautions, General Precautions, Bed Alarm, Isolation,  ,  ,  ,  ,  ,  ,      ASSESSMENT :  Pt laying semi-reclined in bed, reports chronic back pain-nursing notified. BUE AROM WFL, MMT 3+/5, noted b/l hands/digits noted with mild edema. Pt performed BUE AROM x 10 reps for triceps extension with good tolerance, encouraged to perform UE AROM throughout day, pt verbalized understanding.  Bed mobility: Max A x 2 supine <-> sit edge of

## 2023-11-15 NOTE — PROGRESS NOTES
Comprehensive Nutrition Assessment    Type and Reason for Visit:  Reassess    Nutrition Recommendations/Plan:   Plan to add oral nutrition supplement to optimize nutrition intake opportunity: Gelatein 20 (each provides 80 kcal, 20g protein) BID  Continue clear liquid diet, advance as medically appropriate. Continue renal MVI. Continue to monitor PO intake/tolerance of meals/supplements, readiness for dietary advancement, weight, labs, and POC while admitted. Malnutrition Assessment:  Malnutrition Status:  Mild malnutrition (10/30/23 1133)    Context:  Acute Illness     Findings of the 6 clinical characteristics of malnutrition:  Energy Intake:  50% or less of estimated energy requirements for 5 or more days  Weight Loss:  No significant weight loss     Body Fat Loss:  No significant body fat loss Orbital   Muscle Mass Loss:  No significant muscle mass loss Temples (temporalis)  Fluid Accumulation:  Unable to assess     Strength:  Not Performed    Nutrition Assessment:    Pt admitted for management of severe sepsis. Per chart, pt with multiple episodes of vomiting 11/13- diet changed to clear liquids. Per flow sheets, pt with meal intake 1-25% x 2 entries (dated before diet changed to clear liquids). No documentation of supplement intake. Pt unavailable to speak to x2- asleep x2, in dialysis unit x1. Plan to add Gelatein BID while pt on clear liquid diet. Per chart, pt is frequently refusing meals and noted with increased agitation. Nutrition Related Findings:    Last BM: 11/14. Edema: anasarca generalized. Labs: Na 137 WNL, K 3.6 WNL, Cl 103 WNL, BUN/CR 22/4.67 H, GFR 11 L, Ca 8.7 WNL, POC glucose 100-142 x 24-hrs. Pertinent meds: Marinol, Synthroid, Protonix, Flomax, virt-caps. Wound Type: Pressure Injury, Stage II       Current Nutrition Intake & Therapies:    Average Meal Intake: 1-25% (diet has been changed to clear liquids)  Average Supplements Intake: None Ordered  ADULT DIET;  Clear Liquid;

## 2023-11-15 NOTE — PROGRESS NOTES
Pt took one spoon of jello, he stated that is enough, had few sips of ginger-juve. POC glucose 103    0304: Pt fed with jello and ginger-juve, he tolerated well.

## 2023-11-15 NOTE — PLAN OF CARE
Problem: Discharge Planning  Goal: Discharge to home or other facility with appropriate resources  11/15/2023 0956 by Puja Costello RN  Outcome: Progressing  11/15/2023 0640 by Beryle Gavia, RN  Outcome: Progressing  Flowsheets (Taken 11/14/2023 1956)  Discharge to home or other facility with appropriate resources: Identify barriers to discharge with patient and caregiver     Problem: Safety - Adult  Goal: Free from fall injury  11/15/2023 0956 by Puja Costello RN  Outcome: Progressing  11/15/2023 0640 by Beryle Gavia, RN  Outcome: Progressing     Problem: Skin/Tissue Integrity  Goal: Absence of new skin breakdown  Description: 1. Monitor for areas of redness and/or skin breakdown  2. Assess vascular access sites hourly  3. Every 4-6 hours minimum:  Change oxygen saturation probe site  4. Every 4-6 hours:  If on nasal continuous positive airway pressure, respiratory therapy assess nares and determine need for appliance change or resting period.   11/15/2023 0956 by Puja Costello RN  Outcome: Progressing  11/15/2023 0640 by Beryle Gavia, RN  Outcome: Progressing     Problem: Chronic Conditions and Co-morbidities  Goal: Patient's chronic conditions and co-morbidity symptoms are monitored and maintained or improved  11/15/2023 0956 by Puja Costello RN  Outcome: Progressing  11/15/2023 0640 by Beryle Gavia, RN  Outcome: Progressing  Flowsheets (Taken 11/14/2023 1956)  Care Plan - Patient's Chronic Conditions and Co-Morbidity Symptoms are Monitored and Maintained or Improved:   Monitor and assess patient's chronic conditions and comorbid symptoms for stability, deterioration, or improvement   Collaborate with multidisciplinary team to address chronic and comorbid conditions and prevent exacerbation or deterioration   Update acute care plan with appropriate goals if chronic or comorbid symptoms are exacerbated and prevent overall improvement and discharge     Problem: ABCDS Injury Assessment  Goal: Absence of physical injury  11/15/2023 0956 by Andrea Garcia RN  Outcome: Progressing  11/15/2023 0640 by Cookie Carr RN  Outcome: Progressing     Problem: Pain  Goal: Verbalizes/displays adequate comfort level or baseline comfort level  11/15/2023 0956 by Andrea Garcia RN  Outcome: Progressing  11/15/2023 0640 by Cookie Carr RN  Outcome: Progressing  Flowsheets (Taken 11/14/2023 1945)  Verbalizes/displays adequate comfort level or baseline comfort level:   Encourage patient to monitor pain and request assistance   Assess pain using appropriate pain scale   Administer analgesics based on type and severity of pain and evaluate response

## 2023-11-16 LAB
BACTERIA SPEC CULT: ABNORMAL
BASOPHILS # BLD: 0 K/UL (ref 0–0.1)
BASOPHILS NFR BLD: 0 % (ref 0–2)
DIFFERENTIAL METHOD BLD: ABNORMAL
EOSINOPHIL # BLD: 0.1 K/UL (ref 0–0.4)
EOSINOPHIL NFR BLD: 2 % (ref 0–5)
ERYTHROCYTE [DISTWIDTH] IN BLOOD BY AUTOMATED COUNT: 22.9 % (ref 11.6–14.5)
GLUCOSE BLD STRIP.AUTO-MCNC: 115 MG/DL (ref 70–110)
GLUCOSE BLD STRIP.AUTO-MCNC: 125 MG/DL (ref 70–110)
GLUCOSE BLD STRIP.AUTO-MCNC: 128 MG/DL (ref 70–110)
GLUCOSE BLD STRIP.AUTO-MCNC: 137 MG/DL (ref 70–110)
GRAM STN SPEC: ABNORMAL
GRAM STN SPEC: ABNORMAL
HCT VFR BLD AUTO: 22.1 % (ref 36–48)
HCT VFR BLD AUTO: 25.6 % (ref 36–48)
HGB BLD-MCNC: 6.9 G/DL (ref 13–16)
HGB BLD-MCNC: 8.2 G/DL (ref 13–16)
HISTORY CHECK: NORMAL
IMM GRANULOCYTES # BLD AUTO: 0.1 K/UL (ref 0–0.04)
IMM GRANULOCYTES NFR BLD AUTO: 2 % (ref 0–0.5)
LYMPHOCYTES # BLD: 1.4 K/UL (ref 0.9–3.6)
LYMPHOCYTES NFR BLD: 23 % (ref 21–52)
MCH RBC QN AUTO: 33 PG (ref 24–34)
MCHC RBC AUTO-ENTMCNC: 31.2 G/DL (ref 31–37)
MCV RBC AUTO: 105.7 FL (ref 78–100)
MONOCYTES # BLD: 0.3 K/UL (ref 0.05–1.2)
MONOCYTES NFR BLD: 4 % (ref 3–10)
NEUTS SEG # BLD: 4.1 K/UL (ref 1.8–8)
NEUTS SEG NFR BLD: 68 % (ref 40–73)
NRBC # BLD: 0 K/UL (ref 0–0.01)
NRBC BLD-RTO: 0 PER 100 WBC
PLATELET # BLD AUTO: 260 K/UL (ref 135–420)
PMV BLD AUTO: 10.9 FL (ref 9.2–11.8)
RBC # BLD AUTO: 2.09 M/UL (ref 4.35–5.65)
SERVICE CMNT-IMP: ABNORMAL
WBC # BLD AUTO: 6.1 K/UL (ref 4.6–13.2)

## 2023-11-16 PROCEDURE — A4216 STERILE WATER/SALINE, 10 ML: HCPCS | Performed by: HOSPITALIST

## 2023-11-16 PROCEDURE — 6370000000 HC RX 637 (ALT 250 FOR IP): Performed by: STUDENT IN AN ORGANIZED HEALTH CARE EDUCATION/TRAINING PROGRAM

## 2023-11-16 PROCEDURE — 99232 SBSQ HOSP IP/OBS MODERATE 35: CPT | Performed by: HOSPITALIST

## 2023-11-16 PROCEDURE — C9113 INJ PANTOPRAZOLE SODIUM, VIA: HCPCS | Performed by: HOSPITALIST

## 2023-11-16 PROCEDURE — 6370000000 HC RX 637 (ALT 250 FOR IP): Performed by: INTERNAL MEDICINE

## 2023-11-16 PROCEDURE — 6360000002 HC RX W HCPCS: Performed by: HOSPITALIST

## 2023-11-16 PROCEDURE — 85014 HEMATOCRIT: CPT

## 2023-11-16 PROCEDURE — 6370000000 HC RX 637 (ALT 250 FOR IP): Performed by: HOSPITALIST

## 2023-11-16 PROCEDURE — 94761 N-INVAS EAR/PLS OXIMETRY MLT: CPT

## 2023-11-16 PROCEDURE — 85025 COMPLETE CBC W/AUTO DIFF WBC: CPT

## 2023-11-16 PROCEDURE — 2140000001 HC CVICU INTERMEDIATE R&B

## 2023-11-16 PROCEDURE — 85018 HEMOGLOBIN: CPT

## 2023-11-16 PROCEDURE — 2700000000 HC OXYGEN THERAPY PER DAY

## 2023-11-16 PROCEDURE — 6360000002 HC RX W HCPCS: Performed by: INTERNAL MEDICINE

## 2023-11-16 PROCEDURE — 87040 BLOOD CULTURE FOR BACTERIA: CPT

## 2023-11-16 PROCEDURE — P9016 RBC LEUKOCYTES REDUCED: HCPCS

## 2023-11-16 PROCEDURE — 6360000002 HC RX W HCPCS: Performed by: STUDENT IN AN ORGANIZED HEALTH CARE EDUCATION/TRAINING PROGRAM

## 2023-11-16 PROCEDURE — 2580000003 HC RX 258: Performed by: HOSPITALIST

## 2023-11-16 PROCEDURE — 82962 GLUCOSE BLOOD TEST: CPT

## 2023-11-16 PROCEDURE — 2580000003 HC RX 258: Performed by: STUDENT IN AN ORGANIZED HEALTH CARE EDUCATION/TRAINING PROGRAM

## 2023-11-16 PROCEDURE — 36415 COLL VENOUS BLD VENIPUNCTURE: CPT

## 2023-11-16 PROCEDURE — 36430 TRANSFUSION BLD/BLD COMPNT: CPT

## 2023-11-16 PROCEDURE — 97535 SELF CARE MNGMENT TRAINING: CPT

## 2023-11-16 PROCEDURE — 2580000003 HC RX 258: Performed by: INTERNAL MEDICINE

## 2023-11-16 RX ORDER — SODIUM CHLORIDE 9 MG/ML
INJECTION, SOLUTION INTRAVENOUS PRN
Status: DISCONTINUED | OUTPATIENT
Start: 2023-11-16 | End: 2023-11-25 | Stop reason: HOSPADM

## 2023-11-16 RX ORDER — FLUCONAZOLE 200 MG/1
200 TABLET ORAL DAILY
Status: COMPLETED | OUTPATIENT
Start: 2023-11-16 | End: 2023-11-19

## 2023-11-16 RX ADMIN — FLUCONAZOLE 200 MG: 200 TABLET ORAL at 09:57

## 2023-11-16 RX ADMIN — VANCOMYCIN HYDROCHLORIDE 125 MG: 5 INJECTION, POWDER, LYOPHILIZED, FOR SOLUTION INTRAVENOUS at 12:12

## 2023-11-16 RX ADMIN — MIDODRINE HYDROCHLORIDE 15 MG: 10 TABLET ORAL at 09:57

## 2023-11-16 RX ADMIN — AMIODARONE HYDROCHLORIDE 200 MG: 200 TABLET ORAL at 09:57

## 2023-11-16 RX ADMIN — SODIUM CHLORIDE, PRESERVATIVE FREE 10 ML: 5 INJECTION INTRAVENOUS at 20:40

## 2023-11-16 RX ADMIN — ATORVASTATIN CALCIUM 80 MG: 40 TABLET, FILM COATED ORAL at 20:40

## 2023-11-16 RX ADMIN — MEROPENEM 500 MG: 500 INJECTION INTRAVENOUS at 20:40

## 2023-11-16 RX ADMIN — TRAMADOL HYDROCHLORIDE 25 MG: 50 TABLET ORAL at 14:25

## 2023-11-16 RX ADMIN — DRONABINOL 5 MG: 5 CAPSULE ORAL at 06:04

## 2023-11-16 RX ADMIN — TAMSULOSIN HYDROCHLORIDE 0.4 MG: 0.4 CAPSULE ORAL at 09:57

## 2023-11-16 RX ADMIN — ONDANSETRON 4 MG: 2 INJECTION INTRAMUSCULAR; INTRAVENOUS at 01:07

## 2023-11-16 RX ADMIN — VANCOMYCIN HYDROCHLORIDE 125 MG: 5 INJECTION, POWDER, LYOPHILIZED, FOR SOLUTION INTRAVENOUS at 23:52

## 2023-11-16 RX ADMIN — TROSPIUM CHLORIDE 20 MG: 20 TABLET, FILM COATED ORAL at 06:04

## 2023-11-16 RX ADMIN — SODIUM CHLORIDE, PRESERVATIVE FREE 10 ML: 5 INJECTION INTRAVENOUS at 09:58

## 2023-11-16 RX ADMIN — MIDODRINE HYDROCHLORIDE 15 MG: 10 TABLET ORAL at 12:12

## 2023-11-16 RX ADMIN — MIDODRINE HYDROCHLORIDE 15 MG: 10 TABLET ORAL at 17:26

## 2023-11-16 RX ADMIN — PANTOPRAZOLE SODIUM 40 MG: 40 INJECTION, POWDER, FOR SOLUTION INTRAVENOUS at 17:26

## 2023-11-16 RX ADMIN — DRONABINOL 5 MG: 5 CAPSULE ORAL at 17:26

## 2023-11-16 RX ADMIN — VANCOMYCIN HYDROCHLORIDE 125 MG: 5 INJECTION, POWDER, LYOPHILIZED, FOR SOLUTION INTRAVENOUS at 06:20

## 2023-11-16 RX ADMIN — PANTOPRAZOLE SODIUM 40 MG: 40 INJECTION, POWDER, FOR SOLUTION INTRAVENOUS at 06:04

## 2023-11-16 RX ADMIN — NEPHROCAP 1 MG: 1 CAP ORAL at 09:57

## 2023-11-16 RX ADMIN — VANCOMYCIN HYDROCHLORIDE 125 MG: 5 INJECTION, POWDER, LYOPHILIZED, FOR SOLUTION INTRAVENOUS at 00:43

## 2023-11-16 RX ADMIN — VANCOMYCIN HYDROCHLORIDE 125 MG: 5 INJECTION, POWDER, LYOPHILIZED, FOR SOLUTION INTRAVENOUS at 17:25

## 2023-11-16 RX ADMIN — LEVOTHYROXINE SODIUM 125 MCG: 125 TABLET ORAL at 06:04

## 2023-11-16 ASSESSMENT — PAIN SCALES - GENERAL
PAINLEVEL_OUTOF10: 0
PAINLEVEL_OUTOF10: 9
PAINLEVEL_OUTOF10: 8
PAINLEVEL_OUTOF10: 0

## 2023-11-16 ASSESSMENT — PAIN DESCRIPTION - DESCRIPTORS: DESCRIPTORS: ACHING

## 2023-11-16 ASSESSMENT — PAIN DESCRIPTION - LOCATION: LOCATION: LEG

## 2023-11-16 ASSESSMENT — PAIN - FUNCTIONAL ASSESSMENT: PAIN_FUNCTIONAL_ASSESSMENT: PREVENTS OR INTERFERES SOME ACTIVE ACTIVITIES AND ADLS

## 2023-11-16 ASSESSMENT — PAIN DESCRIPTION - ORIENTATION: ORIENTATION: RIGHT;LEFT

## 2023-11-16 NOTE — PROGRESS NOTES
0720: Bedside and Verbal shift change report given to Cielo Pettit, DEAN and DEAN Mancia (oncoming nurse) by Flavio Tavarez RN (offgoing nurse). Report included the following information Nurse Handoff Report, Adult Overview, Intake/Output, MAR, Recent Results, and Cardiac Rhythm NSR . Upon assessment, patient in bed with even, unlabored respirations. Denies any pain or discomfort at this time. 36: Spoke with patient's sister, Case Cummings. Provided an update on patient's care. 1423: Wound care provided. 1930: Bedside and Verbal shift change report given to DEAN Castle (oncoming nurse) by Cielo Pettit RN and DEAN Mancia (offgoing nurse). Report included the following information Nurse Handoff Report, Adult Overview, Intake/Output, MAR, Recent Results, and Cardiac Rhythm NSR .

## 2023-11-16 NOTE — PALLIATIVE CARE
Palliative Medicine    Follow up visit made to patient along with Palliative team member Juan A Amor NP. Patient is resting quietly in bed. Respirations are easy and non labored, oxygen in use. Nursing staff in to turn and reposition patient. No family at bedside. Palliative team was informed that patient's wife has been admitted to [facility], currently in room 457. Palliative team remains available to provide support to Mr. Jorden Preciado and his family during this hospital stay. CODE STATUS: DNR/DNI, SELECTIVE TREATMENTS, NO FEEDING TUBE.     Dileep Mccabe RN  Palliative Medicine Inpatient RN  Palliative [facility] Line: [phone]

## 2023-11-16 NOTE — PLAN OF CARE
Problem: Occupational Therapy - Adult  Goal: By Discharge: Performs self-care activities at highest level of function for planned discharge setting. See evaluation for individualized goals. Description: Occupational Therapy Goals:  Initiated 10/23/2023 to be met within 7-10 days, re-evaluation 11/15/2023, goals downgraded in order for increased success towards goals, Continue OT POC x 7 days    1. Patient will perform upper body dressing with minimal assistance/contact guard assist.   2.  Patient will perform lower body dressing with minimal assistance/contact guard assist.  3.  Patient will perform bed mobility with moderate assistance in prep for ADL routine. 4.  Patient will perform functional task seated edge of bed with fair balance x 10 minutes. 5.  Patient will participate in upper extremity therapeutic exercise/activities with supervision/set-up for 8-10 minutes to increase strength/endurance for ADLs. PLOF: Patient required min to mod assist with basic self care tasks and used a RW for functional mobility PTA. Outcome: Progressing   OCCUPATIONAL THERAPY TREATMENT    Patient: Keila Hanna (87 y.o. male)  Date: 11/16/2023  Diagnosis: UTI (urinary tract infection) [N39.0]  Cystitis [N30.90]  Hypoxia [R09.02]  Leukocytosis, unspecified type [D72.829]  Urinary tract infection associated with indwelling urethral catheter, initial encounter ([address]) [T83.511A, N39.0] Severe sepsis ([address])  Procedure(s) (LRB):  EGD ESOPHAGOGASTRODUODENOSCOPY (N/A)    Precautions: Fall Risk, Contact Precautions, General Precautions, Bed Alarm, Isolation    Chart, occupational therapy assessment, plan of care, and goals were reviewed. ASSESSMENT:  Pt more alert today. Agreeable to EOB activity. Pt c/o RLE pain w/minimal touch/movement and requires MAX A w/bed mobility to maneuver to EOB. Pt tolerates ~ 10 minutes at EOB w/constant verbal/tactile cues for static sitting balance 2/2 posterior lean.   Pt performs simple ADL

## 2023-11-16 NOTE — PROGRESS NOTES
Infectious Disease progress Note        Reason: Severe sepsis    Current abx Prior abx       Fluconazole since 11/6/23  Po vancomycin since 11/8  Meropenem since 11/14  Ciprofloxacin since 11/13   Piperacillin/tazobactam, vancomycin since 10/21-10/23  Po vancomycin 10/24-10/28  Gentamicin since 10/23-10/27  Metronidazole iv since 10/25-11/6  Fidaxomicin since 10/28-11/7     Lines:       Assessment :   80 y.o.  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 Clinical presentation consistent with severe sepsis-present on admission due to Hebert catheter associated cystitis    Possible ileus versus pseudoobstruction:  GI follow-up appreciated. Significant worsening leukocytosis on 10/25/23 -despite current broad-spectrum antibiotics, treatment for infection/UTI - likely due to severe c.diff colitis (refractory to treatment)    Stool c.diff 10/23- positive likely suggest evolving c.diff as the cause of persistent leukocytosis. Patient's recent diarrhea as outpt could have been due to evolving c.diff with subsequent ileus due to immodium in setting of c.diff infection. Urine culture 10/21 positive for 70,000 colonies of pseudomonas (gentamicin NIKA 2, levofloxacin NIKA:1, cefepime NIKA:4, pip/tazo NIKA 8), Klebsiella (R to levofloxacin, gentamicin NIKA:<1)    S/p hebert exchange 10/27. >100,000 colonies of pseudomonas in Urine cx 10/29- likely colonizer. No dysuria.      persistent leukocytosis. Leukocytosis out of proportion to physical findings    nausea, increased abdominal distension on 10/28 concerning for partially treated c.diff- hence, switched to fidaxomicin on 10/28.  No blasts 11/7/23 with evidence of persistent colitis. No new GI symptoms/worsening noted on today's exam. Radiological improvement will likely lag behind clinical improvement    + Left lower extremity DVT-plan for IVC filter noted      Now with high-grade fevers Tmax 102, on 11/12/23  worsening leukocytosis,  positive blood cx 11/13 suggestive of sepsis due to pseudomonas bloodstream infection (positive blood culture 11/13)    S/p one dose of zosyn, vancomycin on 11/13. Ciprofloxacin started on 11/13, meropenem given on 11/14  No evidence of hydronephrosis or new pathology noted on CT scan 11/13/2023. Improvement in colitis noted on CT scan. Patient's management highly complicated due to risk of recurrent C. difficile colitis by using broad-spectrum antibiotics    Drop in h/h: ? Undiagnosed bleed      Improved mentation. No further fevers. Persistent hypotension-low h/h - ? Undiagnosed bleed    Recommendations:     Cont. Diflucan for Candida cystitis, probable fungemia till 11/19/23. Recommend prolonged po vancomycin taper for severe c.diff colitis (125 mg po q 6 hour till 11/15 followed by 125 mg po q 12 hour till 11/20 followed by 125 mg po every other day till 11/30/23). Continue meropenem. D/c ciprofloxacin  Follow up susceptibilities of pseudomonas in blood cx- modify abx accordingly  Repeat blood culture today  Follow up  urology recommendations for hematuria, voiding trial  Management of DVT per primary team  Recommend continued discussion regarding goals of care since patient is at very high risk for clinical duration  Will finalize antibiotic regimen on 11/18 based on the results of repeat blood culture, clinical course  8. Discharge planning per primary team      Patient is at extremely high risk for recurrent C. difficile colitis, recurrent cystitis due to need for indwelling Carlos catheter/antibiotics      Above plan was discussed in details with patient,  dr. Bea Castañeda.  Please call me if any further

## 2023-11-16 NOTE — PROGRESS NOTES
Hospitalist Progress Note    Patient: Randolph Alpers Age: 80 y.o. : 1935 MR#: [id number] SSN: [id number]  Date/Time: 2023 2:16 PM    DOA: 10/21/2023  PCP: Kylie Sahni MD    Subjective:     POD4 infrarenal, retrievable, MRI compatible, inferior vena cava filter placement. Patient received albumin on dialysis today, goal ultrafiltrate changed to 0 given low blood pressures. Patient's hemoglobin 7.0 this morning. No reports of any bleeding. Appetite has been poor, last ate Saturday    Patient seen and examined on dialysis, he is oriented x2. States he is not eating because he does not want to. Denies pain anywhere. Extensive discussion held with wife and daughter in patient's room while he was on dialysis, they report that patient has spent 90% of this year so far in the hospital.  Quality of life was discussed. They inquire as to whether patient will \"pull through\" this most recent infection. They feel that he may be giving up, that they noticed his mood has been agitated. Also he has not eaten since Saturday. Discussed that palliative care will be reconsulted for family support and ongoing discussions. Poor prognosis discussed at length. 11/15-continue current treatment plan. Per ID continue Diflucan for Candida cystitis, probable fungemia till 2023. Prolonged p.o. vancomycin taper for severe C. difficile colitis. Continue meropenem, DC ciprofloxacin. Repeat blood culture tomorrow. -patient seen and evaluated, lying in bed, no acute distress. Wife not at bedside. She was in the emergency room yesterday secondary to anxiety and currently admitted in room 457. Continue current treatment plan. We will follow. Assessment/Plan:     Severe sepsis POA, due to C. difficile colitis, also treated for Pseudomonas cystitis secondary to indwelling Carlos catheter, resolved.   Severe C. difficile colitis, with concern for Tonya's vs fulminant C.diff with

## 2023-11-16 NOTE — PLAN OF CARE
Problem: Discharge Planning  Goal: Discharge to home or other facility with appropriate resources  Outcome: Progressing     Problem: Safety - Adult  Goal: Free from fall injury  Outcome: Progressing     Problem: Skin/Tissue Integrity  Goal: Absence of new skin breakdown  Description: 1. Monitor for areas of redness and/or skin breakdown  2. Assess vascular access sites hourly  3. Every 4-6 hours minimum:  Change oxygen saturation probe site  4. Every 4-6 hours:  If on nasal continuous positive airway pressure, respiratory therapy assess nares and determine need for appliance change or resting period. Outcome: Progressing     Problem: Chronic Conditions and Co-morbidities  Goal: Patient's chronic conditions and co-morbidity symptoms are monitored and maintained or improved  Outcome: Progressing     Problem: ABCDS Injury Assessment  Goal: Absence of physical injury  Outcome: Progressing     Problem: Pain  Goal: Verbalizes/displays adequate comfort level or baseline comfort level  Outcome: Progressing     Problem: Occupational Therapy - Adult  Goal: By Discharge: Performs self-care activities at highest level of function for planned discharge setting. See evaluation for individualized goals. Description: Occupational Therapy Goals:  Initiated 10/23/2023 to be met within 7-10 days, re-evaluation 11/15/2023, goals downgraded in order for increased success towards goals, Continue OT POC x 7 days    1. Patient will perform upper body dressing with minimal assistance/contact guard assist.   2.  Patient will perform lower body dressing with minimal assistance/contact guard assist.  3.  Patient will perform bed mobility with moderate assistance in prep for ADL routine. 4.  Patient will perform functional task seated edge of bed with fair balance x 10 minutes.   5.  Patient will participate in upper extremity therapeutic exercise/activities with supervision/set-up for 8-10 minutes to increase strength/endurance for

## 2023-11-17 LAB
GLUCOSE BLD STRIP.AUTO-MCNC: 103 MG/DL (ref 70–110)
GLUCOSE BLD STRIP.AUTO-MCNC: 110 MG/DL (ref 70–110)
GLUCOSE BLD STRIP.AUTO-MCNC: 87 MG/DL (ref 70–110)
GLUCOSE BLD STRIP.AUTO-MCNC: 99 MG/DL (ref 70–110)

## 2023-11-17 PROCEDURE — 6360000002 HC RX W HCPCS: Performed by: INTERNAL MEDICINE

## 2023-11-17 PROCEDURE — 97112 NEUROMUSCULAR REEDUCATION: CPT

## 2023-11-17 PROCEDURE — 6370000000 HC RX 637 (ALT 250 FOR IP): Performed by: STUDENT IN AN ORGANIZED HEALTH CARE EDUCATION/TRAINING PROGRAM

## 2023-11-17 PROCEDURE — 6370000000 HC RX 637 (ALT 250 FOR IP): Performed by: HOSPITALIST

## 2023-11-17 PROCEDURE — P9047 ALBUMIN (HUMAN), 25%, 50ML: HCPCS | Performed by: INTERNAL MEDICINE

## 2023-11-17 PROCEDURE — 82962 GLUCOSE BLOOD TEST: CPT

## 2023-11-17 PROCEDURE — 6370000000 HC RX 637 (ALT 250 FOR IP): Performed by: INTERNAL MEDICINE

## 2023-11-17 PROCEDURE — C9113 INJ PANTOPRAZOLE SODIUM, VIA: HCPCS | Performed by: HOSPITALIST

## 2023-11-17 PROCEDURE — 99232 SBSQ HOSP IP/OBS MODERATE 35: CPT | Performed by: NURSE PRACTITIONER

## 2023-11-17 PROCEDURE — 2580000003 HC RX 258: Performed by: HOSPITALIST

## 2023-11-17 PROCEDURE — 2700000000 HC OXYGEN THERAPY PER DAY

## 2023-11-17 PROCEDURE — 90935 HEMODIALYSIS ONE EVALUATION: CPT

## 2023-11-17 PROCEDURE — 87040 BLOOD CULTURE FOR BACTERIA: CPT

## 2023-11-17 PROCEDURE — 2580000003 HC RX 258: Performed by: INTERNAL MEDICINE

## 2023-11-17 PROCEDURE — 97530 THERAPEUTIC ACTIVITIES: CPT

## 2023-11-17 PROCEDURE — 2580000003 HC RX 258: Performed by: STUDENT IN AN ORGANIZED HEALTH CARE EDUCATION/TRAINING PROGRAM

## 2023-11-17 PROCEDURE — A4216 STERILE WATER/SALINE, 10 ML: HCPCS | Performed by: HOSPITALIST

## 2023-11-17 PROCEDURE — 99232 SBSQ HOSP IP/OBS MODERATE 35: CPT | Performed by: HOSPITALIST

## 2023-11-17 PROCEDURE — 2140000001 HC CVICU INTERMEDIATE R&B

## 2023-11-17 PROCEDURE — 6360000002 HC RX W HCPCS: Performed by: HOSPITALIST

## 2023-11-17 PROCEDURE — 94761 N-INVAS EAR/PLS OXIMETRY MLT: CPT

## 2023-11-17 RX ADMIN — SODIUM CHLORIDE, PRESERVATIVE FREE 10 ML: 5 INJECTION INTRAVENOUS at 08:50

## 2023-11-17 RX ADMIN — AMIODARONE HYDROCHLORIDE 200 MG: 200 TABLET ORAL at 08:49

## 2023-11-17 RX ADMIN — ALBUMIN (HUMAN) 25 G: 0.25 INJECTION, SOLUTION INTRAVENOUS at 15:09

## 2023-11-17 RX ADMIN — DRONABINOL 5 MG: 5 CAPSULE ORAL at 06:35

## 2023-11-17 RX ADMIN — SODIUM CHLORIDE, PRESERVATIVE FREE 20 ML: 5 INJECTION INTRAVENOUS at 22:25

## 2023-11-17 RX ADMIN — LEVOTHYROXINE SODIUM 125 MCG: 125 TABLET ORAL at 06:35

## 2023-11-17 RX ADMIN — MIDODRINE HYDROCHLORIDE 15 MG: 10 TABLET ORAL at 08:49

## 2023-11-17 RX ADMIN — TAMSULOSIN HYDROCHLORIDE 0.4 MG: 0.4 CAPSULE ORAL at 08:49

## 2023-11-17 RX ADMIN — VANCOMYCIN HYDROCHLORIDE 125 MG: 1 INJECTION, POWDER, LYOPHILIZED, FOR SOLUTION INTRAVENOUS at 22:21

## 2023-11-17 RX ADMIN — FLUCONAZOLE 200 MG: 200 TABLET ORAL at 08:50

## 2023-11-17 RX ADMIN — NEPHROCAP 1 MG: 1 CAP ORAL at 08:50

## 2023-11-17 RX ADMIN — MIDODRINE HYDROCHLORIDE 15 MG: 10 TABLET ORAL at 12:50

## 2023-11-17 RX ADMIN — MIDODRINE HYDROCHLORIDE 15 MG: 10 TABLET ORAL at 18:23

## 2023-11-17 RX ADMIN — ACETAMINOPHEN 325MG 650 MG: 325 TABLET ORAL at 13:46

## 2023-11-17 RX ADMIN — ATORVASTATIN CALCIUM 80 MG: 40 TABLET, FILM COATED ORAL at 22:21

## 2023-11-17 RX ADMIN — TROSPIUM CHLORIDE 20 MG: 20 TABLET, FILM COATED ORAL at 06:35

## 2023-11-17 RX ADMIN — VANCOMYCIN HYDROCHLORIDE 125 MG: 5 INJECTION, POWDER, LYOPHILIZED, FOR SOLUTION INTRAVENOUS at 06:35

## 2023-11-17 RX ADMIN — DRONABINOL 5 MG: 5 CAPSULE ORAL at 18:23

## 2023-11-17 RX ADMIN — PANTOPRAZOLE SODIUM 40 MG: 40 INJECTION, POWDER, FOR SOLUTION INTRAVENOUS at 06:33

## 2023-11-17 RX ADMIN — PANTOPRAZOLE SODIUM 40 MG: 40 INJECTION, POWDER, FOR SOLUTION INTRAVENOUS at 18:23

## 2023-11-17 RX ADMIN — ALLOPURINOL 100 MG: 100 TABLET ORAL at 08:55

## 2023-11-17 ASSESSMENT — PAIN SCALES - GENERAL
PAINLEVEL_OUTOF10: 7
PAINLEVEL_OUTOF10: 6

## 2023-11-17 ASSESSMENT — PAIN DESCRIPTION - DESCRIPTORS
DESCRIPTORS: ACHING
DESCRIPTORS: OTHER (COMMENT)

## 2023-11-17 ASSESSMENT — PAIN - FUNCTIONAL ASSESSMENT: PAIN_FUNCTIONAL_ASSESSMENT: PREVENTS OR INTERFERES SOME ACTIVE ACTIVITIES AND ADLS

## 2023-11-17 ASSESSMENT — PAIN DESCRIPTION - LOCATION
LOCATION: BACK;COCCYX
LOCATION: BACK

## 2023-11-17 ASSESSMENT — PAIN DESCRIPTION - ORIENTATION
ORIENTATION: LOWER
ORIENTATION: POSTERIOR;LOWER

## 2023-11-17 NOTE — PROGRESS NOTES
0730: Bedside and Verbal shift change report given to DEAN Robert and DEAN Mancia (oncoming nurse) by Chiara Birmingham RN (offgoing nurse). Report included the following information Nurse Handoff Report, Adult Overview, Intake/Output, MAR, Recent Results, and Cardiac Rhythm NSR .     1128: Spoke with dialysis. Pt will be transported to dialysis @ 1300.     1258: New time for dialysis @ 1500.    1427: Patient transported off unit via transport for dialysis. 1800: Received report from dialysis nurse. Awaiting patient's arrival back on unit. 1815: Patient arrived back on unit and placed on bedside monitors. VSS. Patient repositioned on right side. 1930: Bedside and Verbal shift change report given to DEAN Castle (oncoming nurse) by DEAN Robert and DEAN Mancia (offgoing nurse). Report included the following information Nurse Handoff Report, Adult Overview, Intake/Output, MAR, Recent Results, and Cardiac Rhythm NSR .

## 2023-11-17 NOTE — PROGRESS NOTES
Palliative Medicine follow-up note  [facility] [place]: [phone] ([phone])  [facility]: [address]: [phone]    Patient Name: Azul Henderson  YOB: 1935    Date of Initial Consult: 11/1/2023   Follow up 11/17/2023   Reason for Consult: goals of care  Requesting Provider: Dr Afsaneh Mondragon    Primary Care Physician: Valentino Ina, MD      SUMMARY:   Azul Henderson is a 80y.o. year old with a past history of urothelial carcinoma of bladder ( 2019), chronic indwelling hebert, lap sigmoid colectomy and colostomy ( 4/2023), Afib no on [facility], ESRD on HD, who was admitted on 10/21/2023 from home with a diagnosis of severe sepsis due to diverticulitis and cystitis . Current medical issues leading to Palliative Medicine involvement include: 80year old gentleman with a long and complicated hospital stay. Patient with nausea dry heaves on admission he was noted to have a distended colon however no signs of bowel obstruction. During hospitalization he started having increased stool output in his colostomy bag with liquid stool. Stool was tested for C. difficile which came back positive. He also developed significant worsening of leukocytosis despite IV antibiotics ID was consulted and is currently following patient. GI was also consulted and involved. Patient continues to have persistent leukocytosis he is no longer nauseous CT of the abdomen pelvis on 10/31 showed entire colon wall thickening with extensive pericolic fat stranding dilated transverse colon was also noted to have a possible acute splenic infarct. He currently has a poor p.o. intake vascular surgery is also awaiting they felt it was low probability for ischemic colitis. Currently ID is suggesting atypical C. difficile colitis he continues with IV antibiotics GI continues to follow. Palliative medicine is consulted for goals of care discussions and support.     11/17/2023 Mr Lima Mendoza is alert reclining in

## 2023-11-17 NOTE — PROGRESS NOTES
RENAL DAILY PROGRESS NOTE  55-year-old male with past medical history of hypertension, bladder cancer on chronic Hebert catheter admitted for sepsis, following for ESRD management  Subjective:       Complaint:   Overnight event noted    IMPRESSION:   ESRD on MWF dialysis  Access: TDC  Sob,fluid overload, improving  UTI, C diff, Leucocytosis  Anemia, refused epogen  Hypotension, on midodrine  Chronic indwelling hebert due to hx of bladder cancer   PLAN:   HD today with 3K bath calcium 2.5, UF goal 1.5 to 2 L as tolerated  Monitor blood pressure during dialysis, albumin as needed for hypotension.            Current Facility-Administered Medications   Medication Dose Route Frequency    fluconazole (DIFLUCAN) tablet 200 mg  200 mg Oral Daily    0.9 % sodium chloride infusion   IntraVENous PRN    0.9 % sodium chloride infusion   IntraVENous PRN    albumin human 25% IV solution 25 g  25 g IntraVENous PRN    meropenem (MERREM) 500 mg in sodium chloride 0.9 % 100 mL IVPB (mini-bag)  500 mg IntraVENous Q24H    pantoprazole (PROTONIX) 40 mg in sodium chloride (PF) 0.9 % 10 mL injection  40 mg IntraVENous Q12H    tamsulosin (FLOMAX) capsule 0.4 mg  0.4 mg Oral Daily    lidocaine (XYLOCAINE) 2 % uro-jet   Topical PRN    trospium (SANCTURA) tablet 20 mg  20 mg Oral QAM AC    dronabinol (MARINOL) capsule 5 mg  5 mg Oral BID AC    acetaminophen (TYLENOL) tablet 650 mg  650 mg Oral Q6H PRN    vancomycin (VANCOCIN) 50 mg/mL oral solution 125 mg  125 mg Oral 4 times per day    0.9 % sodium chloride infusion   IntraVENous PRN    diatrizoate meglumine-sodium (GASTROGRAFIN) 66-10 % solution 30 mL  30 mL Oral ONCE PRN    midodrine (PROAMATINE) tablet 15 mg  15 mg Oral TID WC    [Held by provider] carvedilol (COREG) tablet 3.125 mg  3.125 mg Oral BID    traMADol (ULTRAM) tablet 25 mg  25 mg Oral Q12H PRN    insulin lispro (HUMALOG) injection vial 0-4 Units  0-4 Units SubCUTAneous TID WC    insulin lispro (HUMALOG) injection vial 0-4

## 2023-11-17 NOTE — PLAN OF CARE
INTERVENTION:  HEMODYNAMIC STABILIZATION  MAINTAIN BP WNL WHILE ON HD. INTERVENTION:  FLUID MANAGEMENT  WILL ATTEMPT 1000 ML TOTAL FLUID REMOVAL AS TOLERATED. INTERVENTION:  METABOLIC/ELECTROLYTE MANAGEMENT  3.0 POTASSIUM 2.5 CALCIUM DIALYSATE USED WITH HD TODAY. INTERVENTION:  HEMODIALYSIS ACCESS SITE MANAGEMENT  RIGHT CVC ACCESSED USING ASEPTIC TECHNIQUE. GOAL:  SIGNS AND SYMPTOMS OF LISTED POTENTIAL PROBLEMS WILL BE ABSENT OR MANAGEABLE. OUTCOME:  PROGRESSING. HD PLANNED FOR 3 HOURS TODAY.        Problem: Chronic Conditions and Co-morbidities  Goal: Patient's chronic conditions and co-morbidity symptoms are monitored and maintained or improved  Outcome: Progressing

## 2023-11-17 NOTE — PLAN OF CARE
Problem: Physical Therapy - Adult  Goal: By Discharge: Performs mobility at highest level of function for planned discharge setting. See evaluation for individualized goals. Description: Initiated  10/23/23, updated goals 11/7/23  to be met within 7-10 days. 1.  Patient will move from supine to sit and sit to supine , scoot up and down, and roll side to side in bed with moderate assistance  2. Patient will sit EOB for 8 minutes with min A for balance. 3.  Patient will transfer from bed to chair and chair to bed with moderate assistance using the least restrictive device. 4.  Patient will perform sit to stand with moderate assistance x2.  5.  Patient will ambulate with moderate assistance for 10 feet with the least restrictive device. 6.  Patient will ascend/descend stairs as needed for discharge. 7.  Patient will participate in BLE exercises to increase ROM /strength for functional tasks. PLOF: pt lives with wife in a 1 SH, gets assist with ADLs, ambulatory with a walker    Outcome: Progressing   PHYSICAL THERAPY TREATMENT    Patient: Moris Kan (60 y.o. male)  Date: 11/17/2023  Diagnosis: UTI (urinary tract infection) [N39.0]  Cystitis [N30.90]  Hypoxia [R09.02]  Leukocytosis, unspecified type [D72.829]  Urinary tract infection associated with indwelling urethral catheter, initial encounter ([address]) [R35.766D, N39.0] Severe sepsis ([address])  Procedure(s) (LRB):  EGD ESOPHAGOGASTRODUODENOSCOPY (N/A)    Precautions: Contact Precautions, Fall Risk  ASSESSMENT:  Oriented to self. Encouragement for participation in PT treatment. Confusion with conversation at times; unclear if d/t cognition or hearing. AAROM performed in supine; encouraged AROM BLE as able throughout the day. Max A for supine to sit with step by step commands. Seated EOB with poor balance. Posterior lean. Max A and constant support to maintain upright. Max A for sit to supine. Max A for rolling and scooting to position.  On 3L O2; EOB 3 minutes  Therapeutic Exercises:   BLE AAROM ankle pumps, knee flexion/extension, hip abduction x10 supine    Pain:  Pain level pre-treatment: 0/10  Pain level post-treatment: 0/10     Activity Tolerance:   Activity Tolerance: Patient limited by fatigue    After treatment:   [] Patient left in no apparent distress sitting up in chair  [x] Patient left in no apparent distress in bed  [x] Call bell left within reach  [x] Nursing notified  [] Caregiver present  [] Bed alarm activated  [] Chair alarm activated  [] SCDs applied    COMMUNICATION/EDUCATION:   Patient Education  Education Given To: Patient  Education Provided: Role of Therapy;Plan of Care  Education Method: Demonstration;Verbal;Teach Back  Barriers to Learning: None  Education Outcome: Verbalized understanding;Demonstrated understanding;Continued education needed      Selam Barbosa PT  Minutes: 23

## 2023-11-17 NOTE — PROGRESS NOTES
1930hrs:  Bedside and Verbal shift change report given to DEAN Castle (oncoming nurse) by Jany King RN (offgoing nurse). Report included the following information Nurse Handoff Report, Index, ED Encounter Summary, Adult Overview, Surgery Report, Intake/Output, MAR, Recent Results, and Cardiac Rhythm NSR . Pt in bed, resting with eyes closed. No s/s distress noted. 0635hrs:  CHG bath completed. Pt tolerated procedure. 0730hrs:  Bedside and Verbal shift change report given to [address] [place], RN (oncoming nurse) by [place] Foley RN (offgoing nurse). Report included the following information Nurse Handoff Report, Index, ED Encounter Summary, Adult Overview, Surgery Report, Intake/Output, MAR, Recent Results, and Cardiac Rhythm NSR .

## 2023-11-17 NOTE — PROGRESS NOTES
Comprehensive Nutrition Assessment    Type and Reason for Visit:  Reassess    Nutrition Recommendations/Plan:   Continue current diet, advance as medically appropriate. Pt currently CL day 4. Continue Gelatein supplement BID. Continue renal MVI. Continue to monitor intake/tolerance of meals/supplements, readiness for dietary advancement, weight, labs, and POC while admitted. Malnutrition Assessment:  Malnutrition Status:  Mild malnutrition (11/17/23 1500)    Context:  Acute Illness     Findings of the 6 clinical characteristics of malnutrition:  Energy Intake:  50% or less of estimated energy requirements for 5 or more days  Weight Loss:  No significant weight loss     Body Fat Loss:  No significant body fat loss Orbital   Muscle Mass Loss:  No significant muscle mass loss Temples (temporalis)  Fluid Accumulation:  Unable to assess     Strength:  Not Performed    Nutrition Assessment:    Pt admitted for management of severe sepsis. Per chart, pt with multiple episodes of vomiting 11/13- diet changed to clear liquids. Per chart, meal intake 1-50% since previous RD visit. Supplement intake 1-25% per one entry. Pt unavailable to speak to x2- on phone x1, FLACO x1. Per palliative care 11/16- goals of care DNR/DNI, selective treatments, and no feeding tube. Will continue Gelatein supplement. Per chart, pt stating he is not eating because \"he doesn't want to. \" Will continue to follow. Nutrition Related Findings:    Last BM: 11/17. Edema: anasarca generalized. Labs: BMP last completed 11/15, reviewed. POC glucose 103-137x 24-hrs. Pertinent meds: Marinol, Synthroid, Merrem, Protonix, virt-caps. Wound Type: Pressure Injury, Stage II       Current Nutrition Intake & Therapies:    Average Meal Intake: 1-25%, 26-50% (clear liquids)  Average Supplements Intake: 1-25%  ADULT DIET; Clear Liquid; Low Sodium (2 gm);  Low Potassium (Less than 3000 mg/day)  ADULT ORAL NUTRITION SUPPLEMENT; Breakfast, Dinner; Protein

## 2023-11-17 NOTE — CARE COORDINATION
Spoke with Dr. Argelia Gruber this am. He states it is ok to start the insurance auth for [employer]. Tentative discharge is Monday 11/20. PT/OT is on to see pt today, so therapy notes will be current. Spoke with Enrico Aguero at [facility] to please start [address] today.     [facility] RN YELENA  Case Management  t

## 2023-11-17 NOTE — PROGRESS NOTES
Received pre HD report from  Blair Mendes , RN. Pt in bed, A+O 3, on O2 via NC @ 2L, no s/s of distress noted. Accessed right CVC w/o complications. Tx initiated at 1500 . CVC flowing with ease. UF goal 1000 ml. Offered assistance with repositioning every 2 hours. Vascular access visible at all times during treatment, line connections intact at all times. Tx completed at 1800 , tolerated well 1L removed. De-accessed per protocol. Heparin indwell 1.8ml in arterial, and 1.8ml in venous catheter. Unit nurse Blair Mendes, DEAN given report.

## 2023-11-17 NOTE — PALLIATIVE CARE
Palliative Medicine    Follow up visit made to patient along with Palliative team member Wesley García NP. Patient sitting up in bed finishing his breakfast. RN at bedside administering morning medications. Patient shared that he is \"tired\" of going to dialysis. Unable to tell our team what would happen if he stopped going. Unable to talk with wife as she is currently hospitalized, patient states, \"I miss my wife\". Discharge plan is for facility placement with dialysis once medically stable. Palliative team remains available to provide support to Mr. Marciano Wheatley and his family. Patient does have a POLST form on file.     CODE STATUS: DNR/DNI, SELECTIVE TREATMENTS, NO FEEDING TUBE    Carlos Chung RN  Palliative Medicine Inpatient RN  Palliative [facility] Line: [phone]

## 2023-11-17 NOTE — PROGRESS NOTES
Pt not seen for skilled OT due to:     [ ]  Nausea/vomiting  [ ]  Eating  [ ]  Pain  [ ]  Pt lethargic  [x]  Off Unit - at HD  Other:    OT to follow

## 2023-11-17 NOTE — PROGRESS NOTES
Infectious Disease progress Note        Reason: Severe sepsis    Current abx Prior abx       Fluconazole since 11/6/23  Po vancomycin since 11/8  Meropenem since 11/14     Piperacillin/tazobactam, vancomycin since 10/21-10/23  Po vancomycin 10/24-10/28  Gentamicin since 10/23-10/27  Metronidazole iv since 10/25-11/6  Fidaxomicin since 10/28-11/7  Ciprofloxacin since 11/13-11/16     Lines:       Assessment :   80 y.o.  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 Clinical presentation consistent with severe sepsis-present on admission due to Hebert catheter associated cystitis    Possible ileus versus pseudoobstruction:  GI follow-up appreciated. Significant worsening leukocytosis on 10/25/23 -despite current broad-spectrum antibiotics, treatment for infection/UTI - likely due to severe c.diff colitis (refractory to treatment)    Stool c.diff 10/23- positive likely suggest evolving c.diff as the cause of persistent leukocytosis. Patient's recent diarrhea as outpt could have been due to evolving c.diff with subsequent ileus due to immodium in setting of c.diff infection. Urine culture 10/21 positive for 70,000 colonies of pseudomonas (gentamicin NIKA 2, levofloxacin NIKA:1, cefepime NIKA:4, pip/tazo NIKA 8), Klebsiella (R to levofloxacin, gentamicin NIKA:<1)    S/p hebert exchange 10/27. >100,000 colonies of pseudomonas in Urine cx 10/29- likely colonizer. No dysuria.      persistent leukocytosis. Leukocytosis out of proportion to physical findings    nausea, increased abdominal distension on 10/28 concerning for partially treated c.diff- hence, switched to fidaxomicin on 10/28.  No Staphylococcus epidermidis by PCR Not detected        Staphylococcus lugdunensis by PCR Not detected        STREPTOCOCCUS Not detected        Streptococcus agalactiae (Group B) Not detected        Strep pneumoniae Not detected        Strep pyogenes,(Grp. A) Not detected        Acinetobacter calcoac baumannii complex by PCR Not detected        Bacteroides fragilis by PCR Not detected        Enterobacteriaceae by PCR Not detected        Enterobacter cloacae complex by PCR Not detected        Escherichia Coli Not detected        Klebsiella aerogenes by PCR Not detected        Klebsiella oxytoca by PCR Not detected        Klebsiella pneumoniae group by PCR Not detected        Proteus by PCR Not detected        Salmonella species by PCR Not detected        Serratia marcescens by PCR Not detected        Haemophilus Influenzae by PCR Not detected        Neisseria meningitidis by PCR Not detected        Pseudomonas aeruginosa Detected        Stenotrophomonas maltophilia by PCR Not detected        Candida albicans by PCR Not detected        Candida auris by PCR Not detected        Candida glabrata Not detected        Candida krusei by PCR Not detected        Candida parapsilosis by PCR Not detected        Candida tropicalis by PCR Not detected        Cryptococcus neoformans/gattii by PCR Not detected        Resistant gene targets          Resistant gene ctx-m by PCR Not detected        Resistant gene imp by PCR Not detected        KPC (Carbapenem resistance gene) Not detected        Resistant gene ndm by PCR Not detected        Resistant gene vim by PCR Not detected        [employer] test comment       False positive results may rarely occur.  Correlate with clinical,epidemiologic, and other laboratory findings           Comment: Please see BCID Interpretation Guide in EPIC Links       Culture, Urine [[id number]] Collected: 11/12/23 9409    Order Status: Canceled Specimen: Urine, clean catch     Culture, Urine [[id number]]

## 2023-11-17 NOTE — PROGRESS NOTES
Hospitalist Progress Note    Patient: Randolph Alpers Age: 80 y.o. : 1935 MR#: [id number] SSN: [id number]  Date/Time: 2023 3:51 PM    DOA: 10/21/2023  PCP: Kylie Sahni MD    Subjective:     POD4 infrarenal, retrievable, MRI compatible, inferior vena cava filter placement. Patient received albumin on dialysis today, goal ultrafiltrate changed to 0 given low blood pressures. Patient's hemoglobin 7.0 this morning. No reports of any bleeding. Appetite has been poor, last ate Saturday    Patient seen and examined on dialysis, he is oriented x2. States he is not eating because he does not want to. Denies pain anywhere. Extensive discussion held with wife and daughter in patient's room while he was on dialysis, they report that patient has spent 90% of this year so far in the hospital.  Quality of life was discussed. They inquire as to whether patient will \"pull through\" this most recent infection. They feel that he may be giving up, that they noticed his mood has been agitated. Also he has not eaten since Saturday. Discussed that palliative care will be reconsulted for family support and ongoing discussions. Poor prognosis discussed at length. 11/15-continue current treatment plan. Per ID continue Diflucan for Candida cystitis, probable fungemia till 2023. Prolonged p.o. vancomycin taper for severe C. difficile colitis. Continue meropenem, DC ciprofloxacin. Repeat blood culture tomorrow. -patient seen and evaluated, lying in bed, no acute distress. Wife not at bedside. She was in the emergency room yesterday secondary to anxiety and currently admitted in room 457. Continue current treatment plan. We will follow.  -patient seen and evaluated, lying in bed, no acute distress. No new events overnight. Patient's hemoglobin has improved posttransfusion. Continue hemodialysis per schedule. Antibiotics to be finalized in a.m. based on test results.   Case rectosigmoid  stump and distal colonic segment is unremarkable. The intervening transverse  colon appears to be dilated measuring up to 10.0 cm (604:14). Great vessels/Retroperitoneum:  Unremarkable for age. Lymph nodes: Unremarkable. Peritoneal Spaces: New small ascites, predominantly along bilateral paracolic  gutters and liver. Atherosclerosis. Body wall/MSK: Bilateral hip prostheses with significant streak artifact  limiting evaluation of the adjacent structures. Mild generalized anasarca. Impression  1. Near entire length colonic wall thickening, extensive pericolic fat  stranding and dilated transverse colon up to 10 cm, highly concerning for  moderate to severe infectious/inflammatory colitis. 2.  New small volume ascites, likely reactive in the setting of colitis. 3.  New splenic wedge shaped hypodensity, concerning for acute splenic infarct. 4.  Trace bilateral pleural effusions (right greater than left) and slightly  increased bibasilar atelectasis. 5.  Previously described 0.8 cm right lung lower lobe nodule is not confidently  visualized on current study, possibly due to significant atelectasis. Attention  on follow-up. 6.  Extensive coronary calcification. Findings were discussed over the telephone at 4:39 PM 10/31/2023 with Charity Lucero MD  who verbalized understanding of the findings. Disclaimer: Sections of this note are dictated utilizing voice recognition software, which may have resulted in some phonetic based errors in grammar and contents. Even though attempts were made to correct all the mistakes, some may have been missed, and remained in the body of the document. If questions arise, please contact our department.

## 2023-11-17 NOTE — PLAN OF CARE
Problem: Discharge Planning  Goal: Discharge to home or other facility with appropriate resources  Outcome: Progressing     Problem: Safety - Adult  Goal: Free from fall injury  Outcome: Progressing     Problem: Skin/Tissue Integrity  Goal: Absence of new skin breakdown  Description: 1. Monitor for areas of redness and/or skin breakdown  2. Assess vascular access sites hourly  3. Every 4-6 hours minimum:  Change oxygen saturation probe site  4. Every 4-6 hours:  If on nasal continuous positive airway pressure, respiratory therapy assess nares and determine need for appliance change or resting period.   Outcome: Progressing     Problem: Chronic Conditions and Co-morbidities  Goal: Patient's chronic conditions and co-morbidity symptoms are monitored and maintained or improved  11/17/2023 1851 by Denny Martinez RN  Outcome: Progressing  11/17/2023 1522 by Aliyah Honeycutt RN  Outcome: Progressing

## 2023-11-18 LAB
ABO + RH BLD: NORMAL
BLD PROD TYP BPU: NORMAL
BLD PROD TYP BPU: NORMAL
BLOOD BANK DISPENSE STATUS: NORMAL
BLOOD BANK DISPENSE STATUS: NORMAL
BLOOD GROUP ANTIBODIES SERPL: NORMAL
BPU ID: NORMAL
BPU ID: NORMAL
CALLED TO: NORMAL
CALLED TO:: NORMAL
CROSSMATCH RESULT: NORMAL
CROSSMATCH RESULT: NORMAL
GLUCOSE BLD STRIP.AUTO-MCNC: 100 MG/DL (ref 70–110)
GLUCOSE BLD STRIP.AUTO-MCNC: 90 MG/DL (ref 70–110)
GLUCOSE BLD STRIP.AUTO-MCNC: 92 MG/DL (ref 70–110)
GLUCOSE BLD STRIP.AUTO-MCNC: 95 MG/DL (ref 70–110)
GLUCOSE BLD STRIP.AUTO-MCNC: 97 MG/DL (ref 70–110)
SPECIMEN EXP DATE BLD: NORMAL
UNIT DIVISION: 0
UNIT DIVISION: 0

## 2023-11-18 PROCEDURE — 2580000003 HC RX 258: Performed by: STUDENT IN AN ORGANIZED HEALTH CARE EDUCATION/TRAINING PROGRAM

## 2023-11-18 PROCEDURE — C9113 INJ PANTOPRAZOLE SODIUM, VIA: HCPCS | Performed by: HOSPITALIST

## 2023-11-18 PROCEDURE — 6370000000 HC RX 637 (ALT 250 FOR IP): Performed by: HOSPITALIST

## 2023-11-18 PROCEDURE — 6370000000 HC RX 637 (ALT 250 FOR IP): Performed by: INTERNAL MEDICINE

## 2023-11-18 PROCEDURE — 2140000001 HC CVICU INTERMEDIATE R&B

## 2023-11-18 PROCEDURE — 2580000003 HC RX 258: Performed by: INTERNAL MEDICINE

## 2023-11-18 PROCEDURE — 99232 SBSQ HOSP IP/OBS MODERATE 35: CPT | Performed by: HOSPITALIST

## 2023-11-18 PROCEDURE — A4216 STERILE WATER/SALINE, 10 ML: HCPCS | Performed by: HOSPITALIST

## 2023-11-18 PROCEDURE — 6360000002 HC RX W HCPCS: Performed by: HOSPITALIST

## 2023-11-18 PROCEDURE — 82962 GLUCOSE BLOOD TEST: CPT

## 2023-11-18 PROCEDURE — 6370000000 HC RX 637 (ALT 250 FOR IP): Performed by: STUDENT IN AN ORGANIZED HEALTH CARE EDUCATION/TRAINING PROGRAM

## 2023-11-18 PROCEDURE — 2580000003 HC RX 258: Performed by: HOSPITALIST

## 2023-11-18 PROCEDURE — 6360000002 HC RX W HCPCS: Performed by: INTERNAL MEDICINE

## 2023-11-18 PROCEDURE — 94761 N-INVAS EAR/PLS OXIMETRY MLT: CPT

## 2023-11-18 RX ADMIN — TROSPIUM CHLORIDE 20 MG: 20 TABLET, FILM COATED ORAL at 06:34

## 2023-11-18 RX ADMIN — LEVOTHYROXINE SODIUM 125 MCG: 125 TABLET ORAL at 06:34

## 2023-11-18 RX ADMIN — VANCOMYCIN HYDROCHLORIDE 125 MG: 1 INJECTION, POWDER, LYOPHILIZED, FOR SOLUTION INTRAVENOUS at 21:23

## 2023-11-18 RX ADMIN — ACETAMINOPHEN 325MG 650 MG: 325 TABLET ORAL at 01:20

## 2023-11-18 RX ADMIN — MIDODRINE HYDROCHLORIDE 15 MG: 10 TABLET ORAL at 08:04

## 2023-11-18 RX ADMIN — PANTOPRAZOLE SODIUM 40 MG: 40 INJECTION, POWDER, FOR SOLUTION INTRAVENOUS at 17:26

## 2023-11-18 RX ADMIN — SODIUM CHLORIDE, PRESERVATIVE FREE 10 ML: 5 INJECTION INTRAVENOUS at 08:05

## 2023-11-18 RX ADMIN — SODIUM CHLORIDE, PRESERVATIVE FREE 10 ML: 5 INJECTION INTRAVENOUS at 21:20

## 2023-11-18 RX ADMIN — VANCOMYCIN HYDROCHLORIDE 125 MG: 1 INJECTION, POWDER, LYOPHILIZED, FOR SOLUTION INTRAVENOUS at 08:08

## 2023-11-18 RX ADMIN — MEROPENEM 500 MG: 500 INJECTION INTRAVENOUS at 21:19

## 2023-11-18 RX ADMIN — TRAMADOL HYDROCHLORIDE 25 MG: 50 TABLET ORAL at 21:35

## 2023-11-18 RX ADMIN — NEPHROCAP 1 MG: 1 CAP ORAL at 08:04

## 2023-11-18 RX ADMIN — MIDODRINE HYDROCHLORIDE 15 MG: 10 TABLET ORAL at 17:26

## 2023-11-18 RX ADMIN — AMIODARONE HYDROCHLORIDE 200 MG: 200 TABLET ORAL at 08:04

## 2023-11-18 RX ADMIN — MIDODRINE HYDROCHLORIDE 15 MG: 10 TABLET ORAL at 14:09

## 2023-11-18 RX ADMIN — DRONABINOL 5 MG: 5 CAPSULE ORAL at 14:08

## 2023-11-18 RX ADMIN — FLUCONAZOLE 200 MG: 200 TABLET ORAL at 08:04

## 2023-11-18 RX ADMIN — DRONABINOL 5 MG: 5 CAPSULE ORAL at 06:34

## 2023-11-18 RX ADMIN — ATORVASTATIN CALCIUM 80 MG: 40 TABLET, FILM COATED ORAL at 21:20

## 2023-11-18 RX ADMIN — PANTOPRAZOLE SODIUM 40 MG: 40 INJECTION, POWDER, FOR SOLUTION INTRAVENOUS at 06:33

## 2023-11-18 RX ADMIN — TAMSULOSIN HYDROCHLORIDE 0.4 MG: 0.4 CAPSULE ORAL at 08:04

## 2023-11-18 ASSESSMENT — PAIN DESCRIPTION - FREQUENCY: FREQUENCY: CONTINUOUS

## 2023-11-18 ASSESSMENT — PAIN SCALES - GENERAL
PAINLEVEL_OUTOF10: 7
PAINLEVEL_OUTOF10: 6
PAINLEVEL_OUTOF10: 6

## 2023-11-18 ASSESSMENT — PAIN DESCRIPTION - ORIENTATION
ORIENTATION: LOWER;POSTERIOR
ORIENTATION: POSTERIOR;LOWER;RIGHT;LEFT
ORIENTATION: POSTERIOR

## 2023-11-18 ASSESSMENT — PAIN DESCRIPTION - LOCATION
LOCATION: SACRUM;ABDOMEN
LOCATION: BACK;SACRUM;COCCYX
LOCATION: SACRUM;COCCYX;BACK

## 2023-11-18 ASSESSMENT — PAIN DESCRIPTION - DESCRIPTORS
DESCRIPTORS: ACHING
DESCRIPTORS: SORE;ACHING

## 2023-11-18 ASSESSMENT — PAIN - FUNCTIONAL ASSESSMENT
PAIN_FUNCTIONAL_ASSESSMENT: PREVENTS OR INTERFERES SOME ACTIVE ACTIVITIES AND ADLS
PAIN_FUNCTIONAL_ASSESSMENT: PREVENTS OR INTERFERES SOME ACTIVE ACTIVITIES AND ADLS

## 2023-11-18 ASSESSMENT — PAIN DESCRIPTION - PAIN TYPE: TYPE: CHRONIC PAIN

## 2023-11-18 ASSESSMENT — PAIN DESCRIPTION - ONSET: ONSET: ON-GOING

## 2023-11-18 NOTE — PROGRESS NOTES
Hospitalist Progress Note    Patient: Valentino Diana Age: 80 y.o. : 1935 MR#: [id number] SSN: [id number]  Date/Time: 2023 5:18 PM    DOA: 10/21/2023  PCP: Antony Simpson MD    Subjective:     POD4 infrarenal, retrievable, MRI compatible, inferior vena cava filter placement. Patient received albumin on dialysis today, goal ultrafiltrate changed to 0 given low blood pressures. Patient's hemoglobin 7.0 this morning. No reports of any bleeding. Appetite has been poor, last ate Saturday    Patient seen and examined on dialysis, he is oriented x2. States he is not eating because he does not want to. Denies pain anywhere. Extensive discussion held with wife and daughter in patient's room while he was on dialysis, they report that patient has spent 90% of this year so far in the hospital.  Quality of life was discussed. They inquire as to whether patient will \"pull through\" this most recent infection. They feel that he may be giving up, that they noticed his mood has been agitated. Also he has not eaten since Saturday. Discussed that palliative care will be reconsulted for family support and ongoing discussions. Poor prognosis discussed at length. 11/15-continue current treatment plan. Per ID continue Diflucan for Candida cystitis, probable fungemia till 2023. Prolonged p.o. vancomycin taper for severe C. difficile colitis. Continue meropenem, DC ciprofloxacin. Repeat blood culture tomorrow. -patient seen and evaluated, lying in bed, no acute distress. Wife not at bedside. She was in the emergency room yesterday secondary to anxiety and currently admitted in room 457. Continue current treatment plan. We will follow.  -patient seen and evaluated, lying in bed, no acute distress. No new events overnight. Patient's hemoglobin has improved posttransfusion. Continue hemodialysis per schedule. Antibiotics to be finalized in a.m. based on test results.   Case contents. Even though attempts were made to correct all the mistakes, some may have been missed, and remained in the body of the document. If questions arise, please contact our department.

## 2023-11-18 NOTE — PROGRESS NOTES
1930hrs:  Bedside and Verbal shift change report given to DEAN Castle (oncoming nurse) by Latrell Alexander RN (offgoing nurse). Report included the following information Nurse Handoff Report, Index, ED Encounter Summary, Adult Overview, Surgery Report, Intake/Output, MAR, Recent Results, and Cardiac Rhythm NSR . Pt in bed, resting with his eyes closed. No s/s distress noted. 0120hrs:  Pt with oral temp, 99.3. PRN PO Tylenol admin'd.      0600hrs:  CHG bath performed. Pt tolerated the procedure. 0730hrs:  Bedside and Verbal shift change report given to DEAN Mancia (oncoming nurse) by Samm Borrego RN (offgoing nurse). Report included the following information Nurse Handoff Report, Index, ED Encounter Summary, Adult Overview, Surgery Report, Intake/Output, MAR, Recent Results, and Cardiac Rhythm NSR .

## 2023-11-19 LAB
ALBUMIN SERPL-MCNC: 2.9 G/DL (ref 3.4–5)
ALBUMIN/GLOB SERPL: 1 (ref 0.8–1.7)
ALP SERPL-CCNC: 173 U/L (ref 45–117)
ALT SERPL-CCNC: 79 U/L (ref 16–61)
ANION GAP SERPL CALC-SCNC: 6 MMOL/L (ref 3–18)
AST SERPL-CCNC: 102 U/L (ref 10–38)
BACTERIA SPEC CULT: NORMAL
BILIRUB SERPL-MCNC: 0.9 MG/DL (ref 0.2–1)
BUN SERPL-MCNC: 14 MG/DL (ref 7–18)
BUN/CREAT SERPL: 4 (ref 12–20)
CALCIUM SERPL-MCNC: 9 MG/DL (ref 8.5–10.1)
CHLORIDE SERPL-SCNC: 105 MMOL/L (ref 100–111)
CO2 SERPL-SCNC: 27 MMOL/L (ref 21–32)
CREAT SERPL-MCNC: 3.93 MG/DL (ref 0.6–1.3)
ERYTHROCYTE [DISTWIDTH] IN BLOOD BY AUTOMATED COUNT: 21.4 % (ref 11.6–14.5)
GLOBULIN SER CALC-MCNC: 2.9 G/DL (ref 2–4)
GLUCOSE BLD STRIP.AUTO-MCNC: 106 MG/DL (ref 70–110)
GLUCOSE BLD STRIP.AUTO-MCNC: 107 MG/DL (ref 70–110)
GLUCOSE BLD STRIP.AUTO-MCNC: 91 MG/DL (ref 70–110)
GLUCOSE BLD STRIP.AUTO-MCNC: 96 MG/DL (ref 70–110)
GLUCOSE SERPL-MCNC: 96 MG/DL (ref 74–99)
HCT VFR BLD AUTO: 27.2 % (ref 36–48)
HGB BLD-MCNC: 8.7 G/DL (ref 13–16)
MCH RBC QN AUTO: 33.5 PG (ref 24–34)
MCHC RBC AUTO-ENTMCNC: 32 G/DL (ref 31–37)
MCV RBC AUTO: 104.6 FL (ref 78–100)
NRBC # BLD: 0 K/UL (ref 0–0.01)
NRBC BLD-RTO: 0 PER 100 WBC
PLATELET # BLD AUTO: 303 K/UL (ref 135–420)
PMV BLD AUTO: 10.6 FL (ref 9.2–11.8)
POTASSIUM SERPL-SCNC: 3.5 MMOL/L (ref 3.5–5.5)
PROT SERPL-MCNC: 5.8 G/DL (ref 6.4–8.2)
RBC # BLD AUTO: 2.6 M/UL (ref 4.35–5.65)
SERVICE CMNT-IMP: NORMAL
SODIUM SERPL-SCNC: 138 MMOL/L (ref 136–145)
WBC # BLD AUTO: 7.9 K/UL (ref 4.6–13.2)

## 2023-11-19 PROCEDURE — 36415 COLL VENOUS BLD VENIPUNCTURE: CPT

## 2023-11-19 PROCEDURE — 2700000000 HC OXYGEN THERAPY PER DAY

## 2023-11-19 PROCEDURE — 6370000000 HC RX 637 (ALT 250 FOR IP): Performed by: HOSPITALIST

## 2023-11-19 PROCEDURE — 6370000000 HC RX 637 (ALT 250 FOR IP): Performed by: INTERNAL MEDICINE

## 2023-11-19 PROCEDURE — 6360000002 HC RX W HCPCS: Performed by: STUDENT IN AN ORGANIZED HEALTH CARE EDUCATION/TRAINING PROGRAM

## 2023-11-19 PROCEDURE — 80053 COMPREHEN METABOLIC PANEL: CPT

## 2023-11-19 PROCEDURE — C9113 INJ PANTOPRAZOLE SODIUM, VIA: HCPCS | Performed by: HOSPITALIST

## 2023-11-19 PROCEDURE — 2140000001 HC CVICU INTERMEDIATE R&B

## 2023-11-19 PROCEDURE — 2580000003 HC RX 258: Performed by: STUDENT IN AN ORGANIZED HEALTH CARE EDUCATION/TRAINING PROGRAM

## 2023-11-19 PROCEDURE — 2580000003 HC RX 258: Performed by: HOSPITALIST

## 2023-11-19 PROCEDURE — A4216 STERILE WATER/SALINE, 10 ML: HCPCS | Performed by: HOSPITALIST

## 2023-11-19 PROCEDURE — 6370000000 HC RX 637 (ALT 250 FOR IP): Performed by: STUDENT IN AN ORGANIZED HEALTH CARE EDUCATION/TRAINING PROGRAM

## 2023-11-19 PROCEDURE — 6360000002 HC RX W HCPCS: Performed by: HOSPITALIST

## 2023-11-19 PROCEDURE — 6360000002 HC RX W HCPCS: Performed by: INTERNAL MEDICINE

## 2023-11-19 PROCEDURE — 85027 COMPLETE CBC AUTOMATED: CPT

## 2023-11-19 PROCEDURE — 82962 GLUCOSE BLOOD TEST: CPT

## 2023-11-19 PROCEDURE — 2580000003 HC RX 258: Performed by: INTERNAL MEDICINE

## 2023-11-19 PROCEDURE — 99232 SBSQ HOSP IP/OBS MODERATE 35: CPT | Performed by: HOSPITALIST

## 2023-11-19 PROCEDURE — 94761 N-INVAS EAR/PLS OXIMETRY MLT: CPT

## 2023-11-19 RX ADMIN — ATORVASTATIN CALCIUM 80 MG: 40 TABLET, FILM COATED ORAL at 22:28

## 2023-11-19 RX ADMIN — TAMSULOSIN HYDROCHLORIDE 0.4 MG: 0.4 CAPSULE ORAL at 08:36

## 2023-11-19 RX ADMIN — MIDODRINE HYDROCHLORIDE 15 MG: 10 TABLET ORAL at 12:53

## 2023-11-19 RX ADMIN — NEPHROCAP 1 MG: 1 CAP ORAL at 08:36

## 2023-11-19 RX ADMIN — ONDANSETRON 4 MG: 2 INJECTION INTRAMUSCULAR; INTRAVENOUS at 08:35

## 2023-11-19 RX ADMIN — PANTOPRAZOLE SODIUM 40 MG: 40 INJECTION, POWDER, FOR SOLUTION INTRAVENOUS at 08:36

## 2023-11-19 RX ADMIN — DRONABINOL 5 MG: 5 CAPSULE ORAL at 08:36

## 2023-11-19 RX ADMIN — VANCOMYCIN HYDROCHLORIDE 125 MG: 1 INJECTION, POWDER, LYOPHILIZED, FOR SOLUTION INTRAVENOUS at 10:20

## 2023-11-19 RX ADMIN — SODIUM CHLORIDE, PRESERVATIVE FREE 10 ML: 5 INJECTION INTRAVENOUS at 22:29

## 2023-11-19 RX ADMIN — MIDODRINE HYDROCHLORIDE 15 MG: 10 TABLET ORAL at 15:58

## 2023-11-19 RX ADMIN — LEVOTHYROXINE SODIUM 125 MCG: 125 TABLET ORAL at 08:36

## 2023-11-19 RX ADMIN — PANTOPRAZOLE SODIUM 40 MG: 40 INJECTION, POWDER, FOR SOLUTION INTRAVENOUS at 22:33

## 2023-11-19 RX ADMIN — MIDODRINE HYDROCHLORIDE 15 MG: 10 TABLET ORAL at 08:35

## 2023-11-19 RX ADMIN — TRAMADOL HYDROCHLORIDE 25 MG: 50 TABLET ORAL at 10:20

## 2023-11-19 RX ADMIN — SODIUM CHLORIDE, PRESERVATIVE FREE 10 ML: 5 INJECTION INTRAVENOUS at 09:00

## 2023-11-19 RX ADMIN — DRONABINOL 5 MG: 5 CAPSULE ORAL at 15:58

## 2023-11-19 RX ADMIN — TROSPIUM CHLORIDE 20 MG: 20 TABLET, FILM COATED ORAL at 08:36

## 2023-11-19 RX ADMIN — MEROPENEM 500 MG: 500 INJECTION INTRAVENOUS at 22:29

## 2023-11-19 RX ADMIN — VANCOMYCIN HYDROCHLORIDE 125 MG: 1 INJECTION, POWDER, LYOPHILIZED, FOR SOLUTION INTRAVENOUS at 22:40

## 2023-11-19 RX ADMIN — AMIODARONE HYDROCHLORIDE 200 MG: 200 TABLET ORAL at 08:36

## 2023-11-19 RX ADMIN — FLUCONAZOLE 200 MG: 200 TABLET ORAL at 08:36

## 2023-11-19 ASSESSMENT — PAIN DESCRIPTION - DESCRIPTORS: DESCRIPTORS: OTHER (COMMENT)

## 2023-11-19 ASSESSMENT — PAIN DESCRIPTION - ORIENTATION: ORIENTATION: LEFT

## 2023-11-19 ASSESSMENT — PAIN DESCRIPTION - LOCATION: LOCATION: LEG

## 2023-11-19 NOTE — PROGRESS NOTES
0830 - Spoke with Efrain Carbajal MD order received for Zofran Q6H IV PRN. Patient dry heaving unable to take morning medications at this time.

## 2023-11-19 NOTE — PROGRESS NOTES
the adjacent structures. Mild generalized anasarca. Impression  1. Near entire length colonic wall thickening, extensive pericolic fat  stranding and dilated transverse colon up to 10 cm, highly concerning for  moderate to severe infectious/inflammatory colitis. 2.  New small volume ascites, likely reactive in the setting of colitis. 3.  New splenic wedge shaped hypodensity, concerning for acute splenic infarct. 4.  Trace bilateral pleural effusions (right greater than left) and slightly  increased bibasilar atelectasis. 5.  Previously described 0.8 cm right lung lower lobe nodule is not confidently  visualized on current study, possibly due to significant atelectasis. Attention  on follow-up. 6.  Extensive coronary calcification. Findings were discussed over the telephone at 4:39 PM 10/31/2023 with Eugenia Maldonado MD  who verbalized understanding of the findings. Disclaimer: Sections of this note are dictated utilizing voice recognition software, which may have resulted in some phonetic based errors in grammar and contents. Even though attempts were made to correct all the mistakes, some may have been missed, and remained in the body of the document. If questions arise, please contact our department.

## 2023-11-20 PROBLEM — N39.0 UTI (URINARY TRACT INFECTION): Status: RESOLVED | Noted: 2023-10-21 | Resolved: 2023-11-20

## 2023-11-20 LAB
ERYTHROCYTE [DISTWIDTH] IN BLOOD BY AUTOMATED COUNT: 21.2 % (ref 11.6–14.5)
GLUCOSE BLD STRIP.AUTO-MCNC: 105 MG/DL (ref 70–110)
GLUCOSE BLD STRIP.AUTO-MCNC: 96 MG/DL (ref 70–110)
GLUCOSE BLD STRIP.AUTO-MCNC: 98 MG/DL (ref 70–110)
HCT VFR BLD AUTO: 27.3 % (ref 36–48)
HGB BLD-MCNC: 8.8 G/DL (ref 13–16)
MCH RBC QN AUTO: 33.6 PG (ref 24–34)
MCHC RBC AUTO-ENTMCNC: 32.2 G/DL (ref 31–37)
MCV RBC AUTO: 104.2 FL (ref 78–100)
NRBC # BLD: 0 K/UL (ref 0–0.01)
NRBC BLD-RTO: 0 PER 100 WBC
PLATELET # BLD AUTO: 282 K/UL (ref 135–420)
PMV BLD AUTO: 10.4 FL (ref 9.2–11.8)
RBC # BLD AUTO: 2.62 M/UL (ref 4.35–5.65)
WBC # BLD AUTO: 6.6 K/UL (ref 4.6–13.2)

## 2023-11-20 PROCEDURE — 6370000000 HC RX 637 (ALT 250 FOR IP): Performed by: INTERNAL MEDICINE

## 2023-11-20 PROCEDURE — 97110 THERAPEUTIC EXERCISES: CPT

## 2023-11-20 PROCEDURE — 6370000000 HC RX 637 (ALT 250 FOR IP): Performed by: STUDENT IN AN ORGANIZED HEALTH CARE EDUCATION/TRAINING PROGRAM

## 2023-11-20 PROCEDURE — 97530 THERAPEUTIC ACTIVITIES: CPT

## 2023-11-20 PROCEDURE — 2700000000 HC OXYGEN THERAPY PER DAY

## 2023-11-20 PROCEDURE — 6360000002 HC RX W HCPCS: Performed by: STUDENT IN AN ORGANIZED HEALTH CARE EDUCATION/TRAINING PROGRAM

## 2023-11-20 PROCEDURE — 85027 COMPLETE CBC AUTOMATED: CPT

## 2023-11-20 PROCEDURE — 99231 SBSQ HOSP IP/OBS SF/LOW 25: CPT | Performed by: FAMILY MEDICINE

## 2023-11-20 PROCEDURE — 6360000002 HC RX W HCPCS: Performed by: INTERNAL MEDICINE

## 2023-11-20 PROCEDURE — 2580000003 HC RX 258: Performed by: HOSPITALIST

## 2023-11-20 PROCEDURE — 82962 GLUCOSE BLOOD TEST: CPT

## 2023-11-20 PROCEDURE — 6370000000 HC RX 637 (ALT 250 FOR IP): Performed by: HOSPITALIST

## 2023-11-20 PROCEDURE — 94761 N-INVAS EAR/PLS OXIMETRY MLT: CPT

## 2023-11-20 PROCEDURE — 51702 INSERT TEMP BLADDER CATH: CPT

## 2023-11-20 PROCEDURE — A4216 STERILE WATER/SALINE, 10 ML: HCPCS | Performed by: HOSPITALIST

## 2023-11-20 PROCEDURE — 2580000003 HC RX 258: Performed by: STUDENT IN AN ORGANIZED HEALTH CARE EDUCATION/TRAINING PROGRAM

## 2023-11-20 PROCEDURE — C9113 INJ PANTOPRAZOLE SODIUM, VIA: HCPCS | Performed by: HOSPITALIST

## 2023-11-20 PROCEDURE — 2580000003 HC RX 258: Performed by: INTERNAL MEDICINE

## 2023-11-20 PROCEDURE — 2140000001 HC CVICU INTERMEDIATE R&B

## 2023-11-20 PROCEDURE — 36415 COLL VENOUS BLD VENIPUNCTURE: CPT

## 2023-11-20 PROCEDURE — 6360000002 HC RX W HCPCS: Performed by: HOSPITALIST

## 2023-11-20 RX ADMIN — AMIODARONE HYDROCHLORIDE 200 MG: 200 TABLET ORAL at 09:09

## 2023-11-20 RX ADMIN — MEROPENEM 500 MG: 500 INJECTION INTRAVENOUS at 20:10

## 2023-11-20 RX ADMIN — ALLOPURINOL 100 MG: 100 TABLET ORAL at 09:09

## 2023-11-20 RX ADMIN — ATORVASTATIN CALCIUM 80 MG: 40 TABLET, FILM COATED ORAL at 20:00

## 2023-11-20 RX ADMIN — LEVOTHYROXINE SODIUM 125 MCG: 125 TABLET ORAL at 06:59

## 2023-11-20 RX ADMIN — PANTOPRAZOLE SODIUM 40 MG: 40 INJECTION, POWDER, FOR SOLUTION INTRAVENOUS at 07:01

## 2023-11-20 RX ADMIN — TROSPIUM CHLORIDE 20 MG: 20 TABLET, FILM COATED ORAL at 06:59

## 2023-11-20 RX ADMIN — VANCOMYCIN HYDROCHLORIDE 125 MG: 1 INJECTION, POWDER, LYOPHILIZED, FOR SOLUTION INTRAVENOUS at 23:43

## 2023-11-20 RX ADMIN — SODIUM CHLORIDE, PRESERVATIVE FREE 10 ML: 5 INJECTION INTRAVENOUS at 09:09

## 2023-11-20 RX ADMIN — DRONABINOL 5 MG: 5 CAPSULE ORAL at 20:00

## 2023-11-20 RX ADMIN — MIDODRINE HYDROCHLORIDE 15 MG: 10 TABLET ORAL at 19:16

## 2023-11-20 RX ADMIN — SODIUM CHLORIDE, PRESERVATIVE FREE 10 ML: 5 INJECTION INTRAVENOUS at 21:19

## 2023-11-20 RX ADMIN — MIDODRINE HYDROCHLORIDE 15 MG: 10 TABLET ORAL at 06:58

## 2023-11-20 RX ADMIN — MIDODRINE HYDROCHLORIDE 15 MG: 10 TABLET ORAL at 12:22

## 2023-11-20 RX ADMIN — TAMSULOSIN HYDROCHLORIDE 0.4 MG: 0.4 CAPSULE ORAL at 09:09

## 2023-11-20 RX ADMIN — PANTOPRAZOLE SODIUM 40 MG: 40 INJECTION, POWDER, FOR SOLUTION INTRAVENOUS at 19:17

## 2023-11-20 RX ADMIN — VANCOMYCIN HYDROCHLORIDE 125 MG: 1 INJECTION, POWDER, LYOPHILIZED, FOR SOLUTION INTRAVENOUS at 09:09

## 2023-11-20 RX ADMIN — NEPHROCAP 1 MG: 1 CAP ORAL at 09:09

## 2023-11-20 RX ADMIN — ONDANSETRON 4 MG: 2 INJECTION INTRAMUSCULAR; INTRAVENOUS at 00:02

## 2023-11-20 ASSESSMENT — PAIN SCALES - GENERAL
PAINLEVEL_OUTOF10: 0

## 2023-11-20 NOTE — PLAN OF CARE
Problem: Physical Therapy - Adult  Goal: By Discharge: Performs mobility at highest level of function for planned discharge setting. See evaluation for individualized goals. Description: Initiated  10/23/23, updated goals 11/7/23  to be met within 7-10 days. 1.  Patient will move from supine to sit and sit to supine , scoot up and down, and roll side to side in bed with moderate assistance  2. Patient will sit EOB for 8 minutes with min A for balance. 3.  Patient will transfer from bed to chair and chair to bed with moderate assistance using the least restrictive device. 4.  Patient will perform sit to stand with moderate assistance x2.  5.  Patient will ambulate with moderate assistance for 10 feet with the least restrictive device. 6.  Patient will ascend/descend stairs as needed for discharge. 7.  Patient will participate in BLE exercises to increase ROM /strength for functional tasks. PLOF: pt lives with wife in a 1 SH, gets assist with ADLs, ambulatory with a walker    Outcome: Progressing   PHYSICAL THERAPY TREATMENT    Patient: Ursula Owen (59 y.o. male)  Date: 11/20/2023  Diagnosis: UTI (urinary tract infection) [N39.0]  Cystitis [N30.90]  Hypoxia [R09.02]  Leukocytosis, unspecified type [D72.829]  Urinary tract infection associated with indwelling urethral catheter, initial encounter ([address]) [F17.393G, N39.0] Severe sepsis ([address])  Procedure(s) (LRB):  EGD ESOPHAGOGASTRODUODENOSCOPY (N/A)    Precautions: Fall Risk, Contact Precautions,    ASSESSMENT:  Pt in bed and agreeable to PT treatment. Pt rolled side to side in bed with mod A and verbal cues for hand placement. Pt transferred to sitting edge of bed with max A. Pt sat edge of bed less than 1 minute before requesting to return to supine with max A due to back pain. Pt performed LE exercises as noted below. Pt was left in bed with needs in reach and nursing notified.     Progression toward goals:   []      Improving appropriately

## 2023-11-20 NOTE — PROGRESS NOTES
Infectious Disease progress Note        Reason: Severe sepsis    Current abx Prior abx       Fluconazole since 11/6/23  Po vancomycin since 11/8  Meropenem since 11/14     Piperacillin/tazobactam, vancomycin since 10/21-10/23  Po vancomycin 10/24-10/28  Gentamicin since 10/23-10/27  Metronidazole iv since 10/25-11/6  Fidaxomicin since 10/28-11/7  Ciprofloxacin since 11/13-11/16     Lines:       Assessment :   80 y.o.  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 Clinical presentation consistent with severe sepsis-present on admission due to Hebert catheter associated cystitis    Possible ileus versus pseudoobstruction:  GI follow-up appreciated. Significant worsening leukocytosis on 10/25/23 -despite current broad-spectrum antibiotics, treatment for infection/UTI - likely due to severe c.diff colitis (refractory to treatment)    Stool c.diff 10/23- positive likely suggest evolving c.diff as the cause of persistent leukocytosis. Patient's recent diarrhea as outpt could have been due to evolving c.diff with subsequent ileus due to immodium in setting of c.diff infection. Urine culture 10/21 positive for 70,000 colonies of pseudomonas (gentamicin NIKA 2, levofloxacin NIKA:1, cefepime NIKA:4, pip/tazo NIKA 8), Klebsiella (R to levofloxacin, gentamicin NIKA:<1)    S/p hebert exchange 10/27. >100,000 colonies of pseudomonas in Urine cx 10/29- likely colonizer. No dysuria.      persistent leukocytosis. Leukocytosis out of proportion to physical findings    nausea, increased abdominal distension on 10/28 concerning for partially treated c.diff- hence, switched to fidaxomicin on 10/28.  No 9479     Special Requests --        NO SPECIAL REQUESTS  LEFT  HAND       Culture NO GROWTH 4 DAYS       Culture, Blood 1 [[id number]] Collected: 11/13/23 0343    Order Status: Completed Specimen: Blood Updated: 11/19/23 0647     Special Requests NO SPECIAL REQUESTS        Culture NO GROWTH 6 DAYS       Culture, Blood 2 [[id number]]  (Abnormal)  (Susceptibility) Collected: 11/13/23 0315    Order Status: Completed Specimen: Blood Updated: 11/16/23 0859     Special Requests NO SPECIAL REQUESTS        Gram stain       AEROBIC BOTTLE Gram negative rods                  SMEAR CALLED TO AND CORRECTLY REPEATED BY: DEAN CAICEDO, 3N, AT 0920 ON [id number] TO SLT           Culture       PSEUDOMONAS AERUGINOSA GROWING IN 1 OF 1 BOTTLES DRAWN No Site Indicated          Susceptibility        Pseudomonas aeruginosa      BACTERIAL SUSCEPTIBILITY PANEL NIKA      amikacin <=2 ug/mL Sensitive      cefepime 2 ug/mL Sensitive      cefTAZidime 4 ug/mL Sensitive      ciprofloxacin <=0.25 ug/mL Sensitive      gentamicin 2 ug/mL Sensitive      levofloxacin 1 ug/mL Sensitive      meropenem <=0.25 ug/mL Sensitive      piperacillin-tazobactam 8 ug/mL Sensitive      tobramycin <=1 ug/mL Sensitive                           Culture, Blood, PCR ID Panel [[id number]]  (Abnormal) Collected: 11/13/23 0315    Order Status: Completed Specimen: Blood Updated: 11/14/23 0919     Accession Number [id number]     Enterococcus faecalis by PCR Not detected        Enterococcus faecium by PCR Not detected        Listeria monocytogenes by PCR Not detected        STAPHYLOCOCCUS Not detected        Staphylococcus Aureus Not detected        Staphylococcus epidermidis by PCR Not detected        Staphylococcus lugdunensis by PCR Not detected        STREPTOCOCCUS Not detected        Streptococcus agalactiae (Group B) Not detected        Strep pneumoniae Not detected        Strep pyogenes,(Grp. A) Not detected        Acinetobacter calcoac baumannii complex by PCR Not

## 2023-11-20 NOTE — PROGRESS NOTES
RENAL DAILY PROGRESS NOTE  75-year-old male with past medical history of hypertension, bladder cancer on chronic Hebert catheter admitted for sepsis, following for ESRD management  Subjective:       Complaint:   Overnight event noted  Physical therapist at bedside    IMPRESSION:   ESRD on MWF dialysis  Access: TDC  Sob,fluid overload, improving  UTI, C diff, Leucocytosis  Anemia, refused epogen  Hypotension, on midodrine  Chronic indwelling hebert due to hx of bladder cancer   PLAN:   Encourage compliance with ongoing dialysis treatment, will arrange HD in second shift. HD today  with 3K bath calcium 2.5, UF goal 1.5 to 2 L as tolerated  Monitor blood pressure during dialysis, albumin as needed for hypotension.            Current Facility-Administered Medications   Medication Dose Route Frequency    vancomycin (VANCOCIN) 50 mg/mL oral solution 125 mg  125 mg Oral 2 times per day    0.9 % sodium chloride infusion   IntraVENous PRN    0.9 % sodium chloride infusion   IntraVENous PRN    albumin human 25% IV solution 25 g  25 g IntraVENous PRN    meropenem (MERREM) 500 mg in sodium chloride 0.9 % 100 mL IVPB (mini-bag)  500 mg IntraVENous Q24H    pantoprazole (PROTONIX) 40 mg in sodium chloride (PF) 0.9 % 10 mL injection  40 mg IntraVENous Q12H    tamsulosin (FLOMAX) capsule 0.4 mg  0.4 mg Oral Daily    lidocaine (XYLOCAINE) 2 % uro-jet   Topical PRN    trospium (SANCTURA) tablet 20 mg  20 mg Oral QAM AC    dronabinol (MARINOL) capsule 5 mg  5 mg Oral BID AC    acetaminophen (TYLENOL) tablet 650 mg  650 mg Oral Q6H PRN    0.9 % sodium chloride infusion   IntraVENous PRN    diatrizoate meglumine-sodium (GASTROGRAFIN) 66-10 % solution 30 mL  30 mL Oral ONCE PRN    midodrine (PROAMATINE) tablet 15 mg  15 mg Oral TID WC    [Held by provider] carvedilol (COREG) tablet 3.125 mg  3.125 mg Oral BID    traMADol (ULTRAM) tablet 25 mg  25 mg Oral Q12H PRN    insulin lispro (HUMALOG) injection vial 0-4 Units  0-4 Units

## 2023-11-20 NOTE — PROGRESS NOTES
attempted to visit  with Leobardo Lim, who is a 80 y.o., male.    The reason patientcame to hospital is:   Patient Active Problem List    Diagnosis Date Noted    Leukocytosis 09/23/2014    Anemia of chronic renal failure 09/12/2014    Urinary retention 11/13/2023    Goals of care, counseling/discussion 11/01/2023    Debility 11/01/2023    End stage renal disease on dialysis ([address]) 11/01/2023    Hypoxia 10/27/2023    C. difficile colitis 10/27/2023    C. difficile diarrhea 10/27/2023    Macrocytic anemia 10/27/2023    Occult blood positive stool 10/27/2023    UTI (urinary tract infection) 10/21/2023    Cystitis 10/21/2023    Acute respiratory failure with hypoxia ([address]) 10/21/2023    Hypokalemia 10/21/2023    Hearing impaired 09/21/2023    Hypercoagulable state due to paroxysmal atrial fibrillation ([address]) 09/21/2023    Colostomy present ([address]) 09/20/2023    Indwelling Carlos catheter present 09/20/2023    Cerebral infarction, unspecified ([address]) 08/23/2023    Gastro-esophageal reflux disease without esophagitis 08/23/2023    Hyperlipidemia, unspecified 45/85/0456    Metabolic encephalopathy 01/16/8204    Muscle weakness (generalized) 08/23/2023    Obstructive and reflux uropathy, unspecified 08/23/2023    Severe sepsis ([address]) 08/23/2023    Protein-calorie malnutrition ([address]) 08/23/2023    Unspecified abnormalities of gait and mobility 08/23/2023    ESRD (end stage renal disease) on dialysis ([address]) 08/12/2023    Ischemic cardiomyopathy 08/12/2023    Steroid-induced hyperglycemia 07/31/2023    Secondary hyperparathyroidism of renal origin ([address]) 05/24/2023    Thoracic aortic ectasia ([address]) 05/24/2023    Pulmonary hypertension, unspecified ([address]) 02/01/2023    Periprosthetic fracture around internal prosthetic right knee joint 09/07/2022    Bronchiectasis without complication ([address]) 02/04/6157    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 without tophus 10/28/2021    Primary malignant neoplasm ([address]) 12/17/2019

## 2023-11-20 NOTE — PLAN OF CARE
Problem: Safety - Adult  Goal: Free from fall injury  Outcome: Progressing     Problem: Chronic Conditions and Co-morbidities  Goal: Patient's chronic conditions and co-morbidity symptoms are monitored and maintained or improved  Outcome: Progressing  Flowsheets (Taken 11/20/2023 0800)  Care Plan - Patient's Chronic Conditions and Co-Morbidity Symptoms are Monitored and Maintained or Improved: Monitor and assess patient's chronic conditions and comorbid symptoms for stability, deterioration, or improvement     Problem: ABCDS Injury Assessment  Goal: Absence of physical injury  Outcome: Progressing     Problem: Pain  Goal: Verbalizes/displays adequate comfort level or baseline comfort level  Outcome: Progressing  Flowsheets  Taken 11/20/2023 1100  Verbalizes/displays adequate comfort level or baseline comfort level:   Encourage patient to monitor pain and request assistance   Assess pain using appropriate pain scale  Taken 11/20/2023 0800  Verbalizes/displays adequate comfort level or baseline comfort level:   Encourage patient to monitor pain and request assistance   Assess pain using appropriate pain scale     Problem: Physical Therapy - Adult  Goal: By Discharge: Performs mobility at highest level of function for planned discharge setting. See evaluation for individualized goals. Description: Initiated  10/23/23, updated goals 11/7/23  to be met within 7-10 days. 1.  Patient will move from supine to sit and sit to supine , scoot up and down, and roll side to side in bed with moderate assistance  2. Patient will sit EOB for 8 minutes with min A for balance. 3.  Patient will transfer from bed to chair and chair to bed with moderate assistance using the least restrictive device. 4.  Patient will perform sit to stand with moderate assistance x2.  5.  Patient will ambulate with moderate assistance for 10 feet with the least restrictive device.    6.  Patient will ascend/descend stairs as needed for discharge. 7.  Patient will participate in BLE exercises to increase ROM /strength for functional tasks.     PLOF: pt lives with wife in a 1 SH, gets assist with ADLs, ambulatory with a walker    11/20/2023 1224 by Zach Hui PT  Outcome: Progressing

## 2023-11-20 NOTE — PROGRESS NOTES
Comprehensive Nutrition Assessment    Type and Reason for Visit:  Reassess    Nutrition Recommendations/Plan:   Continue current diet. Encourage PO intake. Modify supplement to Nepro with Carb Steady (each provides 425 kcal, 19g protein) BID  Continue renal MVI. Continue to monitor intake/tolerance of meals/supplements, weight, labs, and POC while admitted. Malnutrition Assessment:  Malnutrition Status:  Mild malnutrition (11/17/23 1500)    Context:  Acute Illness     Findings of the 6 clinical characteristics of malnutrition:  Energy Intake:  50% or less of estimated energy requirements for 5 or more days  Weight Loss:  No significant weight loss     Body Fat Loss:  No significant body fat loss Orbital   Muscle Mass Loss:  No significant muscle mass loss Temples (temporalis)  Fluid Accumulation:  Unable to assess     Strength:  Not Performed    Nutrition Assessment:    Pt admitted for management of severe sepsis. Per chart, pt with multiple episodes of vomiting 11/13- diet changed to clear liquids. Advanced back to regular texture 11/18. Last dialyzed 11/17- 1L removed. Seen by palliative care 11/17- DNR/DNI with limited interventions and NO feeding tubes. Per flow sheets, one entry for meal intake 1-25% when diet was clear liquids. Plan to modify oral nutrition supplements to Nepro BID until meal intake can be better assessed. Nutrition Related Findings:    Last BM: 11/17. Edema: generalized. Labs: Na 138 WNL, K 3.5 WNL, Cl 105 WNL, GFR 14 L, Ca 9 WNL, POC glucose  x 24-hrs. Pertinent meds: Marinol, Synthroid, Merrem, Protonix, Virt-caps. Wound Type: Pressure Injury, Stage II       Current Nutrition Intake & Therapies:    Average Meal Intake: Unable to assess (no PO documentation since diet advancement)  Average Supplements Intake: Unable to assess  ADULT ORAL NUTRITION SUPPLEMENT; Breakfast, Dinner; Protein Modular  ADULT DIET; Regular; Low Sodium (2 gm);  Low Potassium (Less than 3000

## 2023-11-21 LAB
ERYTHROCYTE [DISTWIDTH] IN BLOOD BY AUTOMATED COUNT: 20.9 % (ref 11.6–14.5)
GLUCOSE BLD STRIP.AUTO-MCNC: 105 MG/DL (ref 70–110)
GLUCOSE BLD STRIP.AUTO-MCNC: 107 MG/DL (ref 70–110)
GLUCOSE BLD STRIP.AUTO-MCNC: 108 MG/DL (ref 70–110)
GLUCOSE BLD STRIP.AUTO-MCNC: 109 MG/DL (ref 70–110)
GLUCOSE BLD STRIP.AUTO-MCNC: 125 MG/DL (ref 70–110)
GLUCOSE BLD STRIP.AUTO-MCNC: 99 MG/DL (ref 70–110)
HCT VFR BLD AUTO: 28.2 % (ref 36–48)
HGB BLD-MCNC: 8.9 G/DL (ref 13–16)
MCH RBC QN AUTO: 33.1 PG (ref 24–34)
MCHC RBC AUTO-ENTMCNC: 31.6 G/DL (ref 31–37)
MCV RBC AUTO: 104.8 FL (ref 78–100)
NRBC # BLD: 0 K/UL (ref 0–0.01)
NRBC BLD-RTO: 0 PER 100 WBC
PLATELET # BLD AUTO: 288 K/UL (ref 135–420)
PMV BLD AUTO: 10.7 FL (ref 9.2–11.8)
RBC # BLD AUTO: 2.69 M/UL (ref 4.35–5.65)
WBC # BLD AUTO: 7 K/UL (ref 4.6–13.2)

## 2023-11-21 PROCEDURE — 2580000003 HC RX 258: Performed by: STUDENT IN AN ORGANIZED HEALTH CARE EDUCATION/TRAINING PROGRAM

## 2023-11-21 PROCEDURE — 82962 GLUCOSE BLOOD TEST: CPT

## 2023-11-21 PROCEDURE — 6360000002 HC RX W HCPCS: Performed by: INTERNAL MEDICINE

## 2023-11-21 PROCEDURE — 2140000001 HC CVICU INTERMEDIATE R&B

## 2023-11-21 PROCEDURE — 94761 N-INVAS EAR/PLS OXIMETRY MLT: CPT

## 2023-11-21 PROCEDURE — 6370000000 HC RX 637 (ALT 250 FOR IP): Performed by: STUDENT IN AN ORGANIZED HEALTH CARE EDUCATION/TRAINING PROGRAM

## 2023-11-21 PROCEDURE — 6370000000 HC RX 637 (ALT 250 FOR IP): Performed by: INTERNAL MEDICINE

## 2023-11-21 PROCEDURE — 2700000000 HC OXYGEN THERAPY PER DAY

## 2023-11-21 PROCEDURE — 36415 COLL VENOUS BLD VENIPUNCTURE: CPT

## 2023-11-21 PROCEDURE — 2580000003 HC RX 258: Performed by: INTERNAL MEDICINE

## 2023-11-21 PROCEDURE — 85027 COMPLETE CBC AUTOMATED: CPT

## 2023-11-21 PROCEDURE — C9113 INJ PANTOPRAZOLE SODIUM, VIA: HCPCS | Performed by: HOSPITALIST

## 2023-11-21 PROCEDURE — 97535 SELF CARE MNGMENT TRAINING: CPT

## 2023-11-21 PROCEDURE — 99232 SBSQ HOSP IP/OBS MODERATE 35: CPT | Performed by: INTERNAL MEDICINE

## 2023-11-21 PROCEDURE — A4216 STERILE WATER/SALINE, 10 ML: HCPCS | Performed by: HOSPITALIST

## 2023-11-21 PROCEDURE — 6360000002 HC RX W HCPCS: Performed by: HOSPITALIST

## 2023-11-21 PROCEDURE — 6370000000 HC RX 637 (ALT 250 FOR IP): Performed by: HOSPITALIST

## 2023-11-21 PROCEDURE — 2580000003 HC RX 258: Performed by: HOSPITALIST

## 2023-11-21 RX ADMIN — PANTOPRAZOLE SODIUM 40 MG: 40 INJECTION, POWDER, FOR SOLUTION INTRAVENOUS at 17:37

## 2023-11-21 RX ADMIN — ATORVASTATIN CALCIUM 80 MG: 40 TABLET, FILM COATED ORAL at 21:28

## 2023-11-21 RX ADMIN — AMIODARONE HYDROCHLORIDE 200 MG: 200 TABLET ORAL at 09:40

## 2023-11-21 RX ADMIN — MIDODRINE HYDROCHLORIDE 15 MG: 10 TABLET ORAL at 17:36

## 2023-11-21 RX ADMIN — DRONABINOL 5 MG: 5 CAPSULE ORAL at 17:37

## 2023-11-21 RX ADMIN — MIDODRINE HYDROCHLORIDE 15 MG: 10 TABLET ORAL at 09:40

## 2023-11-21 RX ADMIN — LEVOTHYROXINE SODIUM 125 MCG: 125 TABLET ORAL at 06:45

## 2023-11-21 RX ADMIN — PANTOPRAZOLE SODIUM 40 MG: 40 INJECTION, POWDER, FOR SOLUTION INTRAVENOUS at 06:45

## 2023-11-21 RX ADMIN — VANCOMYCIN HYDROCHLORIDE 125 MG: 1 INJECTION, POWDER, LYOPHILIZED, FOR SOLUTION INTRAVENOUS at 21:28

## 2023-11-21 RX ADMIN — SODIUM CHLORIDE, PRESERVATIVE FREE 10 ML: 5 INJECTION INTRAVENOUS at 21:29

## 2023-11-21 RX ADMIN — VANCOMYCIN HYDROCHLORIDE 125 MG: 1 INJECTION, POWDER, LYOPHILIZED, FOR SOLUTION INTRAVENOUS at 09:49

## 2023-11-21 RX ADMIN — NEPHROCAP 1 MG: 1 CAP ORAL at 09:40

## 2023-11-21 RX ADMIN — DRONABINOL 5 MG: 5 CAPSULE ORAL at 06:45

## 2023-11-21 RX ADMIN — MIDODRINE HYDROCHLORIDE 15 MG: 10 TABLET ORAL at 11:33

## 2023-11-21 RX ADMIN — SODIUM CHLORIDE, PRESERVATIVE FREE 10 ML: 5 INJECTION INTRAVENOUS at 09:41

## 2023-11-21 RX ADMIN — TROSPIUM CHLORIDE 20 MG: 20 TABLET, FILM COATED ORAL at 06:45

## 2023-11-21 RX ADMIN — TAMSULOSIN HYDROCHLORIDE 0.4 MG: 0.4 CAPSULE ORAL at 09:40

## 2023-11-21 ASSESSMENT — PAIN SCALES - GENERAL
PAINLEVEL_OUTOF10: 0

## 2023-11-21 NOTE — PROGRESS NOTES
([address])     Broken leg 09/2022    CHF exacerbation ([address]) 01/08/2021    CKD (chronic kidney disease), stage III (HCC)     Essential hypertension 01/18/2016    Exercise tolerance finding 10/2020    WORKS 8HRS/DAY NO CP OR SOB ON EXERTION    Exercise tolerance finding 05/2019    sob when up 2 flights of stairs no cp    Gout     Hay fever     Hearing impaired     History of pneumonia     Hyperkalemia 10/2020    Hypothyroidism     Leukocytosis     Renal insufficiency     Varicella     Vertigo     Vitamin D deficiency        Past Surgical History:   Procedure Laterality Date    BLADDER SURGERY  04/12/2023    CARPAL TUNNEL RELEASE Left     COLOSTOMY  04/11/2023    CORONARY ANGIOPLASTY WITH STENT PLACEMENT  12/04/2018    IR IVC FILTER PLACEMENT W IMAGING  11/10/2023    IR IVC FILTER PLACEMENT W IMAGING 11/10/2023 Theo Oquendo MD [facility] RAD ANGIO IR    ORTHOPEDIC SURGERY      hip replacement bilateral    OTHER SURGICAL HISTORY  04/10/2023    Blood clot removal of  the bladder    TONSILLECTOMY      TOTAL HIP ARTHROPLASTY      TOTAL KNEE ARTHROPLASTY Left     UROLOGICAL SURGERY  05/14/2019    TURBT w postoperative intravesical instillation of gemcitabine.   [address] [facility]  [address] Governor [facility]       [unfilled]    Current Facility-Administered Medications   Medication Dose Route Frequency    vancomycin (VANCOCIN) 50 mg/mL oral solution 125 mg  125 mg Oral 2 times per day    0.9 % sodium chloride infusion   IntraVENous PRN    0.9 % sodium chloride infusion   IntraVENous PRN    albumin human 25% IV solution 25 g  25 g IntraVENous PRN    meropenem (MERREM) 500 mg in sodium chloride 0.9 % 100 mL IVPB (mini-bag)  500 mg IntraVENous Q24H    pantoprazole (PROTONIX) 40 mg in sodium chloride (PF) 0.9 % 10 mL injection  40 mg IntraVENous Q12H    tamsulosin (FLOMAX) capsule 0.4 mg  0.4 mg Oral Daily    lidocaine (XYLOCAINE) 2 % uro-jet   Topical PRN    trospium (SANCTURA) tablet 20 mg  20 mg Oral QAM AC    dronabinol (MARINOL) capsule 5 mg  5 mg Oral 11/16/23 0941    Order Status: Completed Specimen: Blood Updated: 11/21/23 0643     Special Requests --        NO SPECIAL REQUESTS  LEFT  HAND       Culture NO GROWTH 5 DAYS       Culture, Blood 1 [[id number]] Collected: 11/13/23 0343    Order Status: Completed Specimen: Blood Updated: 11/19/23 0647     Special Requests NO SPECIAL REQUESTS        Culture NO GROWTH 6 DAYS       Culture, Blood 2 [[id number]]  (Abnormal)  (Susceptibility) Collected: 11/13/23 0315    Order Status: Completed Specimen: Blood Updated: 11/16/23 0859     Special Requests NO SPECIAL REQUESTS        Gram stain       AEROBIC BOTTLE Gram negative rods                  SMEAR CALLED TO AND CORRECTLY REPEATED BY: DEAN CAICEDO, 3N, AT 0920 ON [id number] TO SLT           Culture       PSEUDOMONAS AERUGINOSA GROWING IN 1 OF 1 BOTTLES DRAWN No Site Indicated          Susceptibility        Pseudomonas aeruginosa      BACTERIAL SUSCEPTIBILITY PANEL NIKA      amikacin <=2 ug/mL Sensitive      cefepime 2 ug/mL Sensitive      cefTAZidime 4 ug/mL Sensitive      ciprofloxacin <=0.25 ug/mL Sensitive      gentamicin 2 ug/mL Sensitive      levofloxacin 1 ug/mL Sensitive      meropenem <=0.25 ug/mL Sensitive      piperacillin-tazobactam 8 ug/mL Sensitive      tobramycin <=1 ug/mL Sensitive                           Culture, Blood, PCR ID Panel [[id number]]  (Abnormal) Collected: 11/13/23 0315    Order Status: Completed Specimen: Blood Updated: 11/14/23 0919     Accession Number [id number]     Enterococcus faecalis by PCR Not detected        Enterococcus faecium by PCR Not detected        Listeria monocytogenes by PCR Not detected        STAPHYLOCOCCUS Not detected        Staphylococcus Aureus Not detected        Staphylococcus epidermidis by PCR Not detected        Staphylococcus lugdunensis by PCR Not detected        STREPTOCOCCUS Not detected        Streptococcus agalactiae (Group B) Not detected        Strep pneumoniae Not detected        Strep pyogenes,(Grp. A)

## 2023-11-21 NOTE — PLAN OF CARE
Problem: Safety - Adult  Goal: Free from fall injury  Outcome: Progressing     Problem: Chronic Conditions and Co-morbidities  Goal: Patient's chronic conditions and co-morbidity symptoms are monitored and maintained or improved  Outcome: Progressing  Flowsheets (Taken 11/20/2023 1952)  Care Plan - Patient's Chronic Conditions and Co-Morbidity Symptoms are Monitored and Maintained or Improved:   Monitor and assess patient's chronic conditions and comorbid symptoms for stability, deterioration, or improvement   Collaborate with multidisciplinary team to address chronic and comorbid conditions and prevent exacerbation or deterioration   Update acute care plan with appropriate goals if chronic or comorbid symptoms are exacerbated and prevent overall improvement and discharge     Problem: Pain  Goal: Verbalizes/displays adequate comfort level or baseline comfort level  Outcome: Progressing     Problem: ABCDS Injury Assessment  Goal: Absence of physical injury  Outcome: Progressing     Problem: Nutrition Deficit:  Goal: Optimize nutritional status  Outcome: Progressing     Problem: Discharge Planning  Goal: Discharge to home or other facility with appropriate resources  Outcome: Progressing

## 2023-11-21 NOTE — PLAN OF CARE
Problem: Occupational Therapy - Adult  Goal: By Discharge: Performs self-care activities at highest level of function for planned discharge setting. See evaluation for individualized goals. Description: Occupational Therapy Goals:  Initiated 10/23/2023 to be met within 7-10 days, re-evaluation 11/15/2023, goals downgraded in order for increased success towards goals, Continue OT POC x 7 days    1. Patient will perform upper body dressing with minimal assistance/contact guard assist.   2.  Patient will perform lower body dressing with minimal assistance/contact guard assist.  3.  Patient will perform bed mobility with moderate assistance in prep for ADL routine. 4.  Patient will perform functional task seated edge of bed with fair balance x 10 minutes. 5.  Patient will participate in upper extremity therapeutic exercise/activities with supervision/set-up for 8-10 minutes to increase strength/endurance for ADLs. PLOF: Patient required min to mod assist with basic self care tasks and used a RW for functional mobility PTA. Outcome: Progressing   OCCUPATIONAL THERAPY TREATMENT    Patient: Randolph Alpers (89 y.o. male)  Date: 11/21/2023  Diagnosis: UTI (urinary tract infection) [N39.0]  Cystitis [N30.90]  Hypoxia [R09.02]  Leukocytosis, unspecified type [D72.829]  Urinary tract infection associated with indwelling urethral catheter, initial encounter ([address]) [T83.511A, N39.0] Severe sepsis ([address])  Procedure(s) (LRB):  EGD ESOPHAGOGASTRODUODENOSCOPY (N/A)    Precautions: Fall Risk, Contact Precautions    Chart, occupational therapy assessment, plan of care, and goals were reviewed. ASSESSMENT:  Pt alert and motivated to participate w/therapy. Generalized weakness requires MAX A and increase time w/bed mobility to maneuver to EOB. Pt tolerates ~ 10 minutes at EOB w/close guarding. Pt performs UE Therex w/assist and ADL grooming tasks. Pt motivated to walk.  Educated on importance of good sitting balance and

## 2023-11-21 NOTE — PROGRESS NOTES
0730: Bedside and Verbal shift change report given to Jax Marie and Taz Tello RN (oncoming nurse) by Manish Butts RN (offgoing nurse). Report included the following information Nurse Handoff Report, Adult Overview, Intake/Output, MAR, Recent Results, and Cardiac Rhythm NSR . Upon assessment, patient in bed with even chest rise and fall. Denies any pain or discomfort at this time. 1035: OT at bedside. 1930: Bedside and Verbal shift change report given to Elham Edward  (oncoming nurse) by Jax Marie and Taz Tello RN  (offgoing nurse). Report included the following information Nurse Handoff Report, Adult Overview, Intake/Output, MAR, Recent Results, and Cardiac Rhythm NSR .

## 2023-11-21 NOTE — PROGRESS NOTES
Palliative Medicine follow-up note  [facility] hospitals: [phone] ([phone])  [facility]: [address]: [phone]    Patient Name: Emerson Saenz  YOB: 1935    Date of Initial Consult: 11/1/2023   Follow up 11/21/2023   Reason for Consult: goals of care  Requesting Provider: Dr Lynsey Marquez    Primary Care Physician: Shakir Balbuena MD      SUMMARY:   Emerson Saenz is a 80y.o. year old with a past history of urothelial carcinoma of bladder ( 2019), chronic indwelling hebert, lap sigmoid colectomy and colostomy ( 4/2023), Afib no on [facility], ESRD on HD, who was admitted on 10/21/2023 from home with a diagnosis of severe sepsis due to diverticulitis and cystitis . Current medical issues leading to Palliative Medicine involvement include: 80year old gentleman with a long and complicated hospital stay. Patient with nausea dry heaves on admission he was noted to have a distended colon however no signs of bowel obstruction. During hospitalization he started having increased stool output in his colostomy bag with liquid stool. Stool was tested for C. difficile which came back positive. He also developed significant worsening of leukocytosis despite IV antibiotics ID was consulted and is currently following patient. GI was also consulted and involved. Patient continues to have persistent leukocytosis he is no longer nauseous CT of the abdomen pelvis on 10/31 showed entire colon wall thickening with extensive pericolic fat stranding dilated transverse colon was also noted to have a possible acute splenic infarct. He currently has a poor p.o. intake vascular surgery is also awaiting they felt it was low probability for ischemic colitis. Currently ID is suggesting atypical C. difficile colitis he continues with IV antibiotics GI continues to follow. Palliative medicine is consulted for goals of care discussions and support.     11/21/23: Patient was seen in presence of

## 2023-11-21 NOTE — PROGRESS NOTES
0429 [facility] Hospitalist Group  Progress Note    Patient: Azul Henderson Age: 80 y.o. : 1935 MR#: [id number]: [id number]      Subjective/24-hour events:     Stable overnight. Family present at bedside. Assessment:   Severe sepsis POA, due to C. difficile colitis, also treated for Pseudomonas cystitis secondary to indwelling Carlos catheter, resolved. Severe C. difficile colitis, with concern for Tonya's vs fulminant C.diff with pancolitis and infarct spleen. Improving s/p dificid. prolonged po vancomycin taper for severe c.diff colitis (125 mg po q 6 hour till 11/15 followed by 125 mg po q 12 hour till  followed by 125 mg po every other day till 23) per ID recommendations. New splenic wedge-shaped hypodensity concerning for acute splenic infarct in recent CT abdomen/pelvic  Small volume ascites noted, likely reactive to colitis. ESRD on HD, per nephrology. Acute respiratory failure with hypoxia, currently requiring 5 L/min NC oxygen supplementation. Chronic HFmrEF, acute exacerbation with volume overload in the settings of ESRD, sepsis, require HD for volume management  Paroxysmal atrial fibrillation, elevated [id number], on chronic DOAC outpatient  CAD, s/p RCA stent in 2018, currently stable  10. dyslipidemia  Hypertension, with relative hypotension. Coreg held. Midodrine per nephro services. History of bladder cancer, require indwelling Carlos catheter, Voiding trial unsuccessful, PVR > 350 mL. Carlos replaced 23. Trospium. Urology input appreciated. Treated for Pseudomonas cystitis secondary to indwelling catheter on admission, resolved. Repeat UA 23 was abnormal, expected from ESRD patient however, could be partially treated UTI. Urine culture is pending   Anemia of chronic disease, recent finding of occult stool blood positive, currently no active bleeding. Hgb/Hct is slowly decreasing.  Multifactorial (gross hematuria, +FOBT, +ESRD)  Left

## 2023-11-21 NOTE — PROGRESS NOTES
HD completed. Tolerated well  Total Time: 3 hrs  Net Fluid removed: 2000 ml    Access: RT Subclavian TDC without signs of infection. Report received from and called to Juan Luis Arriaga RN. Pt transported back to his room in no apparent distress, by transport staff.

## 2023-11-21 NOTE — PALLIATIVE CARE
Palliative Medicine    Follow up visit made to patient along with Palliative team member Dr. Jovan Valero. Patient resting quietly in bed, daughter at bedside. Alerted to name, states he is \"tired\", \"sleepy\" and wants to get out of here. Stated his wife is going home today and he wants to go with her. Denies any pain or discomfort at this time. Patient continues to wait on LTC placement with dialysis chair. CODE STATUS: DNR/DNI, SELECTIVE TREATMENTS, NO FEEDING TUBE.     Patrick Ross RN  Palliative Medicine Inpatient RN  Palliative [facility] Line: [phone]

## 2023-11-21 NOTE — PROGRESS NOTES
[address] [facility] Hospitalist Group  Progress Note    Patient: Kendra Gaviria Age: 80 y.o. : 1935 MR#: [id number] SSN: [id number]      Subjective/24-hour events:     No complaints. Assessment:   Severe sepsis POA, due to C. difficile colitis, also treated for Pseudomonas cystitis secondary to indwelling Carlos catheter, resolved. Severe C. difficile colitis, with concern for Dalton's vs fulminant C.diff with pancolitis and infarct spleen. Improving s/p dificid. prolonged po vancomycin taper for severe c.diff colitis (125 mg po q 6 hour till 11/15 followed by 125 mg po q 12 hour till  followed by 125 mg po every other day till 23) per ID recommendations. New splenic wedge-shaped hypodensity concerning for acute splenic infarct in recent CT abdomen/pelvic  Small volume ascites noted, likely reactive to colitis. ESRD on HD, per nephrology. Acute respiratory failure with hypoxia, currently requiring 5 L/min NC oxygen supplementation. Chronic HFmrEF, acute exacerbation with volume overload in the settings of ESRD, sepsis, require HD for volume management  Paroxysmal atrial fibrillation, elevated [id number], on chronic DOAC outpatient  CAD, s/p RCA stent in 2018, currently stable  10. dyslipidemia  Hypertension, with relative hypotension. Coreg held. Midodrine per nephro services. History of bladder cancer, require indwelling Carlos catheter, Voiding trial unsuccessful, PVR > 350 mL. Carlos replaced 23. Trospium. Urology input appreciated. Treated for Pseudomonas cystitis secondary to indwelling catheter on admission, resolved. Repeat UA 23 was abnormal, expected from ESRD patient however, could be partially treated UTI. Urine culture is pending   Anemia of chronic disease, recent finding of occult stool blood positive, currently no active bleeding. Hgb/Hct is slowly decreasing.  Multifactorial (gross hematuria, +FOBT, +ESRD)  Left sacral, stage III pressure 2 % uro-jet   Topical PRN    trospium (SANCTURA) tablet 20 mg  20 mg Oral QAM AC    dronabinol (MARINOL) capsule 5 mg  5 mg Oral BID AC    acetaminophen (TYLENOL) tablet 650 mg  650 mg Oral Q6H PRN    0.9 % sodium chloride infusion   IntraVENous PRN    diatrizoate meglumine-sodium (GASTROGRAFIN) 66-10 % solution 30 mL  30 mL Oral ONCE PRN    midodrine (PROAMATINE) tablet 15 mg  15 mg Oral TID WC    [Held by provider] carvedilol (COREG) tablet 3.125 mg  3.125 mg Oral BID    traMADol (ULTRAM) tablet 25 mg  25 mg Oral Q12H PRN    insulin lispro (HUMALOG) injection vial 0-4 Units  0-4 Units SubCUTAneous TID WC    insulin lispro (HUMALOG) injection vial 0-4 Units  0-4 Units SubCUTAneous Nightly    glucose chewable tablet 16 g  4 tablet Oral PRN    dextrose bolus 10% 125 mL  125 mL IntraVENous PRN    Or    dextrose bolus 10% 250 mL  250 mL IntraVENous PRN    glucagon (rDNA) injection 1 mg  1 mg SubCUTAneous PRN    dextrose 10 % infusion   IntraVENous Continuous PRN    [Held by provider] insulin glargine (LANTUS) injection vial 5 Units  5 Units SubCUTAneous Nightly    simethicone (MYLICON) chewable tablet 80 mg  80 mg Oral Q6H PRN    atorvastatin (LIPITOR) tablet 80 mg  80 mg Oral Nightly    allopurinol (ZYLOPRIM) tablet 100 mg  100 mg Oral Q MWF    diphenhydrAMINE (BENYLIN) 12.5 MG/5ML liquid 12.5 mg  12.5 mg Oral Nightly PRN    Virt-Caps 1 mg  1 capsule Oral Daily    melatonin tablet 6 mg  6 mg Oral Nightly PRN    sodium chloride flush 0.9 % injection 5-40 mL  5-40 mL IntraVENous 2 times per day    sodium chloride flush 0.9 % injection 5-40 mL  5-40 mL IntraVENous PRN    0.9 % sodium chloride infusion   IntraVENous PRN    ondansetron (ZOFRAN-ODT) disintegrating tablet 4 mg  4 mg Oral Q8H PRN    Or    ondansetron (ZOFRAN) injection 4 mg  4 mg IntraVENous Q6H PRN    [Held by provider] heparin (porcine) injection 5,000 Units  5,000 Units SubCUTAneous 3 times per day    amiodarone (CORDARONE) tablet 200 mg  200 mg Oral Daily

## 2023-11-22 ENCOUNTER — APPOINTMENT (OUTPATIENT)
Facility: HOSPITAL | Age: 88
End: 2023-11-22
Attending: FAMILY MEDICINE
Payer: MEDICARE

## 2023-11-22 LAB
BACTERIA SPEC CULT: NORMAL
GLUCOSE BLD STRIP.AUTO-MCNC: 108 MG/DL (ref 70–110)
GLUCOSE BLD STRIP.AUTO-MCNC: 108 MG/DL (ref 70–110)
GLUCOSE BLD STRIP.AUTO-MCNC: 112 MG/DL (ref 70–110)
GLUCOSE BLD STRIP.AUTO-MCNC: 121 MG/DL (ref 70–110)
SERVICE CMNT-IMP: NORMAL

## 2023-11-22 PROCEDURE — 2580000003 HC RX 258: Performed by: HOSPITALIST

## 2023-11-22 PROCEDURE — 97168 OT RE-EVAL EST PLAN CARE: CPT

## 2023-11-22 PROCEDURE — 6360000002 HC RX W HCPCS: Performed by: HOSPITALIST

## 2023-11-22 PROCEDURE — 97530 THERAPEUTIC ACTIVITIES: CPT

## 2023-11-22 PROCEDURE — 6370000000 HC RX 637 (ALT 250 FOR IP): Performed by: INTERNAL MEDICINE

## 2023-11-22 PROCEDURE — 2580000003 HC RX 258: Performed by: STUDENT IN AN ORGANIZED HEALTH CARE EDUCATION/TRAINING PROGRAM

## 2023-11-22 PROCEDURE — 90935 HEMODIALYSIS ONE EVALUATION: CPT

## 2023-11-22 PROCEDURE — 99232 SBSQ HOSP IP/OBS MODERATE 35: CPT | Performed by: FAMILY MEDICINE

## 2023-11-22 PROCEDURE — 2140000001 HC CVICU INTERMEDIATE R&B

## 2023-11-22 PROCEDURE — 6360000002 HC RX W HCPCS: Performed by: INTERNAL MEDICINE

## 2023-11-22 PROCEDURE — 6370000000 HC RX 637 (ALT 250 FOR IP): Performed by: HOSPITALIST

## 2023-11-22 PROCEDURE — 87340 HEPATITIS B SURFACE AG IA: CPT

## 2023-11-22 PROCEDURE — 2580000003 HC RX 258: Performed by: INTERNAL MEDICINE

## 2023-11-22 PROCEDURE — 6370000000 HC RX 637 (ALT 250 FOR IP): Performed by: STUDENT IN AN ORGANIZED HEALTH CARE EDUCATION/TRAINING PROGRAM

## 2023-11-22 PROCEDURE — C9113 INJ PANTOPRAZOLE SODIUM, VIA: HCPCS | Performed by: HOSPITALIST

## 2023-11-22 PROCEDURE — 94761 N-INVAS EAR/PLS OXIMETRY MLT: CPT

## 2023-11-22 PROCEDURE — 82962 GLUCOSE BLOOD TEST: CPT

## 2023-11-22 PROCEDURE — 36415 COLL VENOUS BLD VENIPUNCTURE: CPT

## 2023-11-22 PROCEDURE — P9047 ALBUMIN (HUMAN), 25%, 50ML: HCPCS | Performed by: INTERNAL MEDICINE

## 2023-11-22 PROCEDURE — A4216 STERILE WATER/SALINE, 10 ML: HCPCS | Performed by: HOSPITALIST

## 2023-11-22 RX ORDER — HEPARIN SODIUM 1000 [USP'U]/ML
3600 INJECTION, SOLUTION INTRAVENOUS; SUBCUTANEOUS AS NEEDED
Status: DISCONTINUED | OUTPATIENT
Start: 2023-11-22 | End: 2023-11-25 | Stop reason: HOSPADM

## 2023-11-22 RX ADMIN — TAMSULOSIN HYDROCHLORIDE 0.4 MG: 0.4 CAPSULE ORAL at 08:29

## 2023-11-22 RX ADMIN — Medication 6 MG: at 01:06

## 2023-11-22 RX ADMIN — VANCOMYCIN HYDROCHLORIDE 125 MG: 1 INJECTION, POWDER, LYOPHILIZED, FOR SOLUTION INTRAVENOUS at 21:14

## 2023-11-22 RX ADMIN — SODIUM CHLORIDE, PRESERVATIVE FREE 10 ML: 5 INJECTION INTRAVENOUS at 08:30

## 2023-11-22 RX ADMIN — NEPHROCAP 1 MG: 1 CAP ORAL at 08:29

## 2023-11-22 RX ADMIN — VANCOMYCIN HYDROCHLORIDE 125 MG: 1 INJECTION, POWDER, LYOPHILIZED, FOR SOLUTION INTRAVENOUS at 08:29

## 2023-11-22 RX ADMIN — ALBUMIN (HUMAN) 25 G: 0.25 INJECTION, SOLUTION INTRAVENOUS at 13:48

## 2023-11-22 RX ADMIN — MIDODRINE HYDROCHLORIDE 15 MG: 10 TABLET ORAL at 11:14

## 2023-11-22 RX ADMIN — DRONABINOL 5 MG: 5 CAPSULE ORAL at 17:23

## 2023-11-22 RX ADMIN — PANTOPRAZOLE SODIUM 40 MG: 40 INJECTION, POWDER, FOR SOLUTION INTRAVENOUS at 06:25

## 2023-11-22 RX ADMIN — SODIUM CHLORIDE, PRESERVATIVE FREE 10 ML: 5 INJECTION INTRAVENOUS at 21:15

## 2023-11-22 RX ADMIN — Medication 6 MG: at 21:14

## 2023-11-22 RX ADMIN — DRONABINOL 5 MG: 5 CAPSULE ORAL at 06:25

## 2023-11-22 RX ADMIN — LEVOTHYROXINE SODIUM 125 MCG: 125 TABLET ORAL at 06:26

## 2023-11-22 RX ADMIN — TROSPIUM CHLORIDE 20 MG: 20 TABLET, FILM COATED ORAL at 06:26

## 2023-11-22 RX ADMIN — TRAMADOL HYDROCHLORIDE 25 MG: 50 TABLET ORAL at 06:45

## 2023-11-22 RX ADMIN — MIDODRINE HYDROCHLORIDE 15 MG: 10 TABLET ORAL at 06:25

## 2023-11-22 RX ADMIN — TRAMADOL HYDROCHLORIDE 25 MG: 50 TABLET ORAL at 21:14

## 2023-11-22 RX ADMIN — ACETAMINOPHEN 325MG 650 MG: 325 TABLET ORAL at 01:06

## 2023-11-22 RX ADMIN — ATORVASTATIN CALCIUM 80 MG: 40 TABLET, FILM COATED ORAL at 21:14

## 2023-11-22 RX ADMIN — PANTOPRAZOLE SODIUM 40 MG: 40 INJECTION, POWDER, FOR SOLUTION INTRAVENOUS at 17:22

## 2023-11-22 RX ADMIN — ALLOPURINOL 100 MG: 100 TABLET ORAL at 08:29

## 2023-11-22 RX ADMIN — AMIODARONE HYDROCHLORIDE 200 MG: 200 TABLET ORAL at 08:29

## 2023-11-22 ASSESSMENT — PAIN SCALES - GENERAL
PAINLEVEL_OUTOF10: 0
PAINLEVEL_OUTOF10: 4
PAINLEVEL_OUTOF10: 0
PAINLEVEL_OUTOF10: 4
PAINLEVEL_OUTOF10: 0

## 2023-11-22 ASSESSMENT — PAIN DESCRIPTION - LOCATION
LOCATION: LEG;GENERALIZED
LOCATION: GENERALIZED

## 2023-11-22 ASSESSMENT — PAIN DESCRIPTION - DESCRIPTORS
DESCRIPTORS: ACHING
DESCRIPTORS: ACHING

## 2023-11-22 ASSESSMENT — PAIN DESCRIPTION - ORIENTATION: ORIENTATION: RIGHT;LEFT

## 2023-11-22 NOTE — PROGRESS NOTES
2672: Bedside shift change report given to Telma Sanchez (oncoming nurse) by Dian Shelton RN (offgoing nurse). Report included the following information Nurse Handoff Report, Adult Overview, Intake/Output, MAR, and Cardiac Rhythm NSR .     0829: Rounding on patient. Patient's alert and verbal. Patient was offered his breakfast two times but he only wanted ginger ale. Patient's right arm is swollen and red. Elevated on a pillow. Patient complained of no pain. 2286: Patient's family called for an update. Will call back as soon as I can. 1005: Spoke with patient's sister and give her updates. 1200: MD rounding on patient. Family is at bedside. Duplex ordered for patient's right arm to be checked. Will notify family. 5: Patient's sister updated on duplex. 1233: Report given to [facility], RN. Patient's going to dialysis. 1710: Patient arrived from dialysis. Patient's alert, verbal, and resting securely in bed. 36: Spoke with patient's sister. Updates given. 1735: This nurse called vascular department to see if patient could come down for his test. No answer at this time. Will try again. 1800: This nurse reached out to vascular dept again but no one answered. Will try again and update night RN as well. 1910: Bedside shift change report given to Prisca Fung (oncoming nurse) by Silke Summers RN (offgoing nurse). Report included the following information Nurse Handoff Report, Adult Overview, Intake/Output, MAR, and Cardiac Rhythm NSR .

## 2023-11-22 NOTE — PLAN OF CARE
Problem: Discharge Planning  Goal: Discharge to home or other facility with appropriate resources  Outcome: Progressing     Problem: Safety - Adult  Goal: Free from fall injury  Outcome: Progressing     Problem: Skin/Tissue Integrity  Goal: Absence of new skin breakdown  Description: 1. Monitor for areas of redness and/or skin breakdown  2. Assess vascular access sites hourly  3. Every 4-6 hours minimum:  Change oxygen saturation probe site  4. Every 4-6 hours:  If on nasal continuous positive airway pressure, respiratory therapy assess nares and determine need for appliance change or resting period. Outcome: Progressing     Problem: Chronic Conditions and Co-morbidities  Goal: Patient's chronic conditions and co-morbidity symptoms are monitored and maintained or improved  Outcome: Progressing     Problem: ABCDS Injury Assessment  Goal: Absence of physical injury  Outcome: Progressing     Problem: Pain  Goal: Verbalizes/displays adequate comfort level or baseline comfort level  Outcome: Progressing     Problem: Occupational Therapy - Adult  Goal: By Discharge: Performs self-care activities at highest level of function for planned discharge setting. See evaluation for individualized goals. Description: Occupational Therapy Goals:  Initiated 10/23/2023 to be met within 7-10 days, re-evaluation 11/15/2023, goals downgraded in order for increased success towards goals, Continue OT POC x 7 days    1. Patient will perform upper body dressing with minimal assistance/contact guard assist.   2.  Patient will perform lower body dressing with minimal assistance/contact guard assist.  3.  Patient will perform bed mobility with moderate assistance in prep for ADL routine. 4.  Patient will perform functional task seated edge of bed with fair balance x 10 minutes.   5.  Patient will participate in upper extremity therapeutic exercise/activities with supervision/set-up for 8-10 minutes to increase strength/endurance for ADLs.      PLOF: Patient required min to mod assist with basic self care tasks and used a RW for functional mobility PTA. 11/21/2023 1102 by Lana Sylvester OTA  Outcome: Progressing     Problem: Nutrition Deficit:  Goal: Optimize nutritional status  Outcome: Progressing     Problem: Neurosensory - Adult  Goal: Achieves stable or improved neurological status  Outcome: Progressing     Problem: Respiratory - Adult  Goal: Achieves optimal ventilation and oxygenation  Outcome: Progressing     Problem: Skin/Tissue Integrity - Adult  Goal: Skin integrity remains intact  Outcome: Progressing  Goal: Incisions, wounds, or drain sites healing without S/S of infection  Outcome: Progressing     Problem: Musculoskeletal - Adult  Goal: Return mobility to safest level of function  Outcome: Progressing  Goal: Return ADL status to a safe level of function  Outcome: Progressing     Problem: Gastrointestinal - Adult  Goal: Minimal or absence of nausea and vomiting  Outcome: Progressing  Goal: Maintains or returns to baseline bowel function  Outcome: Progressing  Goal: Maintains adequate nutritional intake  Outcome: Progressing  Goal: Establish and maintain optimal ostomy function  Outcome: Progressing     Problem: Genitourinary - Adult  Goal: Absence of urinary retention  Outcome: Progressing     Problem: Infection - Adult  Goal: Absence of infection at discharge  Outcome: Progressing     Problem: Metabolic/Fluid and Electrolytes - Adult  Goal: Electrolytes maintained within normal limits  Outcome: Progressing  Goal: Glucose maintained within prescribed range  Outcome: Progressing     Problem: Cardiovascular - Adult  Goal: Maintains optimal cardiac output and hemodynamic stability  Outcome: Progressing  Goal: Absence of cardiac dysrhythmias or at baseline  Outcome: Progressing     Problem: Coping  Goal: Pt/Family able to verbalize concerns and demonstrate effective coping strategies  Description: INTERVENTIONS:  1.  Assist

## 2023-11-22 NOTE — PALLIATIVE CARE
[address] Floor: [phone] ([phone])  Former[facility]: [phone]    Goals of care defined. Palliative team will sign off. Please consult team as needed, if appropriate.    Thank you for the Palliative Medicine consult and allowing us to participate in the care of Mr. Dallin Shipman form on file       Cesia DAMON, RN, [address]: [phone]

## 2023-11-22 NOTE — PROGRESS NOTES
RENAL DAILY PROGRESS NOTE  72-year-old male with past medical history of hypertension, bladder cancer on chronic Hebert catheter admitted for sepsis, following for ESRD management  Subjective:       Complaint:   Overnight event noted      IMPRESSION:   ESRD on MWF dialysis  Access: TDC  Sob,fluid overload, improving  UTI, C diff, Leucocytosis  Anemia, refused epogen  Hypotension, on midodrine  Chronic indwelling hebert due to hx of bladder cancer   PLAN:   Plan for HD today  in second shift. with 3K bath calcium 2.5, UF goal 1.5 to 2 L as tolerated  Monitor blood pressure during dialysis, albumin as needed for hypotension.            Current Facility-Administered Medications   Medication Dose Route Frequency    vancomycin (VANCOCIN) 50 mg/mL oral solution 125 mg  125 mg Oral 2 times per day    0.9 % sodium chloride infusion   IntraVENous PRN    0.9 % sodium chloride infusion   IntraVENous PRN    albumin human 25% IV solution 25 g  25 g IntraVENous PRN    pantoprazole (PROTONIX) 40 mg in sodium chloride (PF) 0.9 % 10 mL injection  40 mg IntraVENous Q12H    tamsulosin (FLOMAX) capsule 0.4 mg  0.4 mg Oral Daily    lidocaine (XYLOCAINE) 2 % uro-jet   Topical PRN    trospium (SANCTURA) tablet 20 mg  20 mg Oral QAM AC    dronabinol (MARINOL) capsule 5 mg  5 mg Oral BID AC    acetaminophen (TYLENOL) tablet 650 mg  650 mg Oral Q6H PRN    0.9 % sodium chloride infusion   IntraVENous PRN    diatrizoate meglumine-sodium (GASTROGRAFIN) 66-10 % solution 30 mL  30 mL Oral ONCE PRN    midodrine (PROAMATINE) tablet 15 mg  15 mg Oral TID WC    [Held by provider] carvedilol (COREG) tablet 3.125 mg  3.125 mg Oral BID    traMADol (ULTRAM) tablet 25 mg  25 mg Oral Q12H PRN    insulin lispro (HUMALOG) injection vial 0-4 Units  0-4 Units SubCUTAneous TID WC    insulin lispro (HUMALOG) injection vial 0-4 Units  0-4 Units SubCUTAneous Nightly    glucose chewable tablet 16 g  4 tablet Oral PRN    dextrose bolus 10% 125 mL  125 mL 1509  Gross per 24 hour   Intake 0 ml   Output 475 ml   Net -475 ml     Physical Exam:   General: comfortable, no acute distress   HEENT sclera anicteric, supple neck, no thyromegaly  CVS: S1S2 heard,  no rub  RS: + air entry b/l,   Abd: Soft, Non tender  Neuro: non focal, awake, alert   Extrm: no cyanosis, clubbing . Mild edema  Skin: no visible  Rash  Musculoskeletal: No gross joints or bone deformities         Data Review:     LABS:   Hematology:   Recent Labs     11/20/23  0100 11/21/23  0242   WBC 6.6 7.0   HGB 8.8* 8.9*   HCT 27.3* 28.2*     Chemistry:   No results for input(s): \"BUN\", \"CREA\", \"CA\", \"ALB\", \"K\", \"NA\", \"CL\", \"CO2\", \"PHOS\", \"GLU\" in the last 72 hours.            Procedures/imaging: see electronic medical records for all procedures, Xrays and details which were not copied into this note but were reviewed prior to creation of Plan          Assessment & Plan:     See above        Eduard Brothers MD  11/22/2023  1:03 PM Clear bilaterally, pupils equal, round and reactive to light.

## 2023-11-22 NOTE — CARE COORDINATION
11/22/23 1144   Avoidable Days   Payor  [facility] RN CDCES  Case Management

## 2023-11-22 NOTE — PLAN OF CARE
Problem: Occupational Therapy - Adult  Goal: By Discharge: Performs self-care activities at highest level of function for planned discharge setting. See evaluation for individualized goals. Description: Occupational Therapy Goals:   Re-evaluated 11/22/2023, pt is slowly progressing, goal #6 added to maximize pt's independence, continue with goals to be met within 7-10 days. 1.  Patient will perform upper body dressing with minimal assistance/contact guard assist.   2.  Patient will perform lower body dressing with minimal assistance/contact guard assist.  3.  Patient will perform bed mobility with moderate assistance in prep for ADL routine. 4.  Patient will perform functional task seated edge of bed with fair balance x 10 minutes. 5.  Patient will participate in upper extremity therapeutic exercise/activities with supervision/set-up for 8-10 minutes to increase strength/endurance for ADLs. 6.  Patient will perform self-feeding with supervision/set-up. PLOF: Patient required min to mod assist with basic self care tasks and used a RW for functional mobility PTA. 11/22/2023 1305 by Sheryle Herald, OTR/L  Outcome: [address] [place] [address],  [facility] THERAPY RE-EVALUATION    Patient: Moris Kan (89 y.o. male)  Date: 11/22/2023  Primary Diagnosis: UTI (urinary tract infection) [N39.0]  Cystitis [N30.90]  Hypoxia [R09.02]  Leukocytosis, unspecified type [D72.829]  Urinary tract infection associated with indwelling urethral catheter, initial encounter ([address]) [T83.511A, N39.0]  Procedure(s) (LRB):  EGD ESOPHAGOGASTRODUODENOSCOPY (N/A)     Precautions: Fall Risk, Contact Precautions     ASSESSMENT :    Pt is drowsy this am, reports he doesn't feel good, required encouragement to participate in OT re-evaluation. Pt declined to sit EOB in spite of encouragement due to pain in lower back, agreeable to reposition.  Pt required increased time and Mod-Max A to roll in bed several times for adjustment of the pads assistance  Grooming: Moderate assistance  UE Bathing: Moderate assistance  LE Bathing: Maximum assistance  UE Dressing: Moderate assistance  LE Dressing: Dependent/Total  Toileting: Dependent/Total    ADL Intervention: Mod A to don gown  Mod A to wash face with RUE    Pain:  Pain level pre-treatment: not rated  Pain level post-treatment: not rated    Activity Tolerance:   Activity Tolerance: Patient limited by fatigue, Treatment limited secondary to decreased cognition  Please refer to the flowsheet for vital signs taken during this treatment. After treatment:   [] Patient left in no apparent distress sitting up in chair  [x] Patient left in no apparent distress in bed  [x] Call bell left within reach  [x] Nursing notified  [] Caregiver present  [] Bed alarm activated    COMMUNICATION/EDUCATION:   Patient Education  Education Given To: Patient  Education Provided: Role of Therapy;Plan of Care;ADL Adaptive Strategies; Energy Conservation;Orientation  Education Method: Verbal;Demonstration; Teach Back  Barriers to Learning: Cognition  Education Outcome: Continued education needed    Thank you for this referral.  Dorcas Hernandez OTR/L  Minutes: 17

## 2023-11-22 NOTE — PROGRESS NOTES
Received pre HD report from  SOULEYMANE Winter , RN. Pt in bed, A+O 3, on O2 via NC, no s/s of acute distress noted. Accessed right CVC w/o complications. Tx initiated at 1326 . CVC flowing with ease. UF goal 2000 ml. Offered assistance with repositioning every 2 hours. Vascular access visible at all times during treatment, line connections intact at all times. Tx completed at 1620 , PRN albumin administered x 1 d/t hypotension, 2L removed. De-accessed per protocol. Heparin indwell 1.8ml in arterial, and 1.8ml in venous catheter. Unit nurse SOULEYMANE Winter, DEAN given report.

## 2023-11-23 LAB
BACTERIA SPEC CULT: NORMAL
GLUCOSE BLD STRIP.AUTO-MCNC: 103 MG/DL (ref 70–110)
GLUCOSE BLD STRIP.AUTO-MCNC: 112 MG/DL (ref 70–110)
GLUCOSE BLD STRIP.AUTO-MCNC: 117 MG/DL (ref 70–110)
GLUCOSE BLD STRIP.AUTO-MCNC: 120 MG/DL (ref 70–110)
HBV SURFACE AG SER QL: <0.1 INDEX
HBV SURFACE AG SER QL: NEGATIVE
SERVICE CMNT-IMP: NORMAL

## 2023-11-23 PROCEDURE — 2140000001 HC CVICU INTERMEDIATE R&B

## 2023-11-23 PROCEDURE — 2580000003 HC RX 258: Performed by: HOSPITALIST

## 2023-11-23 PROCEDURE — 6370000000 HC RX 637 (ALT 250 FOR IP): Performed by: STUDENT IN AN ORGANIZED HEALTH CARE EDUCATION/TRAINING PROGRAM

## 2023-11-23 PROCEDURE — 6360000002 HC RX W HCPCS: Performed by: INTERNAL MEDICINE

## 2023-11-23 PROCEDURE — 2700000000 HC OXYGEN THERAPY PER DAY

## 2023-11-23 PROCEDURE — A4216 STERILE WATER/SALINE, 10 ML: HCPCS | Performed by: HOSPITALIST

## 2023-11-23 PROCEDURE — 6370000000 HC RX 637 (ALT 250 FOR IP): Performed by: HOSPITALIST

## 2023-11-23 PROCEDURE — 82962 GLUCOSE BLOOD TEST: CPT

## 2023-11-23 PROCEDURE — 2580000003 HC RX 258: Performed by: STUDENT IN AN ORGANIZED HEALTH CARE EDUCATION/TRAINING PROGRAM

## 2023-11-23 PROCEDURE — 2580000003 HC RX 258: Performed by: INTERNAL MEDICINE

## 2023-11-23 PROCEDURE — 99232 SBSQ HOSP IP/OBS MODERATE 35: CPT | Performed by: FAMILY MEDICINE

## 2023-11-23 PROCEDURE — 94761 N-INVAS EAR/PLS OXIMETRY MLT: CPT

## 2023-11-23 PROCEDURE — 6370000000 HC RX 637 (ALT 250 FOR IP): Performed by: INTERNAL MEDICINE

## 2023-11-23 PROCEDURE — C9113 INJ PANTOPRAZOLE SODIUM, VIA: HCPCS | Performed by: HOSPITALIST

## 2023-11-23 PROCEDURE — 6360000002 HC RX W HCPCS: Performed by: HOSPITALIST

## 2023-11-23 RX ADMIN — MIDODRINE HYDROCHLORIDE 15 MG: 10 TABLET ORAL at 09:08

## 2023-11-23 RX ADMIN — TAMSULOSIN HYDROCHLORIDE 0.4 MG: 0.4 CAPSULE ORAL at 09:09

## 2023-11-23 RX ADMIN — MIDODRINE HYDROCHLORIDE 15 MG: 10 TABLET ORAL at 16:35

## 2023-11-23 RX ADMIN — NEPHROCAP 1 MG: 1 CAP ORAL at 09:08

## 2023-11-23 RX ADMIN — VANCOMYCIN HYDROCHLORIDE 125 MG: 1 INJECTION, POWDER, LYOPHILIZED, FOR SOLUTION INTRAVENOUS at 23:23

## 2023-11-23 RX ADMIN — SODIUM CHLORIDE, PRESERVATIVE FREE 20 ML: 5 INJECTION INTRAVENOUS at 23:23

## 2023-11-23 RX ADMIN — LEVOTHYROXINE SODIUM 125 MCG: 125 TABLET ORAL at 04:50

## 2023-11-23 RX ADMIN — VANCOMYCIN HYDROCHLORIDE 125 MG: 1 INJECTION, POWDER, LYOPHILIZED, FOR SOLUTION INTRAVENOUS at 09:13

## 2023-11-23 RX ADMIN — DRONABINOL 5 MG: 5 CAPSULE ORAL at 06:01

## 2023-11-23 RX ADMIN — ATORVASTATIN CALCIUM 80 MG: 40 TABLET, FILM COATED ORAL at 23:23

## 2023-11-23 RX ADMIN — PANTOPRAZOLE SODIUM 40 MG: 40 INJECTION, POWDER, FOR SOLUTION INTRAVENOUS at 04:50

## 2023-11-23 RX ADMIN — TROSPIUM CHLORIDE 20 MG: 20 TABLET, FILM COATED ORAL at 06:01

## 2023-11-23 RX ADMIN — AMIODARONE HYDROCHLORIDE 200 MG: 200 TABLET ORAL at 09:08

## 2023-11-23 RX ADMIN — ACETAMINOPHEN 325MG 650 MG: 325 TABLET ORAL at 04:50

## 2023-11-23 RX ADMIN — DRONABINOL 5 MG: 5 CAPSULE ORAL at 16:35

## 2023-11-23 RX ADMIN — SODIUM CHLORIDE, PRESERVATIVE FREE 10 ML: 5 INJECTION INTRAVENOUS at 09:09

## 2023-11-23 RX ADMIN — MIDODRINE HYDROCHLORIDE 15 MG: 10 TABLET ORAL at 12:53

## 2023-11-23 RX ADMIN — PANTOPRAZOLE SODIUM 40 MG: 40 INJECTION, POWDER, FOR SOLUTION INTRAVENOUS at 16:35

## 2023-11-23 ASSESSMENT — PAIN SCALES - GENERAL
PAINLEVEL_OUTOF10: 0

## 2023-11-23 ASSESSMENT — PAIN DESCRIPTION - ORIENTATION: ORIENTATION: LEFT;RIGHT

## 2023-11-23 ASSESSMENT — PAIN DESCRIPTION - FREQUENCY: FREQUENCY: INTERMITTENT

## 2023-11-23 ASSESSMENT — PAIN DESCRIPTION - LOCATION: LOCATION: FOOT

## 2023-11-23 ASSESSMENT — PAIN DESCRIPTION - DESCRIPTORS: DESCRIPTORS: ACHING

## 2023-11-23 ASSESSMENT — PAIN DESCRIPTION - ONSET: ONSET: ON-GOING

## 2023-11-23 NOTE — PROGRESS NOTES
0715: Bedside shift change report given to Rayne Pagan RN (oncoming nurse) by Albertina Mcfadden RN (offgoing nurse). Report included the following information Nurse Handoff Report and Adult Overview. 0900: Rounding on patient. Patient's alert, verbal, and resting securely in bed.     0955:Called vascular dept. No answer. 1100: MD rounding on patient. MD updated that vascular dept was contacted twice on yesterday's shift and once today. No orders given at this time. Will continue to keep patient's arm elevated. 1200: Family at bedside. 1630: Rounding on patient. Patient remains alert. No signs of distress. Will continue to monitor patient remiander of shift. 1915: Bedside shift change report given to DEAN Castle (oncoming nurse) by Rayne Pagan RN (offgoing nurse). Report included the following information Nurse Handoff Report, Adult Overview, Intake/Output, MAR, and Cardiac Rhythm NSR .

## 2023-11-23 NOTE — PROGRESS NOTES
[id number] Hospitalist Group  Progress Note    Patient: Brenda Rodriguez Age: 80 y.o. : 1935 MR#: [id number]: [id number]      Subjective/24-hour events:     Feeling well on visit earlier today. Family has noted swelling in RUE late yesterday that appears worse today. No pain, remains afebrile. Assessment:   Severe sepsis POA, due to C. difficile colitis, also treated for Pseudomonas cystitis secondary to indwelling Carlos catheter, resolved. Severe C. difficile colitis, with concern for [place]'s vs fulminant C.diff with pancolitis and infarct spleen. Improving s/p dificid. prolonged po vancomycin taper for severe c.diff colitis (125 mg po q 6 hour till 11/15 followed by 125 mg po q 12 hour till  followed by 125 mg po every other day till 23) per ID recommendations. New splenic wedge-shaped hypodensity concerning for acute splenic infarct in recent CT abdomen/pelvic  Small volume ascites noted, likely reactive to colitis. ESRD on HD, per nephrology. Acute respiratory failure with hypoxia, currently requiring 5 L/min NC oxygen supplementation. Chronic HFmrEF, acute exacerbation with volume overload in the settings of ESRD, sepsis, require HD for volume management  Paroxysmal atrial fibrillation, elevated [id number], on chronic DOAC outpatient  CAD, s/p RCA stent in 2018, currently stable  10. dyslipidemia  Hypertension, with relative hypotension. Coreg held. Midodrine per nephro services. History of bladder cancer, require indwelling Carlos catheter, Voiding trial unsuccessful, PVR > 350 mL. Carlos replaced 23. Trospium. Urology input appreciated. Treated for Pseudomonas cystitis secondary to indwelling catheter on admission, resolved. Repeat UA 23 was abnormal, expected from ESRD patient however, could be partially treated UTI.  Urine culture is pending   Anemia of chronic disease, recent finding of occult stool blood positive, currently no mg Oral Q8H PRN    Or    ondansetron (ZOFRAN) injection 4 mg  4 mg IntraVENous Q6H PRN    [Held by provider] heparin (porcine) injection 5,000 Units  5,000 Units SubCUTAneous 3 times per day    amiodarone (CORDARONE) tablet 200 mg  200 mg Oral Daily    levothyroxine (SYNTHROID) tablet 125 mcg  125 mcg Oral Daily        Labs:    Recent Results (from the past 24 hour(s))   POCT Glucose    Collection Time: 11/21/23  9:35 PM   Result Value Ref Range    POC Glucose 99 70 - 110 mg/dL   POCT Glucose    Collection Time: 11/22/23  7:51 AM   Result Value Ref Range    POC Glucose 112 (H) 70 - 110 mg/dL   POCT Glucose    Collection Time: 11/22/23 11:18 AM   Result Value Ref Range    POC Glucose 121 (H) 70 - 110 mg/dL   POCT Glucose    Collection Time: 11/22/23  5:18 PM   Result Value Ref Range    POC Glucose 108 70 - 110 mg/dL         Signed By: Juventino Cavazos MD

## 2023-11-23 NOTE — PROGRESS NOTES
[facility] Hospitalist Group  Progress Note    Patient: Kate Zaldivar Age: 80 y.o. : 1935 MR#: [id number] SSN: [id number]      Subjective/24-hour events:     No new complaints. RUE swelling improved. Daughter at bedside. Assessment:   Severe sepsis POA, due to C. difficile colitis, also treated for Pseudomonas cystitis secondary to indwelling Carlos catheter, resolved. Severe C. difficile colitis, with concern for [place]'s vs fulminant C.diff with pancolitis and infarct spleen. Improving s/p dificid. prolonged po vancomycin taper for severe c.diff colitis (125 mg po q 6 hour till 11/15 followed by 125 mg po q 12 hour till  followed by 125 mg po every other day till 23) per ID recommendations. New splenic wedge-shaped hypodensity concerning for acute splenic infarct in recent CT abdomen/pelvic  Small volume ascites noted, likely reactive to colitis. ESRD on HD, per nephrology. Acute respiratory failure with hypoxia, currently requiring 5 L/min NC oxygen supplementation. Chronic HFmrEF, acute exacerbation with volume overload in the settings of ESRD, sepsis, require HD for volume management  Paroxysmal atrial fibrillation, elevated [id number], on chronic DOAC outpatient  CAD, s/p RCA stent in 2018, currently stable  10. dyslipidemia  Hypertension, with relative hypotension. Coreg held. Midodrine per nephro services. History of bladder cancer, require indwelling Carlos catheter, Voiding trial unsuccessful, PVR > 350 mL. Carlos replaced 23. Trospium. Urology input appreciated. Treated for Pseudomonas cystitis secondary to indwelling catheter on admission, resolved. Repeat UA 23 was abnormal, expected from ESRD patient however, could be partially treated UTI. Urine culture is pending   Anemia of chronic disease, recent finding of occult stool blood positive, currently no active bleeding. Hgb/Hct is slowly decreasing.  Multifactorial (gross hematuria, infusion   IntraVENous PRN    0.9 % sodium chloride infusion   IntraVENous PRN    albumin human 25% IV solution 25 g  25 g IntraVENous PRN    pantoprazole (PROTONIX) 40 mg in sodium chloride (PF) 0.9 % 10 mL injection  40 mg IntraVENous Q12H    tamsulosin (FLOMAX) capsule 0.4 mg  0.4 mg Oral Daily    lidocaine (XYLOCAINE) 2 % uro-jet   Topical PRN    trospium (SANCTURA) tablet 20 mg  20 mg Oral QAM AC    dronabinol (MARINOL) capsule 5 mg  5 mg Oral BID AC    acetaminophen (TYLENOL) tablet 650 mg  650 mg Oral Q6H PRN    0.9 % sodium chloride infusion   IntraVENous PRN    diatrizoate meglumine-sodium (GASTROGRAFIN) 66-10 % solution 30 mL  30 mL Oral ONCE PRN    midodrine (PROAMATINE) tablet 15 mg  15 mg Oral TID WC    [Held by provider] carvedilol (COREG) tablet 3.125 mg  3.125 mg Oral BID    traMADol (ULTRAM) tablet 25 mg  25 mg Oral Q12H PRN    insulin lispro (HUMALOG) injection vial 0-4 Units  0-4 Units SubCUTAneous TID WC    insulin lispro (HUMALOG) injection vial 0-4 Units  0-4 Units SubCUTAneous Nightly    glucose chewable tablet 16 g  4 tablet Oral PRN    dextrose bolus 10% 125 mL  125 mL IntraVENous PRN    Or    dextrose bolus 10% 250 mL  250 mL IntraVENous PRN    glucagon (rDNA) injection 1 mg  1 mg SubCUTAneous PRN    dextrose 10 % infusion   IntraVENous Continuous PRN    [Held by provider] insulin glargine (LANTUS) injection vial 5 Units  5 Units SubCUTAneous Nightly    simethicone (MYLICON) chewable tablet 80 mg  80 mg Oral Q6H PRN    atorvastatin (LIPITOR) tablet 80 mg  80 mg Oral Nightly    allopurinol (ZYLOPRIM) tablet 100 mg  100 mg Oral Q MWF    diphenhydrAMINE (BENYLIN) 12.5 MG/5ML liquid 12.5 mg  12.5 mg Oral Nightly PRN    Virt-Caps 1 mg  1 capsule Oral Daily    melatonin tablet 6 mg  6 mg Oral Nightly PRN    sodium chloride flush 0.9 % injection 5-40 mL  5-40 mL IntraVENous 2 times per day    sodium chloride flush 0.9 % injection 5-40 mL  5-40 mL IntraVENous PRN    0.9 % sodium chloride infusion

## 2023-11-24 ENCOUNTER — APPOINTMENT (OUTPATIENT)
Facility: HOSPITAL | Age: 88
End: 2023-11-24
Attending: FAMILY MEDICINE
Payer: MEDICARE

## 2023-11-24 LAB
ANION GAP SERPL CALC-SCNC: 3 MMOL/L (ref 3–18)
BUN SERPL-MCNC: 15 MG/DL (ref 7–18)
BUN/CREAT SERPL: 4 (ref 12–20)
CALCIUM SERPL-MCNC: 8.4 MG/DL (ref 8.5–10.1)
CHLORIDE SERPL-SCNC: 104 MMOL/L (ref 100–111)
CO2 SERPL-SCNC: 30 MMOL/L (ref 21–32)
CREAT SERPL-MCNC: 3.95 MG/DL (ref 0.6–1.3)
ECHO BSA: 2.17 M2
GLUCOSE BLD STRIP.AUTO-MCNC: 100 MG/DL (ref 70–110)
GLUCOSE BLD STRIP.AUTO-MCNC: 96 MG/DL (ref 70–110)
GLUCOSE BLD STRIP.AUTO-MCNC: 98 MG/DL (ref 70–110)
GLUCOSE SERPL-MCNC: 90 MG/DL (ref 74–99)
POTASSIUM SERPL-SCNC: 3.3 MMOL/L (ref 3.5–5.5)
SODIUM SERPL-SCNC: 137 MMOL/L (ref 136–145)

## 2023-11-24 PROCEDURE — 6370000000 HC RX 637 (ALT 250 FOR IP): Performed by: INTERNAL MEDICINE

## 2023-11-24 PROCEDURE — 6370000000 HC RX 637 (ALT 250 FOR IP): Performed by: STUDENT IN AN ORGANIZED HEALTH CARE EDUCATION/TRAINING PROGRAM

## 2023-11-24 PROCEDURE — 2700000000 HC OXYGEN THERAPY PER DAY

## 2023-11-24 PROCEDURE — 90935 HEMODIALYSIS ONE EVALUATION: CPT

## 2023-11-24 PROCEDURE — C9113 INJ PANTOPRAZOLE SODIUM, VIA: HCPCS | Performed by: HOSPITALIST

## 2023-11-24 PROCEDURE — 82962 GLUCOSE BLOOD TEST: CPT

## 2023-11-24 PROCEDURE — 2140000001 HC CVICU INTERMEDIATE R&B

## 2023-11-24 PROCEDURE — 6370000000 HC RX 637 (ALT 250 FOR IP): Performed by: HOSPITALIST

## 2023-11-24 PROCEDURE — A4216 STERILE WATER/SALINE, 10 ML: HCPCS | Performed by: HOSPITALIST

## 2023-11-24 PROCEDURE — 6360000002 HC RX W HCPCS: Performed by: INTERNAL MEDICINE

## 2023-11-24 PROCEDURE — 2580000003 HC RX 258: Performed by: HOSPITALIST

## 2023-11-24 PROCEDURE — 6360000002 HC RX W HCPCS: Performed by: HOSPITALIST

## 2023-11-24 PROCEDURE — 2580000003 HC RX 258: Performed by: INTERNAL MEDICINE

## 2023-11-24 PROCEDURE — 80048 BASIC METABOLIC PNL TOTAL CA: CPT

## 2023-11-24 PROCEDURE — 2580000003 HC RX 258: Performed by: STUDENT IN AN ORGANIZED HEALTH CARE EDUCATION/TRAINING PROGRAM

## 2023-11-24 PROCEDURE — 99232 SBSQ HOSP IP/OBS MODERATE 35: CPT | Performed by: HOSPITALIST

## 2023-11-24 PROCEDURE — 93971 EXTREMITY STUDY: CPT

## 2023-11-24 PROCEDURE — 93971 EXTREMITY STUDY: CPT | Performed by: INTERNAL MEDICINE

## 2023-11-24 PROCEDURE — 94761 N-INVAS EAR/PLS OXIMETRY MLT: CPT

## 2023-11-24 PROCEDURE — 36415 COLL VENOUS BLD VENIPUNCTURE: CPT

## 2023-11-24 RX ADMIN — DRONABINOL 5 MG: 5 CAPSULE ORAL at 18:03

## 2023-11-24 RX ADMIN — PANTOPRAZOLE SODIUM 40 MG: 40 INJECTION, POWDER, FOR SOLUTION INTRAVENOUS at 18:01

## 2023-11-24 RX ADMIN — TAMSULOSIN HYDROCHLORIDE 0.4 MG: 0.4 CAPSULE ORAL at 14:20

## 2023-11-24 RX ADMIN — NEPHROCAP 1 MG: 1 CAP ORAL at 14:20

## 2023-11-24 RX ADMIN — SODIUM CHLORIDE, PRESERVATIVE FREE 10 ML: 5 INJECTION INTRAVENOUS at 14:27

## 2023-11-24 RX ADMIN — ALLOPURINOL 100 MG: 100 TABLET ORAL at 14:20

## 2023-11-24 RX ADMIN — DRONABINOL 5 MG: 5 CAPSULE ORAL at 06:54

## 2023-11-24 RX ADMIN — AMIODARONE HYDROCHLORIDE 200 MG: 200 TABLET ORAL at 14:28

## 2023-11-24 RX ADMIN — TROSPIUM CHLORIDE 20 MG: 20 TABLET, FILM COATED ORAL at 06:54

## 2023-11-24 RX ADMIN — MIDODRINE HYDROCHLORIDE 15 MG: 10 TABLET ORAL at 18:01

## 2023-11-24 RX ADMIN — SODIUM CHLORIDE, PRESERVATIVE FREE 10 ML: 5 INJECTION INTRAVENOUS at 21:31

## 2023-11-24 RX ADMIN — VANCOMYCIN HYDROCHLORIDE 125 MG: 1 INJECTION, POWDER, LYOPHILIZED, FOR SOLUTION INTRAVENOUS at 21:31

## 2023-11-24 RX ADMIN — ATORVASTATIN CALCIUM 80 MG: 40 TABLET, FILM COATED ORAL at 21:31

## 2023-11-24 RX ADMIN — LEVOTHYROXINE SODIUM 125 MCG: 125 TABLET ORAL at 06:54

## 2023-11-24 RX ADMIN — PANTOPRAZOLE SODIUM 40 MG: 40 INJECTION, POWDER, FOR SOLUTION INTRAVENOUS at 06:54

## 2023-11-24 RX ADMIN — MIDODRINE HYDROCHLORIDE 15 MG: 10 TABLET ORAL at 14:25

## 2023-11-24 RX ADMIN — VANCOMYCIN HYDROCHLORIDE 125 MG: 1 INJECTION, POWDER, LYOPHILIZED, FOR SOLUTION INTRAVENOUS at 14:24

## 2023-11-24 ASSESSMENT — PAIN SCALES - GENERAL
PAINLEVEL_OUTOF10: 0

## 2023-11-24 NOTE — PROGRESS NOTES
1930hrs:  Bedside and Verbal shift change report given to DEAN Castle (oncoming nurse) by Farshad Wilson RN (offgoing nurse). Report included the following information Nurse Handoff Report, Index, ED Encounter Summary, Adult Overview, Surgery Report, Intake/Output, MAR, Recent Results, and Cardiac Rhythm NSR . Pt in bed with eyes closed. Pt is easily aroused. No s/s distress noted. 0700hrs:  CHG bath completed. Pt tolerated procedure. 0800hrs:  Bedside and Verbal shift change report given to Mila Selby (oncoming nurse) by Abida Calvo RN (offgoing nurse). Report included the following information Nurse Handoff Report, Index, ED Encounter Summary, Adult Overview, Surgery Report, Intake/Output, MAR, Recent Results, and Cardiac Rhythm NSR .

## 2023-11-24 NOTE — CARE COORDINATION
Patient scheduled for discharge tomorrow. 11/25/2023. Transport has been arranged with [facility] # [phone]. Spoke with Clay and pick-up time is scheduled for 1130 am tomorrow.     [facility] RN CDCES  Case Management

## 2023-11-24 NOTE — PROGRESS NOTES
Lab notes state for Uric Acid lab test. Please order future labs for 11-18 appointment. Thanks, Josefa VALENZUELAT   Pt's wife called to enquire about patient. This RN informed wife patient was in dialysis. Wife asked RN to ensure patient has access personal phone  when back on unit.

## 2023-11-24 NOTE — PROGRESS NOTES
PT attempt x2. Unable to see patient as he is [facility] for HD. Will continue to follow.    Darrel Degroot PT, DPT

## 2023-11-24 NOTE — PROGRESS NOTES
Received pre HD report from  ASHELY Bowie RN. Pt in bed, A+O x4, on O2 via NC @ 1L, no s/s of distress noted. Accessed right CVC w/o complications. Tx initiated at 1007. CVC flowing with ease. For hemodynamic stability UF goal 2500 ml. Offered assistance with repositioning every 2 hours. Vascular access visible at all times during treatment, line connections intact at all times. Tx completed at 1308, tolerated well 2L removed. De-accessed per protocol. Heparin indwell 1.8ml in arterial, and 1.8ml in venous catheter. Unit nurse ASHELY Bowie, DEAN given report.

## 2023-11-24 NOTE — PLAN OF CARE
INTERVENTION:  HEMODYNAMIC STABILIZATION  MAINTAIN BP WNL WHILE ON HD. INTERVENTION:  FLUID MANAGEMENT  WILL ATTEMPT 2500 ML TOTAL FLUID REMOVAL AS TOLERATED. INTERVENTION:  METABOLIC/ELECTROLYTE MANAGEMENT  3.0 POTASSIUM 2.5 CALCIUM DIALYSATE USED WITH HD TODAY. INTERVENTION:  HEMODIALYSIS ACCESS SITE MANAGEMENT  RIGHT SIDE CVC ACCESSED USING ASEPTIC TECHNIQUE. GOAL:  SIGNS AND SYMPTOMS OF LISTED POTENTIAL PROBLEMS WILL BE ABSENT OR MANAGEABLE. OUTCOME:  PROGRESSING. HD PLANNED FOR 3 HOURS TODAY.

## 2023-11-25 VITALS
DIASTOLIC BLOOD PRESSURE: 61 MMHG | OXYGEN SATURATION: 100 % | BODY MASS INDEX: 33.39 KG/M2 | TEMPERATURE: 99.5 F | SYSTOLIC BLOOD PRESSURE: 112 MMHG | HEIGHT: 67 IN | HEART RATE: 74 BPM | RESPIRATION RATE: 18 BRPM | WEIGHT: 212.74 LBS

## 2023-11-25 LAB
ANION GAP SERPL CALC-SCNC: 3 MMOL/L (ref 3–18)
BUN SERPL-MCNC: 14 MG/DL (ref 7–18)
BUN/CREAT SERPL: 4 (ref 12–20)
CALCIUM SERPL-MCNC: 8.7 MG/DL (ref 8.5–10.1)
CHLORIDE SERPL-SCNC: 104 MMOL/L (ref 100–111)
CO2 SERPL-SCNC: 28 MMOL/L (ref 21–32)
CREAT SERPL-MCNC: 3.41 MG/DL (ref 0.6–1.3)
ERYTHROCYTE [DISTWIDTH] IN BLOOD BY AUTOMATED COUNT: 20.4 % (ref 11.6–14.5)
GLUCOSE BLD STRIP.AUTO-MCNC: 100 MG/DL (ref 70–110)
GLUCOSE SERPL-MCNC: 83 MG/DL (ref 74–99)
HCT VFR BLD AUTO: 27.7 % (ref 36–48)
HGB BLD-MCNC: 8.6 G/DL (ref 13–16)
MCH RBC QN AUTO: 33.1 PG (ref 24–34)
MCHC RBC AUTO-ENTMCNC: 31 G/DL (ref 31–37)
MCV RBC AUTO: 106.5 FL (ref 78–100)
NRBC # BLD: 0 K/UL (ref 0–0.01)
NRBC BLD-RTO: 0 PER 100 WBC
PLATELET # BLD AUTO: 224 K/UL (ref 135–420)
PMV BLD AUTO: 11.1 FL (ref 9.2–11.8)
POTASSIUM SERPL-SCNC: 4.6 MMOL/L (ref 3.5–5.5)
RBC # BLD AUTO: 2.6 M/UL (ref 4.35–5.65)
SODIUM SERPL-SCNC: 135 MMOL/L (ref 136–145)
WBC # BLD AUTO: 5.6 K/UL (ref 4.6–13.2)

## 2023-11-25 PROCEDURE — 6370000000 HC RX 637 (ALT 250 FOR IP): Performed by: STUDENT IN AN ORGANIZED HEALTH CARE EDUCATION/TRAINING PROGRAM

## 2023-11-25 PROCEDURE — 6360000002 HC RX W HCPCS: Performed by: INTERNAL MEDICINE

## 2023-11-25 PROCEDURE — 6370000000 HC RX 637 (ALT 250 FOR IP): Performed by: HOSPITALIST

## 2023-11-25 PROCEDURE — 2580000003 HC RX 258: Performed by: HOSPITALIST

## 2023-11-25 PROCEDURE — A4216 STERILE WATER/SALINE, 10 ML: HCPCS | Performed by: HOSPITALIST

## 2023-11-25 PROCEDURE — 99239 HOSP IP/OBS DSCHRG MGMT >30: CPT | Performed by: HOSPITALIST

## 2023-11-25 PROCEDURE — 85027 COMPLETE CBC AUTOMATED: CPT

## 2023-11-25 PROCEDURE — 2580000003 HC RX 258: Performed by: INTERNAL MEDICINE

## 2023-11-25 PROCEDURE — 80048 BASIC METABOLIC PNL TOTAL CA: CPT

## 2023-11-25 PROCEDURE — 82962 GLUCOSE BLOOD TEST: CPT

## 2023-11-25 PROCEDURE — 6360000002 HC RX W HCPCS: Performed by: HOSPITALIST

## 2023-11-25 PROCEDURE — 36415 COLL VENOUS BLD VENIPUNCTURE: CPT

## 2023-11-25 PROCEDURE — C9113 INJ PANTOPRAZOLE SODIUM, VIA: HCPCS | Performed by: HOSPITALIST

## 2023-11-25 PROCEDURE — 2580000003 HC RX 258: Performed by: STUDENT IN AN ORGANIZED HEALTH CARE EDUCATION/TRAINING PROGRAM

## 2023-11-25 RX ORDER — LANOLIN ALCOHOL/MO/W.PET/CERES
6 CREAM (GRAM) TOPICAL NIGHTLY PRN
Qty: 10 TABLET | Refills: 0 | Status: SHIPPED | OUTPATIENT
Start: 2023-11-25 | End: 2023-11-30

## 2023-11-25 RX ORDER — MIDODRINE HYDROCHLORIDE 10 MG/1
10 TABLET ORAL 3 TIMES DAILY
Qty: 90 TABLET | Refills: 0 | Status: SHIPPED | OUTPATIENT
Start: 2023-11-25 | End: 2023-12-25

## 2023-11-25 RX ORDER — DRONABINOL 5 MG/1
5 CAPSULE ORAL
Qty: 30 CAPSULE | Refills: 0 | Status: SHIPPED | OUTPATIENT
Start: 2023-11-25 | End: 2023-12-10

## 2023-11-25 RX ORDER — TRAMADOL HYDROCHLORIDE 50 MG/1
25 TABLET ORAL EVERY 12 HOURS PRN
Qty: 3 TABLET | Refills: 0 | Status: SHIPPED | OUTPATIENT
Start: 2023-11-25 | End: 2023-11-28

## 2023-11-25 RX ORDER — SIMETHICONE 80 MG
80 TABLET,CHEWABLE ORAL EVERY 6 HOURS PRN
Qty: 30 TABLET | Refills: 0 | Status: SHIPPED | OUTPATIENT
Start: 2023-11-25

## 2023-11-25 RX ADMIN — TROSPIUM CHLORIDE 20 MG: 20 TABLET, FILM COATED ORAL at 05:56

## 2023-11-25 RX ADMIN — VANCOMYCIN HYDROCHLORIDE 125 MG: 1 INJECTION, POWDER, LYOPHILIZED, FOR SOLUTION INTRAVENOUS at 10:06

## 2023-11-25 RX ADMIN — DRONABINOL 5 MG: 5 CAPSULE ORAL at 05:56

## 2023-11-25 RX ADMIN — SODIUM CHLORIDE, PRESERVATIVE FREE 10 ML: 5 INJECTION INTRAVENOUS at 09:29

## 2023-11-25 RX ADMIN — MIDODRINE HYDROCHLORIDE 15 MG: 10 TABLET ORAL at 09:26

## 2023-11-25 RX ADMIN — PANTOPRAZOLE SODIUM 40 MG: 40 INJECTION, POWDER, FOR SOLUTION INTRAVENOUS at 05:56

## 2023-11-25 RX ADMIN — LEVOTHYROXINE SODIUM 125 MCG: 125 TABLET ORAL at 06:21

## 2023-11-25 RX ADMIN — AMIODARONE HYDROCHLORIDE 200 MG: 200 TABLET ORAL at 09:27

## 2023-11-25 RX ADMIN — TAMSULOSIN HYDROCHLORIDE 0.4 MG: 0.4 CAPSULE ORAL at 09:27

## 2023-11-25 RX ADMIN — ACETAMINOPHEN 325MG 650 MG: 325 TABLET ORAL at 10:09

## 2023-11-25 RX ADMIN — NEPHROCAP 1 MG: 1 CAP ORAL at 09:28

## 2023-11-25 ASSESSMENT — PAIN DESCRIPTION - DESCRIPTORS: DESCRIPTORS: SHOOTING

## 2023-11-25 ASSESSMENT — PAIN DESCRIPTION - ORIENTATION: ORIENTATION: LEFT

## 2023-11-25 ASSESSMENT — PAIN SCALES - GENERAL
PAINLEVEL_OUTOF10: 0
PAINLEVEL_OUTOF10: 3
PAINLEVEL_OUTOF10: 0
PAINLEVEL_OUTOF10: 0

## 2023-11-25 ASSESSMENT — PAIN DESCRIPTION - LOCATION: LOCATION: LEG

## 2023-11-25 NOTE — PROGRESS NOTES
0700: Bedside and Verbal shift change report given to Ghassan Franco RN (oncoming nurse) by Tr Guillen RN (offgoing nurse). Report included the following information Nurse Handoff Report, Adult Overview, Recent Results, Cardiac Rhythm NSR, Neuro Assessment, and Event Log.      1100: Transport called and gave ETA for pt  for discharge and transfer to [facility]. Two IV removed, no complications. 1130: Discharge paper work given to transport with hard copy scripts for medication. Talked to spouse of pt on telephone regarding discharge and transfer to rehab. 1145: Pt transported off unit with two bag of belongings and discharge paperwork.

## 2023-11-25 NOTE — PROGRESS NOTES
Patient seen and evaluated, lying in bed, no acute distress. Discussed with case management, anticipate discharge to skilled nursing facility today. Spoke with patient, he is agreeable to go to skilled nursing facility. Will place discharge orders.

## 2023-11-25 NOTE — PLAN OF CARE
report changes in neurological status     Problem: Respiratory - Adult  Goal: Achieves optimal ventilation and oxygenation  Outcome: Completed  Flowsheets  Taken 11/25/2023 0845 by Gume Chirinos RN  Achieves optimal ventilation and oxygenation:   Assess for changes in respiratory status   Assess for changes in mentation and behavior   Position to facilitate oxygenation and minimize respiratory effort   Oxygen supplementation based on oxygen saturation or arterial blood gases   Respiratory therapy support as indicated  Taken 11/25/2023 0032 by Raad Crow RN  Achieves optimal ventilation and oxygenation: Assess for changes in respiratory status     Problem: Skin/Tissue Integrity - Adult  Goal: Skin integrity remains intact  Outcome: Completed  Flowsheets  Taken 11/25/2023 0845 by Gume Chirinos RN  Skin Integrity Remains Intact:   Assess vascular access sites hourly   Every 4-6 hours minimum: Change oxygen saturation probe site   Monitor for areas of redness and/or skin breakdown   Every 4-6 hours: If on nasal continuous positive airway pressure, respiratory therapy assesses nares and determine need for appliance change or resting period  Taken 11/25/2023 0032 by Raad Crow RN  Skin Integrity Remains Intact: Monitor for areas of redness and/or skin breakdown  Goal: Incisions, wounds, or drain sites healing without S/S of infection  Outcome: Completed  Flowsheets (Taken 11/25/2023 0845)  Incisions, Wounds, or Drain Sites Healing Without Sign and Symptoms of Infection: ADMISSION and DAILY: Assess and document risk factors for pressure ulcer development     Problem: Musculoskeletal - Adult  Goal: Return mobility to safest level of function  Outcome: Completed  Flowsheets  Taken 11/25/2023 0845 by Gume Chirinos RN  Return Mobility to Safest Level of Function:   Assess patient stability and activity tolerance for standing, transferring and ambulating with or without assistive devices   Assist with transfers and ambulation using safe patient handling equipment as needed   Ensure adequate protection for wounds/incisions during mobilization  Taken 11/25/2023 0032 by Dede Carroll RN  Return Mobility to Safest Level of Function:   Assess patient stability and activity tolerance for standing, transferring and ambulating with or without assistive devices   Assist with transfers and ambulation using safe patient handling equipment as needed  Goal: Return ADL status to a safe level of function  Outcome: Completed  Flowsheets  Taken 11/25/2023 0845 by Bienvenido Cortés RN  Return ADL Status to a Safe Level of Function:   Administer medication as ordered   Assess activities of daily living deficits and provide assistive devices as needed   Assist and instruct patient to increase activity and self care as tolerated  Taken 11/25/2023 0032 by Dede Carroll RN  Return ADL Status to a Safe Level of Function:   Assess activities of daily living deficits and provide assistive devices as needed   Administer medication as ordered     Problem: Gastrointestinal - Adult  Goal: Minimal or absence of nausea and vomiting  Outcome: Completed  Flowsheets (Taken 11/25/2023 0845)  Minimal or absence of nausea and vomiting:   Administer IV fluids as ordered to ensure adequate hydration   Administer ordered antiemetic medications as needed   Provide nonpharmacologic comfort measures as appropriate  Goal: Maintains or returns to baseline bowel function  Outcome: Completed  Flowsheets (Taken 11/25/2023 0845)  Maintains or returns to baseline bowel function:   Assess bowel function   Encourage oral fluids to ensure adequate hydration   Administer IV fluids as ordered to ensure adequate hydration  Goal: Maintains adequate nutritional intake  Outcome: Completed  Flowsheets (Taken 11/25/2023 0845)  Maintains adequate nutritional intake:   Monitor percentage of each meal consumed   Identify factors contributing to decreased intake, treat as appropriate   Assist with

## 2023-11-25 NOTE — PROGRESS NOTES
1900: Bedside and Verbal shift change report given to DEAN Johnson (oncoming nurse) by Keny Arellano RN (offgoing nurse). Report included the following information Nurse Handoff Report, Index, Adult Overview, Intake/Output, MAR, Recent Results, and Cardiac Rhythm NSR . Evening medications and assessment completed. Pt with no complaints of pain or discomfort at this time. 2130: Pt repositioned at this time. 2350: Bedside and Verbal shift change report given to Raj Hedrick RN (oncoming nurse) by DEAN Johnson (offgoing nurse). Report included the following information Nurse Handoff Report, Index, Adult Overview, Intake/Output, MAR, Recent Results, and Cardiac Rhythm NSR. Paul Bloom